# Patient Record
Sex: MALE | Race: BLACK OR AFRICAN AMERICAN | Employment: UNEMPLOYED | URBAN - METROPOLITAN AREA
[De-identification: names, ages, dates, MRNs, and addresses within clinical notes are randomized per-mention and may not be internally consistent; named-entity substitution may affect disease eponyms.]

---

## 2017-12-27 ENCOUNTER — APPOINTMENT (OUTPATIENT)
Dept: GENERAL RADIOLOGY | Age: 42
DRG: 720 | End: 2017-12-27
Payer: MEDICAID

## 2017-12-27 ENCOUNTER — APPOINTMENT (OUTPATIENT)
Dept: CT IMAGING | Age: 42
DRG: 720 | End: 2017-12-27
Payer: MEDICAID

## 2017-12-27 ENCOUNTER — HOSPITAL ENCOUNTER (INPATIENT)
Age: 42
LOS: 2 days | Discharge: HOME HEALTH | DRG: 720 | End: 2017-12-29
Attending: EMERGENCY MEDICINE | Admitting: INTERNAL MEDICINE
Payer: MEDICAID

## 2017-12-27 DIAGNOSIS — T83.511S URINARY TRACT INFECTION ASSOCIATED WITH CATHETERIZATION OF URINARY TRACT, UNSPECIFIED INDWELLING URINARY CATHETER TYPE, SEQUELA: ICD-10-CM

## 2017-12-27 DIAGNOSIS — A41.9 SEPSIS, DUE TO UNSPECIFIED ORGANISM: Primary | ICD-10-CM

## 2017-12-27 DIAGNOSIS — N39.0 URINARY TRACT INFECTION ASSOCIATED WITH CATHETERIZATION OF URINARY TRACT, UNSPECIFIED INDWELLING URINARY CATHETER TYPE, SEQUELA: ICD-10-CM

## 2017-12-27 PROBLEM — G93.40 ENCEPHALOPATHY ACUTE: Status: ACTIVE | Noted: 2017-12-27

## 2017-12-27 LAB
-: NORMAL
ABSOLUTE EOS #: <0.03 K/UL (ref 0–0.44)
ABSOLUTE IMMATURE GRANULOCYTE: 0.08 K/UL (ref 0–0.3)
ABSOLUTE LYMPH #: 1.33 K/UL (ref 1.1–3.7)
ABSOLUTE MONO #: 1.04 K/UL (ref 0.1–1.2)
ALBUMIN SERPL-MCNC: 3.3 G/DL (ref 3.5–5.2)
ALBUMIN SERPL-MCNC: 4 G/DL (ref 3.5–5.2)
ALBUMIN/GLOBULIN RATIO: 1.1 (ref 1–2.5)
ALBUMIN/GLOBULIN RATIO: 1.3 (ref 1–2.5)
ALLEN TEST: ABNORMAL
ALP BLD-CCNC: 100 U/L (ref 40–129)
ALP BLD-CCNC: 101 U/L (ref 40–129)
ALT SERPL-CCNC: 115 U/L (ref 5–41)
ALT SERPL-CCNC: 133 U/L (ref 5–41)
AMMONIA: 35 UMOL/L (ref 16–60)
AMMONIA: 48 UMOL/L (ref 16–60)
AMORPHOUS: NORMAL
AMPHETAMINE SCREEN URINE: NEGATIVE
AMYLASE: 27 U/L (ref 28–100)
ANION GAP SERPL CALCULATED.3IONS-SCNC: 15 MMOL/L (ref 9–17)
ANION GAP SERPL CALCULATED.3IONS-SCNC: 15 MMOL/L (ref 9–17)
ANION GAP: 7 MMOL/L (ref 7–16)
AST SERPL-CCNC: 24 U/L
AST SERPL-CCNC: 53 U/L
BACTERIA: NORMAL
BARBITURATE SCREEN URINE: POSITIVE
BASOPHILS # BLD: 0 % (ref 0–2)
BASOPHILS ABSOLUTE: 0.03 K/UL (ref 0–0.2)
BENZODIAZEPINE SCREEN, URINE: POSITIVE
BILIRUB SERPL-MCNC: 0.65 MG/DL (ref 0.3–1.2)
BILIRUB SERPL-MCNC: 0.84 MG/DL (ref 0.3–1.2)
BILIRUBIN DIRECT: 0.23 MG/DL
BILIRUBIN URINE: NEGATIVE
BILIRUBIN, INDIRECT: 0.42 MG/DL (ref 0–1)
BUN BLDV-MCNC: 9 MG/DL (ref 6–20)
BUN BLDV-MCNC: 9 MG/DL (ref 6–20)
BUN/CREAT BLD: ABNORMAL (ref 9–20)
BUN/CREAT BLD: ABNORMAL (ref 9–20)
BUPRENORPHINE URINE: ABNORMAL
CALCIUM SERPL-MCNC: 8.6 MG/DL (ref 8.6–10.4)
CALCIUM SERPL-MCNC: 9 MG/DL (ref 8.6–10.4)
CANNABINOID SCREEN URINE: POSITIVE
CASTS UA: NORMAL /LPF (ref 0–8)
CHLORIDE BLD-SCNC: 104 MMOL/L (ref 98–107)
CHLORIDE BLD-SCNC: 99 MMOL/L (ref 98–107)
CO2: 21 MMOL/L (ref 20–31)
CO2: 26 MMOL/L (ref 20–31)
COCAINE METABOLITE, URINE: NEGATIVE
COLOR: ABNORMAL
COMMENT UA: ABNORMAL
CORTISOL COLLECTION INFO: NORMAL
CORTISOL: 16.6 UG/DL (ref 2.7–18.4)
CREAT SERPL-MCNC: 0.6 MG/DL (ref 0.7–1.2)
CREAT SERPL-MCNC: 0.73 MG/DL (ref 0.7–1.2)
CRYSTALS, UA: NORMAL /HPF
DIFFERENTIAL TYPE: ABNORMAL
DIRECT EXAM: NORMAL
EOSINOPHILS RELATIVE PERCENT: 0 % (ref 1–4)
EPITHELIAL CELLS UA: NORMAL /HPF (ref 0–5)
FIO2: ABNORMAL
GFR AFRICAN AMERICAN: >60 ML/MIN
GFR AFRICAN AMERICAN: >60 ML/MIN
GFR NON-AFRICAN AMERICAN: >60 ML/MIN
GFR SERPL CREATININE-BSD FRML MDRD: >60 ML/MIN
GFR SERPL CREATININE-BSD FRML MDRD: ABNORMAL ML/MIN/{1.73_M2}
GFR SERPL CREATININE-BSD FRML MDRD: NORMAL ML/MIN/{1.73_M2}
GLOBULIN: ABNORMAL G/DL (ref 1.5–3.8)
GLUCOSE BLD-MCNC: 108 MG/DL (ref 70–99)
GLUCOSE BLD-MCNC: 119 MG/DL (ref 70–99)
GLUCOSE BLD-MCNC: 129 MG/DL (ref 74–100)
GLUCOSE URINE: NEGATIVE
HCO3 VENOUS: 24.2 MMOL/L (ref 22–29)
HCT VFR BLD CALC: 47.5 % (ref 40.7–50.3)
HEMOGLOBIN: 15.6 G/DL (ref 13–17)
IMMATURE GRANULOCYTES: 1 %
KETONES, URINE: NEGATIVE
LACTIC ACID, WHOLE BLOOD: 1.7 MMOL/L (ref 0.7–2.1)
LACTIC ACID, WHOLE BLOOD: 2 MMOL/L (ref 0.7–2.1)
LACTIC ACID, WHOLE BLOOD: 2.8 MMOL/L (ref 0.7–2.1)
LACTIC ACID, WHOLE BLOOD: 2.9 MMOL/L (ref 0.7–2.1)
LACTIC ACID: ABNORMAL MMOL/L
LACTIC ACID: ABNORMAL MMOL/L
LACTIC ACID: NORMAL MMOL/L
LEUKOCYTE ESTERASE, URINE: ABNORMAL
LIPASE: 12 U/L (ref 13–60)
LYMPHOCYTES # BLD: 9 % (ref 24–43)
Lab: NORMAL
Lab: NORMAL
MCH RBC QN AUTO: 31.1 PG (ref 25.2–33.5)
MCHC RBC AUTO-ENTMCNC: 32.8 G/DL (ref 28.4–34.8)
MCV RBC AUTO: 94.6 FL (ref 82.6–102.9)
MDMA URINE: ABNORMAL
METHADONE SCREEN, URINE: NEGATIVE
METHAMPHETAMINE, URINE: ABNORMAL
MODE: ABNORMAL
MONOCYTES # BLD: 7 % (ref 3–12)
MUCUS: NORMAL
NEGATIVE BASE EXCESS, VEN: ABNORMAL (ref 0–2)
NITRITE, URINE: NEGATIVE
O2 DEVICE/FLOW/%: ABNORMAL
O2 SAT, VEN: 67 % (ref 60–85)
OPIATES, URINE: POSITIVE
OTHER OBSERVATIONS UA: NORMAL
OXYCODONE SCREEN URINE: NEGATIVE
PATIENT TEMP: ABNORMAL
PCO2, VEN: 39 MM HG (ref 41–51)
PDW BLD-RTO: 13.3 % (ref 11.8–14.4)
PH UA: 7.5 (ref 5–8)
PH VENOUS: 7.4 (ref 7.32–7.43)
PHENCYCLIDINE, URINE: NEGATIVE
PLATELET # BLD: 214 K/UL (ref 138–453)
PLATELET ESTIMATE: ABNORMAL
PMV BLD AUTO: 11.5 FL (ref 8.1–13.5)
PO2, VEN: 34.7 MM HG (ref 30–50)
POC CHLORIDE: 109 MMOL/L (ref 98–107)
POC CREATININE: 0.92 MG/DL (ref 0.51–1.19)
POC HEMATOCRIT: 52 % (ref 41–53)
POC HEMOGLOBIN: 17.8 G/DL (ref 13.5–17.5)
POC IONIZED CALCIUM: 1.06 MMOL/L (ref 1.15–1.33)
POC LACTIC ACID: 2.52 MMOL/L (ref 0.56–1.39)
POC PCO2 TEMP: ABNORMAL MM HG
POC PH TEMP: ABNORMAL
POC PO2 TEMP: ABNORMAL MM HG
POC POTASSIUM: 4.4 MMOL/L (ref 3.5–4.5)
POC SODIUM: 140 MMOL/L (ref 138–146)
POC TROPONIN I: 0 NG/ML (ref 0–0.1)
POC TROPONIN I: 0 NG/ML (ref 0–0.1)
POC TROPONIN INTERP: NORMAL
POC TROPONIN INTERP: NORMAL
POSITIVE BASE EXCESS, VEN: 0 (ref 0–3)
POTASSIUM SERPL-SCNC: 3.3 MMOL/L (ref 3.7–5.3)
POTASSIUM SERPL-SCNC: 4.5 MMOL/L (ref 3.7–5.3)
PROPOXYPHENE, URINE: ABNORMAL
PROTEIN UA: ABNORMAL
RBC # BLD: 5.02 M/UL (ref 4.21–5.77)
RBC # BLD: ABNORMAL 10*6/UL
RBC UA: NORMAL /HPF (ref 0–4)
RENAL EPITHELIAL, UA: NORMAL /HPF
SAMPLE SITE: ABNORMAL
SEG NEUTROPHILS: 83 % (ref 36–65)
SEGMENTED NEUTROPHILS ABSOLUTE COUNT: 12.15 K/UL (ref 1.5–8.1)
SODIUM BLD-SCNC: 140 MMOL/L (ref 135–144)
SODIUM BLD-SCNC: 140 MMOL/L (ref 135–144)
SPECIFIC GRAVITY UA: 1.03 (ref 1–1.03)
SPECIMEN DESCRIPTION: NORMAL
SPECIMEN DESCRIPTION: NORMAL
STATUS: NORMAL
STATUS: NORMAL
TEST INFORMATION: ABNORMAL
TOTAL CK: 243 U/L (ref 39–308)
TOTAL CO2, VENOUS: 25 MMOL/L (ref 23–30)
TOTAL PROTEIN: 6.2 G/DL (ref 6.4–8.3)
TOTAL PROTEIN: 7 G/DL (ref 6.4–8.3)
TRICHOMONAS: NORMAL
TRICYCLIC ANTIDEPRESSANTS, UR: ABNORMAL
TROPONIN INTERP: NORMAL
TROPONIN T: <0.03 NG/ML
TURBIDITY: CLEAR
URINE HGB: ABNORMAL
UROBILINOGEN, URINE: NORMAL
WBC # BLD: 14.6 K/UL (ref 3.5–11.3)
WBC # BLD: ABNORMAL 10*3/UL
WBC UA: NORMAL /HPF (ref 0–5)
YEAST: NORMAL

## 2017-12-27 PROCEDURE — 99285 EMERGENCY DEPT VISIT HI MDM: CPT

## 2017-12-27 PROCEDURE — 80076 HEPATIC FUNCTION PANEL: CPT

## 2017-12-27 PROCEDURE — 71010 XR CHEST PORTABLE: CPT

## 2017-12-27 PROCEDURE — G0480 DRUG TEST DEF 1-7 CLASSES: HCPCS

## 2017-12-27 PROCEDURE — 93005 ELECTROCARDIOGRAM TRACING: CPT

## 2017-12-27 PROCEDURE — 80307 DRUG TEST PRSMV CHEM ANLYZR: CPT

## 2017-12-27 PROCEDURE — 83605 ASSAY OF LACTIC ACID: CPT

## 2017-12-27 PROCEDURE — 80053 COMPREHEN METABOLIC PANEL: CPT

## 2017-12-27 PROCEDURE — 84132 ASSAY OF SERUM POTASSIUM: CPT

## 2017-12-27 PROCEDURE — 6360000002 HC RX W HCPCS: Performed by: EMERGENCY MEDICINE

## 2017-12-27 PROCEDURE — 87186 SC STD MICRODIL/AGAR DIL: CPT

## 2017-12-27 PROCEDURE — 82435 ASSAY OF BLOOD CHLORIDE: CPT

## 2017-12-27 PROCEDURE — 82947 ASSAY GLUCOSE BLOOD QUANT: CPT

## 2017-12-27 PROCEDURE — 84484 ASSAY OF TROPONIN QUANT: CPT

## 2017-12-27 PROCEDURE — 87077 CULTURE AEROBIC IDENTIFY: CPT

## 2017-12-27 PROCEDURE — 86403 PARTICLE AGGLUT ANTBDY SCRN: CPT

## 2017-12-27 PROCEDURE — 87149 DNA/RNA DIRECT PROBE: CPT

## 2017-12-27 PROCEDURE — 84295 ASSAY OF SERUM SODIUM: CPT

## 2017-12-27 PROCEDURE — 87449 NOS EACH ORGANISM AG IA: CPT

## 2017-12-27 PROCEDURE — 82803 BLOOD GASES ANY COMBINATION: CPT

## 2017-12-27 PROCEDURE — 87205 SMEAR GRAM STAIN: CPT

## 2017-12-27 PROCEDURE — 82140 ASSAY OF AMMONIA: CPT

## 2017-12-27 PROCEDURE — 2060000000 HC ICU INTERMEDIATE R&B

## 2017-12-27 PROCEDURE — 82565 ASSAY OF CREATININE: CPT

## 2017-12-27 PROCEDURE — 6370000000 HC RX 637 (ALT 250 FOR IP): Performed by: STUDENT IN AN ORGANIZED HEALTH CARE EDUCATION/TRAINING PROGRAM

## 2017-12-27 PROCEDURE — 80048 BASIC METABOLIC PNL TOTAL CA: CPT

## 2017-12-27 PROCEDURE — 82533 TOTAL CORTISOL: CPT

## 2017-12-27 PROCEDURE — 2500000003 HC RX 250 WO HCPCS: Performed by: EMERGENCY MEDICINE

## 2017-12-27 PROCEDURE — 36415 COLL VENOUS BLD VENIPUNCTURE: CPT

## 2017-12-27 PROCEDURE — 83690 ASSAY OF LIPASE: CPT

## 2017-12-27 PROCEDURE — 87086 URINE CULTURE/COLONY COUNT: CPT

## 2017-12-27 PROCEDURE — G8987 SELF CARE CURRENT STATUS: HCPCS

## 2017-12-27 PROCEDURE — 82150 ASSAY OF AMYLASE: CPT

## 2017-12-27 PROCEDURE — 6360000002 HC RX W HCPCS: Performed by: STUDENT IN AN ORGANIZED HEALTH CARE EDUCATION/TRAINING PROGRAM

## 2017-12-27 PROCEDURE — 82550 ASSAY OF CK (CPK): CPT

## 2017-12-27 PROCEDURE — 81001 URINALYSIS AUTO W/SCOPE: CPT

## 2017-12-27 PROCEDURE — 82330 ASSAY OF CALCIUM: CPT

## 2017-12-27 PROCEDURE — 85014 HEMATOCRIT: CPT

## 2017-12-27 PROCEDURE — 70450 CT HEAD/BRAIN W/O DYE: CPT

## 2017-12-27 PROCEDURE — 87899 AGENT NOS ASSAY W/OPTIC: CPT

## 2017-12-27 PROCEDURE — 2580000003 HC RX 258: Performed by: STUDENT IN AN ORGANIZED HEALTH CARE EDUCATION/TRAINING PROGRAM

## 2017-12-27 PROCEDURE — 97167 OT EVAL HIGH COMPLEX 60 MIN: CPT

## 2017-12-27 PROCEDURE — 6370000000 HC RX 637 (ALT 250 FOR IP): Performed by: EMERGENCY MEDICINE

## 2017-12-27 PROCEDURE — 2580000003 HC RX 258: Performed by: EMERGENCY MEDICINE

## 2017-12-27 PROCEDURE — G8988 SELF CARE GOAL STATUS: HCPCS

## 2017-12-27 PROCEDURE — 87040 BLOOD CULTURE FOR BACTERIA: CPT

## 2017-12-27 PROCEDURE — 85025 COMPLETE CBC W/AUTO DIFF WBC: CPT

## 2017-12-27 PROCEDURE — 97530 THERAPEUTIC ACTIVITIES: CPT

## 2017-12-27 RX ORDER — ALBUTEROL SULFATE 2.5 MG/3ML
2.5 SOLUTION RESPIRATORY (INHALATION) EVERY 4 HOURS PRN
Status: DISCONTINUED | OUTPATIENT
Start: 2017-12-27 | End: 2017-12-29 | Stop reason: HOSPADM

## 2017-12-27 RX ORDER — 0.9 % SODIUM CHLORIDE 0.9 %
1000 INTRAVENOUS SOLUTION INTRAVENOUS ONCE
Status: COMPLETED | OUTPATIENT
Start: 2017-12-27 | End: 2017-12-27

## 2017-12-27 RX ORDER — ACETAMINOPHEN 650 MG/1
650 SUPPOSITORY RECTAL ONCE
Status: COMPLETED | OUTPATIENT
Start: 2017-12-27 | End: 2017-12-27

## 2017-12-27 RX ORDER — PRIMIDONE 50 MG/1
50 TABLET ORAL 2 TIMES DAILY
Status: DISCONTINUED | OUTPATIENT
Start: 2017-12-27 | End: 2017-12-29 | Stop reason: HOSPADM

## 2017-12-27 RX ORDER — POTASSIUM CHLORIDE 20 MEQ/1
40 TABLET, EXTENDED RELEASE ORAL 2 TIMES DAILY WITH MEALS
Status: COMPLETED | OUTPATIENT
Start: 2017-12-27 | End: 2017-12-28

## 2017-12-27 RX ORDER — AZITHROMYCIN 250 MG/1
1000 TABLET, FILM COATED ORAL ONCE
Status: DISCONTINUED | OUTPATIENT
Start: 2017-12-27 | End: 2017-12-27

## 2017-12-27 RX ORDER — ACETAMINOPHEN 325 MG/1
650 TABLET ORAL EVERY 4 HOURS PRN
Status: DISCONTINUED | OUTPATIENT
Start: 2017-12-27 | End: 2017-12-29 | Stop reason: HOSPADM

## 2017-12-27 RX ORDER — SODIUM CHLORIDE 0.9 % (FLUSH) 0.9 %
10 SYRINGE (ML) INJECTION PRN
Status: DISCONTINUED | OUTPATIENT
Start: 2017-12-27 | End: 2017-12-29 | Stop reason: HOSPADM

## 2017-12-27 RX ORDER — SODIUM CHLORIDE 9 MG/ML
INJECTION, SOLUTION INTRAVENOUS CONTINUOUS
Status: DISCONTINUED | OUTPATIENT
Start: 2017-12-27 | End: 2017-12-29 | Stop reason: HOSPADM

## 2017-12-27 RX ORDER — SODIUM CHLORIDE 0.9 % (FLUSH) 0.9 %
10 SYRINGE (ML) INJECTION EVERY 12 HOURS SCHEDULED
Status: DISCONTINUED | OUTPATIENT
Start: 2017-12-27 | End: 2017-12-29 | Stop reason: HOSPADM

## 2017-12-27 RX ADMIN — SODIUM CHLORIDE 1000 ML: 9 INJECTION, SOLUTION INTRAVENOUS at 10:12

## 2017-12-27 RX ADMIN — PRIMIDONE 50 MG: 50 TABLET ORAL at 20:35

## 2017-12-27 RX ADMIN — CEFTRIAXONE SODIUM 1 G: 1 INJECTION, POWDER, FOR SOLUTION INTRAMUSCULAR; INTRAVENOUS at 16:58

## 2017-12-27 RX ADMIN — CEFTRIAXONE SODIUM 1 G: 1 INJECTION, POWDER, FOR SOLUTION INTRAMUSCULAR; INTRAVENOUS at 11:07

## 2017-12-27 RX ADMIN — SODIUM CHLORIDE 1000 ML: 9 INJECTION, SOLUTION INTRAVENOUS at 11:07

## 2017-12-27 RX ADMIN — ENOXAPARIN SODIUM 40 MG: 40 INJECTION SUBCUTANEOUS at 16:57

## 2017-12-27 RX ADMIN — SODIUM CHLORIDE: 9 INJECTION, SOLUTION INTRAVENOUS at 16:57

## 2017-12-27 RX ADMIN — ACETAMINOPHEN 650 MG: 650 SUPPOSITORY RECTAL at 10:17

## 2017-12-27 RX ADMIN — POTASSIUM CHLORIDE 40 MEQ: 20 TABLET, EXTENDED RELEASE ORAL at 18:48

## 2017-12-27 RX ADMIN — SODIUM CHLORIDE 1000 ML: 9 INJECTION, SOLUTION INTRAVENOUS at 15:59

## 2017-12-27 RX ADMIN — AZITHROMYCIN MONOHYDRATE 500 MG: 500 INJECTION, POWDER, LYOPHILIZED, FOR SOLUTION INTRAVENOUS at 12:47

## 2017-12-27 ASSESSMENT — PAIN DESCRIPTION - PAIN TYPE: TYPE: ACUTE PAIN

## 2017-12-27 ASSESSMENT — PAIN SCALES - GENERAL: PAINLEVEL_OUTOF10: 0

## 2017-12-27 ASSESSMENT — PAIN DESCRIPTION - FREQUENCY: FREQUENCY: CONTINUOUS

## 2017-12-27 ASSESSMENT — PAIN DESCRIPTION - LOCATION: LOCATION: PENIS

## 2017-12-27 ASSESSMENT — PAIN DESCRIPTION - DESCRIPTORS: DESCRIPTORS: BURNING;TENDER

## 2017-12-27 ASSESSMENT — PAIN SCALES - WONG BAKER: WONGBAKER_NUMERICALRESPONSE: 2

## 2017-12-27 NOTE — H&P
congestion. Visualized osseous structures appear intact, and grossly unremarkable, given the non dedicated imaging. No radiographic evidence for acute cardiopulmonary disease process. ASSESSMENT/PLAN:  1. Sepsis. Source is urine. May have a developing pneumonia as well which is not evident on the chest x-ray. .  #2 acute metabolic encephalopathy  #3 multiple sclerosis. At baseline he is wheelchair bound and incontinent  #4 severe dehydration  #5 hyperlactatemia which has resolved after getting IV fluids      · - Patient got a dose of Rocephin and azithromycin in the emergency room. Continue Rocephin. Decide about next dose of azithromycin tomorrow. · Follow urine culture and blood culture  · Influenza A and B antigens, Legionella urine antigen, streptococcal antigen  · Patient has got 2 L of fluid boluses in the emergency room. We will give another fluid bolus on the floor and then continue fluids at 150 mL per hour  · Monitor intake and output daily weights, BMP, lactic acid  · DVT prophylaxis  · Neurology consultation for multiple sclerosis  · Tylenol to keep patient normothermic  · GI prophylaxis  · Assess mentation periodically. Keep nothing by mouth for now. · Oxygen inhalation to keep saturation above 92%.   When necessary albuterol nebulization for wheezing  · Follow-up toxic screen          Ava Burks MD  Internal Medicine, PGY2 Resident  [facility]

## 2017-12-27 NOTE — ED NOTES
Family arrives at bedside, Resident at bedside to speak with family, will continue to monitor      Jaron Riddle RN  12/27/17 [id number]

## 2017-12-27 NOTE — ED NOTES
Patient resting on stretcher, pt responds to verbal, will continue to monitor      Jake Bajwa RN  12/27/17 [id number]

## 2017-12-27 NOTE — ED PROVIDER NOTES
nasal spray 1 spray by Nasal route daily 3/10/16   Ying Raza, DO   albuterol (PROVENTIL) (2.5 MG/3ML) 0.083% nebulizer solution Take 3 mLs by nebulization every 4 hours as needed for Wheezing 3/10/16   Ying Raza, DO   Catheters (GIZMO CONDOM CATHETER) MISC 1 Units by Does not apply route daily 3/10/16   Ying Raza, DO   Incontinence Supplies (URINARY DRAINAGE BAG) MISC 1 Units by Other route once a week Change fry bag at least once weekly. Empty every 4-6 hours. 3/10/16   Ying Raza, DO   pantoprazole (PROTONIX) 40 MG tablet Take 1 tablet by mouth daily Secondary to possible NG trauma from intubation 3/10/16   Ying Raza, DO   clonazePAM (KLONOPIN) 1 MG tablet Take 1 mg by mouth 2 times daily as needed    Historical Provider, MD   primidone (MYSOLINE) 50 MG tablet Take 50 mg by mouth 2 times daily    Historical Provider, MD   DULoxetine (CYMBALTA) 60 MG capsule Take 60 mg by mouth daily    Historical Provider, MD   baclofen (LIORESAL) 20 MG tablet Take 20 mg by mouth 3 times daily    Historical Provider, MD   nortriptyline (PAMELOR) 25 MG capsule Take 50 mg by mouth nightly     Historical Provider, MD   QUEtiapine (SEROQUEL) 25 MG tablet Take 25 mg by mouth daily     Historical Provider, MD       REVIEW OF SYSTEMS    (2-9 systems for level 4, 10 or more for level 5)      Review of Systems   Unable to perform ROS: Mental status change       PHYSICAL EXAM   (up to 7 for level 4, 8 or more for level 5)      INITIAL VITALS:   /75   Pulse 103   Temp 98.5 °F (36.9 °C) (Oral)   Resp 25   Ht 6' 1\" (1.854 m)   Wt 141 lb 15.6 oz (64.4 kg)   SpO2 95%   BMI 18.73 kg/m²     Physical Exam   Constitutional: He appears lethargic. He appears cachectic. He has a sickly appearance. No distress. HENT:   Head: Normocephalic and atraumatic. Eyes: Right eye exhibits no discharge. Left eye exhibits no discharge. Neck: Normal range of motion. No JVD present.  No tracheal deviation present. Cardiovascular: Normal rate, normal heart sounds and intact distal pulses. Pulmonary/Chest: Effort normal. No stridor. No respiratory distress. He has no wheezes. He has rales in the left middle field and the left lower field. Abdominal: Soft. He exhibits no distension. There is no tenderness. There is no guarding. Musculoskeletal: Normal range of motion. Lymphadenopathy:     He has no cervical adenopathy. Neurological: He appears lethargic. He is disoriented. GCS eye subscore is 3. GCS verbal subscore is 2. GCS motor subscore is 6. Skin: Skin is warm. No rash noted.        DIFFERENTIAL  DIAGNOSIS     PLAN (LABS / IMAGING / EKG):  Orders Placed This Encounter   Procedures    Culture Blood #1    Culture Blood #1    RAPID INFLUENZA A/B ANTIGENS    LEGIONELLA ANTIGEN, URINE    Strep Pneumoniae Antigen    Urine Culture    CT Head WO Contrast    XR Chest Portable    Hemoglobin and hematocrit, blood    CBC WITH AUTO DIFFERENTIAL    BASIC METABOLIC PANEL    HEPATIC FUNCTION PANEL    UA W/REFLEX CULTURE    Lactic Acid, Plasma    SODIUM (POC)    POTASSIUM (POC)    CHLORIDE (POC)    CALCIUM, IONIC (POC)    Ammonia    Lactic Acid, Plasma    Microscopic Urinalysis    Protein, Urine    Lactic acid, plasma    Comprehensive metabolic panel    Troponin    CK    Cortisol    Amylase    Lipase    CBC auto differential    CK    Magnesium    Phosphorus    AMMONIA    DRUG SCREEN MULTI URINE    TOX SCR, COMPLETE BL    Lactic Acid, Whole Blood    Diet NPO Effective Now    Vital signs per unit routine    Daily weights    Intake and output    Telemetry monitoring    Notify physician    Up as tolerated    Full Code    Inpatient consult to Social Work    Inpatient Consult to Neurology    OT eval and treat    PT evaluation and treat    Initiate Oxygen Therapy Protocol    POCT troponin    Venous Blood Gas, POC    Creatinine W/GFR Point of Care    POCT American >60 >60 mL/min    GFR Comment         Lactic Acid, POC   Result Value Ref Range    POC Lactic Acid 2.52 (H) 0.56 - 1.39 mmol/L   POCT Glucose   Result Value Ref Range    POC Glucose 129 (H) 74 - 100 mg/dL   Anion Gap (Calc) POC   Result Value Ref Range    Anion Gap 7 7 - 16 mmol/L   POCT troponin   Result Value Ref Range    POC Troponin I 0.00 0.00 - 0.10 ng/mL    POC Troponin Interp       The Troponin-I (POC) results cannot be compared to the Troponin-T results. IMPRESSION: MENTAL status like this secondary to sepsis. Patient has a history of multiple sclerosis, was noted this morning to be altered by his wife. Patient is AO ×1 ( to person), GCS of 11 and maintaining his airway  without difficulty. Vitals are remarkable for a fever 102.5, heart rate of 106. Normal heart sounds and auscultation, mild crackles at the left lung base on auscultation of the lungs. Abdomen is soft, nontender, nondistended with no guarding or rigidity. We will initiate an altered mental status workup including a CT scan of the head, chest x-ray, EKG and start patient on IV fluids. We will also obtain cultures, rectal Tylenol for fever. RADIOLOGY:  Ct Head Wo Contrast    Result Date: 12/27/2017  EXAMINATION: CT OF THE HEAD WITHOUT CONTRAST  12/27/2017 10:00 am TECHNIQUE: CT of the head was performed without the administration of intravenous contrast. Dose modulation, iterative reconstruction, and/or weight based adjustment of the mA/kV was utilized to reduce the radiation dose to as low as reasonably achievable. COMPARISON: 03/05/2016, MR brain from 03/07/2016 HISTORY: ORDERING SYSTEM PROVIDED HISTORY: altered mentral status 51-year-old male with altered mental status FINDINGS: BRAIN/VENTRICLES: Foramen magnum and 4th ventricle appear patent. Stable mild enlargement of the ventricles which are unchanged in size and remain midline in position.   Stable prominence of the left temporal extra-axial space anteriorly on 1. Sepsis, due to unspecified organism ([facility] [address]    2. Urinary tract infection associated with catheterization of urinary tract, unspecified indwelling urinary catheter type, sequela          DISPOSITION / PLAN     DISPOSITION Admitted 12/27/2017 12:19:17 PM      PATIENT REFERRED TO:  No follow-up provider specified.     DISCHARGE MEDICATIONS:  Current Discharge Medication List          Zaria Rosen MD  Emergency Medicine Resident    (Please note that portions of this note were completed with a voice recognition program.  Efforts were made to edit the dictations but occasionally words are mis-transcribed.)       Zaria Rosen MD  Resident  12/27/17 [phone]

## 2017-12-27 NOTE — PROGRESS NOTES
to [facility]  L Shoulder Flex: 4+/5  L Elbow Flex: 4+/5  L Elbow Ext: 4/5  L Hand Grasp: 5/5  L Hand Release: 5/5  RUE Strength  Gross RUE Strength: Exceptions to [facility]  R Shoulder Flex: 3-/5  R Elbow Flex: 4-/5  R Elbow Ext: 3+/5  R Hand Grasp: 4+/5  R Hand Release: 4+/5      Assessment   Performance deficits / Impairments: Decreased functional mobility ; Decreased ADL status; Decreased balance;Decreased safe awareness;Decreased endurance;Decreased high-level IADLs;Decreased strength;Decreased coordination  Prognosis: Fair  Decision Making: High Complexity  Patient Education: Safety awareness, OTPOC, discharge rec  REQUIRES OT FOLLOW UP: Yes  Activity Tolerance  Activity Tolerance: Patient limited by fatigue;Treatment limited secondary to medical complications (free text)  Activity Tolerance: Hx of MS, tremors in RUE, limited movement noted in BLE's  Safety Devices  Safety Devices in place: Yes  Type of devices: Left in bed;Nurse notified; All fall risk precautions in place;Call light within reach (RN in room)  Restraints  Initially in place: No         Plan   Plan  Times per week: 4-5x  Current Treatment Recommendations: Strengthening, Functional Mobility Training, Endurance Training, Neuromuscular Re-education, Safety Education & Training, Pain Management, Self-Care / ADL, Home Management Training    G-Code  OT G-codes  Functional Assessment Tool Used: [facility]  Score: 9/24  Functional Limitation: Self care  Self Care Current Status (): At least 80 percent but less than 100 percent impaired, limited or restricted (Pt has low baseline d/t multiple sclerosis)  Self Care Goal Status ():  At least 60 percent but less than 80 percent impaired, limited or restricted    Goals  Short term goals  Time Frame for Short term goals: Pt will by discharge   Short term goal 1: demo supine<>sit transfer with mod A and increased time  Short term goal 2: demo ADL feeding/grooming task seated with mod A and setup  Short term goal

## 2017-12-28 ENCOUNTER — APPOINTMENT (OUTPATIENT)
Dept: MRI IMAGING | Age: 42
DRG: 720 | End: 2017-12-28
Payer: MEDICAID

## 2017-12-28 LAB
ABSOLUTE EOS #: 0.04 K/UL (ref 0–0.44)
ABSOLUTE IMMATURE GRANULOCYTE: 0.07 K/UL (ref 0–0.3)
ABSOLUTE LYMPH #: 1.86 K/UL (ref 1.1–3.7)
ABSOLUTE MONO #: 1.24 K/UL (ref 0.1–1.2)
BASOPHILS # BLD: 0 % (ref 0–2)
BASOPHILS ABSOLUTE: 0.04 K/UL (ref 0–0.2)
CULTURE: ABNORMAL
CULTURE: ABNORMAL
DIFFERENTIAL TYPE: ABNORMAL
EKG ATRIAL RATE: 113 BPM
EKG P AXIS: 57 DEGREES
EKG P-R INTERVAL: 166 MS
EKG Q-T INTERVAL: 310 MS
EKG QRS DURATION: 84 MS
EKG QTC CALCULATION (BAZETT): 425 MS
EKG R AXIS: 62 DEGREES
EKG T AXIS: 58 DEGREES
EKG VENTRICULAR RATE: 113 BPM
EOSINOPHILS RELATIVE PERCENT: 0 % (ref 1–4)
FOLATE: 10.9 NG/ML
GLUCOSE BLD-MCNC: 106 MG/DL (ref 75–110)
GLUCOSE BLD-MCNC: 106 MG/DL (ref 75–110)
HCT VFR BLD CALC: 35.2 % (ref 40.7–50.3)
HEMOGLOBIN: 12 G/DL (ref 13–17)
IMMATURE GRANULOCYTES: 1 %
LACTIC ACID, WHOLE BLOOD: 1 MMOL/L (ref 0.7–2.1)
LACTIC ACID: NORMAL MMOL/L
LYMPHOCYTES # BLD: 15 % (ref 24–43)
Lab: ABNORMAL
MAGNESIUM: 1.6 MG/DL (ref 1.6–2.6)
MCH RBC QN AUTO: 31.7 PG (ref 25.2–33.5)
MCHC RBC AUTO-ENTMCNC: 34.1 G/DL (ref 28.4–34.8)
MCV RBC AUTO: 93.1 FL (ref 82.6–102.9)
MONOCYTES # BLD: 10 % (ref 3–12)
ORGANISM: ABNORMAL
PDW BLD-RTO: 13.5 % (ref 11.8–14.4)
PHOSPHORUS: 1.7 MG/DL (ref 2.5–4.5)
PLATELET # BLD: 155 K/UL (ref 138–453)
PLATELET ESTIMATE: ABNORMAL
PMV BLD AUTO: 11.7 FL (ref 8.1–13.5)
RBC # BLD: 3.78 M/UL (ref 4.21–5.77)
RBC # BLD: ABNORMAL 10*6/UL
SEG NEUTROPHILS: 74 % (ref 36–65)
SEGMENTED NEUTROPHILS ABSOLUTE COUNT: 9.36 K/UL (ref 1.5–8.1)
SPECIMEN DESCRIPTION: ABNORMAL
STATUS: ABNORMAL
TOTAL CK: 442 U/L (ref 39–308)
TOTAL CK: 472 U/L (ref 39–308)
VITAMIN B-12: 439 PG/ML (ref 232–1245)
WBC # BLD: 12.6 K/UL (ref 3.5–11.3)
WBC # BLD: ABNORMAL 10*3/UL

## 2017-12-28 PROCEDURE — 2060000000 HC ICU INTERMEDIATE R&B

## 2017-12-28 PROCEDURE — 82947 ASSAY GLUCOSE BLOOD QUANT: CPT

## 2017-12-28 PROCEDURE — 36415 COLL VENOUS BLD VENIPUNCTURE: CPT

## 2017-12-28 PROCEDURE — 83735 ASSAY OF MAGNESIUM: CPT

## 2017-12-28 PROCEDURE — 70553 MRI BRAIN STEM W/O & W/DYE: CPT

## 2017-12-28 PROCEDURE — 82550 ASSAY OF CK (CPK): CPT

## 2017-12-28 PROCEDURE — 84100 ASSAY OF PHOSPHORUS: CPT

## 2017-12-28 PROCEDURE — 82746 ASSAY OF FOLIC ACID SERUM: CPT

## 2017-12-28 PROCEDURE — 2500000003 HC RX 250 WO HCPCS: Performed by: STUDENT IN AN ORGANIZED HEALTH CARE EDUCATION/TRAINING PROGRAM

## 2017-12-28 PROCEDURE — 2580000003 HC RX 258: Performed by: STUDENT IN AN ORGANIZED HEALTH CARE EDUCATION/TRAINING PROGRAM

## 2017-12-28 PROCEDURE — 6360000004 HC RX CONTRAST MEDICATION: Performed by: INTERNAL MEDICINE

## 2017-12-28 PROCEDURE — 6360000002 HC RX W HCPCS: Performed by: STUDENT IN AN ORGANIZED HEALTH CARE EDUCATION/TRAINING PROGRAM

## 2017-12-28 PROCEDURE — 6370000000 HC RX 637 (ALT 250 FOR IP): Performed by: STUDENT IN AN ORGANIZED HEALTH CARE EDUCATION/TRAINING PROGRAM

## 2017-12-28 PROCEDURE — 85025 COMPLETE CBC W/AUTO DIFF WBC: CPT

## 2017-12-28 PROCEDURE — 99254 IP/OBS CNSLTJ NEW/EST MOD 60: CPT | Performed by: PSYCHIATRY & NEUROLOGY

## 2017-12-28 PROCEDURE — 72156 MRI NECK SPINE W/O & W/DYE: CPT

## 2017-12-28 PROCEDURE — 83605 ASSAY OF LACTIC ACID: CPT

## 2017-12-28 PROCEDURE — 82607 VITAMIN B-12: CPT

## 2017-12-28 PROCEDURE — 99223 1ST HOSP IP/OBS HIGH 75: CPT | Performed by: INTERNAL MEDICINE

## 2017-12-28 PROCEDURE — A9579 GAD-BASE MR CONTRAST NOS,1ML: HCPCS | Performed by: INTERNAL MEDICINE

## 2017-12-28 RX ORDER — SODIUM CHLORIDE 0.9 % (FLUSH) 0.9 %
10 SYRINGE (ML) INJECTION 2 TIMES DAILY
Status: DISCONTINUED | OUTPATIENT
Start: 2017-12-28 | End: 2017-12-29 | Stop reason: HOSPADM

## 2017-12-28 RX ORDER — VANCOMYCIN HYDROCHLORIDE 1 G/200ML
1000 INJECTION, SOLUTION INTRAVENOUS EVERY 12 HOURS
Status: DISCONTINUED | OUTPATIENT
Start: 2017-12-28 | End: 2017-12-28

## 2017-12-28 RX ADMIN — PRIMIDONE 50 MG: 50 TABLET ORAL at 08:46

## 2017-12-28 RX ADMIN — POTASSIUM PHOSPHATE, MONOBASIC AND POTASSIUM PHOSPHATE, DIBASIC 10 MMOL: 224; 236 INJECTION, SOLUTION, CONCENTRATE INTRAVENOUS at 12:00

## 2017-12-28 RX ADMIN — GADOPENTETATE DIMEGLUMINE 13 ML: 469.01 INJECTION INTRAVENOUS at 15:33

## 2017-12-28 RX ADMIN — PRIMIDONE 50 MG: 50 TABLET ORAL at 21:25

## 2017-12-28 RX ADMIN — CEFTRIAXONE SODIUM 1 G: 1 INJECTION, POWDER, FOR SOLUTION INTRAMUSCULAR; INTRAVENOUS at 15:30

## 2017-12-28 RX ADMIN — ENOXAPARIN SODIUM 40 MG: 40 INJECTION SUBCUTANEOUS at 08:46

## 2017-12-28 RX ADMIN — POTASSIUM CHLORIDE 40 MEQ: 20 TABLET, EXTENDED RELEASE ORAL at 08:46

## 2017-12-28 RX ADMIN — SODIUM CHLORIDE: 9 INJECTION, SOLUTION INTRAVENOUS at 12:19

## 2017-12-28 RX ADMIN — SODIUM CHLORIDE, PRESERVATIVE FREE 10 ML: 5 INJECTION INTRAVENOUS at 08:46

## 2017-12-28 RX ADMIN — ACETAMINOPHEN 650 MG: 325 TABLET ORAL at 07:37

## 2017-12-28 RX ADMIN — ACETAMINOPHEN 650 MG: 325 TABLET ORAL at 21:42

## 2017-12-28 ASSESSMENT — PAIN SCALES - GENERAL: PAINLEVEL_OUTOF10: 3

## 2017-12-28 NOTE — PLAN OF CARE
Problem: Pain:  Goal: Pain level will decrease  Pain level will decrease   Outcome: Ongoing  Pain assessment completed. Patient educated on pain scale and to call out with any new onset of pain or unrelieved pain. PRN pain medication given per patient request. Will continue to monitor. Piper Gold RN      Problem: Risk for Impaired Skin Integrity  Goal: Tissue integrity - skin and mucous membranes  Structural intactness and normal physiological function of skin and  mucous membranes. Outcome: Ongoing  Skin assessment performed during this shift. No new skin breakdown noted. Patient is reminded to turn every 2 hours. Will continue to monitor. Piper Gold RN      Problem: Falls - Risk of  Goal: Absence of falls  Outcome: Ongoing  Fall precautions in place. Bed in lowest position, wheels locked, bed alarm in place and activated. Non-skid socks on patient. Fall risk ID on patient, call light within reach. Environment free of clutter and adequate lighting provided. Patient is encouraged to call out before getting out of bed and for any other needs. Will continue to monitor. Piper Gold RN

## 2017-12-28 NOTE — PROGRESS NOTES
Physician Statement  I have discussed the care of Neil Blunt, including pertinent history and exam findings,  with the resident. I have seen and examined the patient and the key elements of all parts of the encounter have been performed by me. I agree with the assessment, plan and orders as documented by the resident.      Phil Driscoll MD  12/28/2017  2:24 PM

## 2017-12-28 NOTE — CONSULTS
1,250 mg Intravenous Q12H Edil Berry MD        vancomycin (VANCOCIN) intermittent dosing (placeholder)   Other RX Placeholder Edil Berry MD        albuterol (PROVENTIL) nebulizer solution 2.5 mg  2.5 mg Nebulization Q4H PRN Veverly MD Judith        primidone (MYSOLINE) tablet 50 mg  50 mg Oral BID Veverly MD Judith   50 mg at 12/28/17 0846    sodium chloride flush 0.9 % injection 10 mL  10 mL Intravenous 2 times per day Veverly MD Judith   10 mL at 12/28/17 0846    sodium chloride flush 0.9 % injection 10 mL  10 mL Intravenous PRN Veverly MD Judith        acetaminophen (TYLENOL) tablet 650 mg  650 mg Oral Q4H PRN Veverly MD Judith   650 mg at 12/28/17 0737    enoxaparin (LOVENOX) injection 40 mg  40 mg Subcutaneous Daily Elysialy MD Judith   40 mg at 12/28/17 0846    0.9 % sodium chloride infusion   Intravenous Continuous Veverly MD Jduith 150 mL/hr at 12/27/17 1657      cefTRIAXone (ROCEPHIN) 1 g in sterile water 10 mL IV syringe  1 g Intravenous Q24H Veverly MD Judith   1 g at 12/27/17 1658       No Known Allergies    ROS:   Constitutional                  Postive for fever and chills   HEENT                            Negative for ear discharge, ear pain, nosebleed  Eyes                                Negative for photophobia, pain and discharge  Respiratory                      Positive for cough  Cardiovascular                Negative for orthopnea, claudication and PND  Gastrointestinal               Negative for abdominal pain, diarrhea, blood in stool  Musculoskeletal               Negative for joint pain, negative for myalgia  Skin                                 Negative for rash or itching  Endo/heme/allergies       Negative for polydipsia, environmental allergy  Psychiatric                       Negative for suicidal ideation.   Patient is not anxious    Vitals:    12/28/17 0723   BP: (!) 141/79   Pulse: 98   Resp: 25   Temp: 101.3 °F (38.5 °C)   SpO2: 96%

## 2017-12-28 NOTE — PROGRESS NOTES
Pharmacy Note  Vancomycin Consult    Hunter Hernandez is a 38 y.o. male started on Vancomycin for sepsis; consult received from Dr. Eloisa Nickerson to manage therapy. Also receiving the following antibiotics: ceftriaxone. Patient Active Problem List   Diagnosis    Influenza A    Altered mental status    Lactic acidosis    Multiple sclerosis ([facility] [address]    Metabolic encephalopathy    Acute respiratory failure (HCC)    Relapsing remitting multiple sclerosis (HCC)    Acute respiratory failure with hypoxia (HCC)    Encephalopathy acute       Allergies:  Review of patient's allergies indicates no known allergies. Temp max: 38.6 C    Recent Labs      12/27/17   0911  12/27/17   1536   BUN  9  9       Recent Labs      12/27/17   0911  12/27/17   1536   CREATININE  0.73  0.60*       Recent Labs      12/27/17   0911  12/28/17   0608   WBC  14.6*  12.6*         Intake/Output Summary (Last 24 hours) at 12/28/17 0809  Last data filed at 12/28/17 0550   Gross per 24 hour   Intake 2852 ml   Output 1300 ml   Net 1552 ml       Culture Date      Source                       Results  12.27.17              Legionella                  negative  12.27.17              Strep Pneumoniae    negative  12.27.17              Urine                          pending  12.27.17              Blood x2                    pending    Ht Readings from Last 1 Encounters:   12/27/17 6' 1\" (1.854 m)        Wt Readings from Last 1 Encounters:   12/27/17 141 lb 15.6 oz (64.4 kg)         Body mass index is 18.73 kg/m². Estimated Creatinine Clearance: 149 mL/min (based on SCr of 0.6 mg/dL). Assessment/Plan:  Will initiate vancomycin 1250 mg IV every 12 hours. Timing of trough level will be determined based on culture results, renal function, and clinical response. Thank you for the consult. Will continue to follow.   Licha Alberts, JesseniaD BCPS  12/28/2017 8:10 AM

## 2017-12-29 VITALS
HEART RATE: 66 BPM | WEIGHT: 141.98 LBS | HEIGHT: 73 IN | BODY MASS INDEX: 18.82 KG/M2 | RESPIRATION RATE: 26 BRPM | TEMPERATURE: 99.2 F | DIASTOLIC BLOOD PRESSURE: 86 MMHG | OXYGEN SATURATION: 95 % | SYSTOLIC BLOOD PRESSURE: 138 MMHG

## 2017-12-29 LAB
ACETAMINOPHEN LEVEL: <10 UG/ML (ref 10–30)
AMPHETAMINE: NEGATIVE NG/ML
ANION GAP SERPL CALCULATED.3IONS-SCNC: 14 MMOL/L (ref 9–17)
BARBITURATES: POSITIVE NG/ML
BENZODIAZEPINES: NEGATIVE NG/ML
BUN BLDV-MCNC: 3 MG/DL (ref 6–20)
BUN/CREAT BLD: ABNORMAL (ref 9–20)
BUPRENORPHINE: NEGATIVE NG/ML
CALCIUM SERPL-MCNC: 8.1 MG/DL (ref 8.6–10.4)
CHLORIDE BLD-SCNC: 105 MMOL/L (ref 98–107)
CO2: 20 MMOL/L (ref 20–31)
COCAINE: NEGATIVE NG/ML
CREAT SERPL-MCNC: 0.49 MG/DL (ref 0.7–1.2)
CULTURE: ABNORMAL
DRUGS OF ABUSE COMMENT: ABNORMAL
ETHANOL PERCENT: <0.01 %
ETHANOL: <10 MG/DL
GFR AFRICAN AMERICAN: >60 ML/MIN
GFR NON-AFRICAN AMERICAN: >60 ML/MIN
GFR SERPL CREATININE-BSD FRML MDRD: ABNORMAL ML/MIN/{1.73_M2}
GFR SERPL CREATININE-BSD FRML MDRD: ABNORMAL ML/MIN/{1.73_M2}
GLUCOSE BLD-MCNC: 106 MG/DL (ref 75–110)
GLUCOSE BLD-MCNC: 109 MG/DL (ref 75–110)
GLUCOSE BLD-MCNC: 129 MG/DL (ref 75–110)
GLUCOSE BLD-MCNC: 98 MG/DL (ref 70–99)
Lab: ABNORMAL
MAGNESIUM: 1.8 MG/DL (ref 1.6–2.6)
METHADONE: NEGATIVE NG/ML
METHAMPHETAMINE: NEGATIVE NG/ML
OPIATES: NEGATIVE NG/ML
ORGANISM: ABNORMAL
OXYCODONE: NEGATIVE NG/ML
PHENCYCLIDINE: NEGATIVE NG/ML
PHOSPHORUS: 2.7 MG/DL (ref 2.5–4.5)
POTASSIUM SERPL-SCNC: 3.7 MMOL/L (ref 3.7–5.3)
SALICYLATE LEVEL: <1 MG/DL (ref 3–10)
SODIUM BLD-SCNC: 139 MMOL/L (ref 135–144)
SPECIMEN DESCRIPTION: ABNORMAL
STATUS: ABNORMAL
THC: POSITIVE NG/ML
TOTAL CK: 307 U/L (ref 39–308)
TOXIC TRICYCLIC SC,BLOOD: NEGATIVE

## 2017-12-29 PROCEDURE — 95819 EEG AWAKE AND ASLEEP: CPT

## 2017-12-29 PROCEDURE — 2580000003 HC RX 258: Performed by: STUDENT IN AN ORGANIZED HEALTH CARE EDUCATION/TRAINING PROGRAM

## 2017-12-29 PROCEDURE — 92610 EVALUATE SWALLOWING FUNCTION: CPT

## 2017-12-29 PROCEDURE — G8997 SWALLOW GOAL STATUS: HCPCS

## 2017-12-29 PROCEDURE — 36415 COLL VENOUS BLD VENIPUNCTURE: CPT

## 2017-12-29 PROCEDURE — G8978 MOBILITY CURRENT STATUS: HCPCS

## 2017-12-29 PROCEDURE — 99238 HOSP IP/OBS DSCHRG MGMT 30/<: CPT | Performed by: INTERNAL MEDICINE

## 2017-12-29 PROCEDURE — G8979 MOBILITY GOAL STATUS: HCPCS

## 2017-12-29 PROCEDURE — 82550 ASSAY OF CK (CPK): CPT

## 2017-12-29 PROCEDURE — 80048 BASIC METABOLIC PNL TOTAL CA: CPT

## 2017-12-29 PROCEDURE — 99232 SBSQ HOSP IP/OBS MODERATE 35: CPT | Performed by: PSYCHIATRY & NEUROLOGY

## 2017-12-29 PROCEDURE — G8996 SWALLOW CURRENT STATUS: HCPCS

## 2017-12-29 PROCEDURE — 95816 EEG AWAKE AND DROWSY: CPT | Performed by: PSYCHIATRY & NEUROLOGY

## 2017-12-29 PROCEDURE — 97162 PT EVAL MOD COMPLEX 30 MIN: CPT

## 2017-12-29 PROCEDURE — 84100 ASSAY OF PHOSPHORUS: CPT

## 2017-12-29 PROCEDURE — 2580000003 HC RX 258: Performed by: INTERNAL MEDICINE

## 2017-12-29 PROCEDURE — 83735 ASSAY OF MAGNESIUM: CPT

## 2017-12-29 PROCEDURE — 6370000000 HC RX 637 (ALT 250 FOR IP): Performed by: STUDENT IN AN ORGANIZED HEALTH CARE EDUCATION/TRAINING PROGRAM

## 2017-12-29 PROCEDURE — 97530 THERAPEUTIC ACTIVITIES: CPT

## 2017-12-29 PROCEDURE — 82947 ASSAY GLUCOSE BLOOD QUANT: CPT

## 2017-12-29 RX ORDER — CIPROFLOXACIN 500 MG/1
500 TABLET, FILM COATED ORAL EVERY 12 HOURS SCHEDULED
Status: DISCONTINUED | OUTPATIENT
Start: 2017-12-29 | End: 2017-12-29 | Stop reason: HOSPADM

## 2017-12-29 RX ORDER — CIPROFLOXACIN 500 MG/1
500 TABLET, FILM COATED ORAL EVERY 12 HOURS SCHEDULED
Qty: 20 TABLET | Refills: 0 | Status: SHIPPED | OUTPATIENT
Start: 2017-12-29 | End: 2018-01-08

## 2017-12-29 RX ADMIN — SODIUM CHLORIDE 1000 ML: 9 INJECTION, SOLUTION INTRAVENOUS at 01:48

## 2017-12-29 RX ADMIN — SODIUM CHLORIDE: 9 INJECTION, SOLUTION INTRAVENOUS at 07:27

## 2017-12-29 RX ADMIN — SODIUM CHLORIDE, PRESERVATIVE FREE 10 ML: 5 INJECTION INTRAVENOUS at 09:00

## 2017-12-29 RX ADMIN — CIPROFLOXACIN 500 MG: 500 TABLET, FILM COATED ORAL at 10:36

## 2017-12-29 RX ADMIN — PRIMIDONE 50 MG: 50 TABLET ORAL at 10:36

## 2017-12-29 NOTE — PROCEDURES
[facility]                            ELECTROENCEPHALOGRAM REPORT    PATIENT NAME: Virgie Alexandre                 :        1975  MED REC NO:   [id number]                             ROOM:       1031  ACCOUNT NO:   [de-identified]                           ADMIT DATE: 2017  PROVIDER:     Yudelka Richmond    DATE OF EE2017    HISTORY:  This is a 57-year-old male with secondary progressive multiple  sclerosis, presented with altered mentation. He is being admitted for possible seizures. DESCRIPTION OF PROCEDURE:  Electrodes were applied using paste in positions  dictated by the International 10-20 System of placement. Reviewing  montages included both referential and bipolar derivations. In addition to  EEG data, EKG and eye movements were recorded. This is a routine  recording. This test was performed on 2017. DESCRIPTION OF ACTIVITIES:  At the onset of the study, the patient is  awake, and during wakefulness, there are occasional runs of 8 Hz, 20-30  microvolt symmetric posterior alpha rhythm, which attenuated symmetrically  with eye opening. As the study progresses, the patient is drowsy for a  brief duration and artifacts attenuated. Sleep was not recorded. This  study did not demonstrate any ongoing electrographic seizure activity. No  epileptiform discharges noted. Hyperventilation was not performed. Photic  stimulation did not induce posterior driving responses. ELECTRODIAGNOSTIC INTERPRETATION:  This EEG performed during wakefulness  and a brief period of drowsiness did not demonstrate any evidence of  ongoing electrographic seizure activity. No epileptiform discharges noted. No electrographic seizures noted.         Yesica Rosario    D: 2017 15:33:55       T: 2017 15:35:39     [email or web address]  Job#: [id number]     Doc#: [id number]    CC:

## 2017-12-29 NOTE — PLAN OF CARE
Problem: Pain:  Goal: Pain level will decrease  Pain level will decrease   Outcome: Ongoing    Goal: Control of acute pain  Control of acute pain   Outcome: Ongoing    Goal: Control of chronic pain  Control of chronic pain   Outcome: Ongoing      Problem: Risk for Impaired Skin Integrity  Goal: Tissue integrity - skin and mucous membranes  Structural intactness and normal physiological function of skin and  mucous membranes.    Outcome: Ongoing      Problem: Falls - Risk of  Goal: Absence of falls  Outcome: Ongoing

## 2017-12-29 NOTE — PROGRESS NOTES
Physical Therapy    Facility/Department: [facility] NEURO  Initial Assessment    NAME: Colleen Kramer  : 1975  MRN: [id number]    Date of Service: 2017  Chief Complaint   Patient presents with    Fever     EMS report 104 fever.  Altered Mental Status     history of MS. usually more responsive. Patient Diagnosis(es): The primary encounter diagnosis was Sepsis, due to unspecified organism [facility]). A diagnosis of Urinary tract infection associated with catheterization of urinary tract, unspecified indwelling urinary catheter type, sequela was also pertinent to this visit. has a past medical history of Depression and Multiple sclerosis ([facility] [address] has no past surgical history on file. Restrictions  Restrictions/Precautions  Restrictions/Precautions: General Precautions, Fall Risk  Required Braces or Orthoses?: No  Position Activity Restriction  Other position/activity restrictions: up as tolerated, hx of MS  Vision/Hearing  Vision: Within Functional Limits  Hearing: Within functional limits     Subjective  General  Patient assessed for rehabilitation services?: Yes  Response To Previous Treatment: Not applicable  Family / Caregiver Present: Yes (wife)  Follows Commands: Within Functional Limits  Subjective  Subjective: Pt supine in bed and agreeable to therapy. Wife agreeable to therapy as well. Pt denies pain at this time.    Pain Screening  Patient Currently in Pain: Denies  Vital Signs  Patient Currently in Pain: Denies         Social/Functional History  Social/Functional History  Lives With: Spouse (Pt's spouse and children live with him (multiple children agaes 10-22 years))  Type of Home: House  Home Layout: Two level, Able to Live on Main level with bedroom/bathroom  Home Access: Ramped entrance  Bathroom Shower/Tub:  (Pt does bed bathes only)  Bathroom Toilet:  (Pt utilizes briefs only)  Bathroom Accessibility: Accessible  Home Equipment: [address] Help From: Family, Personal care attendant (Pt has home health aides and nurses but wife indicates they aren't very reliable. Pt's children and wife do most of the assisting.)  ADL Assistance: Needs assistance  Homemaking Assistance: Needs assistance  Homemaking Responsibilities: No (wife does all household chores)  Ambulation Assistance: Needs assistance (dependent push in w/c)  Transfer Assistance: Needs assistance (Wife states pt is picked up \"like a baby\" and placed into his wheelchair)  Active : No  Patient's  Info: Wife drives  Mode of Transportation: Family  Occupation: On disability  Leisure & Hobbies: Likes to be with his family  Additional Comments: Wife reports someone is with him at all times but indicates that sometimes the only person home is the 8year old and that he just does what he can. Pt reports it is difficult to obtain all of the help that she needs. Objective          PROM RLE (degrees)  RLE PROM: WFL  RLE General PROM: ankle to neutral DF  PROM LLE (degrees)  LLE PROM: WFL  LLE General PROM: ankle to neutral DF  AROM RUE (degrees)  RUE AROM : WFL  AROM LUE (degrees)  LUE AROM : WFL  Strength RLE  Comment: No active movements noted  Strength LLE  Comment: No active movements noted  Strength RUE  Comment: grossly 4-/5  Strength LUE  Comment: grossly 4/5        Bed mobility  Rolling to Left: Maximum assistance  Rolling to Right: Maximum assistance  Supine to Sit: Maximum assistance  Sit to Supine: Dependent/Total;Maximum assistance  Scooting: Dependent/Total  Transfers  Comment: Not attempted this date  Ambulation  Ambulation?: No  Stairs/Curb  Stairs?: No     Balance  Posture: Poor  Sitting - Static: Poor  Sitting - Dynamic: Poor;-  Other exercises  Other exercises?: Yes  Other exercises 1: PROM x10 reps bilateral LEs  Other exercises 2: A-AAROM elbow flexion x10 reps   Pt sat EOB x4 minutes with varying mod to max A with improved balance when using LUE for support.      Assessment   Body

## 2017-12-29 NOTE — PROGRESS NOTES
assist feeder to place hand on head to help stabilize. Treatment/Goals  Short-term Goals  Goal 1: Pt will tolerate recommended diet without observed clinical signs/symptoms of aspiration. General  Chart Reviewed: Yes  Behavior/Cognition: Alert; Cooperative  Breath Sounds: Clear  Follows Directions: Complex  Dentition: Adequate  Patient Positioning: Upright in bed  Consistencies Administered: soft solid, puree, thin liquid      Oral Motor Deficits  Oral/Motor  Oral Motor: Exceptions to [facility]  Labial Coordination: Reduced  Lingual Coordination: Reduced    Oral Phase Dysfunction  Oral Phase  Oral Phase: Exceptions     Indicators of Pharyngeal Phase Dysfunction   Pharyngeal Phase  Pharyngeal Phase: Exceptions    Prognosis  Prognosis  Prognosis for safe diet advancement: fair  Individuals consulted  Consulted and agree with results and recommendations: Patient;RN    Education  Patient Education: yes  Patient Education Response: Verbalizes understanding      G-Code  SLP G-Codes  Functional Limitations: Swallowing  Swallow Current Status (): At least 20 percent but less than 40 percent impaired, limited or restricted  Swallow Goal Status ():  At least 20 percent but less than 40 percent impaired, limited or restricted         Therapy Time  SLP Individual Minutes  Time In: 6843  Time Out: [facility]  Minutes: [address] [facility] [place] [address] [place]  12/29/2017 10:08 AM

## 2017-12-29 NOTE — PROGRESS NOTES
NEUROLOGY INPATIENT PROGRESS NOTE    12/29/2017         Subjective: Clinton Harp is a  38 y.o. male admitted on 12/27/2017 with Encephalopathy acute [G93.40]  Chart reviewed and discussed with caregivers. They stated that the patient has been having improving mentation. He has not had any witnessed seizure activity. Briefly, this is a  38 y.o. male with history of secondary progressive multiple sclerosis was admitted on 12/27/2017 with mental status changes. He has baseline paraparesis along with cerebellar syndrome. He has been wheelchair bound for the past few years. He is brought to Hospital for stupor/disorientation. He was found to be febrile with 104°F.  CT head demonstrated bilateral extensive white matter changes. Admits workup is significant for urinalysis positive with gram negative rods. He is started on ciprofloxacin. Patient is not on any immunomodulators. He takes Mysoline 50 mg twice daily, baclofen 20 mg 3 times a day, Seroquel 25 mg nightly and Cymbalta 60 mg daily. No current facility-administered medications on file prior to encounter. Current Outpatient Prescriptions on File Prior to Encounter   Medication Sig Dispense Refill    fluticasone (FLONASE) 50 MCG/ACT nasal spray 1 spray by Nasal route daily 1 Bottle 3    albuterol (PROVENTIL) (2.5 MG/3ML) 0.083% nebulizer solution Take 3 mLs by nebulization every 4 hours as needed for Wheezing 120 each 3    Catheters (GIZMO CONDOM CATHETER) MISC 1 Units by Does not apply route daily 30 each 5    Incontinence Supplies (URINARY DRAINAGE BAG) MISC 1 Units by Other route once a week Change fry bag at least once weekly. Empty every 4-6 hours.  30 Bottle 5    pantoprazole (PROTONIX) 40 MG tablet Take 1 tablet by mouth daily Secondary to possible NG trauma from intubation 30 tablet 0    clonazePAM (KLONOPIN) 1 MG tablet Take 1 mg by mouth 2 times daily as needed      primidone (MYSOLINE) 50 MG tablet Take 50 mg grade 3/5 in bilateral lower extremities and 4+/5 in bilateral upper extremities with spasticity in lower extremities    SENSORY FUNCTION:  Normal touch, normal pin, normal vibration, normal proprioception   CEREBELLAR FUNCTION:  Significant dysmetria on finger-nose-finger testing left greater than right   REFLEX FUNCTION:  Hyperreflexia with bilateral extensor plantar response   STATION and GAIT  Not tested         Data:    Lab Results:   CBC:   Recent Labs      12/27/17   0911  12/28/17   0608   WBC  14.6*  12.6*   HGB  15.6  12.0*   PLT  214  155     BMP:    Recent Labs      12/27/17   0911  12/27/17   1536  12/29/17   0640   NA  140  140  139   K  4.5  3.3*  3.7   CL  99  104  105   CO2  26  21  20   BUN  9  9  3*   CREATININE  0.73  0.60*  0.49*   GLUCOSE  119*  108*  98         Lab Results   Component Value Date     (H) 12/27/2017    AST 53 (H) 12/27/2017    [id number] 439 12/28/2017     MRI brain (W/WO) (12/28/17): Motion limited evaluation. 2. No acute intracranial abnormality. 3. No appreciable change in extensive T2/FLAIR hyperintense lesions in thesupratentorial white matter and brainstem in keeping with chronicdemyelinating disease. No definite new lesions since 03/07/2016 or evidenceof active demyelination. 4. Stable bifrontal encephalomalacia in keeping with sequela of prior insult. MRI cervical spine (wo/wo) (12/28/17): Motion limited evaluation. 2. No appreciable change in multiple T2 hyperintense lesions in thevisualized spinal cord in keeping with chronic demyelinating disease. Noabnormal cord enhancement to suggest active demyelination or pre small newlesions since 03/07/2016.3. Mild C4-5 and C5-6 spinal canal stenosis. 4. Moderate right C5 and mild bilateral C6 neural foraminal narrowing    EEG (12/29/17):  Performed during drowsy state is unremarkable.       No results found for: [facility]  Lab Results   Component Value Date    [id number] 439 12/28/2017      Lab Results   Component Value

## 2017-12-29 NOTE — PROGRESS NOTES
[place]  Occupational Therapy Not Seen Note    Patient not available for Occupational Therapy due to:    [] Testing:    [] Hemodialysis    [] Blood Transfusion in Progress    []Refusal by Patient:    [] Surgery/Procedure:    [] Strict Bedrest    [] Sedation    [] Spine Precautions     [] Pt being transferred to palliative care at this time. Spoke with pt/family and OT services to be defered. [] Pt independent with functional mobility and functional tasks. Pt with no OT acute care needs at this time, will defer OT eval.    [x] Other : attempt this afternoon but pt speaking w/ another discipline, writer moved on.     Next Scheduled Treatment: 12/30/17    Signature: Marcelo MCKEON

## 2017-12-29 NOTE — PROGRESS NOTES
Physical Therapy  DATE: 2017    NAME: Sagar Huerta  MRN: [id number]   : 1975    Patient not seen this date for Physical Therapy due to:  [] Blood transfusion in progress  [] Hemodialysis  []  Patient Declined  [] Spine Precautions   [] Strict Bedrest  [] Surgery/ Procedure  [x] Testing- EEG. PT will check back as time allows. [] Other        [] PT being discontinued at this time. Patient independent. No further needs. [] PT being discontinued at this time as the patient has been transferred to palliative care. No further needs.     Damon Sarah, PT

## 2017-12-31 LAB
AMOBARBITAL: <50 NG/ML
BUTALBITAL: <50 NG/ML
PENTOBARBITAL: <50 NG/ML
PHENOBARBITAL: NORMAL NG/ML
SECOBARBITAL: <50 NG/ML

## 2018-01-02 LAB
CULTURE: NORMAL
CULTURE: NORMAL
Lab: NORMAL
SPECIMEN DESCRIPTION: NORMAL
STATUS: NORMAL

## 2018-01-04 LAB — CANNABINOID, BLOOD: 147 NG/ML

## 2018-01-11 NOTE — DISCHARGE SUMMARY
[address] [place] [address]     Department of Internal Medicine - Staff Internal Medicine Service    INPATIENT DISCHARGE SUMMARY      PATIENT IDENTIFICATION:  NAME:  El Brown   :   1975  MRN:    [id number]     Acct:    [de-identified]   [facility] Date:  2017  Discharge date:  2017  6:26 PM   Attending Provider: No att. providers found                                     REASON FOR HOSPITALIZATION:   El Brown is a 38 y.o. male who presented with AMS    DIAGNOSES:  Primary:   Encephalopathy acute [G93.40]    Secondary: Active Hospital Problems    Diagnosis Date Noted    Sepsis ([facility] [address] [A41.9]     Encephalopathy acute [G93.40]     Metabolic encephalopathy [K86.98] 2016    Multiple sclerosis ([facility] [place] 2016       TREATMENT:  Brief Inpatient Course: This 38year old patient was admitted for AMS. On admission patient was septic and IV antibotics and fluids were given. Blood and urine cultures were positive for gram negative rods. Neurology was consulted for hx of MS; ordered EEG, MRI head and cervical spine. Patient improved with antibotics. Patient was discharged on ciprofloxacin for urosepsis.      Consults:   neurology    Procedures:  EEG    Any Hospital Acquired Infections: none    PATIENT'S DISCHARGE CONDITION:      PATIENT/FAMILY INSTRUCTIONS:   Discharge Medication List as of 2017  3:54 PM      START taking these medications    Details   ciprofloxacin (CIPRO) 500 MG tablet Take 1 tablet by mouth every 12 hours for 10 days, Disp-20 tablet, R-0Normal         CONTINUE these medications which have NOT CHANGED    Details   fluticasone (FLONASE) 50 MCG/ACT nasal spray 1 spray by Nasal route daily, Disp-1 Bottle, R-3      albuterol (PROVENTIL) (2.5 MG/3ML) 0.083% nebulizer solution Take 3 mLs by nebulization every 4 hours as needed for Wheezing, Disp-120 each, R-3      Catheters (GIZMO CONDOM CATHETER) MISC DAILY Starting 3/10/2016, Until Discontinued, Disp-30 each, R-5, Normal      Incontinence Supplies (URINARY DRAINAGE BAG) MISC WEEKLY Starting 3/10/2016, Until Discontinued, Disp-30 Bottle, R-5, YEIMY, NormalChange fry bag at least once weekly. Empty every 4-6 hours. pantoprazole (PROTONIX) 40 MG tablet Take 1 tablet by mouth daily Secondary to possible NG trauma from intubation, Disp-30 tablet, R-0      clonazePAM (KLONOPIN) 1 MG tablet Take 1 mg by mouth 2 times daily as needed      primidone (MYSOLINE) 50 MG tablet Take 50 mg by mouth 2 times daily      DULoxetine (CYMBALTA) 60 MG capsule Take 60 mg by mouth daily      baclofen (LIORESAL) 20 MG tablet Take 20 mg by mouth 3 times daily      nortriptyline (PAMELOR) 25 MG capsule Take 50 mg by mouth nightly       QUEtiapine (SEROQUEL) 25 MG tablet Take 25 mg by mouth daily            Activity: activity as tolerated    Condition: Stable    Diet: regular diet    Disposition: home    Follow-up:  in the next few weeks with No primary care provider on file. No follow-up provider specified.     Claudette Sargent, PGY-1  Internal Medicine Department  [facility]

## 2018-09-16 ENCOUNTER — HOSPITAL ENCOUNTER (INPATIENT)
Age: 43
LOS: 5 days | Discharge: HOME HEALTH | DRG: 720 | End: 2018-09-21
Attending: EMERGENCY MEDICINE | Admitting: INTERNAL MEDICINE
Payer: MEDICAID

## 2018-09-16 ENCOUNTER — APPOINTMENT (OUTPATIENT)
Dept: GENERAL RADIOLOGY | Age: 43
DRG: 720 | End: 2018-09-16
Payer: MEDICAID

## 2018-09-16 DIAGNOSIS — N30.00 ACUTE CYSTITIS WITHOUT HEMATURIA: ICD-10-CM

## 2018-09-16 DIAGNOSIS — A41.9 SEPSIS, DUE TO UNSPECIFIED ORGANISM: Primary | ICD-10-CM

## 2018-09-16 PROBLEM — N39.0 UTI (URINARY TRACT INFECTION): Status: ACTIVE | Noted: 2018-09-16

## 2018-09-16 PROBLEM — N39.0 URINARY TRACT INFECTION: Status: ACTIVE | Noted: 2018-09-16

## 2018-09-16 LAB
-: ABNORMAL
ABSOLUTE EOS #: 0.03 K/UL (ref 0–0.44)
ABSOLUTE IMMATURE GRANULOCYTE: 0.04 K/UL (ref 0–0.3)
ABSOLUTE LYMPH #: 1.31 K/UL (ref 1.1–3.7)
ABSOLUTE MONO #: 0.59 K/UL (ref 0.1–1.2)
ALBUMIN SERPL-MCNC: 3.9 G/DL (ref 3.5–5.2)
ALBUMIN/GLOBULIN RATIO: 1.3 (ref 1–2.5)
ALLEN TEST: ABNORMAL
ALLEN TEST: POSITIVE
ALP BLD-CCNC: 95 U/L (ref 40–129)
ALT SERPL-CCNC: 64 U/L (ref 5–41)
AMMONIA: 44 UMOL/L (ref 16–60)
AMORPHOUS: ABNORMAL
ANION GAP SERPL CALCULATED.3IONS-SCNC: 8 MMOL/L (ref 9–17)
ANION GAP: 6 MMOL/L (ref 7–16)
AST SERPL-CCNC: 24 U/L
BACTERIA: ABNORMAL
BASOPHILS # BLD: 0 % (ref 0–2)
BASOPHILS ABSOLUTE: 0.04 K/UL (ref 0–0.2)
BILIRUB SERPL-MCNC: 0.53 MG/DL (ref 0.3–1.2)
BILIRUBIN URINE: NEGATIVE
BUN BLDV-MCNC: 8 MG/DL (ref 6–20)
BUN/CREAT BLD: ABNORMAL (ref 9–20)
CALCIUM SERPL-MCNC: 8.8 MG/DL (ref 8.6–10.4)
CASTS UA: ABNORMAL /LPF (ref 0–8)
CHLORIDE BLD-SCNC: 106 MMOL/L (ref 98–107)
CO2: 24 MMOL/L (ref 20–31)
COLOR: YELLOW
CREAT SERPL-MCNC: 0.78 MG/DL (ref 0.7–1.2)
CRYSTALS, UA: ABNORMAL /HPF
DIFFERENTIAL TYPE: ABNORMAL
EKG ATRIAL RATE: 104 BPM
EKG P AXIS: 75 DEGREES
EKG P-R INTERVAL: 166 MS
EKG Q-T INTERVAL: 322 MS
EKG QRS DURATION: 82 MS
EKG QTC CALCULATION (BAZETT): 423 MS
EKG R AXIS: 67 DEGREES
EKG T AXIS: 70 DEGREES
EKG VENTRICULAR RATE: 104 BPM
EOSINOPHILS RELATIVE PERCENT: 0 % (ref 1–4)
EPITHELIAL CELLS UA: ABNORMAL /HPF (ref 0–5)
FIO2: ABNORMAL
FIO2: ABNORMAL
GFR AFRICAN AMERICAN: >60 ML/MIN
GFR NON-AFRICAN AMERICAN: >60 ML/MIN
GFR NON-AFRICAN AMERICAN: >60 ML/MIN
GFR SERPL CREATININE-BSD FRML MDRD: >60 ML/MIN
GFR SERPL CREATININE-BSD FRML MDRD: ABNORMAL ML/MIN/{1.73_M2}
GFR SERPL CREATININE-BSD FRML MDRD: ABNORMAL ML/MIN/{1.73_M2}
GFR SERPL CREATININE-BSD FRML MDRD: NORMAL ML/MIN/{1.73_M2}
GLUCOSE BLD-MCNC: 106 MG/DL (ref 75–110)
GLUCOSE BLD-MCNC: 92 MG/DL (ref 74–100)
GLUCOSE BLD-MCNC: 97 MG/DL (ref 70–99)
GLUCOSE URINE: NEGATIVE
HCO3 VENOUS: 28.2 MMOL/L (ref 22–29)
HCT VFR BLD CALC: 44.5 % (ref 40.7–50.3)
HEMOGLOBIN: 14.5 G/DL (ref 13–17)
IMMATURE GRANULOCYTES: 0 %
INR BLD: 1.1
KETONES, URINE: NEGATIVE
LACTIC ACID, WHOLE BLOOD: 1.8 MMOL/L (ref 0.7–2.1)
LEUKOCYTE ESTERASE, URINE: ABNORMAL
LYMPHOCYTES # BLD: 11 % (ref 24–43)
MCH RBC QN AUTO: 31.7 PG (ref 25.2–33.5)
MCHC RBC AUTO-ENTMCNC: 32.6 G/DL (ref 28.4–34.8)
MCV RBC AUTO: 97.2 FL (ref 82.6–102.9)
MODE: ABNORMAL
MODE: ABNORMAL
MONOCYTES # BLD: 5 % (ref 3–12)
MUCUS: ABNORMAL
NEGATIVE BASE EXCESS, ART: ABNORMAL (ref 0–2)
NEGATIVE BASE EXCESS, VEN: ABNORMAL (ref 0–2)
NITRITE, URINE: POSITIVE
NRBC AUTOMATED: 0 PER 100 WBC
O2 DEVICE/FLOW/%: ABNORMAL
O2 DEVICE/FLOW/%: ABNORMAL
O2 SAT, VEN: 27 % (ref 60–85)
OTHER OBSERVATIONS UA: ABNORMAL
PARTIAL THROMBOPLASTIN TIME: 26.4 SEC (ref 20.5–30.5)
PATIENT TEMP: ABNORMAL
PATIENT TEMP: ABNORMAL
PCO2, VEN: 47.7 MM HG (ref 41–51)
PDW BLD-RTO: 13.8 % (ref 11.8–14.4)
PH UA: >9 (ref 5–8)
PH VENOUS: 7.38 (ref 7.32–7.43)
PLATELET # BLD: 273 K/UL (ref 138–453)
PLATELET ESTIMATE: ABNORMAL
PMV BLD AUTO: 11.2 FL (ref 8.1–13.5)
PO2, VEN: 18.8 MM HG (ref 30–50)
POC CHLORIDE: 106 MMOL/L (ref 98–107)
POC CREATININE: 0.89 MG/DL (ref 0.51–1.19)
POC HCO3: 22.8 MMOL/L (ref 21–28)
POC HEMATOCRIT: 43 % (ref 41–53)
POC HEMOGLOBIN: 14.7 G/DL (ref 13.5–17.5)
POC IONIZED CALCIUM: 1.18 MMOL/L (ref 1.15–1.33)
POC LACTIC ACID: 1.6 MMOL/L (ref 0.56–1.39)
POC O2 SATURATION: 88 % (ref 94–98)
POC PCO2 TEMP: ABNORMAL MM HG
POC PCO2 TEMP: ABNORMAL MM HG
POC PCO2: 31 MM HG (ref 35–48)
POC PH TEMP: ABNORMAL
POC PH TEMP: ABNORMAL
POC PH: 7.47 (ref 7.35–7.45)
POC PO2 TEMP: ABNORMAL MM HG
POC PO2 TEMP: ABNORMAL MM HG
POC PO2: 50.4 MM HG (ref 83–108)
POC POTASSIUM: 5.6 MMOL/L (ref 3.5–4.5)
POC SODIUM: 140 MMOL/L (ref 138–146)
POSITIVE BASE EXCESS, ART: 0 (ref 0–3)
POSITIVE BASE EXCESS, VEN: 2 (ref 0–3)
POTASSIUM SERPL-SCNC: 5 MMOL/L (ref 3.7–5.3)
PROTEIN UA: ABNORMAL
PROTHROMBIN TIME: 11.6 SEC (ref 9–12)
RBC # BLD: 4.58 M/UL (ref 4.21–5.77)
RBC # BLD: ABNORMAL 10*6/UL
RBC UA: ABNORMAL /HPF (ref 0–4)
RENAL EPITHELIAL, UA: ABNORMAL /HPF
SAMPLE SITE: ABNORMAL
SAMPLE SITE: ABNORMAL
SEG NEUTROPHILS: 84 % (ref 36–65)
SEGMENTED NEUTROPHILS ABSOLUTE COUNT: 9.63 K/UL (ref 1.5–8.1)
SODIUM BLD-SCNC: 138 MMOL/L (ref 135–144)
SPECIFIC GRAVITY UA: 1.03 (ref 1–1.03)
TCO2 (CALC), ART: 24 MMOL/L (ref 22–29)
TOTAL CO2, VENOUS: 30 MMOL/L (ref 23–30)
TOTAL PROTEIN: 6.9 G/DL (ref 6.4–8.3)
TRICHOMONAS: ABNORMAL
TROPONIN INTERP: NORMAL
TROPONIN INTERP: NORMAL
TROPONIN T: <0.03 NG/ML
TROPONIN T: <0.03 NG/ML
TURBIDITY: CLEAR
URINE HGB: NEGATIVE
UROBILINOGEN, URINE: NORMAL
WBC # BLD: 11.6 K/UL (ref 3.5–11.3)
WBC # BLD: ABNORMAL 10*3/UL
WBC UA: ABNORMAL /HPF (ref 0–5)
YEAST: ABNORMAL

## 2018-09-16 PROCEDURE — 84484 ASSAY OF TROPONIN QUANT: CPT

## 2018-09-16 PROCEDURE — 85610 PROTHROMBIN TIME: CPT

## 2018-09-16 PROCEDURE — 85025 COMPLETE CBC W/AUTO DIFF WBC: CPT

## 2018-09-16 PROCEDURE — 1200000000 HC SEMI PRIVATE

## 2018-09-16 PROCEDURE — 84132 ASSAY OF SERUM POTASSIUM: CPT

## 2018-09-16 PROCEDURE — 87040 BLOOD CULTURE FOR BACTERIA: CPT

## 2018-09-16 PROCEDURE — 80048 BASIC METABOLIC PNL TOTAL CA: CPT

## 2018-09-16 PROCEDURE — 2580000003 HC RX 258: Performed by: STUDENT IN AN ORGANIZED HEALTH CARE EDUCATION/TRAINING PROGRAM

## 2018-09-16 PROCEDURE — 99285 EMERGENCY DEPT VISIT HI MDM: CPT

## 2018-09-16 PROCEDURE — 82565 ASSAY OF CREATININE: CPT

## 2018-09-16 PROCEDURE — 93005 ELECTROCARDIOGRAM TRACING: CPT

## 2018-09-16 PROCEDURE — 82803 BLOOD GASES ANY COMBINATION: CPT

## 2018-09-16 PROCEDURE — 87205 SMEAR GRAM STAIN: CPT

## 2018-09-16 PROCEDURE — 84146 ASSAY OF PROLACTIN: CPT

## 2018-09-16 PROCEDURE — 80053 COMPREHEN METABOLIC PANEL: CPT

## 2018-09-16 PROCEDURE — 81001 URINALYSIS AUTO W/SCOPE: CPT

## 2018-09-16 PROCEDURE — 6360000002 HC RX W HCPCS: Performed by: STUDENT IN AN ORGANIZED HEALTH CARE EDUCATION/TRAINING PROGRAM

## 2018-09-16 PROCEDURE — 96367 TX/PROPH/DG ADDL SEQ IV INF: CPT

## 2018-09-16 PROCEDURE — 71045 X-RAY EXAM CHEST 1 VIEW: CPT

## 2018-09-16 PROCEDURE — 87086 URINE CULTURE/COLONY COUNT: CPT

## 2018-09-16 PROCEDURE — 82140 ASSAY OF AMMONIA: CPT

## 2018-09-16 PROCEDURE — 85730 THROMBOPLASTIN TIME PARTIAL: CPT

## 2018-09-16 PROCEDURE — 6370000000 HC RX 637 (ALT 250 FOR IP): Performed by: EMERGENCY MEDICINE

## 2018-09-16 PROCEDURE — 82330 ASSAY OF CALCIUM: CPT

## 2018-09-16 PROCEDURE — 82435 ASSAY OF BLOOD CHLORIDE: CPT

## 2018-09-16 PROCEDURE — 96365 THER/PROPH/DIAG IV INF INIT: CPT

## 2018-09-16 PROCEDURE — 87088 URINE BACTERIA CULTURE: CPT

## 2018-09-16 PROCEDURE — 82947 ASSAY GLUCOSE BLOOD QUANT: CPT

## 2018-09-16 PROCEDURE — 85014 HEMATOCRIT: CPT

## 2018-09-16 PROCEDURE — 87186 SC STD MICRODIL/AGAR DIL: CPT

## 2018-09-16 PROCEDURE — 36415 COLL VENOUS BLD VENIPUNCTURE: CPT

## 2018-09-16 PROCEDURE — 87641 MR-STAPH DNA AMP PROBE: CPT

## 2018-09-16 PROCEDURE — 87149 DNA/RNA DIRECT PROBE: CPT

## 2018-09-16 PROCEDURE — 36600 WITHDRAWAL OF ARTERIAL BLOOD: CPT

## 2018-09-16 PROCEDURE — 83605 ASSAY OF LACTIC ACID: CPT

## 2018-09-16 PROCEDURE — 84295 ASSAY OF SERUM SODIUM: CPT

## 2018-09-16 RX ORDER — SODIUM CHLORIDE 0.9 % (FLUSH) 0.9 %
10 SYRINGE (ML) INJECTION PRN
Status: DISCONTINUED | OUTPATIENT
Start: 2018-09-16 | End: 2018-09-21 | Stop reason: HOSPADM

## 2018-09-16 RX ORDER — ACETAMINOPHEN 650 MG/1
650 SUPPOSITORY RECTAL ONCE
Status: COMPLETED | OUTPATIENT
Start: 2018-09-16 | End: 2018-09-16

## 2018-09-16 RX ORDER — PRIMIDONE 50 MG/1
50 TABLET ORAL 2 TIMES DAILY
Status: DISCONTINUED | OUTPATIENT
Start: 2018-09-16 | End: 2018-09-21 | Stop reason: HOSPADM

## 2018-09-16 RX ORDER — VANCOMYCIN HYDROCHLORIDE 1 G/200ML
1000 INJECTION, SOLUTION INTRAVENOUS ONCE
Status: COMPLETED | OUTPATIENT
Start: 2018-09-16 | End: 2018-09-16

## 2018-09-16 RX ORDER — CLONAZEPAM 1 MG/1
1 TABLET ORAL 2 TIMES DAILY PRN
Status: DISCONTINUED | OUTPATIENT
Start: 2018-09-16 | End: 2018-09-21 | Stop reason: HOSPADM

## 2018-09-16 RX ORDER — NORTRIPTYLINE HYDROCHLORIDE 25 MG/1
50 CAPSULE ORAL NIGHTLY
Status: DISCONTINUED | OUTPATIENT
Start: 2018-09-17 | End: 2018-09-19

## 2018-09-16 RX ORDER — QUETIAPINE FUMARATE 25 MG/1
25 TABLET, FILM COATED ORAL NIGHTLY
Status: DISCONTINUED | OUTPATIENT
Start: 2018-09-17 | End: 2018-09-21 | Stop reason: HOSPADM

## 2018-09-16 RX ORDER — ONDANSETRON 2 MG/ML
4 INJECTION INTRAMUSCULAR; INTRAVENOUS EVERY 6 HOURS PRN
Status: DISCONTINUED | OUTPATIENT
Start: 2018-09-16 | End: 2018-09-21 | Stop reason: HOSPADM

## 2018-09-16 RX ORDER — SODIUM CHLORIDE 9 MG/ML
INJECTION, SOLUTION INTRAVENOUS CONTINUOUS
Status: DISCONTINUED | OUTPATIENT
Start: 2018-09-16 | End: 2018-09-21 | Stop reason: HOSPADM

## 2018-09-16 RX ORDER — ALBUTEROL SULFATE 2.5 MG/3ML
2.5 SOLUTION RESPIRATORY (INHALATION) EVERY 4 HOURS PRN
Status: DISCONTINUED | OUTPATIENT
Start: 2018-09-16 | End: 2018-09-21 | Stop reason: HOSPADM

## 2018-09-16 RX ORDER — SODIUM CHLORIDE 0.9 % (FLUSH) 0.9 %
10 SYRINGE (ML) INJECTION EVERY 12 HOURS SCHEDULED
Status: DISCONTINUED | OUTPATIENT
Start: 2018-09-16 | End: 2018-09-21 | Stop reason: HOSPADM

## 2018-09-16 RX ADMIN — ACETAMINOPHEN 650 MG: 650 SUPPOSITORY RECTAL at 18:36

## 2018-09-16 RX ADMIN — PIPERACILLIN AND TAZOBACTAM 3.38 G: 3; .375 INJECTION, POWDER, LYOPHILIZED, FOR SOLUTION INTRAVENOUS; PARENTERAL at 17:05

## 2018-09-16 RX ADMIN — VANCOMYCIN HYDROCHLORIDE 1000 MG: 1 INJECTION, SOLUTION INTRAVENOUS at 18:36

## 2018-09-16 ASSESSMENT — PAIN SCALES - GENERAL: PAINLEVEL_OUTOF10: 10

## 2018-09-16 NOTE — ED NOTES
Bed: 24  Expected date:   Expected time:   Means of arrival:   Comments:  Joel Murray RN  09/16/18 2392

## 2018-09-16 NOTE — ED PROVIDER NOTES
pulses. Pulmonary/Chest: Breath sounds normal. No respiratory distress. He has no wheezes. He has no rales. Course breath sounds bilateral lung fields. Abdominal: Soft. Bowel sounds are normal. He exhibits no distension. There is no tenderness. Musculoskeletal: Normal range of motion. He exhibits no edema or deformity. Neurological: He has normal reflexes. Patient following simple commands. GCS 13.  5 muscle strength in bilateral lower extremities. Diffuse bilateral lower extremity muscle atrophy. Plus peripheral pulses in all extremities. Skin: Skin is warm and dry. No rash noted. No erythema. Psychiatric: He has a normal mood and affect.  His behavior is normal.       DIFFERENTIAL  DIAGNOSIS     PLAN (LABS / IMAGING / EKG):  Orders Placed This Encounter   Procedures    Culture Blood #1    Culture Blood #1    Urine Culture    URINE CULTURE CLEAN CATCH    CULTURE BLOOD #1    CULTURE BLOOD #1    MRSA DNA Probe, Nasal    XR CHEST PORTABLE    XR CHEST PORTABLE    CBC Auto Differential    Comprehensive Metabolic Panel    Protime-INR    APTT    Urinalysis with Microscopic    AMMONIA    Hemoglobin and hematocrit, blood    SODIUM (POC)    POTASSIUM (POC)    CHLORIDE (POC)    CALCIUM, IONIC (POC)    Troponin    Troponin    Protein, Urine    Basic Metabolic Panel w/ Reflex to MG    CBC auto differential    Basic Metabolic Panel w/ Reflex to MG    PROLACTIN    Hemoglobin and hematocrit, blood    SODIUM (POC)    POTASSIUM (POC)    CHLORIDE (POC)    CALCIUM, IONIC (POC)    Diet NPO Effective Now    Vital signs per unit routine    Tobacco cessation education    Notify physician    Bedrest with bedside commode    Daily weights    Intake and output    Up as tolerated    Nursing swallow assessment    Full Code    Inpatient consult to Internal Medicine    Inpatient consult to Case Management    OT eval and treat    PT evaluation and treat    Initiate Oxygen Therapy Protocol    Venous Blood Gas, POC    Creatinine W/GFR Point of Care    Lactic Acid, POC    POCT Glucose    Anion Gap (Calc) POC    POC Glucose Fingerstick    Arterial Blood Gas, POC    Arterial Blood Gas, POC    Creatinine W/GFR Point of Care    Lactic Acid, POC    POCT Glucose    Anion Gap (Calc) POC    EKG 12 Lead    PATIENT STATUS (FROM ED OR OR/PROCEDURAL) Inpatient    PATIENT STATUS (FROM ED OR OR/PROCEDURAL) Inpatient    Transfer patient       MEDICATIONS ORDERED:  Orders Placed This Encounter   Medications    piperacillin-tazobactam (ZOSYN) 3.375 g in dextrose 5 % 50 mL IVPB (mini-bag)    vancomycin (VANCOCIN) 1000 mg in dextrose 5% 200 mL IVPB    DISCONTD: vancomycin (VANCOCIN) intermittent dosing (placeholder)    DISCONTD: vancomycin (VANCOCIN) 750 mg in dextrose 5 % 250 mL IVPB    acetaminophen (TYLENOL) suppository 650 mg    albuterol (PROVENTIL) nebulizer solution 2.5 mg    clonazePAM (KLONOPIN) tablet 1 mg    primidone (MYSOLINE) tablet 50 mg    QUEtiapine (SEROQUEL) tablet 25 mg    sodium chloride flush 0.9 % injection 10 mL    sodium chloride flush 0.9 % injection 10 mL    magnesium hydroxide (MILK OF MAGNESIA) 400 MG/5ML suspension 30 mL    ondansetron (ZOFRAN) injection 4 mg    enoxaparin (LOVENOX) injection 40 mg    DISCONTD: cefTRIAXone (ROCEPHIN) 1 g IVPB in 50 mL D5W minibag    nortriptyline (PAMELOR) capsule 50 mg    0.9 % sodium chloride infusion    piperacillin-tazobactam (ZOSYN) 3.375 g in dextrose 5 % 50 mL IVPB (mini-bag)       DDX: Sepsis with urinary/pulmonary/soft tissue source    DIAGNOSTIC RESULTS / EMERGENCY DEPARTMENT COURSE / MDM     LABS:  Results for orders placed or performed during the hospital encounter of 09/16/18   Culture Blood #1   Result Value Ref Range    Specimen Description . BLOOD     Special Requests LT FA     Culture NO GROWTH 4 HOURS     Status Pending    Culture Blood #1   Result Value Ref Range    Specimen Description . BLOOD Special Requests RT AC     Culture NO GROWTH 4 HOURS     Status Pending    CBC Auto Differential   Result Value Ref Range    WBC 11.6 (H) 3.5 - 11.3 k/uL    RBC 4.58 4.21 - 5.77 m/uL    Hemoglobin 14.5 13.0 - 17.0 g/dL    Hematocrit 44.5 40.7 - 50.3 %    MCV 97.2 82.6 - 102.9 fL    MCH 31.7 25.2 - 33.5 pg    MCHC 32.6 28.4 - 34.8 g/dL    RDW 13.8 11.8 - 14.4 %    Platelets 273 489 - 345 k/uL    MPV 11.2 8.1 - 13.5 fL    NRBC Automated 0.0 0.0 per 100 WBC    Differential Type NOT REPORTED     Seg Neutrophils 84 (H) 36 - 65 %    Lymphocytes 11 (L) 24 - 43 %    Monocytes 5 3 - 12 %    Eosinophils % 0 (L) 1 - 4 %    Basophils 0 0 - 2 %    Immature Granulocytes 0 0 %    Segs Absolute 9.63 (H) 1.50 - 8.10 k/uL    Absolute Lymph # 1.31 1.10 - 3.70 k/uL    Absolute Mono # 0.59 0.10 - 1.20 k/uL    Absolute Eos # 0.03 0.00 - 0.44 k/uL    Basophils # 0.04 0.00 - 0.20 k/uL    Absolute Immature Granulocyte 0.04 0.00 - 0.30 k/uL    WBC Morphology NOT REPORTED     RBC Morphology NOT REPORTED     Platelet Estimate NOT REPORTED    Comprehensive Metabolic Panel   Result Value Ref Range    Glucose 97 70 - 99 mg/dL    BUN 8 6 - 20 mg/dL    CREATININE 0.78 0.70 - 1.20 mg/dL    Bun/Cre Ratio NOT REPORTED 9 - 20    Calcium 8.8 8.6 - 10.4 mg/dL    Sodium 138 135 - 144 mmol/L    Potassium 5.0 3.7 - 5.3 mmol/L    Chloride 106 98 - 107 mmol/L    CO2 24 20 - 31 mmol/L    Anion Gap 8 (L) 9 - 17 mmol/L    Alkaline Phosphatase 95 40 - 129 U/L    ALT 64 (H) 5 - 41 U/L    AST 24 <40 U/L    Total Bilirubin 0.53 0.3 - 1.2 mg/dL    Total Protein 6.9 6.4 - 8.3 g/dL    Alb 3.9 3.5 - 5.2 g/dL    Albumin/Globulin Ratio 1.3 1.0 - 2.5    GFR Non-African American >60 >60 mL/min    GFR African American >60 >60 mL/min    GFR Comment          GFR Staging NOT REPORTED    Lactic Acid, Whole Blood   Result Value Ref Range    Lactic Acid, Whole Blood 1.8 0.7 - 2.1 mmol/L   Protime-INR   Result Value Ref Range    Protime 11.6 9.0 - 12.0 sec    INR 1.1    APTT Result Value Ref Range    PTT 26.4 20.5 - 30.5 sec   Urinalysis with Microscopic   Result Value Ref Range    Color, UA YELLOW YEL    Turbidity UA CLEAR CLEAR    Glucose, Ur NEGATIVE NEG    Bilirubin Urine NEGATIVE NEG    Ketones, Urine NEGATIVE NEG    Specific Gravity, UA 1.027 1.005 - 1.030    Urine Hgb NEGATIVE NEG    pH, UA >9.0 (H) 5.0 - 8.0    Protein, UA TRACE (A) NEG    Urobilinogen, Urine Normal NORM    Nitrite, Urine POSITIVE (A) NEG    Leukocyte Esterase, Urine TRACE (A) NEG    -          WBC, UA 5 TO 10 0 - 5 /HPF    RBC, UA 0 TO 2 0 - 4 /HPF    Casts UA 5 TO 10 HYALINE 0 - 8 /LPF    Crystals UA NOT REPORTED NONE /HPF    Epithelial Cells UA 2 TO 5 0 - 5 /HPF    Renal Epithelial, Urine NOT REPORTED 0 /HPF    Bacteria, UA FEW (A) NONE    Mucus, UA NOT REPORTED NONE    Trichomonas, UA NOT REPORTED NONE    Amorphous, UA NOT REPORTED NONE    Other Observations UA NOT REPORTED NREQ    Yeast, UA NOT REPORTED NONE   AMMONIA   Result Value Ref Range    Ammonia 44 16 - 60 umol/L   Hemoglobin and hematocrit, blood   Result Value Ref Range    POC Hemoglobin 14.7 13.5 - 17.5 g/dL    POC Hematocrit 43 41 - 53 %   SODIUM (POC)   Result Value Ref Range    POC Sodium 140 138 - 146 mmol/L   POTASSIUM (POC)   Result Value Ref Range    POC Potassium 5.6 (H) 3.5 - 4.5 mmol/L   CHLORIDE (POC)   Result Value Ref Range    POC Chloride 106 98 - 107 mmol/L   CALCIUM, IONIC (POC)   Result Value Ref Range    POC Ionized Calcium 1.18 1.15 - 1.33 mmol/L   Troponin   Result Value Ref Range    Troponin T <0.03 <0.03 ng/mL    Troponin Interp         Troponin   Result Value Ref Range    Troponin T <0.03 <0.03 ng/mL    Troponin Interp         Basic Metabolic Panel w/ Reflex to MG   Result Value Ref Range    Glucose 99 70 - 99 mg/dL    BUN 10 6 - 20 mg/dL    CREATININE 0.81 0.70 - 1.20 mg/dL    Bun/Cre Ratio NOT REPORTED 9 - 20    Calcium 8.6 8.6 - 10.4 mg/dL    Sodium 141 135 - 144 mmol/L    Potassium 3.7 3.7 - 5.3 mmol/L    Chloride 106 98 - 107 mmol/L    CO2 22 20 - 31 mmol/L    Anion Gap 13 9 - 17 mmol/L    GFR Non-African American >60 >60 mL/min    GFR African American >60 >60 mL/min    GFR Comment          GFR Staging NOT REPORTED    PROLACTIN   Result Value Ref Range    Prolactin 16.57 (H) 4.04 - 15.20 ug/L   Hemoglobin and hematocrit, blood   Result Value Ref Range    POC Hemoglobin 13.5 13.5 - 17.5 g/dL    POC Hematocrit 40 (L) 41 - 53 %   SODIUM (POC)   Result Value Ref Range    POC Sodium 142 138 - 146 mmol/L   POTASSIUM (POC)   Result Value Ref Range    POC Potassium 3.4 (L) 3.5 - 4.5 mmol/L   CHLORIDE (POC)   Result Value Ref Range    POC Chloride 106 98 - 107 mmol/L   CALCIUM, IONIC (POC)   Result Value Ref Range    POC Ionized Calcium 1.22 1.15 - 1.33 mmol/L   Venous Blood Gas, POC   Result Value Ref Range    pH, Selvin 7.379 7.320 - 7.430    pCO2, Selvin 47.7 41.0 - 51.0 mm Hg    pO2, Selvin 18.8 (L) 30.0 - 50.0 mm Hg    HCO3, Venous 28.2 22.0 - 29.0 mmol/L    Total CO2, Venous 30 23.0 - 30.0 mmol/L    Negative Base Excess, Selvin NOT REPORTED 0.0 - 2.0    Positive Base Excess, Selvin 2 0.0 - 3.0    O2 Sat, Selvin 27 (L) 60.0 - 85.0 %    O2 Device/Flow/% NOT REPORTED     Charles Test NOT REPORTED     Sample Site NOT REPORTED     Mode NOT REPORTED     FIO2 NOT REPORTED     Pt Temp NOT REPORTED     POC pH Temp NOT REPORTED     POC pCO2 Temp NOT REPORTED mm Hg    POC pO2 Temp NOT REPORTED mm Hg   Creatinine W/GFR Point of Care   Result Value Ref Range    POC Creatinine 0.89 0.51 - 1.19 mg/dL    GFR Comment >60 >60 mL/min    GFR Non-African American >60 >60 mL/min    GFR Comment         Lactic Acid, POC   Result Value Ref Range    POC Lactic Acid 1.60 (H) 0.56 - 1.39 mmol/L   POCT Glucose   Result Value Ref Range    POC Glucose 92 74 - 100 mg/dL   Anion Gap (Calc) POC   Result Value Ref Range    Anion Gap 6 (L) 7 - 16 mmol/L   POC Glucose Fingerstick   Result Value Ref Range    POC Glucose 106 75 - 110 mg/dL   Arterial Blood Gas, POC   Result Value Ref Range patient is incontinent and has frequent UTIs. Patient also with bilateral coarse breath sounds on presentation and not handling secretions well. Patient was suctioned with improvement of respiratory status. Patient did remain with O2 sats above 95%. Blood pressure was stable, pulse stable. Patient did present febrile with a temperature of 101.3. Blood cultures, lactate obtained. Lactate not elevated. Patient did have elevated white blood cell count, fever, urinary source coughing patient for sepsis. Patient was placed on Zosyn and flank after initial emanation. Required 30 mL/kg bolus. Patient was admitted to the ICU under Dr. Juarez Hargrove:  None    CONSULTS:  IP CONSULT TO INTERNAL MEDICINE  IP CONSULT TO CASE MANAGEMENT    CRITICAL CARE:  None    FINAL IMPRESSION      1. Sepsis, due to unspecified organism ([facility] [address]    2. Acute cystitis without hematuria          DISPOSITION / PLAN     DISPOSITION Admitted 09/16/2018 09:18:35 PM      PATIENT REFERRED TO:  No follow-up provider specified.     DISCHARGE MEDICATIONS:  Current Discharge Medication List          Shayne Aguilar MD  Emergency Medicine Resident    (Please note that portions of this note were completed with a voice recognition program.  Efforts were made to edit the dictations but occasionally words are mis-transcribed.)       Shayne Aguilar MD  09/17/18 [id number]

## 2018-09-17 ENCOUNTER — APPOINTMENT (OUTPATIENT)
Dept: GENERAL RADIOLOGY | Age: 43
DRG: 720 | End: 2018-09-17
Payer: MEDICAID

## 2018-09-17 PROBLEM — J69.0 ASPIRATION PNEUMONIA (HCC): Status: ACTIVE | Noted: 2018-09-17

## 2018-09-17 LAB
ABSOLUTE EOS #: <0.03 K/UL (ref 0–0.44)
ABSOLUTE IMMATURE GRANULOCYTE: 0.08 K/UL (ref 0–0.3)
ABSOLUTE LYMPH #: 2.19 K/UL (ref 1.1–3.7)
ABSOLUTE MONO #: 1.32 K/UL (ref 0.1–1.2)
ALLEN TEST: POSITIVE
ANION GAP SERPL CALCULATED.3IONS-SCNC: 12 MMOL/L (ref 9–17)
ANION GAP SERPL CALCULATED.3IONS-SCNC: 13 MMOL/L (ref 9–17)
ANION GAP: 13 MMOL/L (ref 7–16)
BASOPHILS # BLD: 0 % (ref 0–2)
BASOPHILS ABSOLUTE: 0.04 K/UL (ref 0–0.2)
BUN BLDV-MCNC: 10 MG/DL (ref 6–20)
BUN BLDV-MCNC: 11 MG/DL (ref 6–20)
BUN/CREAT BLD: ABNORMAL (ref 9–20)
BUN/CREAT BLD: NORMAL (ref 9–20)
CALCIUM SERPL-MCNC: 8.1 MG/DL (ref 8.6–10.4)
CALCIUM SERPL-MCNC: 8.6 MG/DL (ref 8.6–10.4)
CHLORIDE BLD-SCNC: 106 MMOL/L (ref 98–107)
CHLORIDE BLD-SCNC: 106 MMOL/L (ref 98–107)
CO2: 22 MMOL/L (ref 20–31)
CO2: 24 MMOL/L (ref 20–31)
CREAT SERPL-MCNC: 0.81 MG/DL (ref 0.7–1.2)
CREAT SERPL-MCNC: 1.14 MG/DL (ref 0.7–1.2)
DIFFERENTIAL TYPE: ABNORMAL
EOSINOPHILS RELATIVE PERCENT: 0 % (ref 1–4)
FIO2: ABNORMAL
GFR AFRICAN AMERICAN: >60 ML/MIN
GFR AFRICAN AMERICAN: >60 ML/MIN
GFR NON-AFRICAN AMERICAN: >60 ML/MIN
GFR SERPL CREATININE-BSD FRML MDRD: >60 ML/MIN
GFR SERPL CREATININE-BSD FRML MDRD: ABNORMAL ML/MIN/{1.73_M2}
GFR SERPL CREATININE-BSD FRML MDRD: ABNORMAL ML/MIN/{1.73_M2}
GFR SERPL CREATININE-BSD FRML MDRD: NORMAL ML/MIN/{1.73_M2}
GLUCOSE BLD-MCNC: 105 MG/DL (ref 74–100)
GLUCOSE BLD-MCNC: 97 MG/DL (ref 70–99)
GLUCOSE BLD-MCNC: 99 MG/DL (ref 70–99)
HCT VFR BLD CALC: 40.2 % (ref 40.7–50.3)
HEMOGLOBIN: 13.4 G/DL (ref 13–17)
IMMATURE GRANULOCYTES: 1 %
LYMPHOCYTES # BLD: 18 % (ref 24–43)
MCH RBC QN AUTO: 31.5 PG (ref 25.2–33.5)
MCHC RBC AUTO-ENTMCNC: 33.3 G/DL (ref 28.4–34.8)
MCV RBC AUTO: 94.6 FL (ref 82.6–102.9)
MODE: ABNORMAL
MONOCYTES # BLD: 11 % (ref 3–12)
MRSA, DNA, NASAL: NORMAL
NEGATIVE BASE EXCESS, ART: ABNORMAL (ref 0–2)
NRBC AUTOMATED: 0 PER 100 WBC
O2 DEVICE/FLOW/%: ABNORMAL
PATIENT TEMP: ABNORMAL
PDW BLD-RTO: 13.6 % (ref 11.8–14.4)
PLATELET # BLD: 218 K/UL (ref 138–453)
PLATELET ESTIMATE: ABNORMAL
PMV BLD AUTO: 10.6 FL (ref 8.1–13.5)
POC CHLORIDE: 106 MMOL/L (ref 98–107)
POC CREATININE: 0.86 MG/DL (ref 0.51–1.19)
POC HCO3: 23.2 MMOL/L (ref 21–28)
POC HEMATOCRIT: 40 % (ref 41–53)
POC HEMOGLOBIN: 13.5 G/DL (ref 13.5–17.5)
POC IONIZED CALCIUM: 1.22 MMOL/L (ref 1.15–1.33)
POC LACTIC ACID: 0.45 MMOL/L (ref 0.56–1.39)
POC O2 SATURATION: 94 % (ref 94–98)
POC PCO2 TEMP: ABNORMAL MM HG
POC PCO2: 32.5 MM HG (ref 35–48)
POC PH TEMP: ABNORMAL
POC PH: 7.46 (ref 7.35–7.45)
POC PO2 TEMP: ABNORMAL MM HG
POC PO2: 67.4 MM HG (ref 83–108)
POC POTASSIUM: 3.4 MMOL/L (ref 3.5–4.5)
POC SODIUM: 142 MMOL/L (ref 138–146)
POSITIVE BASE EXCESS, ART: 0 (ref 0–3)
POTASSIUM SERPL-SCNC: 3.7 MMOL/L (ref 3.7–5.3)
POTASSIUM SERPL-SCNC: 3.8 MMOL/L (ref 3.7–5.3)
PROLACTIN: 16.57 UG/L (ref 4.04–15.2)
RBC # BLD: 4.25 M/UL (ref 4.21–5.77)
RBC # BLD: ABNORMAL 10*6/UL
SAMPLE SITE: ABNORMAL
SEG NEUTROPHILS: 70 % (ref 36–65)
SEGMENTED NEUTROPHILS ABSOLUTE COUNT: 8.32 K/UL (ref 1.5–8.1)
SODIUM BLD-SCNC: 141 MMOL/L (ref 135–144)
SODIUM BLD-SCNC: 142 MMOL/L (ref 135–144)
SPECIMEN DESCRIPTION: NORMAL
TCO2 (CALC), ART: 24 MMOL/L (ref 22–29)
WBC # BLD: 12 K/UL (ref 3.5–11.3)
WBC # BLD: ABNORMAL 10*3/UL

## 2018-09-17 PROCEDURE — 6360000002 HC RX W HCPCS: Performed by: STUDENT IN AN ORGANIZED HEALTH CARE EDUCATION/TRAINING PROGRAM

## 2018-09-17 PROCEDURE — 82803 BLOOD GASES ANY COMBINATION: CPT

## 2018-09-17 PROCEDURE — 85014 HEMATOCRIT: CPT

## 2018-09-17 PROCEDURE — 36415 COLL VENOUS BLD VENIPUNCTURE: CPT

## 2018-09-17 PROCEDURE — 6370000000 HC RX 637 (ALT 250 FOR IP): Performed by: STUDENT IN AN ORGANIZED HEALTH CARE EDUCATION/TRAINING PROGRAM

## 2018-09-17 PROCEDURE — G8979 MOBILITY GOAL STATUS: HCPCS

## 2018-09-17 PROCEDURE — 2000000000 HC ICU R&B

## 2018-09-17 PROCEDURE — 82435 ASSAY OF BLOOD CHLORIDE: CPT

## 2018-09-17 PROCEDURE — 82947 ASSAY GLUCOSE BLOOD QUANT: CPT

## 2018-09-17 PROCEDURE — 82330 ASSAY OF CALCIUM: CPT

## 2018-09-17 PROCEDURE — 80048 BASIC METABOLIC PNL TOTAL CA: CPT

## 2018-09-17 PROCEDURE — 94762 N-INVAS EAR/PLS OXIMTRY CONT: CPT

## 2018-09-17 PROCEDURE — 97162 PT EVAL MOD COMPLEX 30 MIN: CPT

## 2018-09-17 PROCEDURE — 2580000003 HC RX 258: Performed by: STUDENT IN AN ORGANIZED HEALTH CARE EDUCATION/TRAINING PROGRAM

## 2018-09-17 PROCEDURE — 82565 ASSAY OF CREATININE: CPT

## 2018-09-17 PROCEDURE — 36600 WITHDRAWAL OF ARTERIAL BLOOD: CPT

## 2018-09-17 PROCEDURE — G8978 MOBILITY CURRENT STATUS: HCPCS

## 2018-09-17 PROCEDURE — 2580000003 HC RX 258: Performed by: HOSPITALIST

## 2018-09-17 PROCEDURE — 71045 X-RAY EXAM CHEST 1 VIEW: CPT

## 2018-09-17 PROCEDURE — 84295 ASSAY OF SERUM SODIUM: CPT

## 2018-09-17 PROCEDURE — 85025 COMPLETE CBC W/AUTO DIFF WBC: CPT

## 2018-09-17 PROCEDURE — 51701 INSERT BLADDER CATHETER: CPT

## 2018-09-17 PROCEDURE — 99291 CRITICAL CARE FIRST HOUR: CPT | Performed by: INTERNAL MEDICINE

## 2018-09-17 PROCEDURE — 84132 ASSAY OF SERUM POTASSIUM: CPT

## 2018-09-17 PROCEDURE — 83605 ASSAY OF LACTIC ACID: CPT

## 2018-09-17 PROCEDURE — 97530 THERAPEUTIC ACTIVITIES: CPT

## 2018-09-17 PROCEDURE — 51798 US URINE CAPACITY MEASURE: CPT

## 2018-09-17 RX ORDER — DULOXETIN HYDROCHLORIDE 30 MG/1
60 CAPSULE, DELAYED RELEASE ORAL DAILY
Status: DISCONTINUED | OUTPATIENT
Start: 2018-09-18 | End: 2018-09-19

## 2018-09-17 RX ORDER — BACLOFEN 10 MG/1
20 TABLET ORAL 3 TIMES DAILY
Status: DISCONTINUED | OUTPATIENT
Start: 2018-09-17 | End: 2018-09-21 | Stop reason: HOSPADM

## 2018-09-17 RX ORDER — 0.9 % SODIUM CHLORIDE 0.9 %
500 INTRAVENOUS SOLUTION INTRAVENOUS ONCE
Status: COMPLETED | OUTPATIENT
Start: 2018-09-17 | End: 2018-09-17

## 2018-09-17 RX ADMIN — PRIMIDONE 50 MG: 50 TABLET ORAL at 08:45

## 2018-09-17 RX ADMIN — ENOXAPARIN SODIUM 40 MG: 40 INJECTION SUBCUTANEOUS at 08:46

## 2018-09-17 RX ADMIN — MEROPENEM 1 G: 1 INJECTION, POWDER, FOR SOLUTION INTRAVENOUS at 19:17

## 2018-09-17 RX ADMIN — PIPERACILLIN AND TAZOBACTAM 3.38 G: 3; .375 INJECTION, POWDER, LYOPHILIZED, FOR SOLUTION INTRAVENOUS; PARENTERAL at 00:21

## 2018-09-17 RX ADMIN — QUETIAPINE FUMARATE 25 MG: 25 TABLET ORAL at 20:13

## 2018-09-17 RX ADMIN — PRIMIDONE 50 MG: 50 TABLET ORAL at 20:13

## 2018-09-17 RX ADMIN — PIPERACILLIN AND TAZOBACTAM 3.38 G: 3; .375 INJECTION, POWDER, LYOPHILIZED, FOR SOLUTION INTRAVENOUS; PARENTERAL at 07:43

## 2018-09-17 RX ADMIN — SODIUM CHLORIDE 500 ML: 9 INJECTION, SOLUTION INTRAVENOUS at 15:52

## 2018-09-17 RX ADMIN — BACLOFEN 20 MG: 10 TABLET ORAL at 10:54

## 2018-09-17 RX ADMIN — BACLOFEN 20 MG: 10 TABLET ORAL at 15:52

## 2018-09-17 RX ADMIN — SODIUM CHLORIDE: 9 INJECTION, SOLUTION INTRAVENOUS at 06:16

## 2018-09-17 RX ADMIN — BACLOFEN 20 MG: 10 TABLET ORAL at 20:13

## 2018-09-17 RX ADMIN — MEROPENEM 1 G: 1 INJECTION, POWDER, FOR SOLUTION INTRAVENOUS at 10:54

## 2018-09-17 RX ADMIN — SODIUM CHLORIDE: 9 INJECTION, SOLUTION INTRAVENOUS at 00:00

## 2018-09-17 RX ADMIN — NORTRIPTYLINE HYDROCHLORIDE 50 MG: 25 CAPSULE ORAL at 20:13

## 2018-09-17 RX ADMIN — Medication 10 ML: at 00:22

## 2018-09-17 ASSESSMENT — PAIN SCALES - GENERAL
PAINLEVEL_OUTOF10: 0

## 2018-09-17 NOTE — H&P
LABALBU  3.9      Assessment and Plan     Principal Problem:    Urinary tract infection  Active Problems:    Multiple sclerosis (HCC)    Metabolic encephalopathy    Sepsis ([address]  Resolved Problems:    * No resolved hospital problems. *      1. Metabolic Encephalopathy secondary to UTI   - UA consistent with UTI   - Patient has multiple episodes of UTI   - Urine culture pending   - Started him on Rocephin 1g IV    2. Diet   - NPO for now    3. DVT Prophylaxis   - On Lovenox 40 mg SQ      The primary Internal medicine team assigned to the patient will be following up the patient on the floor. The above mentioned assessment and plan will be reviewed by the Internal medicine attending, and can be changed or modified accordingly by the attending physician.       Luan Rodriguez MD  PGY-1, Department of Internal Medicine  [address] [place], [place]  9/16/2018 11:20 PM

## 2018-09-17 NOTE — PROGRESS NOTES
2230: Pt arrived to unit from ED via stretcher, accompanied by ED Paramedic and family member. Writer noted immediately that pts eyes were rolling towards the back of his head. Writer attempted to stimulate pt several times, without any responses. /77 HR 92 Temp 98.9 Resp 24 SpO2 92% on RA. Writer asked family member if this was pts baseline. Family stated it was not his baseline and that pt was able to talk and respond in the ED. Family member also stated that pt vomited prior to arriving to unit. 2235: Rapid Response called at this time.      2250:Writer gave report to ICU, Elsi Wooten RN.     2300: Pt transported to ICU at this time, accompanied by Rapid Response Team.

## 2018-09-17 NOTE — PROGRESS NOTES
Physical Therapy  Facility/Department: [address] MICU  Daily Treatment Note  NAME: Karolina Armijo  : 1975  MRN: [id number]    Date of Service: 2018    Discharge Recommendations:  Continue to assess pending progress, Subacute/Skilled Nursing Facility       Chief Complaint   Patient presents with    Altered Mental Status         Patient Diagnosis(es): The primary encounter diagnosis was Sepsis, due to unspecified organism ([facility] [address] A diagnosis of Acute cystitis without hematuria was also pertinent to this visit. has a past medical history of Depression and Multiple sclerosis ([facility] [address] has no past surgical history on file. Restrictions  Restrictions/Precautions  Restrictions/Precautions: Fall Risk  Subjective   General  Family / Caregiver Present: No  Subjective  Subjective: Pt resting in bed when therapy entered. Pt very difficult to understand, however able to answer yes/no questions appropriatly . Pain Screening  Patient Currently in Pain: Denies  Vital Signs  Patient Currently in Pain: Denies       Orientation  Orientation  Overall Orientation Status: Within Normal Limits  Objective   Bed mobility  Rolling to Left: Maximum assistance  Rolling to Right: Maximum assistance               PROM RLE (degrees)  RLE PROM: WFL  PROM LLE (degrees)  LLE PROM: WFL  AROM RUE (degrees)  RUE AROM : WFL  AROM LUE (degrees)  LUE AROM : WFL  Strength RLE  Comment: no active movement observed  Strength LLE  Comment: no active movement observed  Strength RUE  Comment: At least 3/5  Strength LUE  Comment: At least 3/5  Strength Other  Other: Severe intention spams with movement, RUE worse than LUE                 Assessment   Body structures, Functions, Activity limitations: Decreased functional mobility ; Decreased strength;Decreased endurance;Decreased balance  Assessment: Pt performed rolling wiht MaxA, severe spasms noted with any BUE movemetn therefore further bed mobility deferred at this time.  Will continue to

## 2018-09-17 NOTE — H&P
noted to have increasing difficulty breathing using accessory muscles and becoming tachypneic. He was deep suctioned with improvement in respiratory status and he was transferred to the ICU to closer observation. Upon arrival to the ICU, pt became more responsive and was following commends appropriately and speaking at this baseline speech which was hard to understand but comprehensible. Decision was made to hold intubation, order CXR and observe patient. Past Medical History:        Diagnosis Date    Depression     Multiple sclerosis ([facility] [address]        Past Surgical History:  History reviewed. No pertinent surgical history. Allergies:    No Known Allergies      Home Meds:   Prior to Admission medications    Medication Sig Start Date End Date Taking? Authorizing Provider   fluticasone (FLONASE) 50 MCG/ACT nasal spray 1 spray by Nasal route daily 3/10/16   Ying Raza,    albuterol (PROVENTIL) (2.5 MG/3ML) 0.083% nebulizer solution Take 3 mLs by nebulization every 4 hours as needed for Wheezing 3/10/16   Ying Raza, DO   Catheters (GIZMO CONDOM CATHETER) MISC 1 Units by Does not apply route daily 3/10/16   Ying Raza, DO   Incontinence Supplies (URINARY DRAINAGE BAG) MISC 1 Units by Other route once a week Change fry bag at least once weekly. Empty every 4-6 hours.  3/10/16   Ying Raza, DO   pantoprazole (PROTONIX) 40 MG tablet Take 1 tablet by mouth daily Secondary to possible NG trauma from intubation 3/10/16   Ying Raza DO   clonazePAM (KLONOPIN) 1 MG tablet Take 1 mg by mouth 2 times daily as needed    Historical Provider, MD   primidone (MYSOLINE) 50 MG tablet Take 50 mg by mouth 2 times daily    Historical Provider, MD   DULoxetine (CYMBALTA) 60 MG capsule Take 60 mg by mouth daily    Historical Provider, MD   baclofen (LIORESAL) 20 MG tablet Take 20 mg by mouth 3 times daily    Historical Provider, MD   nortriptyline (PAMELOR) 25 MG capsule Take coli)  Sputum Culture:               [x] None drawn       [] Negative             []  Positive (Details:  )   Endotracheal aspirate:     [x] None drawn       [] Negative             []  Positive (Details:  )         -----------------------------------------------------------------  Radiological reports:    (See actual reports for details)      Recent Cardiac Catheterization summary:   (See actual reports for details)    Electrocardiogram:     Last Echocardiogram findings:   (See actual reports for details)       Assessment and Plan       Patient Active Problem List   Diagnosis    Influenza A    Altered mental status    Lactic acidosis    Multiple sclerosis ([address]    Metabolic encephalopathy    Acute respiratory failure ([address]    Relapsing remitting multiple sclerosis ([address]    Acute respiratory failure with hypoxia ([address]    Encephalopathy acute    Sepsis ([facility] [address]    Urinary tract infection    UTI (urinary tract infection)    Aspiration pneumonia ([address]       42 yo M w/ Hx of MS who presented w/ complaints of AMS admitted for UTI, became unresponsive but improved mentation after deep suctioning with concern for aspiration, now back to baseline. No prior hx of seizure.     Plan:    NEURO: AMS improved,   - AO x 3  - Neuro checks  - Analgesia   - if repeated episode of unresponsiveness, will consider neurology consult to r/o seizure and CTH  - prolactin of 16.5  - resume home meds of klonopin, cymbalta, nortriptyline, seroquel, and primidone    CV:  - BP and HR stable  - Continue to monitor    PULM: Impending respiratory failure - resolved  - stable  - CXR shows hazy density in L perihilar region and LLL infiltrates concerning for possible aspiration   - repeat CXR in AM  - Encourage frequent pulmonary toilet with IS, deep breathing/ coughing exercises    GI/DVT PROPHYLAXIS: hyperammonemia  - H2 blocker/SCDs/heparin  - NPO  - LFT wnl except for very mildly elevated ammonia  - repeat ammonia in AM    RENAL:  - Adequate urine output  - continue briefs and intermittent straight caths  - Continue to monitor  - IVF: NS @ 75 cc/hr  - Cr/BUN stable  - daily bmp    ID/ HEME: UTI, possible aspiration pneumonia  - switch zosyn to meropenem  - received vancomycin x1  - urine culture and blood cx 1/4 positive for E coli  - WBC 11.6  - Afebrile / Tmax 100.2  - Continue meropenem until final cultures  - daily cbc    MSK/ ORTHO:  - wheelchair bound and BUE weakness at baseline 2/2 hx of MS     PT/OT/SLP:  - Evaluation and treatment     DISPO:  - Continue ICU admission      The critical care team assigned to the patient will be following up the patient in the intensive care unit. I have discussed the current plan with the critical care attending. The above mentioned assessment and plan will be reviewed again in detail by the critical care attending at bedside, and can be further changed or modified accordingly by the attending physician. --  Kirill Gurrola MD  Critical Care Resident, PGY-3  Department of Internal Medicine/ Critical care  [facility])             9/17/2018, 5:30 PM    Attending Physician Statement  I have discussed the care of Elle Clifford, including pertinent history and exam findings with the resident. I have reviewed the key elements of all parts of the encounter with the resident. I have seen and examined the patient with the resident. I agree with the assessment and plan and status of the problem list as documented. Seen the patient during my round this morning, I reviewed the chest x-rays, lab seen urine and blood cultures are seen and I have also seen the results of the right gases and reviewed the previous history and previous culture results seen.   History of multiple sclerosis progressive neuromuscular disease with previous history of Klebsiella bacteremia and Klebsiella UTI, history of frequent urinary infection and previous history of intubation for airway protection. Admitted with altered mental status and after he was admitted to the floor he became unresponsive and it was considered that he will need intubation for airway protection but after arrival to ICU he became more responsive, there was also concern about aspiration as he had vomitus also and had rhonchi on exam after he had vomiting, initial chest x-ray shows possible hazy infiltrate in left lower lobe and infrahilar area the chest x-ray repeated shows more prominent infiltrate in that area which is likely infiltrate rather than atelectasis. This morning his blood culture is growing E. coli, his urine culture was positive also for E. coli although his urine has 5-10 WBC only and positive for nitrites and trace leukocyte esterase. He had noted any hypotension and ABG did not show CO2 retention does have hypoxia, when I examined he was only on 2 L nasal cannula he was not in distress he does have rhonchi present on the left side. According to nursing staff he had no signs of aspiration and on giving him his medication there was no coughing or choking. His lactic acid which was done on admission did not show lactic acidosis. He is on Klonopin at home and also on Seroquel. Sepsis likely secondary to urinary tract infection. Altered mental status secondary to sepsis and UTI. Progressive multiple sclerosis and immobility and bed bound status. Discontinue vancomycin. Start meropenem and discontinue Zosyn until culture data available. Aspiration precaution. He is on Klonopin when necessary, he is also on Seroquel which we will resume, he is also on nortriptyline and also on baclofen for muscle spasticity. Repeat chest x-ray. Continue with IV fluid. We will do a straight cath. We will resume primidone, baclofen Seroquel and nortriptyline. If his mentation remained okay we can resume home dose of Cymbalta tomorrow. Follow-up blood and urine culture results.   Follow-up urine output and renal function.     Ioana Vazquez MD  9/17/2018 9:51 AM

## 2018-09-17 NOTE — FLOWSHEET NOTE
SPIRITUAL CARE DEPARTMENT - [address]  PROGRESS NOTE    Shift date: 9.16.2018  Shift day: Sunday   Shift # 2    Room # 0130/0130-01   Name: Karan Martines            Age: 41 y.o. Gender: male          Church: [employer]   Place of [employer]: unknown    Referral: Rapid Response    Admit Date & Time: 9/16/2018  4:19 PM    PATIENT/EVENT DESCRIPTION:  Karan Martines is a 41 y.o. male who was called in for a rapid response. Patient having trouble breathing. SPIRITUAL ASSESSMENT/INTERVENTION:  Mother was in the room with the patient.  sat with the mother and made sure she was ok with staying in the room. Mother insisted on staying and stated that the patient's wife was on the way. Patient's wife came and  escorted them to the ICU.  prayed with the family and checked in on the patient's situation upon transfer. Wife is struggling with cancer and just recently had a transplant done. She is concerned about enter into the ICU because of possible infection. SPIRITUAL CARE FOLLOW-UP PLAN:  Chaplains will remain available to offer spiritual and emotional support as needed. Electronically signed by Catrachita Grove, on 9/16/2018 at 11:48 PM.  [facility]  [phone]       09/16/18 5186   Encounter Summary   Services provided to: Patient and family together   Referral/Consult From: Multi-disciplinary team   Support System Parent;Spouse   Place of [address] [place] [address] ([phone])   Complexity of Encounter High   Length of Encounter 30 minutes   Spiritual Assessment Completed Yes   Crisis   Type Rapid response   Assessment Tearful; Anxious; Fearful   Intervention Active listening;Explored feelings, thoughts, concerns;Prayer   Outcome Expressed gratitude     Electronically signed by Aureliano Valderrama on 9/16/2018 at 11:48 PM

## 2018-09-17 NOTE — PROGRESS NOTES
Smoking Cessation - topics covered   []  Health Risks  []  Benefits of Quitting   []  Smoking Cessation  [x]  Patient has no history of tobacco use  []  Patient is former smoker. [x]  No need for tobacco cessation education. []  Booklet given  []  Patient verbalizes understanding. []  Patient denies need for tobacco cessation education.   Mayur August  8:10 AM

## 2018-09-18 ENCOUNTER — APPOINTMENT (OUTPATIENT)
Dept: GENERAL RADIOLOGY | Age: 43
DRG: 720 | End: 2018-09-18
Payer: MEDICAID

## 2018-09-18 LAB
ABSOLUTE EOS #: <0.03 K/UL (ref 0–0.44)
ABSOLUTE IMMATURE GRANULOCYTE: 0.09 K/UL (ref 0–0.3)
ABSOLUTE LYMPH #: 0.71 K/UL (ref 1.1–3.7)
ABSOLUTE MONO #: 1.21 K/UL (ref 0.1–1.2)
ANION GAP SERPL CALCULATED.3IONS-SCNC: 19 MMOL/L (ref 9–17)
BASOPHILS # BLD: 0 % (ref 0–2)
BASOPHILS ABSOLUTE: 0.03 K/UL (ref 0–0.2)
BUN BLDV-MCNC: 9 MG/DL (ref 6–20)
BUN/CREAT BLD: ABNORMAL (ref 9–20)
CALCIUM SERPL-MCNC: 9 MG/DL (ref 8.6–10.4)
CHLORIDE BLD-SCNC: 106 MMOL/L (ref 98–107)
CO2: 19 MMOL/L (ref 20–31)
CREAT SERPL-MCNC: 1.41 MG/DL (ref 0.7–1.2)
CULTURE: ABNORMAL
DIFFERENTIAL TYPE: ABNORMAL
EOSINOPHILS RELATIVE PERCENT: 0 % (ref 1–4)
GFR AFRICAN AMERICAN: >60 ML/MIN
GFR NON-AFRICAN AMERICAN: 55 ML/MIN
GFR SERPL CREATININE-BSD FRML MDRD: ABNORMAL ML/MIN/{1.73_M2}
GFR SERPL CREATININE-BSD FRML MDRD: ABNORMAL ML/MIN/{1.73_M2}
GLUCOSE BLD-MCNC: 83 MG/DL (ref 70–99)
HCT VFR BLD CALC: 42 % (ref 40.7–50.3)
HEMOGLOBIN: 13.9 G/DL (ref 13–17)
IMMATURE GRANULOCYTES: 1 %
LYMPHOCYTES # BLD: 5 % (ref 24–43)
Lab: ABNORMAL
Lab: ABNORMAL
MCH RBC QN AUTO: 31.4 PG (ref 25.2–33.5)
MCHC RBC AUTO-ENTMCNC: 33.1 G/DL (ref 28.4–34.8)
MCV RBC AUTO: 94.8 FL (ref 82.6–102.9)
MONOCYTES # BLD: 9 % (ref 3–12)
NRBC AUTOMATED: 0 PER 100 WBC
ORGANISM: ABNORMAL
ORGANISM: ABNORMAL
PDW BLD-RTO: 13.7 % (ref 11.8–14.4)
PLATELET # BLD: 210 K/UL (ref 138–453)
PLATELET ESTIMATE: ABNORMAL
PMV BLD AUTO: 11.1 FL (ref 8.1–13.5)
POTASSIUM SERPL-SCNC: 3.7 MMOL/L (ref 3.7–5.3)
RBC # BLD: 4.43 M/UL (ref 4.21–5.77)
RBC # BLD: ABNORMAL 10*6/UL
SEG NEUTROPHILS: 86 % (ref 36–65)
SEGMENTED NEUTROPHILS ABSOLUTE COUNT: 12.2 K/UL (ref 1.5–8.1)
SODIUM BLD-SCNC: 144 MMOL/L (ref 135–144)
SPECIMEN DESCRIPTION: ABNORMAL
SPECIMEN DESCRIPTION: ABNORMAL
STATUS: ABNORMAL
STATUS: ABNORMAL
WBC # BLD: 14.3 K/UL (ref 3.5–11.3)
WBC # BLD: ABNORMAL 10*3/UL

## 2018-09-18 PROCEDURE — 6360000002 HC RX W HCPCS: Performed by: EMERGENCY MEDICINE

## 2018-09-18 PROCEDURE — 6360000002 HC RX W HCPCS: Performed by: STUDENT IN AN ORGANIZED HEALTH CARE EDUCATION/TRAINING PROGRAM

## 2018-09-18 PROCEDURE — 97110 THERAPEUTIC EXERCISES: CPT

## 2018-09-18 PROCEDURE — 36415 COLL VENOUS BLD VENIPUNCTURE: CPT

## 2018-09-18 PROCEDURE — 71045 X-RAY EXAM CHEST 1 VIEW: CPT

## 2018-09-18 PROCEDURE — 6370000000 HC RX 637 (ALT 250 FOR IP): Performed by: STUDENT IN AN ORGANIZED HEALTH CARE EDUCATION/TRAINING PROGRAM

## 2018-09-18 PROCEDURE — 80048 BASIC METABOLIC PNL TOTAL CA: CPT

## 2018-09-18 PROCEDURE — 85025 COMPLETE CBC W/AUTO DIFF WBC: CPT

## 2018-09-18 PROCEDURE — 2060000000 HC ICU INTERMEDIATE R&B

## 2018-09-18 PROCEDURE — 2000000000 HC ICU R&B

## 2018-09-18 PROCEDURE — 99233 SBSQ HOSP IP/OBS HIGH 50: CPT | Performed by: INTERNAL MEDICINE

## 2018-09-18 PROCEDURE — 2580000003 HC RX 258: Performed by: EMERGENCY MEDICINE

## 2018-09-18 PROCEDURE — 2580000003 HC RX 258: Performed by: STUDENT IN AN ORGANIZED HEALTH CARE EDUCATION/TRAINING PROGRAM

## 2018-09-18 RX ORDER — ACETAMINOPHEN 325 MG/1
650 TABLET ORAL EVERY 6 HOURS PRN
Status: DISCONTINUED | OUTPATIENT
Start: 2018-09-18 | End: 2018-09-21 | Stop reason: HOSPADM

## 2018-09-18 RX ADMIN — PRIMIDONE 50 MG: 50 TABLET ORAL at 10:43

## 2018-09-18 RX ADMIN — ACETAMINOPHEN 650 MG: 325 TABLET ORAL at 06:35

## 2018-09-18 RX ADMIN — QUETIAPINE FUMARATE 25 MG: 25 TABLET ORAL at 21:05

## 2018-09-18 RX ADMIN — SODIUM CHLORIDE: 9 INJECTION, SOLUTION INTRAVENOUS at 02:49

## 2018-09-18 RX ADMIN — PRIMIDONE 50 MG: 50 TABLET ORAL at 21:06

## 2018-09-18 RX ADMIN — BACLOFEN 20 MG: 10 TABLET ORAL at 15:23

## 2018-09-18 RX ADMIN — ENOXAPARIN SODIUM 40 MG: 40 INJECTION SUBCUTANEOUS at 10:44

## 2018-09-18 RX ADMIN — CEFTRIAXONE SODIUM 2 G: 2 INJECTION, POWDER, FOR SOLUTION INTRAMUSCULAR; INTRAVENOUS at 18:40

## 2018-09-18 RX ADMIN — MEROPENEM 1 G: 1 INJECTION, POWDER, FOR SOLUTION INTRAVENOUS at 10:44

## 2018-09-18 RX ADMIN — BACLOFEN 20 MG: 10 TABLET ORAL at 21:05

## 2018-09-18 RX ADMIN — NORTRIPTYLINE HYDROCHLORIDE 50 MG: 25 CAPSULE ORAL at 21:06

## 2018-09-18 RX ADMIN — DULOXETINE HYDROCHLORIDE 60 MG: 30 CAPSULE, DELAYED RELEASE ORAL at 10:43

## 2018-09-18 RX ADMIN — Medication 10 ML: at 10:42

## 2018-09-18 RX ADMIN — MEROPENEM 1 G: 1 INJECTION, POWDER, FOR SOLUTION INTRAVENOUS at 02:47

## 2018-09-18 RX ADMIN — SODIUM CHLORIDE: 9 INJECTION, SOLUTION INTRAVENOUS at 12:53

## 2018-09-18 RX ADMIN — BACLOFEN 20 MG: 10 TABLET ORAL at 10:44

## 2018-09-18 ASSESSMENT — PAIN SCALES - GENERAL
PAINLEVEL_OUTOF10: 0

## 2018-09-18 NOTE — PROGRESS NOTES
Physical Therapy    DATE: 2018  NAME: Elle Cliffrod  MRN: [id number]   : 1975      Discharge Recommendations   Continue to assess pending progress, Subacute/Skilled Nursing Facility         Subjective: Per RN, patient okay for PT - stated patient continues to have significant spasms. Patient opened eyes upon introduction. Able shake/nod head, limited communication. Pain: KOFI  Patient follows: All commands - attempts motions when asked  Is patient on ventilator: No  Is patient on sedation: No  Precautions: Fall risk    Therapeutic exercises:  PROM all planes x 10 reps BUE and BLE. Bilateral gastrocnemius stretching 3x15\"  Very little tightness noted in B LE's except B gastrocs.   -Issued FDS, notified RN, donned to L LE at 1100    Goals  Short Term Goals  Short term goal 1: Pt to perform bed mobility with Sydni  Short term goal 2: Pt to tolerate at least 30 mins of UE/LE exercises in order ot increase strength and prevent contractures  Short term goal 3: pt to tolerate sitting on EOB for at least 5' with ModA          Plan: Progress functional mobility as medically appropriate.    Time In: 1687  Time Out: 0915  Time Coded Minutes (treatment minutes): 32  Rehab Potential: Good  Treatments/week: 3x/wk    Navdeep Koo, PTA

## 2018-09-18 NOTE — PROGRESS NOTES
If yes -     [] Right IJ     [] Left IJ [] Right Femoral [] Left Femoral                   [] Right Subclavian [] Left Subclavian       OWENS'S CATHETER:   [x] No   [] Yes  (Date of Insertion:   )     URINE OUTPUT:            [x] Good   [] Low              [] Anuric      OBJECTIVE:     VITAL SIGNS:  BP (!) 124/105   Pulse 61   Temp 98.6 °F (37 °C) (Axillary)   Resp 23   Ht 6' 0.83\" (1.85 m)   Wt 137 lb 2 oz (62.2 kg)   SpO2 100%   BMI 18.17 kg/m²   Tmax over 24 hours:  Temp (24hrs), Av.4 °F (37.4 °C), Min:98.6 °F (37 °C), Max:101.1 °F (38.4 °C)      Patient Vitals for the past 6 hrs:   BP Temp Temp src Pulse Resp SpO2   18 1200 (!) 124/105 - - 61 23 -   18 1100 (!) 126/102 98.6 °F (37 °C) Axillary 61 18 100 %   18 1000 126/75 - - 62 22 100 %   18 0900 125/78 - - 59 17 100 %   18 0800 108/63 100.2 °F (37.9 °C) Axillary 67 20 100 %   18 0700 (!) 157/89 - - 81 21 100 %         Intake/Output Summary (Last 24 hours) at 18 1243  Last data filed at 18 0800   Gross per 24 hour   Intake             2180 ml   Output                0 ml   Net             2180 ml     Wt Readings from Last 2 Encounters:   18 137 lb 2 oz (62.2 kg)   17 141 lb 15.6 oz (64.4 kg)     Body mass index is 18.17 kg/m². PHYSICAL EXAMINATION:  Constitutional: Appears well, no distress  EENT: PERRLA, EOMI, sclera clear, anicteric, oropharynx clear, no lesions, neck supple with midline trachea. Neck: Supple, symmetrical, trachea midline, no adenopathy,  no jvd, skin normal  Respiratory: clear to auscultation, no wheezes or rales and unlabored breathing.  No intercostal tenderness  Cardiovascular: regular rate and rhythm, normal S1, S2, no murmur noted  Abdomen: soft, nontender, nondistended, no masses or organomegaly, in diaper for incontinence  Extremities:   no pedal edema, no clubbing or cyanosis  Neuro: following commends appropriately, 0/5 in BLE at baseline, 3+/5, weak cough at baseline, weakness in BUE at baseline      MEDICATIONS:    Scheduled Meds:   meropenem  1 g Intravenous Q8H    baclofen  20 mg Oral TID    DULoxetine  60 mg Oral Daily    primidone  50 mg Oral BID    QUEtiapine  25 mg Oral Nightly    sodium chloride flush  10 mL Intravenous 2 times per day    enoxaparin  40 mg Subcutaneous Daily    nortriptyline  50 mg Oral Nightly     Continuous Infusions:   sodium chloride 100 mL/hr at 09/18/18 0249     PRN Meds:     acetaminophen 650 mg Q6H PRN   albuterol 2.5 mg Q4H PRN   clonazePAM 1 mg BID PRN   sodium chloride flush 10 mL PRN   magnesium hydroxide 30 mL Daily PRN   ondansetron 4 mg Q6H PRN         VENT SETTINGS (Comprehensive) (if applicable):  Vent Information  FiO2 : 21 %  Additional Respiratory  Assessments  Pulse: 61  Resp: 23  SpO2: 100 %  Oral Care: Teeth brushed, Lip moisturizer applied, Mouth moisturizer    ABGs:     Laboratory findings:    Complete Blood Count: Recent Labs      09/16/18   1649  09/17/18   0531  09/18/18   0606   WBC  11.6*  12.0*  14.3*   HGB  14.5  13.4  13.9   HCT  44.5  40.2*  42.0   PLT  273  218  210        Last 3 Blood Glucose:   Recent Labs      09/16/18   2334  09/17/18   0531  09/18/18   0606   GLUCOSE  99  97  83        PT/INR:    Lab Results   Component Value Date    PROTIME 11.6 09/16/2018    INR 1.1 09/16/2018     PTT:    Lab Results   Component Value Date    APTT 26.4 09/16/2018       Comprehensive Metabolic Profile:   Recent Labs      09/16/18   1649  09/16/18   2334  09/17/18   0016  09/17/18   0531  09/18/18   0606   NA  138  141   --   142  144   K  5.0  3.7   --   3.8  3.7   CL  106  106   --   106  106   CO2  24  22   --   24  19*   BUN  8  10   --   11  9   CREATININE  0.78  0.81  0.86  1.14  1.41*   GLUCOSE  97  99   --   97  83   CALCIUM  8.8  8.6   --   8.1*  9.0   PROT  6.9   --    --    --    --    LABALBU  3.9   --    --    --    --    BILITOT  0.53   --    --    --    --    ALKPHOS  95   --    --    --    -- AST  24   --    --    --    --    ALT  64*   --    --    --    --       Magnesium:   Lab Results   Component Value Date    MG 1.8 12/29/2017     Phosphorus:   Lab Results   Component Value Date    PHOS 2.7 12/29/2017     Ionized Calcium: No results found for: CAION     Urinalysis:     Troponin: No results for input(s): TROPONINI in the last 72 hours. Microbiology:    Cultures during this admission:     Blood cultures:                 [] None drawn      [] Negative             [x]  Positive (Details: E coli )  Urine Culture:                   [] None drawn      [] Negative             [x]  Positive (Details: E coli )  Sputum Culture:               [x] None drawn       [] Negative             []  Positive (Details:  )   Endotracheal aspirate:     [x] None drawn       [] Negative             []  Positive (Details:  )     Other pertinent Labs:       Radiology/Imaging:     Chest Xray (9/18/2018):    ASSESSMENT:     Patient Active Problem List    Diagnosis Date Noted    Aspiration pneumonia ([address] 09/17/2018    Urinary tract infection 09/16/2018    UTI (urinary tract infection) 09/16/2018    Sepsis ([address]     Encephalopathy acute 12/27/2017    Acute respiratory failure with hypoxia ([address]     Acute respiratory failure ([address] 03/09/2016    Relapsing remitting multiple sclerosis ([address] 44/27/1196    Metabolic encephalopathy 27/13/6438    Influenza A 03/05/2016    Altered mental status 03/05/2016    Lactic acidosis 03/05/2016    Multiple sclerosis ([address] 03/05/2016       PLAN:     WEAN PER PROTOCOL:  [] No   [] Yes  [x] N/A    DISCONTINUE ANY LABS:   [x] No   [] Yes    ICU PROPHYLAXIS:  Stress ulcer:  [x] PPI Agent  [] R2Iilbj [] Sucralfate  [] Other:  VTE:   [] Enoxaparin  [] Unfract.  Heparin Subcut  [] EPC Cuffs    NUTRITION:  [] NPO [] Tube Feeding (Specify: ) [] TPN  [x] PO (Diet: Diet NPO Effective Now)    HOME MEDICATIONS RECONCILED: [] No  [x] Yes    INSULIN DRIP:   [x] No   [] Yes    CONSULTATION NEEDED: [x] No   [] Yes    FAMILY UPDATED:    [] No   [x] Yes    TRANSFER OUT OF ICU:   [] No   [x] Yes    ADDITIONAL PLAN:    42 yo M w/ Hx of MS who presented w/ complaints of AMS admitted for UTI, became unresponsive but improved mentation after deep suctioning with concern for aspiration, now back to baseline. No prior hx of seizure.     Plan:     NEURO: AMS improved,   - AO x 3  - Neuro checks  - Analgesia   - if repeated episode of unresponsiveness, will consider neurology consult to r/o seizure and CTH  - prolactin of 16.5  - resume home meds of klonopin, cymbalta, nortriptyline, seroquel, baclofen, and primidone - use klonopin as needed     CV:  - BP and HR stable  - Continue to monitor     PULM: Impending respiratory failure - resolved  - stable  - CXR 9/17 shows hazy density in L perihilar region and LLL infiltrates concerning for possible aspiration   - repeat CXR 9/18 shows improving L perihilar density  - Encourage frequent pulmonary toilet with IS, deep breathing/ coughing exercises     GI/DVT PROPHYLAXIS: hyperammonemia  - H2 blocker/SCDs/heparin  - Start diet   - LFT wnl except, ammonia 44     RENAL: AARTI  - Adequate urine output  - place external urethral catheter  - Continue to monitor  - Cr/BUN 1.14/11-->1.41/9  - IVF: NS @ 75 cc/hr, increase to 100 cc/hr  - daily bmp     ID/ HEME: Urosepsis 2/2 E coli, possible aspiration pneumonia  - switch zosyn to meropenem, - Urine cx sensitivity shows sensitivity to rocephin - will d/c meropenem and start rocephin   - received vancomycin x1  - urine culture and blood cx 1/4 positive for E coli  - WBC 11.6 -->14.3  - Afebrile / Tmax 101.2  - daily cbc     MSK/ ORTHO:  - wheelchair bound and BUE weakness at baseline 2/2 hx of MS     PT/OT/SLP:  - Evaluation and treatment      DISPO:  - Transfer to step down        The critical care team assigned to the patient will be following up the patient in the intensive care unit.  I have discussed the current plan with the

## 2018-09-19 ENCOUNTER — APPOINTMENT (OUTPATIENT)
Dept: GENERAL RADIOLOGY | Age: 43
DRG: 720 | End: 2018-09-19
Payer: MEDICAID

## 2018-09-19 LAB
ABSOLUTE EOS #: 0.08 K/UL (ref 0–0.44)
ABSOLUTE IMMATURE GRANULOCYTE: 0.05 K/UL (ref 0–0.3)
ABSOLUTE LYMPH #: 1.27 K/UL (ref 1.1–3.7)
ABSOLUTE MONO #: 1.01 K/UL (ref 0.1–1.2)
ANION GAP SERPL CALCULATED.3IONS-SCNC: 16 MMOL/L (ref 9–17)
BASOPHILS # BLD: 0 % (ref 0–2)
BASOPHILS ABSOLUTE: <0.03 K/UL (ref 0–0.2)
BUN BLDV-MCNC: 7 MG/DL (ref 6–20)
BUN/CREAT BLD: ABNORMAL (ref 9–20)
CALCIUM SERPL-MCNC: 8.7 MG/DL (ref 8.6–10.4)
CHLORIDE BLD-SCNC: 101 MMOL/L (ref 98–107)
CO2: 23 MMOL/L (ref 20–31)
CREAT SERPL-MCNC: 0.93 MG/DL (ref 0.7–1.2)
DIFFERENTIAL TYPE: ABNORMAL
EOSINOPHILS RELATIVE PERCENT: 1 % (ref 1–4)
GFR AFRICAN AMERICAN: >60 ML/MIN
GFR NON-AFRICAN AMERICAN: >60 ML/MIN
GFR SERPL CREATININE-BSD FRML MDRD: ABNORMAL ML/MIN/{1.73_M2}
GFR SERPL CREATININE-BSD FRML MDRD: ABNORMAL ML/MIN/{1.73_M2}
GLUCOSE BLD-MCNC: 112 MG/DL (ref 70–99)
HCT VFR BLD CALC: 39.3 % (ref 40.7–50.3)
HEMOGLOBIN: 13.1 G/DL (ref 13–17)
IMMATURE GRANULOCYTES: 1 %
LYMPHOCYTES # BLD: 15 % (ref 24–43)
MCH RBC QN AUTO: 31.3 PG (ref 25.2–33.5)
MCHC RBC AUTO-ENTMCNC: 33.3 G/DL (ref 28.4–34.8)
MCV RBC AUTO: 93.8 FL (ref 82.6–102.9)
MONOCYTES # BLD: 12 % (ref 3–12)
NRBC AUTOMATED: 0 PER 100 WBC
PDW BLD-RTO: 13.3 % (ref 11.8–14.4)
PLATELET # BLD: 217 K/UL (ref 138–453)
PLATELET ESTIMATE: ABNORMAL
PMV BLD AUTO: 11.2 FL (ref 8.1–13.5)
POTASSIUM SERPL-SCNC: 3.3 MMOL/L (ref 3.7–5.3)
RBC # BLD: 4.19 M/UL (ref 4.21–5.77)
RBC # BLD: ABNORMAL 10*6/UL
SEG NEUTROPHILS: 72 % (ref 36–65)
SEGMENTED NEUTROPHILS ABSOLUTE COUNT: 6.27 K/UL (ref 1.5–8.1)
SODIUM BLD-SCNC: 140 MMOL/L (ref 135–144)
WBC # BLD: 8.7 K/UL (ref 3.5–11.3)
WBC # BLD: ABNORMAL 10*3/UL

## 2018-09-19 PROCEDURE — 85025 COMPLETE CBC W/AUTO DIFF WBC: CPT

## 2018-09-19 PROCEDURE — 2580000003 HC RX 258: Performed by: EMERGENCY MEDICINE

## 2018-09-19 PROCEDURE — 6360000002 HC RX W HCPCS: Performed by: STUDENT IN AN ORGANIZED HEALTH CARE EDUCATION/TRAINING PROGRAM

## 2018-09-19 PROCEDURE — 99233 SBSQ HOSP IP/OBS HIGH 50: CPT | Performed by: INTERNAL MEDICINE

## 2018-09-19 PROCEDURE — 92611 MOTION FLUOROSCOPY/SWALLOW: CPT

## 2018-09-19 PROCEDURE — G8988 SELF CARE GOAL STATUS: HCPCS

## 2018-09-19 PROCEDURE — 6370000000 HC RX 637 (ALT 250 FOR IP): Performed by: STUDENT IN AN ORGANIZED HEALTH CARE EDUCATION/TRAINING PROGRAM

## 2018-09-19 PROCEDURE — G8997 SWALLOW GOAL STATUS: HCPCS

## 2018-09-19 PROCEDURE — 2500000003 HC RX 250 WO HCPCS: Performed by: HOSPITALIST

## 2018-09-19 PROCEDURE — 74230 X-RAY XM SWLNG FUNCJ C+: CPT

## 2018-09-19 PROCEDURE — 1200000000 HC SEMI PRIVATE

## 2018-09-19 PROCEDURE — 80048 BASIC METABOLIC PNL TOTAL CA: CPT

## 2018-09-19 PROCEDURE — 97166 OT EVAL MOD COMPLEX 45 MIN: CPT

## 2018-09-19 PROCEDURE — 97530 THERAPEUTIC ACTIVITIES: CPT

## 2018-09-19 PROCEDURE — G8987 SELF CARE CURRENT STATUS: HCPCS

## 2018-09-19 PROCEDURE — 2580000003 HC RX 258: Performed by: STUDENT IN AN ORGANIZED HEALTH CARE EDUCATION/TRAINING PROGRAM

## 2018-09-19 PROCEDURE — 36415 COLL VENOUS BLD VENIPUNCTURE: CPT

## 2018-09-19 PROCEDURE — G8996 SWALLOW CURRENT STATUS: HCPCS

## 2018-09-19 PROCEDURE — 6370000000 HC RX 637 (ALT 250 FOR IP): Performed by: HOSPITALIST

## 2018-09-19 PROCEDURE — 6360000002 HC RX W HCPCS: Performed by: EMERGENCY MEDICINE

## 2018-09-19 RX ORDER — PROMETHAZINE HYDROCHLORIDE 6.25 MG/5ML
20 SYRUP ORAL EVERY 6 HOURS PRN
Status: ON HOLD | COMMUNITY
End: 2018-09-21 | Stop reason: HOSPADM

## 2018-09-19 RX ORDER — PROPRANOLOL HCL 60 MG
120 CAPSULE, EXTENDED RELEASE 24HR ORAL DAILY
Status: DISCONTINUED | OUTPATIENT
Start: 2018-09-19 | End: 2018-09-21 | Stop reason: HOSPADM

## 2018-09-19 RX ORDER — OXYCODONE HYDROCHLORIDE AND ACETAMINOPHEN 5; 325 MG/1; MG/1
1 TABLET ORAL EVERY 6 HOURS PRN
Status: ON HOLD | COMMUNITY
End: 2018-09-21 | Stop reason: HOSPADM

## 2018-09-19 RX ORDER — LABETALOL HYDROCHLORIDE 5 MG/ML
20 INJECTION, SOLUTION INTRAVENOUS EVERY 4 HOURS PRN
Status: DISCONTINUED | OUTPATIENT
Start: 2018-09-19 | End: 2018-09-20

## 2018-09-19 RX ORDER — FLUOXETINE HYDROCHLORIDE 20 MG/1
20 CAPSULE ORAL DAILY
Status: DISCONTINUED | OUTPATIENT
Start: 2018-09-19 | End: 2018-09-21 | Stop reason: HOSPADM

## 2018-09-19 RX ORDER — POTASSIUM CHLORIDE 20MEQ/15ML
40 LIQUID (ML) ORAL 2 TIMES DAILY
Status: COMPLETED | OUTPATIENT
Start: 2018-09-19 | End: 2018-09-19

## 2018-09-19 RX ORDER — CLONAZEPAM 1 MG/1
0.5 TABLET ORAL
Status: ON HOLD | COMMUNITY
End: 2018-09-21 | Stop reason: HOSPADM

## 2018-09-19 RX ORDER — FLUOXETINE HYDROCHLORIDE 20 MG/1
20 CAPSULE ORAL DAILY
Status: ON HOLD | COMMUNITY
End: 2018-09-21

## 2018-09-19 RX ORDER — PROPRANOLOL HYDROCHLORIDE 120 MG/1
120 CAPSULE, EXTENDED RELEASE ORAL DAILY
Status: ON HOLD | COMMUNITY
End: 2018-09-21 | Stop reason: HOSPADM

## 2018-09-19 RX ADMIN — SODIUM CHLORIDE: 9 INJECTION, SOLUTION INTRAVENOUS at 00:27

## 2018-09-19 RX ADMIN — CEFTRIAXONE SODIUM 2 G: 2 INJECTION, POWDER, FOR SOLUTION INTRAMUSCULAR; INTRAVENOUS at 17:38

## 2018-09-19 RX ADMIN — VITAMIN D, TAB 1000IU (100/BT) 1000 UNITS: 25 TAB at 17:38

## 2018-09-19 RX ADMIN — LABETALOL HYDROCHLORIDE 20 MG: 5 INJECTION INTRAVENOUS at 22:04

## 2018-09-19 RX ADMIN — PROPRANOLOL HYDROCHLORIDE 120 MG: 60 CAPSULE, EXTENDED RELEASE ORAL at 17:38

## 2018-09-19 RX ADMIN — BACLOFEN 20 MG: 10 TABLET ORAL at 08:41

## 2018-09-19 RX ADMIN — PRIMIDONE 50 MG: 50 TABLET ORAL at 08:41

## 2018-09-19 RX ADMIN — QUETIAPINE FUMARATE 25 MG: 25 TABLET ORAL at 20:44

## 2018-09-19 RX ADMIN — VITAMIN D, TAB 1000IU (100/BT) 1000 UNITS: 25 TAB at 20:44

## 2018-09-19 RX ADMIN — POTASSIUM CHLORIDE 40 MEQ: 40 SOLUTION ORAL at 08:41

## 2018-09-19 RX ADMIN — PRIMIDONE 50 MG: 50 TABLET ORAL at 20:44

## 2018-09-19 RX ADMIN — ENOXAPARIN SODIUM 40 MG: 40 INJECTION SUBCUTANEOUS at 08:41

## 2018-09-19 RX ADMIN — POTASSIUM CHLORIDE 40 MEQ: 40 SOLUTION ORAL at 20:43

## 2018-09-19 RX ADMIN — SODIUM CHLORIDE: 9 INJECTION, SOLUTION INTRAVENOUS at 19:38

## 2018-09-19 RX ADMIN — LABETALOL HYDROCHLORIDE 20 MG: 5 INJECTION INTRAVENOUS at 06:13

## 2018-09-19 RX ADMIN — DULOXETINE HYDROCHLORIDE 60 MG: 30 CAPSULE, DELAYED RELEASE ORAL at 08:41

## 2018-09-19 RX ADMIN — FLUOXETINE HYDROCHLORIDE 20 MG: 20 CAPSULE ORAL at 17:37

## 2018-09-19 RX ADMIN — BACLOFEN 20 MG: 10 TABLET ORAL at 20:44

## 2018-09-19 ASSESSMENT — PAIN SCALES - GENERAL
PAINLEVEL_OUTOF10: 0

## 2018-09-19 NOTE — PROGRESS NOTES
[x] No   [] Yes  (Date of Insertion:   )     URINE OUTPUT:            [x] Good   [] Low              [] Anuric      OBJECTIVE:     VITAL SIGNS:  /76   Pulse 76   Temp 98.2 °F (36.8 °C) (Oral)   Resp 19   Ht 6' 0.83\" (1.85 m)   Wt 139 lb 5.3 oz (63.2 kg)   SpO2 99%   BMI 18.47 kg/m²   Tmax over 24 hours:  Temp (24hrs), Av.5 °F (36.9 °C), Min:98.2 °F (36.8 °C), Max:99.1 °F (37.3 °C)      Patient Vitals for the past 6 hrs:   BP Temp Temp src Pulse Resp SpO2 Weight   18 0800 119/76 98.2 °F (36.8 °C) Oral 76 19 99 % -   18 0700 132/87 - - 65 16 100 % -   18 0645 125/80 - - 69 15 100 % -   18 0630 124/79 - - 71 16 100 % -   18 0615 (!) 184/111 - - 86 16 100 % -   18 0600 (!) 176/110 - - 86 15 100 % -   18 0545 (!) 179/105 - - 83 16 100 % -   18 0530 (!) 163/103 - - 83 16 99 % -   18 0515 (!) 183/106 - - 84 19 100 % -   18 0501 - - - - - - 139 lb 5.3 oz (63.2 kg)   18 0500 (!) 177/107 - - 73 23 100 % -   18 0400 (!) 160/96 98.6 °F (37 °C) Oral 73 15 100 % -   18 0300 136/87 - - 74 17 100 % -         Intake/Output Summary (Last 24 hours) at 18 0836  Last data filed at 18 0831   Gross per 24 hour   Intake             2074 ml   Output             1460 ml   Net              614 ml     Wt Readings from Last 2 Encounters:   18 139 lb 5.3 oz (63.2 kg)   17 141 lb 15.6 oz (64.4 kg)     Body mass index is 18.47 kg/m². PHYSICAL EXAMINATION:  Constitutional: Appears well, no distress  EENT: PERRLA, EOMI, sclera clear, anicteric, oropharynx clear, no lesions, neck supple with midline trachea. Neck: Supple, symmetrical, trachea midline, no adenopathy,  no jvd, skin normal  Respiratory: clear to auscultation, no wheezes or rales and unlabored breathing.  No intercostal tenderness  Cardiovascular: regular rate and rhythm, normal S1, S2, no murmur noted  Abdomen: soft, nontender, nondistended, no masses or Martha Hamilton, including pertinent history and exam findings with the resident. I have reviewed the key elements of all parts of the encounter with the resident. I have seen and examined the patient with the resident. I agree with the assessment and plan and status of the problem list as documented. T max 99.1 last 24 hours and no fever spike and remained responsive and no episodes of unresponsiveness  One dose of labetalol this am as he is hypertensive he is not on any medicine for hypertension at home  No dysphagia and no aspiration but according to nursing staff he does have episode of coughing sometimes with eating  Swallow evaluation by speech and may need dysphagia diet  Creatinine is better and normal and urine output is good  Will continue with Rocephin for E. coli bacteremia and E. coli UTI  Transfer to floor with medicine team and once on floor critical care team will sign off      Marcus Hooper MD  9/19/2018 12:24 PM    Please note that this chart was generated using voice recognition Dragon dictation software. Although every effort was made to ensure the accuracy of this automated transcription, some errors in transcription may have occurred.

## 2018-09-19 NOTE — PROGRESS NOTES
AROM : WFL  RUE General AROM: Full ROM however extremely shaky/discoordination   Right Hand PROM (degrees)  Right Hand PROM: Exceptions  Right Hand General PROM: Last 3 digits demo increased tone, unable to fully extend passively  LUE Strength  Gross LUE Strength: Exceptions to [facility]  L Shoulder Flex: 4/5  L Elbow Flex: 4/5  L Elbow Ext: 4/5  L Hand Grasp: 4/5  L Hand Release: 4/5  RUE Strength  Gross RUE Strength: Exceptions to [facility]  R Shoulder Flex: 4/5  R Elbow Flex: 4/5  R Elbow Ext: 4/5  R Hand Grasp: 4/5  R Hand Release: 3-/5     Assessment   Performance deficits / Impairments: Decreased functional mobility ; Decreased ADL status; Decreased strength;Decreased safe awareness;Decreased high-level IADLs;Decreased balance;Decreased endurance;Decreased coordination;Decreased fine motor control  Prognosis: Fair;Guarded  Decision Making: Medium Complexity  Patient Education: Safety awareness, OTPOC, discharge rec-good return  REQUIRES OT FOLLOW UP: Yes  Activity Tolerance  Activity Tolerance: Patient Tolerated treatment well  Activity Tolerance: Limited by MS and extreme spasms, especially RUE  Safety Devices  Safety Devices in place: Yes  Type of devices: Left in bed; All fall risk precautions in place;Call light within reach;Nurse notified;Gait belt  Restraints  Initially in place: No         Plan   Plan  Times per week: 2-3x  Current Treatment Recommendations: Functional Mobility Training, Balance Training, Endurance Training, Safety Education & Training, Patient/Caregiver Education & Training, Equipment Evaluation, Education, & procurement, Strengthening, Neuromuscular Re-education, Wheelchair Mobility Training    G-Code  OT G-codes  Functional Assessment Tool Used: [place] AMPAC  Score: 7/24  Functional Limitation: Self care  Self Care Current Status (): At least 80 percent but less than 100 percent impaired, limited or restricted  Self Care Goal Status ():  At least 60 percent but less than 80 percent impaired, limited or restricted  How much help from another person does the pt currently need? Unable A Lot A Little None   1. Putting on and taking off regular lower body clothing? 1      2      3       4   2. Bathing (including washing, rinsing, drying)? 1      2      3      4   3. Toileting, which includes using toilet, bedpan, or urinal?      1      2        3      4   4. Putting on and taking off regular upper body clothing? 1      2      3       4   5. Taking care of personal grooming such as brushing teeth? 1      2      3      4   6. Eating meals? 1      2       3       4     1. Unable = Total/Dependent Assist  2. A Lot = Maximum/Moderate Assist  3. A Little = Minimum/Contact Guard Assist/Supervision  4. None= Modified [place]/Independent    Raw Score Scale Score Scale Score Standard Error Approximate Degree of Functional Impairment     6 17.07 3.74 100%   7 20.13 3.68 92%   8 22.86 3.43 86%   9 25.33 3.17 80%   10 27.31 2.96 75%   11 29.04 2.79 70%   12 30.60 2.68 67%   13 32.03 2.62 63%   14 33.39 2.61 60%   15 34.69 2.65 56%   16 35.96 2.71 53%   17 37.26 2.82 50%   18 38.66 2.97 47%   19 40.22 3.20 43%   20 42.03 3.55 38%   21 44.27 4.08 33%   22 47.10 4.81 26%   23 51.12 5.88 16%   24 57.54 7.36 0%       Goals  Short term goals  Time Frame for Short term goals: Pt will by discharge   Short term goal 1: demo supine<>sit transfer to EOB with mod A  Short term goal 2: demo static/dynamic sitting on EOB for 10 min with min A  Short term goal 3: demo simple func activity using BUE's with max A and setup while seated  Short term goal 4: demo activity tolerance for 40 min  Short term goal 5: notify OTR to update goals as mobility progresses if appropriate.        Therapy Time   Individual Concurrent Group Co-treatment   Time In 8670         Time Out 56         Minutes 30            Pt would benefit from additional acute care and post-acute care therapy services prior to a safe discharge home to address all current deficits and to return pt to previous baseline function. Discharge recommendations discussed with patient during initial evaluation.       Dave Shepard, OTR/L

## 2018-09-19 NOTE — PROCEDURES
INSTRUMENTAL SWALLOW REPORT  MODIFIED BARIUM SWALLOW    NAME: Johnny Mendoza   : 1975  MRN: [id number]       Date of Eval: 2018              Referring Diagnosis(es):      Past Medical History:  has a past medical history of Depression and Multiple sclerosis ([facility] [address] Past Surgical History:  has no past surgical history on file. Current Diet Solid Consistency: Regular  Current Diet Liquid Consistency: Thin       Type of Study: Initial MBS       Patient Complaints/Reason for Referral:  Johnny Mendoza was referred for a MBS to assess the efficiency of his/her swallow function, assess for aspiration, and to make recommendations regarding safe dietary consistencies, effective compensatory strategies, and safe eating environment. Onset of problem:     History was obtained from chart review and family. Johnny Mendoza is a 41 y.o. who was a rapid respond due to AMS while he was admitted to the floor for UTI and AMS. Pt has hx of MS, is wheelchair bound, is incontinent of urine at baseline. He also has history of intubation for airway protection in 2016. The patient has been admitted to the hospital multiple times in the past for sepsis and urinary tract infections. Per his mother, he was confused at home for the past day. His mom denies pt having complaints of f/c, n/v, headache, lightheadedness dizziness, chest pain, shortness of breath, productive cough, diarrhea or constipation. Pt was A&O in ED and was found to have UTI and was started on abx. Past hx of klebsiella UTI and bacterimia. The rest of his labs are unremarkable including cxr and lactic acid. Behavior/Cognition/Vision/Hearing:  Behavior/Cognition: Alert; Cooperative  Vision: Impaired    Impressions:  Patient presents with severe oral phase dysphagia. Pt. with decreased ability to close mouth consistently independently with decreased oral control of bolus.   Able to suck from straw with assist.  Decreased bolus formation with spillover and swallow delay due to decreased oral bolus control/coordination due to MS. Per RN pt. May not be on all his home meds. Proper medication may decreased uncontrolled movements which are impeding his swallow ability, recommend repeat MBSS at that time. Pt. Presents with safe swallow for Level I Puree diet with nectar thick liquids as evidenced by no  aspiration noted with consistencies tested. Recommend small sips and bites, only feed when alert and awake and upright at 90 degrees for all PO intake. Recommend close monitoring for overt/clinical s/s of aspiration and D/C PO intake and complete Modified Barium Swallow Study should they occur. Results and recommendations reported to RN. Patient Position: Lateral and Patient Degrees: 90      Consistencies Administered: Soft solid;Puree;Nectar straw; Thin straw        Recommended Diet:  Solid consistency: Dysphagia I Pureed  Liquid consistency: Nectar  Liquid administration via: Straw    Medication administration: Meds in puree    Safe Swallow Protocol:     Compensatory Swallowing Strategies: Alternate solids and liquids;Eat/Feed slowly;Upright as possible for all oral intake;Small bites/sips      Recommendations/Treatment  Requires SLP Intervention: Yes        D/C Recommendations: 24 hour supervision/assistance  Postural Changes and/or Swallow Maneuvers: Upright 90 degrees      Recommended Exercises:    Therapeutic Interventions: Diet tolerance monitoring         Education: Images and recommendations were reviewed with pt and RN following this exam.   Patient Education: yes       Prognosis  Prognosis for safe diet advancement: fair      Goals:    Long Term: To Maximize safety with intake, optimize nutrition/hydration and minimize risk for aspiration.   Short Term:     Goal 1: ST to follow and re-assess as appropriate for diet upgrade  Goal 2: The patient will tolerate recommended diet without observed clinical signs of

## 2018-09-19 NOTE — PROGRESS NOTES
Potassium 3.3. Ordered 40 mEq Oral solution KCL replacement BID x 2 doses. Sophie Iglesias M.D.  PGY-3 Internal Medicine  [address]

## 2018-09-20 LAB
ABSOLUTE EOS #: 0.1 K/UL (ref 0–0.44)
ABSOLUTE IMMATURE GRANULOCYTE: 0.03 K/UL (ref 0–0.3)
ABSOLUTE LYMPH #: 1.29 K/UL (ref 1.1–3.7)
ABSOLUTE MONO #: 0.97 K/UL (ref 0.1–1.2)
ANION GAP SERPL CALCULATED.3IONS-SCNC: 12 MMOL/L (ref 9–17)
BASOPHILS # BLD: 0 % (ref 0–2)
BASOPHILS ABSOLUTE: <0.03 K/UL (ref 0–0.2)
BUN BLDV-MCNC: 4 MG/DL (ref 6–20)
BUN/CREAT BLD: ABNORMAL (ref 9–20)
CALCIUM SERPL-MCNC: 8.6 MG/DL (ref 8.6–10.4)
CHLORIDE BLD-SCNC: 105 MMOL/L (ref 98–107)
CO2: 23 MMOL/L (ref 20–31)
CREAT SERPL-MCNC: 0.64 MG/DL (ref 0.7–1.2)
CULTURE: NORMAL
DIFFERENTIAL TYPE: ABNORMAL
EOSINOPHILS RELATIVE PERCENT: 2 % (ref 1–4)
GFR AFRICAN AMERICAN: >60 ML/MIN
GFR NON-AFRICAN AMERICAN: >60 ML/MIN
GFR SERPL CREATININE-BSD FRML MDRD: ABNORMAL ML/MIN/{1.73_M2}
GFR SERPL CREATININE-BSD FRML MDRD: ABNORMAL ML/MIN/{1.73_M2}
GLUCOSE BLD-MCNC: 104 MG/DL (ref 70–99)
HCT VFR BLD CALC: 35.3 % (ref 40.7–50.3)
HEMOGLOBIN: 12 G/DL (ref 13–17)
IMMATURE GRANULOCYTES: 1 %
LYMPHOCYTES # BLD: 22 % (ref 24–43)
Lab: NORMAL
MCH RBC QN AUTO: 31.2 PG (ref 25.2–33.5)
MCHC RBC AUTO-ENTMCNC: 34 G/DL (ref 28.4–34.8)
MCV RBC AUTO: 91.7 FL (ref 82.6–102.9)
MONOCYTES # BLD: 17 % (ref 3–12)
NRBC AUTOMATED: 0 PER 100 WBC
PDW BLD-RTO: 13.4 % (ref 11.8–14.4)
PLATELET # BLD: 194 K/UL (ref 138–453)
PLATELET ESTIMATE: ABNORMAL
PMV BLD AUTO: 11.1 FL (ref 8.1–13.5)
POTASSIUM SERPL-SCNC: 3.5 MMOL/L (ref 3.7–5.3)
RBC # BLD: 3.85 M/UL (ref 4.21–5.77)
RBC # BLD: ABNORMAL 10*6/UL
SEG NEUTROPHILS: 58 % (ref 36–65)
SEGMENTED NEUTROPHILS ABSOLUTE COUNT: 3.44 K/UL (ref 1.5–8.1)
SODIUM BLD-SCNC: 140 MMOL/L (ref 135–144)
SPECIMEN DESCRIPTION: NORMAL
STATUS: NORMAL
WBC # BLD: 5.8 K/UL (ref 3.5–11.3)
WBC # BLD: ABNORMAL 10*3/UL

## 2018-09-20 PROCEDURE — 6360000002 HC RX W HCPCS: Performed by: STUDENT IN AN ORGANIZED HEALTH CARE EDUCATION/TRAINING PROGRAM

## 2018-09-20 PROCEDURE — 2580000003 HC RX 258: Performed by: STUDENT IN AN ORGANIZED HEALTH CARE EDUCATION/TRAINING PROGRAM

## 2018-09-20 PROCEDURE — 97530 THERAPEUTIC ACTIVITIES: CPT

## 2018-09-20 PROCEDURE — 6370000000 HC RX 637 (ALT 250 FOR IP): Performed by: STUDENT IN AN ORGANIZED HEALTH CARE EDUCATION/TRAINING PROGRAM

## 2018-09-20 PROCEDURE — 51798 US URINE CAPACITY MEASURE: CPT

## 2018-09-20 PROCEDURE — 6360000002 HC RX W HCPCS: Performed by: EMERGENCY MEDICINE

## 2018-09-20 PROCEDURE — 94762 N-INVAS EAR/PLS OXIMTRY CONT: CPT

## 2018-09-20 PROCEDURE — 1200000000 HC SEMI PRIVATE

## 2018-09-20 PROCEDURE — 80048 BASIC METABOLIC PNL TOTAL CA: CPT

## 2018-09-20 PROCEDURE — 99233 SBSQ HOSP IP/OBS HIGH 50: CPT | Performed by: INTERNAL MEDICINE

## 2018-09-20 PROCEDURE — 2580000003 HC RX 258: Performed by: EMERGENCY MEDICINE

## 2018-09-20 PROCEDURE — 85025 COMPLETE CBC W/AUTO DIFF WBC: CPT

## 2018-09-20 PROCEDURE — 36415 COLL VENOUS BLD VENIPUNCTURE: CPT

## 2018-09-20 PROCEDURE — 97110 THERAPEUTIC EXERCISES: CPT

## 2018-09-20 PROCEDURE — 6370000000 HC RX 637 (ALT 250 FOR IP): Performed by: INTERNAL MEDICINE

## 2018-09-20 RX ORDER — POLYETHYLENE GLYCOL 3350 17 G/17G
17 POWDER, FOR SOLUTION ORAL 2 TIMES DAILY
Status: DISCONTINUED | OUTPATIENT
Start: 2018-09-20 | End: 2018-09-21 | Stop reason: HOSPADM

## 2018-09-20 RX ORDER — POTASSIUM CHLORIDE 7.45 MG/ML
40 INJECTION INTRAVENOUS ONCE
Status: COMPLETED | OUTPATIENT
Start: 2018-09-20 | End: 2018-09-20

## 2018-09-20 RX ADMIN — LABETALOL HYDROCHLORIDE 20 MG: 5 INJECTION INTRAVENOUS at 18:31

## 2018-09-20 RX ADMIN — BACLOFEN 20 MG: 10 TABLET ORAL at 22:19

## 2018-09-20 RX ADMIN — PROPRANOLOL HYDROCHLORIDE 120 MG: 60 CAPSULE, EXTENDED RELEASE ORAL at 08:01

## 2018-09-20 RX ADMIN — VITAMIN D, TAB 1000IU (100/BT) 1000 UNITS: 25 TAB at 08:00

## 2018-09-20 RX ADMIN — VITAMIN D, TAB 1000IU (100/BT) 1000 UNITS: 25 TAB at 22:19

## 2018-09-20 RX ADMIN — PRIMIDONE 50 MG: 50 TABLET ORAL at 22:19

## 2018-09-20 RX ADMIN — POLYETHYLENE GLYCOL 3350 17 G: 17 POWDER, FOR SOLUTION ORAL at 22:19

## 2018-09-20 RX ADMIN — BACLOFEN 20 MG: 10 TABLET ORAL at 08:01

## 2018-09-20 RX ADMIN — Medication 10 ML: at 08:00

## 2018-09-20 RX ADMIN — SODIUM CHLORIDE: 9 INJECTION, SOLUTION INTRAVENOUS at 05:00

## 2018-09-20 RX ADMIN — QUETIAPINE FUMARATE 25 MG: 25 TABLET ORAL at 22:19

## 2018-09-20 RX ADMIN — CEFTRIAXONE SODIUM 2 G: 2 INJECTION, POWDER, FOR SOLUTION INTRAMUSCULAR; INTRAVENOUS at 18:27

## 2018-09-20 RX ADMIN — BACLOFEN 20 MG: 10 TABLET ORAL at 15:23

## 2018-09-20 RX ADMIN — FLUOXETINE HYDROCHLORIDE 20 MG: 20 CAPSULE ORAL at 08:01

## 2018-09-20 RX ADMIN — POTASSIUM CHLORIDE 40 MEQ: 7.46 INJECTION, SOLUTION INTRAVENOUS at 06:39

## 2018-09-20 RX ADMIN — PRIMIDONE 50 MG: 50 TABLET ORAL at 08:00

## 2018-09-20 RX ADMIN — ENOXAPARIN SODIUM 40 MG: 40 INJECTION SUBCUTANEOUS at 08:01

## 2018-09-20 ASSESSMENT — PAIN SCALES - GENERAL
PAINLEVEL_OUTOF10: 0

## 2018-09-20 NOTE — PROGRESS NOTES
Critical Care Team - Daily Progress Note      Date and time: 9/20/2018 2:31 PM  Patient's name:  Caleb Menchaca [employer] Record Number: [id number]  Patient's account/billing number: [de-identified]  Patient's YOB: 1975  Age: 41 y.o. Date of Admission: 9/16/2018  4:19 PM  Length of stay during current admission: 4      Primary Care Physician: No primary care provider on file. ICU Attending Physician:   []Dr. Monisha Bui  [x]Dr. Lexx Hemphill  []Dr. Jonathan Elizondo  []Dr. Edgardo Blood    Code Status: Full Code    Reason for ICU admission: UTI, AMS      SUBJECTIVE:     OVERNIGHT EVENTS:         He is hemodynamically stable,afebrile. No acute issues overnight  This morning patient is awake alert and responding to commands  Creatinine back to baseline  On I.v  fluids at 75 mL per hour  Pt is on dyspahgia 1 diet. His U/O is 1.5 l over 24 hrs. Continued on rocephin .       AWAKE & FOLLOWING COMMANDS:  [] No   [x] Yes    CURRENT VENTILATION STATUS:     [] Ventilator  [] BIPAP  [] Nasal Cannula [x] Room Air      IF INTUBATED, ET TUBE MARKING AT LOWER LIP:       cms    SECRETIONS Amount:  [] Small [] Moderate  [] Large  [x] None  Color:     [] White [] Colored  [] Bloody    SEDATION:  RAAS Score:  [] Propofol gtt  [] Versed gtt  [] Ativan gtt   [x] No Sedation    PARALYZED:  [x] No    [] Yes    DIARRHEA:                [x] No                [] Yes  (C. Difficile status: [] positive                                                                                                                       [] negative                                                                                                                     [] pending)    VASOPRESSORS:  [x] No    [] Yes    If yes -   [] Levophed       [] Dopamine     [] Vasopressin       [] Dobutamine  [] Phenylephrine         [] Epinephrine    CENTRAL LINES:     [x] No   [] Yes   (Date of Insertion:   )           If yes -     [] Right IJ     [] Left IJ [] Right Femoral [] Left Femoral                   [] Right Subclavian [] Left Subclavian       OWENS'S CATHETER:   [] No   [x] Yes  (Date of Insertion:   )     URINE OUTPUT:            [x] Good   [] Low              [] Anuric      OBJECTIVE:     VITAL SIGNS:  BP (!) 154/96   Pulse 74   Temp 98.2 °F (36.8 °C) (Axillary)   Resp 18   Ht 6' 0.83\" (1.85 m)   Wt 139 lb 5.3 oz (63.2 kg)   SpO2 99%   BMI 18.47 kg/m²   Tmax over 24 hours:  Temp (24hrs), Av.3 °F (36.8 °C), Min:97.7 °F (36.5 °C), Max:98.8 °F (37.1 °C)      Patient Vitals for the past 6 hrs:   BP Temp Temp src Pulse Resp SpO2   18 1300 - - - 74 18 99 %   18 1200 (!) 154/96 98.2 °F (36.8 °C) Axillary 63 19 100 %   18 1100 - - - 68 16 100 %   18 1000 - - - 69 18 -   18 0900 - - - 68 18 -         Intake/Output Summary (Last 24 hours) at 18 1431  Last data filed at 18 1243   Gross per 24 hour   Intake             2719 ml   Output             1725 ml   Net              994 ml     Wt Readings from Last 2 Encounters:   18 139 lb 5.3 oz (63.2 kg)   17 141 lb 15.6 oz (64.4 kg)     Body mass index is 18.47 kg/m². PHYSICAL EXAMINATION:  Constitutional: Appears well, no distress  EENT: PERRLA, EOMI, sclera clear, anicteric, oropharynx clear, no lesions, neck supple with midline trachea. Neck: Supple, symmetrical, trachea midline, no adenopathy,  no jvd, skin normal  Respiratory: clear to auscultation, no wheezes or rales and unlabored breathing.  No intercostal tenderness  Cardiovascular: regular rate and rhythm, normal S1, S2, no murmur noted  Abdomen: soft, nontender, nondistended, no masses or organomegaly, in diaper for incontinence  Extremities:   no pedal edema, no clubbing or cyanosis  Neuro: following commends appropriately, 0/5 in BLE at baseline, 3+/5, weak cough at baseline, weakness in BUE at baseline      MEDICATIONS:    Scheduled Meds:   dextromethorphan-quiNIDine  1 capsule Oral Q12H    FLUoxetine  20 mg Oral Daily    propranolol  120 mg Oral Daily    vitamin D  1,000 Units Oral BID    cefTRIAXone (ROCEPHIN) IV  2 g Intravenous Q24H    baclofen  20 mg Oral TID    primidone  50 mg Oral BID    QUEtiapine  25 mg Oral Nightly    sodium chloride flush  10 mL Intravenous 2 times per day    enoxaparin  40 mg Subcutaneous Daily     Continuous Infusions:   sodium chloride 75 mL/hr at 09/20/18 0500     PRN Meds:     labetalol 20 mg Q4H PRN   acetaminophen 650 mg Q6H PRN   albuterol 2.5 mg Q4H PRN   clonazePAM 1 mg BID PRN   sodium chloride flush 10 mL PRN   magnesium hydroxide 30 mL Daily PRN   ondansetron 4 mg Q6H PRN           Laboratory findings:    Complete Blood Count:   Recent Labs      09/18/18   0606  09/19/18   0443  09/20/18   0451   WBC  14.3*  8.7  5.8   HGB  13.9  13.1  12.0*   HCT  42.0  39.3*  35.3*   PLT  210  217  194        Last 3 Blood Glucose:   Recent Labs      09/18/18   0606  09/19/18   0443  09/20/18   0451   GLUCOSE  83  112*  104*        PT/INR:    Lab Results   Component Value Date    PROTIME 11.6 09/16/2018    INR 1.1 09/16/2018     PTT:    Lab Results   Component Value Date    APTT 26.4 09/16/2018       Comprehensive Metabolic Profile:   Recent Labs      09/18/18   0606  09/19/18   0443  09/20/18   0451   NA  144  140  140   K  3.7  3.3*  3.5*   CL  106  101  105   CO2  19*  23  23   BUN  9  7  4*   CREATININE  1.41*  0.93  0.64*   GLUCOSE  83  112*  104*   CALCIUM  9.0  8.7  8.6      Magnesium:   Lab Results   Component Value Date    MG 1.8 12/29/2017     Phosphorus:   Lab Results   Component Value Date    PHOS 2.7 12/29/2017     Ionized Calcium: No results found for: CAION     Urinalysis:     Troponin: No results for input(s): TROPONINI in the last 72 hours.     Microbiology:    Cultures during this admission:     Blood cultures:                 [] None drawn      [] Negative             [x]  Positive (Details: E coli )  Urine Culture:                   [] None drawn      [] Negative [x]  Positive (Details: E coli )  Sputum Culture:               [x] None drawn       [] Negative             []  Positive (Details:  )   Endotracheal aspirate:     [x] None drawn       [] Negative             []  Positive (Details:  )         ASSESSMENT:     Principal Problem:    Urinary tract infection  Active Problems:    Multiple sclerosis ([facility] [address]    Metabolic encephalopathy    Sepsis ([facility] [address]    Aspiration pneumonia ([facility] [address]  Resolved Problems:    * No resolved hospital problems. *        PLAN:     WEAN PER PROTOCOL:  [] No   [] Yes  [x] N/A    DISCONTINUE ANY LABS:   [x] No   [] Yes    ICU PROPHYLAXIS:  Stress ulcer:  [x] PPI Agent  [] [id number] [] Sucralfate  [] Other:  VTE:   [] Enoxaparin  [] Unfract. Heparin Subcut  [] EPC Cuffs    NUTRITION:  [] NPO [] Tube Feeding (Specify: ) [] TPN  [x] PO (Diet: DIET GENERAL; Dysphagia I Pureed; Nectar Thick)    HOME MEDICATIONS RECONCILED: [] No  [x] Yes    INSULIN DRIP:   [x] No   [] Yes    CONSULTATION NEEDED:  [x] No   [] Yes    FAMILY UPDATED:    [] No   [x] Yes    TRANSFER OUT OF ICU:   [] No   [x] Yes    ADDITIONAL PLAN:    42 yo M w/ Hx of MS who presented w/ complaints of AMS admitted for UTI, became unresponsive but improved mentation after deep suctioning with concern for aspiration, now back to baseline. No prior hx of seizure.     Plan:     1. UTI bacteremia and AARTI  Urine and blood cultures positive for E. coli sensitive to Rocephin  improving  received vancomycin x1  BUN/cr- 4/0.64  Wbc-5.8  U/O-1.5 l over 24hrs  Continued on iv fluids at 75  Continue rocephin for 2weeks as per ID  Tolerating dysphagia 1 diet well  Plan to take fry out today and check post void urine.     2. AMS improved,   - AO x 3 , follows commands  - resumed home meds of klonopin, cymbalta, nortriptyline, seroquel, baclofen, and primidone - use klonopin as needed ( did not needed in the last 24 hours)     3.: Impending respiratory failure - resolved  - stable  - CXR 9/17 shows hazy density in L perihilar region and LLL infiltrates concerning for possible aspiration   - repeat CXR 9/18 shows improving L perihilar density  - Encourage frequent pulmonary toilet with IS, deep breathing/ coughing exercises        MSK/ ORTHO:  - wheelchair bound and BUE weakness at baseline 2/2 hx of MS     PT/OT/SLP:  - Evaluation and treatment      DISPO:  - Transfer to step [facility]        Teodoro Cotter MD      Department of Internal Medicine  [facility]         9/20/2018, 2:31 PM  Attending Physician Statement  I have discussed the care of Hussein Bang, including pertinent history and exam findings,  with the resident. I have seen and examined the patient and the key elements of all parts of the encounter have been performed by me. I agree with the assessment, plan and orders as documented by the resident with additions . Treatment plan Discussed with nursing staff in detail , all questions answered . Electronically signed by Sonu Henderson MD on   9/20/18 at 7:18 PM    Please note that this chart was generated using voice recognition Dragon dictation software. Although every effort was made to ensure the accuracy of this automated transcription, some errors in transcription may have occurred.

## 2018-09-20 NOTE — PLAN OF CARE
Problem: Risk for Impaired Skin Integrity  Goal: Tissue integrity - skin and mucous membranes  Structural intactness and normal physiological function of skin and  mucous membranes.    Outcome: Ongoing      Problem: Falls - Risk of:  Goal: Will remain free from falls  Will remain free from falls   Outcome: Met This Shift    Goal: Absence of physical injury  Absence of physical injury   Outcome: Met This Shift

## 2018-09-21 VITALS
HEART RATE: 70 BPM | SYSTOLIC BLOOD PRESSURE: 122 MMHG | WEIGHT: 149.2 LBS | DIASTOLIC BLOOD PRESSURE: 77 MMHG | BODY MASS INDEX: 19.78 KG/M2 | HEIGHT: 73 IN | TEMPERATURE: 98.2 F | OXYGEN SATURATION: 100 % | RESPIRATION RATE: 16 BRPM

## 2018-09-21 LAB
ABSOLUTE EOS #: 0.12 K/UL (ref 0–0.44)
ABSOLUTE IMMATURE GRANULOCYTE: 0.04 K/UL (ref 0–0.3)
ABSOLUTE LYMPH #: 1.48 K/UL (ref 1.1–3.7)
ABSOLUTE MONO #: 0.94 K/UL (ref 0.1–1.2)
ANION GAP SERPL CALCULATED.3IONS-SCNC: 12 MMOL/L (ref 9–17)
BASOPHILS # BLD: 0 % (ref 0–2)
BASOPHILS ABSOLUTE: <0.03 K/UL (ref 0–0.2)
BUN BLDV-MCNC: 3 MG/DL (ref 6–20)
BUN/CREAT BLD: ABNORMAL (ref 9–20)
CALCIUM SERPL-MCNC: 9.3 MG/DL (ref 8.6–10.4)
CHLORIDE BLD-SCNC: 105 MMOL/L (ref 98–107)
CO2: 24 MMOL/L (ref 20–31)
CREAT SERPL-MCNC: 0.65 MG/DL (ref 0.7–1.2)
DIFFERENTIAL TYPE: ABNORMAL
EOSINOPHILS RELATIVE PERCENT: 2 % (ref 1–4)
GFR AFRICAN AMERICAN: >60 ML/MIN
GFR NON-AFRICAN AMERICAN: >60 ML/MIN
GFR SERPL CREATININE-BSD FRML MDRD: ABNORMAL ML/MIN/{1.73_M2}
GFR SERPL CREATININE-BSD FRML MDRD: ABNORMAL ML/MIN/{1.73_M2}
GLUCOSE BLD-MCNC: 97 MG/DL (ref 70–99)
HCT VFR BLD CALC: 38.7 % (ref 40.7–50.3)
HEMOGLOBIN: 13 G/DL (ref 13–17)
IMMATURE GRANULOCYTES: 1 %
LYMPHOCYTES # BLD: 20 % (ref 24–43)
MCH RBC QN AUTO: 31.1 PG (ref 25.2–33.5)
MCHC RBC AUTO-ENTMCNC: 33.6 G/DL (ref 28.4–34.8)
MCV RBC AUTO: 92.6 FL (ref 82.6–102.9)
MONOCYTES # BLD: 13 % (ref 3–12)
NRBC AUTOMATED: 0 PER 100 WBC
PDW BLD-RTO: 13.6 % (ref 11.8–14.4)
PLATELET # BLD: 228 K/UL (ref 138–453)
PLATELET ESTIMATE: ABNORMAL
PMV BLD AUTO: 11 FL (ref 8.1–13.5)
POTASSIUM SERPL-SCNC: 3.5 MMOL/L (ref 3.7–5.3)
RBC # BLD: 4.18 M/UL (ref 4.21–5.77)
RBC # BLD: ABNORMAL 10*6/UL
SEG NEUTROPHILS: 64 % (ref 36–65)
SEGMENTED NEUTROPHILS ABSOLUTE COUNT: 4.7 K/UL (ref 1.5–8.1)
SODIUM BLD-SCNC: 141 MMOL/L (ref 135–144)
WBC # BLD: 7.3 K/UL (ref 3.5–11.3)
WBC # BLD: ABNORMAL 10*3/UL

## 2018-09-21 PROCEDURE — 6370000000 HC RX 637 (ALT 250 FOR IP): Performed by: STUDENT IN AN ORGANIZED HEALTH CARE EDUCATION/TRAINING PROGRAM

## 2018-09-21 PROCEDURE — 6360000002 HC RX W HCPCS: Performed by: STUDENT IN AN ORGANIZED HEALTH CARE EDUCATION/TRAINING PROGRAM

## 2018-09-21 PROCEDURE — 2580000003 HC RX 258: Performed by: STUDENT IN AN ORGANIZED HEALTH CARE EDUCATION/TRAINING PROGRAM

## 2018-09-21 PROCEDURE — 99233 SBSQ HOSP IP/OBS HIGH 50: CPT | Performed by: INTERNAL MEDICINE

## 2018-09-21 PROCEDURE — 85025 COMPLETE CBC W/AUTO DIFF WBC: CPT

## 2018-09-21 PROCEDURE — 6370000000 HC RX 637 (ALT 250 FOR IP): Performed by: INTERNAL MEDICINE

## 2018-09-21 PROCEDURE — 80048 BASIC METABOLIC PNL TOTAL CA: CPT

## 2018-09-21 PROCEDURE — 94762 N-INVAS EAR/PLS OXIMTRY CONT: CPT

## 2018-09-21 RX ORDER — CIPROFLOXACIN 500 MG/1
500 TABLET, FILM COATED ORAL EVERY 12 HOURS SCHEDULED
Qty: 14 TABLET | Refills: 0 | Status: SHIPPED | OUTPATIENT
Start: 2018-09-21 | End: 2018-09-21

## 2018-09-21 RX ORDER — QUETIAPINE FUMARATE 25 MG/1
25 TABLET, FILM COATED ORAL NIGHTLY
Qty: 60 TABLET | Refills: 3 | Status: SHIPPED | OUTPATIENT
Start: 2018-09-21 | End: 2018-09-21 | Stop reason: HOSPADM

## 2018-09-21 RX ORDER — CIPROFLOXACIN 500 MG/1
500 TABLET, FILM COATED ORAL EVERY 12 HOURS SCHEDULED
Status: DISCONTINUED | OUTPATIENT
Start: 2018-09-21 | End: 2018-09-21 | Stop reason: HOSPADM

## 2018-09-21 RX ORDER — HYDRALAZINE HYDROCHLORIDE 20 MG/ML
5 INJECTION INTRAMUSCULAR; INTRAVENOUS ONCE
Status: COMPLETED | OUTPATIENT
Start: 2018-09-21 | End: 2018-09-21

## 2018-09-21 RX ORDER — PRIMIDONE 250 MG/1
250 TABLET ORAL 2 TIMES DAILY
Qty: 120 TABLET | Refills: 3 | Status: SHIPPED | OUTPATIENT
Start: 2018-09-21

## 2018-09-21 RX ORDER — AMLODIPINE BESYLATE 5 MG/1
5 TABLET ORAL DAILY
Status: DISCONTINUED | OUTPATIENT
Start: 2018-09-21 | End: 2018-09-21

## 2018-09-21 RX ORDER — POTASSIUM CHLORIDE 20 MEQ/1
20 TABLET, EXTENDED RELEASE ORAL ONCE
Status: COMPLETED | OUTPATIENT
Start: 2018-09-21 | End: 2018-09-21

## 2018-09-21 RX ORDER — QUETIAPINE FUMARATE 25 MG/1
25 TABLET, FILM COATED ORAL NIGHTLY
Qty: 60 TABLET | Refills: 3 | Status: SHIPPED | OUTPATIENT
Start: 2018-09-21 | End: 2020-12-05

## 2018-09-21 RX ORDER — CIPROFLOXACIN 500 MG/1
500 TABLET, FILM COATED ORAL EVERY 12 HOURS SCHEDULED
Qty: 14 TABLET | Refills: 0 | Status: SHIPPED | OUTPATIENT
Start: 2018-09-21 | End: 2018-09-28

## 2018-09-21 RX ORDER — PROPRANOLOL HYDROCHLORIDE 120 MG/1
120 CAPSULE, EXTENDED RELEASE ORAL DAILY
Qty: 30 CAPSULE | Refills: 3 | Status: ON HOLD | OUTPATIENT
Start: 2018-09-22 | End: 2020-12-06 | Stop reason: HOSPADM

## 2018-09-21 RX ORDER — FLUOXETINE HYDROCHLORIDE 20 MG/1
20 CAPSULE ORAL DAILY
Qty: 30 CAPSULE | Refills: 3 | Status: ON HOLD | OUTPATIENT
Start: 2018-09-21 | End: 2020-12-06 | Stop reason: HOSPADM

## 2018-09-21 RX ADMIN — FLUOXETINE HYDROCHLORIDE 20 MG: 20 CAPSULE ORAL at 09:07

## 2018-09-21 RX ADMIN — POLYETHYLENE GLYCOL 3350 17 G: 17 POWDER, FOR SOLUTION ORAL at 09:07

## 2018-09-21 RX ADMIN — BACLOFEN 20 MG: 10 TABLET ORAL at 13:31

## 2018-09-21 RX ADMIN — PRIMIDONE 50 MG: 50 TABLET ORAL at 09:08

## 2018-09-21 RX ADMIN — BACLOFEN 20 MG: 10 TABLET ORAL at 09:07

## 2018-09-21 RX ADMIN — PROPRANOLOL HYDROCHLORIDE 120 MG: 60 CAPSULE, EXTENDED RELEASE ORAL at 09:07

## 2018-09-21 RX ADMIN — POTASSIUM CHLORIDE 20 MEQ: 20 TABLET, EXTENDED RELEASE ORAL at 09:07

## 2018-09-21 RX ADMIN — VITAMIN D, TAB 1000IU (100/BT) 1000 UNITS: 25 TAB at 09:09

## 2018-09-21 RX ADMIN — SODIUM CHLORIDE: 9 INJECTION, SOLUTION INTRAVENOUS at 00:43

## 2018-09-21 RX ADMIN — HYDRALAZINE HYDROCHLORIDE 5 MG: 20 INJECTION INTRAMUSCULAR; INTRAVENOUS at 01:02

## 2018-09-21 RX ADMIN — ENOXAPARIN SODIUM 40 MG: 40 INJECTION SUBCUTANEOUS at 09:09

## 2018-09-21 RX ADMIN — CIPROFLOXACIN 500 MG: 500 TABLET ORAL at 09:07

## 2018-09-21 ASSESSMENT — ENCOUNTER SYMPTOMS
HEMOPTYSIS: 0
ORTHOPNEA: 0
HEARTBURN: 0
COUGH: 0
SPUTUM PRODUCTION: 0
NAUSEA: 0
VOMITING: 0

## 2018-09-21 ASSESSMENT — PAIN SCALES - GENERAL: PAINLEVEL_OUTOF10: 0

## 2018-09-21 NOTE — PROGRESS NOTES
Value Ref Range Status   09/16/2018 FEW (A) NONE Final     Nitrite, Urine   Date Value Ref Range Status   09/16/2018 POSITIVE (A) NEG Final     WBC, UA   Date Value Ref Range Status   09/16/2018 5 TO 10 0 - 5 /HPF Final     Leukocyte Esterase, Urine   Date Value Ref Range Status   09/16/2018 TRACE (A) NEG Final     Yeast, UA   Date Value Ref Range Status   09/16/2018 NOT REPORTED NONE Final     Glucose, Ur   Date Value Ref Range Status   09/16/2018 NEGATIVE NEG Final     Bilirubin Urine   Date Value Ref Range Status   09/16/2018 NEGATIVE NEG Final         Assessment:  Principal Problem:    Urinary tract infection  Active Problems:    Multiple sclerosis (HCC)    Sepsis (HCC)    Aspiration pneumonia (HCC)  Resolved Problems:    Metabolic encephalopathy      Plan:  1.  UTI  Urine cultures positive for E. Coli 9/16/18  On  ciprofloxacin 500mg BID for 7 days       2. Altered mental status  Likely due to UTI  Improved,AO x 3 , follows commands     3. Impending respiratory failure - resolved  - stable  - CXR 9/17 shows hazy density in L perihilar region and LLL infiltrates concerning for possible aspiration   - repeat CXR 9/18 shows improving L perihilar density  - Encourage frequent pulmonary toilet with IS, deep breathing/ coughing exercises        4. Multiple sclerosis  Wheelchair bound and BUE weakness at baseline     5.  Continue home meds -Prozac, Seroquel, baclofen, Klonopin, primidone    hSailesh Prado MD        PGY-1 Internal Medicine Resident  [facility]       9/21/2018, 11:24 AM     Attending Physician Statement    Active Hospital Problems    Diagnosis Date Noted    Aspiration pneumonia ([facility] [address] [J69.0] 09/17/2018    Urinary tract infection [N39.0] 09/16/2018    Sepsis ([address] [A41.9]     Multiple sclerosis ([address] Ebony Markie 03/05/2016       I have discussed the case, including pertinent history and exam findings with the resident and the team.  I have seen and examined the patient and the key

## 2018-09-21 NOTE — PROGRESS NOTES
Talked with pts wife Aparna Palacio she stated if pt dc ok to use [facility] and pt has no PCP but wants to schedule appointment with [facility] internal med Dr Johanna Peña  stated she would call to make appointment Aparna Palacio notified of above.

## 2018-09-21 NOTE — PROGRESS NOTES
Internal Medicine Senior note     NAME:  Reyna Nino  MRN:  [id number]  [place]: [de-identified]   : 1975  PCP:No primary care provider on file. Admit date:2018  History Obtained From:  Patient     This is a 41years old gentleman with previous history of MS was admitted 4 days ago for UTI. Transferred to ICU secondary to inability to handle oral secretions. He did not require intubation while on ICU. One out of 2 Blood  grew E. Coli- 18  urine culture grew E. Coli-  X-ray showed minimal left hilar infiltration    Significant co morbidities: MS    On physical exam patient is alert and oriented, neurological finding at baseline    Assessment & plan :  Principal Problem:    Urinary tract infection  Active Problems:    Multiple sclerosis (HCC)    Metabolic encephalopathy    Sepsis ([address]    Aspiration pneumonia ([address]  Resolved Problems:    * No resolved hospital problems. *    1. Currently on IV Rocephin, will change to ciprofloxacin from tomorrow  2. Speech evaluation recommended levels 1 puree  Diet with nectar thick  3. Patient has Dow catheter, will remove tomorrow morning  4. MiraLAX  5. Continue home medication- Prozac, Seroquel, baclofen, Klonopin, primidone  6.   Stop IV fluids in the morning      Jadyn Wheeler MD  PGY-3,  Department of Internal Medicine,  [address]

## 2018-09-21 NOTE — H&P
INTERNAL MEDICINE                                                                             ICU PATIENT TRANSFER NOTE        Patient:  Felisa Schofield  YOB: 1975  MRN: [id number]     Acct: [de-identified]     Admit date: 9/16/2018    Code Status:-  Full code     Reason for ICU Admission:-       SUPPORT DEVICES: [] Ventilator [] BIPAP  [] Nasal Cannula [x] Room Air    Consultations:- [] Cardiology [] Nephrology  [] Hemo onco  [] GI                               [] ID [] ENT  [] Rheum [] Endo   []Physiotherapy                                 Others:-     NUTRITION:  [] NPO [] Tube Feeding (Specify: ) [] TPN  [x] PO    Central Lines:- [] No   [] Yes           If yes - Days/Date of Insertion. Pt seen,examined and Chart reviewed. ICU COURSE :-     Felisa Schofield is a 41 y.o male who was admitted on 9/16/2018 to the floor for UTI and AMS. According to his mother that the patient was confused at home the past day, he did not have any chest pain, shortness of breath, productive cough, nausea, vomiting. While in the ED he was found to have UTI and was started on antibiotics. When the patient arrived to his stepdown bed, he suddenly became unresponsive and a rapid response was called. The patient had an episode of emesis during his transit to his bed car, and there was a concern for aspiration. His ABGs showed pH 7.47, 0250, carbon dioxide 31, sat 88%. He was deep suctioned, with improvement in respiratory status and he was transferred to ICU for closer observation. While in the ICU he became more responsive and was following commands appropriately and speaking at his baseline speech. His AMS improved. The patient was not intubated. His urine and blood cultures were positive for E. coli, sensitive to Rocephin.   Continue Rocephin for 2 weeks as 20 mg Oral TID    primidone  50 mg Oral BID    QUEtiapine  25 mg Oral Nightly    sodium chloride flush  10 mL Intravenous 2 times per day    enoxaparin  40 mg Subcutaneous Daily     Continuous Infusions:   sodium chloride 75 mL/hr at 09/20/18 0500     PRN Meds:acetaminophen, albuterol, clonazePAM, sodium chloride flush, magnesium hydroxide, ondansetron    Objective:    CBC:   Recent Labs      09/18/18   0606  09/19/18   0443  09/20/18   0451   WBC  14.3*  8.7  5.8   HGB  13.9  13.1  12.0*   PLT  210  217  194     BMP:  Recent Labs      09/18/18   0606  09/19/18   0443  09/20/18   0451   NA  144  140  140   K  3.7  3.3*  3.5*   CL  106  101  105   CO2  19*  23  23   BUN  9  7  4*   CREATININE  1.41*  0.93  0.64*   GLUCOSE  83  112*  104*     Calcium:  Recent Labs      09/20/18   0451   CALCIUM  8.6   CULTURES:         Assessment:  Patient Active Problem List   Diagnosis    Influenza A    Altered mental status    Lactic acidosis    Multiple sclerosis (HCC)    Acute respiratory failure (HCC)    Relapsing remitting multiple sclerosis (HCC)    Acute respiratory failure with hypoxia (HCC)    Encephalopathy acute    Sepsis ([facility] [address]    Urinary tract infection    UTI (urinary tract infection)    Aspiration pneumonia (HCC)           Plan:  1.  UTI  Urine cultures positive for E. Coli 9/16/18  The patient is currently on  IV Rocephin, will change to ciprofloxacin from tomorrow  On IV  N saline at 75ml/hr      2. Altered mental status  Likely due to UTI  Improved,AO x 3 , follows commands    3. Impending respiratory failure - resolved  - stable  - CXR 9/17 shows hazy density in L perihilar region and LLL infiltrates concerning for possible aspiration   - repeat CXR 9/18 shows improving L perihilar density  - Encourage frequent pulmonary toilet with IS, deep breathing/ coughing exercises      4. Multiple sclerosis  Wheelchair bound and BUE weakness at baseline    5.  Continue home meds -Prozac, Seroquel, baclofen, jayson Kenney MD,   PGY-1 Internal Medicine Resident  [facility] [facility]  9/20/2018  10:19 PM

## 2018-09-21 NOTE — CARE COORDINATION
Called [facility] to set up PCP. Spoke with Berenice. Berenice states patient has missed appointments in the past.   Appointment made with Dr. Natan Rojas on 2018 at 0830. Address [address] [place] [address]; [place], [facility]. Phone [phone]  Leslie Palacio, [facility] notified. Shaheen Hill, wife notified of address, phone number, appointment time/date, photo id/insurance cards and to bring medications or med list.  Also informed to cancel 24 hours in advance if needed. If time is not convenient, patient is to call and reschedule. [employer] rep in with necessity form to be signed by MD, NP or PA. Leslie Palacio RN notified and will contact team (for thick it)    [phone]: Alejandrina Campbell states necessity form has been signed and faxed to [place].    1440: Faxed [facility] for ground transportation to patients home,  time 1600. Await verification from [address] [place] [address]: Spoke with Elijah Sorenson, will have thick it delivered to patients home. Aware patient is being discharged today. [phone]: [facility] order, Leslie Palacio RN notified and will call team.    [phone]: Faxed CLINT to Uche Garcia, [address] [facility] for [facility] [phone]  Phone [phone]: Nelli, spoke with Faye RABAGO  time at 1600 ground ambulance  Notified Shaheen Hill, wife at [phone] notified patient will be picked up at [place]. Brendan's around [facility] 13. verbalized understanding. [address] [facility] [address] Orders to Ang Diaz, [address] [facility] [phone]  Phone [phone]    Discharge Report    [facility] Case Management Department  Written by: Tereso Clements RN    Patient Name: Alejandroamy Boyces  Attending Provider: Chano Klein MD  Admit Date: 2018  4:19 PM  MRN: [id number]  Account: [de-identified]                     : 1975  Discharge Date:       Disposition: home per [facility] on stretcher.   Wife to meet patient at home    Tereso Clements RN
Swallow study completed-see note for diet recommendations. Also recommended a repeat swallow study once home meds resumed. Current with [facility].
PM

## 2018-09-21 NOTE — PROGRESS NOTES
CLINICAL PHARMACY NOTE: MEDS TO [address] Select Patient?: No  Total # of Prescriptions Filled: 2   The following medications were delivered to the patient:  · Quetiapine  · ciprofloxacin  Total # of Interventions Completed: 0  Time Spent (min): 0    Additional Documentation:

## 2018-09-21 NOTE — PROGRESS NOTES
\"Pt's /115, HR 77. Interventions? Thanks\"    PerfectServed Dr. Marilu Teague (internal medicine resident) about above. Orders received, see eMAR.

## 2018-09-22 LAB
CULTURE: NORMAL
CULTURE: NORMAL
Lab: NORMAL
Lab: NORMAL
SPECIMEN DESCRIPTION: NORMAL
SPECIMEN DESCRIPTION: NORMAL
STATUS: NORMAL
STATUS: NORMAL

## 2018-09-23 LAB
CULTURE: NORMAL
Lab: NORMAL
SPECIMEN DESCRIPTION: NORMAL
STATUS: NORMAL

## 2018-10-04 NOTE — DISCHARGE SUMMARY
concerning for possible aspiration, repeat chest x-ray on 9/18 showed improving left perihilar density. The patient was encouraged frequent pulmonary toilet with INS, deep breathing/coughing exercises. Procedures:  none    Any Hospital Acquired Infections: none    Discharge Functional Status:  stable    Disposition: home    Patient Instructions:   Discharge Medication List as of 9/21/2018  2:47 PM      CONTINUE these medications which have CHANGED    Details   primidone (MYSOLINE) 250 MG tablet Take 1 tablet by mouth 2 times daily, Disp-120 tablet, R-3Normal      FLUoxetine (PROZAC) 20 MG capsule Take 1 capsule by mouth daily, Disp-30 capsule, R-3Normal      QUEtiapine (SEROQUEL) 25 MG tablet Take 1 tablet by mouth nightly, Disp-60 tablet, R-3Normal      propranolol (INDERAL LA) 120 MG extended release capsule Take 1 capsule by mouth daily, Disp-30 capsule, R-3Normal      ciprofloxacin (CIPRO) 500 MG tablet Take 1 tablet by mouth every 12 hours for 7 days, Disp-14 tablet, R-0Normal      magnesium hydroxide (MILK OF MAGNESIA) 400 MG/5ML suspension Take 30 mLs by mouth daily as needed for ConstipationOTC      dextromethorphan-quiNIDine (NUEDEXTA) 20-10 MG CAPS per capsule Take 1 capsule by mouth every 12 hours, Disp-60 capsule, R-0Normal      vitamin D (CHOLECALCIFEROL) 1000 UNIT TABS tablet Take 1 tablet by mouth 2 times daily, Disp-60 tablet, R-0Normal         CONTINUE these medications which have NOT CHANGED    Details   Catheters (GIZMO CONDOM CATHETER) MISC DAILY Starting 3/10/2016, Until Discontinued, Disp-30 each, R-5, Normal      Incontinence Supplies (URINARY DRAINAGE BAG) MISC WEEKLY Starting 3/10/2016, Until Discontinued, Disp-30 Bottle, R-5, YEIMY, NormalChange fry bag at least once weekly. Empty every 4-6 hours. clonazePAM (KLONOPIN) 1 MG tablet Take 1 mg by mouth 2 times daily. (Take 1 tab in the morning, 1/2 tab at  3-4pm, and 1 tab in the evening. ). Historical Med         STOP taking these

## 2018-10-16 PROBLEM — N39.0 URINARY TRACT INFECTION: Status: RESOLVED | Noted: 2018-09-16 | Resolved: 2018-10-16

## 2018-10-16 PROBLEM — N39.0 UTI (URINARY TRACT INFECTION): Status: RESOLVED | Noted: 2018-09-16 | Resolved: 2018-10-16

## 2019-11-13 ENCOUNTER — APPOINTMENT (OUTPATIENT)
Dept: GENERAL RADIOLOGY | Age: 44
End: 2019-11-13
Payer: MEDICAID

## 2019-11-13 ENCOUNTER — HOSPITAL ENCOUNTER (OUTPATIENT)
Age: 44
Setting detail: OBSERVATION
Discharge: NURSING, REHAB OR LONG-TERM CARE | End: 2019-11-14
Attending: EMERGENCY MEDICINE | Admitting: INTERNAL MEDICINE
Payer: MEDICAID

## 2019-11-13 DIAGNOSIS — R31.9 URINARY TRACT INFECTION WITH HEMATURIA, SITE UNSPECIFIED: ICD-10-CM

## 2019-11-13 DIAGNOSIS — N39.0 URINARY TRACT INFECTION WITH HEMATURIA, SITE UNSPECIFIED: ICD-10-CM

## 2019-11-13 DIAGNOSIS — G35 MULTIPLE SCLEROSIS (HCC): Primary | ICD-10-CM

## 2019-11-13 PROBLEM — R65.10 SIRS (SYSTEMIC INFLAMMATORY RESPONSE SYNDROME) (HCC): Status: ACTIVE | Noted: 2019-11-13

## 2019-11-13 LAB
-: ABNORMAL
ABSOLUTE EOS #: 0.1 K/UL (ref 0–0.44)
ABSOLUTE IMMATURE GRANULOCYTE: 0.15 K/UL (ref 0–0.3)
ABSOLUTE LYMPH #: 2.53 K/UL (ref 1.1–3.7)
ABSOLUTE MONO #: 0.97 K/UL (ref 0.1–1.2)
AMORPHOUS: ABNORMAL
ANION GAP SERPL CALCULATED.3IONS-SCNC: 10 MMOL/L (ref 9–17)
BACTERIA: ABNORMAL
BASOPHILS # BLD: 0 % (ref 0–2)
BASOPHILS ABSOLUTE: 0.04 K/UL (ref 0–0.2)
BILIRUBIN URINE: ABNORMAL
BUN BLDV-MCNC: 11 MG/DL (ref 6–20)
BUN/CREAT BLD: ABNORMAL (ref 9–20)
CALCIUM SERPL-MCNC: 9.1 MG/DL (ref 8.6–10.4)
CASTS UA: ABNORMAL /LPF (ref 0–8)
CHLORIDE BLD-SCNC: 100 MMOL/L (ref 98–107)
CO2: 28 MMOL/L (ref 20–31)
COLOR: ABNORMAL
CREAT SERPL-MCNC: 0.59 MG/DL (ref 0.7–1.2)
CRYSTALS, UA: ABNORMAL /HPF
DIFFERENTIAL TYPE: ABNORMAL
EOSINOPHILS RELATIVE PERCENT: 1 % (ref 1–4)
EPITHELIAL CELLS UA: ABNORMAL /HPF (ref 0–5)
GFR AFRICAN AMERICAN: >60 ML/MIN
GFR NON-AFRICAN AMERICAN: >60 ML/MIN
GFR SERPL CREATININE-BSD FRML MDRD: ABNORMAL ML/MIN/{1.73_M2}
GFR SERPL CREATININE-BSD FRML MDRD: ABNORMAL ML/MIN/{1.73_M2}
GLUCOSE BLD-MCNC: 115 MG/DL (ref 70–99)
GLUCOSE URINE: NEGATIVE
HCT VFR BLD CALC: 39.9 % (ref 40.7–50.3)
HEMOGLOBIN: 12.9 G/DL (ref 13–17)
IMMATURE GRANULOCYTES: 1 %
INR BLD: 1
KETONES, URINE: ABNORMAL
LACTIC ACID, SEPSIS WHOLE BLOOD: 1 MMOL/L (ref 0.5–1.9)
LACTIC ACID, SEPSIS WHOLE BLOOD: 1.2 MMOL/L (ref 0.5–1.9)
LACTIC ACID, SEPSIS: NORMAL MMOL/L (ref 0.5–1.9)
LACTIC ACID, SEPSIS: NORMAL MMOL/L (ref 0.5–1.9)
LACTIC ACID, WHOLE BLOOD: 1.3 MMOL/L (ref 0.7–2.1)
LEUKOCYTE ESTERASE, URINE: ABNORMAL
LYMPHOCYTES # BLD: 19 % (ref 24–43)
MCH RBC QN AUTO: 30.9 PG (ref 25.2–33.5)
MCHC RBC AUTO-ENTMCNC: 32.3 G/DL (ref 28.4–34.8)
MCV RBC AUTO: 95.7 FL (ref 82.6–102.9)
MONOCYTES # BLD: 7 % (ref 3–12)
MUCUS: ABNORMAL
NITRITE, URINE: POSITIVE
NRBC AUTOMATED: 0 PER 100 WBC
OTHER OBSERVATIONS UA: ABNORMAL
PARTIAL THROMBOPLASTIN TIME: 27.6 SEC (ref 20.5–30.5)
PDW BLD-RTO: 13.8 % (ref 11.8–14.4)
PH UA: 5.5 (ref 5–8)
PLATELET # BLD: 430 K/UL (ref 138–453)
PLATELET ESTIMATE: ABNORMAL
PMV BLD AUTO: 10 FL (ref 8.1–13.5)
POTASSIUM SERPL-SCNC: 4 MMOL/L (ref 3.7–5.3)
PROTEIN UA: ABNORMAL
PROTHROMBIN TIME: 10.7 SEC (ref 9–12)
RBC # BLD: 4.17 M/UL (ref 4.21–5.77)
RBC # BLD: ABNORMAL 10*6/UL
RBC UA: ABNORMAL /HPF (ref 0–4)
RENAL EPITHELIAL, UA: ABNORMAL /HPF
SEG NEUTROPHILS: 72 % (ref 36–65)
SEGMENTED NEUTROPHILS ABSOLUTE COUNT: 9.67 K/UL (ref 1.5–8.1)
SODIUM BLD-SCNC: 138 MMOL/L (ref 135–144)
SPECIFIC GRAVITY UA: 1.03 (ref 1–1.03)
TRICHOMONAS: ABNORMAL
TURBIDITY: CLEAR
URINE HGB: NEGATIVE
UROBILINOGEN, URINE: NORMAL
WBC # BLD: 13.5 K/UL (ref 3.5–11.3)
WBC # BLD: ABNORMAL 10*3/UL
WBC UA: ABNORMAL /HPF (ref 0–5)
YEAST: ABNORMAL

## 2019-11-13 PROCEDURE — APPSS60 APP SPLIT SHARED TIME 46-60 MINUTES: Performed by: PHYSICIAN ASSISTANT

## 2019-11-13 PROCEDURE — 6360000002 HC RX W HCPCS: Performed by: PHYSICIAN ASSISTANT

## 2019-11-13 PROCEDURE — G0378 HOSPITAL OBSERVATION PER HR: HCPCS

## 2019-11-13 PROCEDURE — 2580000003 HC RX 258: Performed by: PHYSICIAN ASSISTANT

## 2019-11-13 PROCEDURE — 87040 BLOOD CULTURE FOR BACTERIA: CPT

## 2019-11-13 PROCEDURE — 96365 THER/PROPH/DIAG IV INF INIT: CPT

## 2019-11-13 PROCEDURE — 85025 COMPLETE CBC W/AUTO DIFF WBC: CPT

## 2019-11-13 PROCEDURE — 6360000002 HC RX W HCPCS: Performed by: NURSE PRACTITIONER

## 2019-11-13 PROCEDURE — 36415 COLL VENOUS BLD VENIPUNCTURE: CPT

## 2019-11-13 PROCEDURE — 2580000003 HC RX 258: Performed by: STUDENT IN AN ORGANIZED HEALTH CARE EDUCATION/TRAINING PROGRAM

## 2019-11-13 PROCEDURE — 71045 X-RAY EXAM CHEST 1 VIEW: CPT

## 2019-11-13 PROCEDURE — 96372 THER/PROPH/DIAG INJ SC/IM: CPT

## 2019-11-13 PROCEDURE — 99284 EMERGENCY DEPT VISIT MOD MDM: CPT

## 2019-11-13 PROCEDURE — 85610 PROTHROMBIN TIME: CPT

## 2019-11-13 PROCEDURE — 85730 THROMBOPLASTIN TIME PARTIAL: CPT

## 2019-11-13 PROCEDURE — 81001 URINALYSIS AUTO W/SCOPE: CPT

## 2019-11-13 PROCEDURE — 6370000000 HC RX 637 (ALT 250 FOR IP): Performed by: NURSE PRACTITIONER

## 2019-11-13 PROCEDURE — 96366 THER/PROPH/DIAG IV INF ADDON: CPT

## 2019-11-13 PROCEDURE — 99219 PR INITIAL OBSERVATION CARE/DAY 50 MINUTES: CPT | Performed by: INTERNAL MEDICINE

## 2019-11-13 PROCEDURE — 80048 BASIC METABOLIC PNL TOTAL CA: CPT

## 2019-11-13 PROCEDURE — 83605 ASSAY OF LACTIC ACID: CPT

## 2019-11-13 PROCEDURE — 6360000002 HC RX W HCPCS: Performed by: STUDENT IN AN ORGANIZED HEALTH CARE EDUCATION/TRAINING PROGRAM

## 2019-11-13 RX ORDER — OXYCODONE HYDROCHLORIDE AND ACETAMINOPHEN 5; 325 MG/1; MG/1
1 TABLET ORAL EVERY 6 HOURS PRN
Status: ON HOLD | COMMUNITY
End: 2019-11-14 | Stop reason: SDUPTHER

## 2019-11-13 RX ORDER — SODIUM CHLORIDE 9 MG/ML
INJECTION, SOLUTION INTRAVENOUS CONTINUOUS
Status: DISCONTINUED | OUTPATIENT
Start: 2019-11-13 | End: 2019-11-14 | Stop reason: HOSPADM

## 2019-11-13 RX ORDER — PRIMIDONE 250 MG/1
250 TABLET ORAL 2 TIMES DAILY
Status: DISCONTINUED | OUTPATIENT
Start: 2019-11-13 | End: 2019-11-14 | Stop reason: HOSPADM

## 2019-11-13 RX ORDER — 0.9 % SODIUM CHLORIDE 0.9 %
1000 INTRAVENOUS SOLUTION INTRAVENOUS ONCE
Status: COMPLETED | OUTPATIENT
Start: 2019-11-13 | End: 2019-11-13

## 2019-11-13 RX ORDER — SODIUM CHLORIDE 0.9 % (FLUSH) 0.9 %
10 SYRINGE (ML) INJECTION PRN
Status: DISCONTINUED | OUTPATIENT
Start: 2019-11-13 | End: 2019-11-14 | Stop reason: HOSPADM

## 2019-11-13 RX ORDER — CLONAZEPAM 0.5 MG/1
1 TABLET ORAL 2 TIMES DAILY
Status: DISCONTINUED | OUTPATIENT
Start: 2019-11-13 | End: 2019-11-14 | Stop reason: HOSPADM

## 2019-11-13 RX ORDER — FLUOXETINE HYDROCHLORIDE 20 MG/1
20 CAPSULE ORAL DAILY
Status: DISCONTINUED | OUTPATIENT
Start: 2019-11-13 | End: 2019-11-14 | Stop reason: HOSPADM

## 2019-11-13 RX ORDER — SODIUM CHLORIDE 0.9 % (FLUSH) 0.9 %
10 SYRINGE (ML) INJECTION EVERY 12 HOURS SCHEDULED
Status: DISCONTINUED | OUTPATIENT
Start: 2019-11-13 | End: 2019-11-14 | Stop reason: HOSPADM

## 2019-11-13 RX ORDER — PROPRANOLOL HCL 60 MG
120 CAPSULE, EXTENDED RELEASE 24HR ORAL DAILY
Status: DISCONTINUED | OUTPATIENT
Start: 2019-11-13 | End: 2019-11-14 | Stop reason: HOSPADM

## 2019-11-13 RX ORDER — QUETIAPINE FUMARATE 25 MG/1
25 TABLET, FILM COATED ORAL NIGHTLY
Status: DISCONTINUED | OUTPATIENT
Start: 2019-11-13 | End: 2019-11-14 | Stop reason: HOSPADM

## 2019-11-13 RX ORDER — VITAMIN B COMPLEX
1000 TABLET ORAL 2 TIMES DAILY
Status: DISCONTINUED | OUTPATIENT
Start: 2019-11-13 | End: 2019-11-14 | Stop reason: HOSPADM

## 2019-11-13 RX ORDER — ACETAMINOPHEN 325 MG/1
650 TABLET ORAL EVERY 4 HOURS PRN
Status: DISCONTINUED | OUTPATIENT
Start: 2019-11-13 | End: 2019-11-14 | Stop reason: HOSPADM

## 2019-11-13 RX ADMIN — PROPRANOLOL HYDROCHLORIDE 120 MG: 60 CAPSULE, EXTENDED RELEASE ORAL at 12:26

## 2019-11-13 RX ADMIN — CEFTRIAXONE SODIUM 1 G: 1 INJECTION, POWDER, FOR SOLUTION INTRAMUSCULAR; INTRAVENOUS at 12:27

## 2019-11-13 RX ADMIN — SODIUM CHLORIDE: 9 INJECTION, SOLUTION INTRAVENOUS at 10:00

## 2019-11-13 RX ADMIN — DEXTROMETHORPHAN HYDROBROMIDE AND QUINIDINE SULFATE 1 CAPSULE: 20; 10 CAPSULE, GELATIN COATED ORAL at 12:26

## 2019-11-13 RX ADMIN — VITAMIN D, TAB 1000IU (100/BT) 1000 UNITS: 25 TAB at 21:41

## 2019-11-13 RX ADMIN — DEXTROMETHORPHAN HYDROBROMIDE AND QUINIDINE SULFATE 1 CAPSULE: 20; 10 CAPSULE, GELATIN COATED ORAL at 21:41

## 2019-11-13 RX ADMIN — SODIUM CHLORIDE 1000 ML: 9 INJECTION, SOLUTION INTRAVENOUS at 01:26

## 2019-11-13 RX ADMIN — PRIMIDONE 250 MG: 250 TABLET ORAL at 21:41

## 2019-11-13 RX ADMIN — SODIUM CHLORIDE: 9 INJECTION, SOLUTION INTRAVENOUS at 20:44

## 2019-11-13 RX ADMIN — CLONAZEPAM 1 MG: 0.5 TABLET ORAL at 12:25

## 2019-11-13 RX ADMIN — CLONAZEPAM 1 MG: 0.5 TABLET ORAL at 21:41

## 2019-11-13 RX ADMIN — QUETIAPINE FUMARATE 25 MG: 25 TABLET ORAL at 21:41

## 2019-11-13 RX ADMIN — ENOXAPARIN SODIUM 40 MG: 40 INJECTION SUBCUTANEOUS at 12:27

## 2019-11-13 RX ADMIN — PIPERACILLIN AND TAZOBACTAM 3.38 G: 3; .375 INJECTION, POWDER, FOR SOLUTION INTRAVENOUS at 04:56

## 2019-11-13 RX ADMIN — FLUOXETINE HYDROCHLORIDE 20 MG: 20 CAPSULE ORAL at 12:27

## 2019-11-13 RX ADMIN — VITAMIN D, TAB 1000IU (100/BT) 1000 UNITS: 25 TAB at 12:26

## 2019-11-13 RX ADMIN — PRIMIDONE 250 MG: 250 TABLET ORAL at 12:26

## 2019-11-14 VITALS
SYSTOLIC BLOOD PRESSURE: 111 MMHG | OXYGEN SATURATION: 98 % | HEART RATE: 77 BPM | HEIGHT: 73 IN | BODY MASS INDEX: 20 KG/M2 | RESPIRATION RATE: 19 BRPM | TEMPERATURE: 98.4 F | DIASTOLIC BLOOD PRESSURE: 68 MMHG | WEIGHT: 150.9 LBS

## 2019-11-14 PROBLEM — R65.10 SIRS (SYSTEMIC INFLAMMATORY RESPONSE SYNDROME) (HCC): Status: RESOLVED | Noted: 2019-11-13 | Resolved: 2019-11-14

## 2019-11-14 LAB
ALBUMIN SERPL-MCNC: 2.8 G/DL (ref 3.5–5.2)
ALBUMIN/GLOBULIN RATIO: 1.1 (ref 1–2.5)
ALP BLD-CCNC: 61 U/L (ref 40–129)
ALT SERPL-CCNC: 32 U/L (ref 5–41)
ANION GAP SERPL CALCULATED.3IONS-SCNC: 10 MMOL/L (ref 9–17)
AST SERPL-CCNC: 13 U/L
BILIRUB SERPL-MCNC: <0.1 MG/DL (ref 0.3–1.2)
BUN BLDV-MCNC: 6 MG/DL (ref 6–20)
BUN/CREAT BLD: ABNORMAL (ref 9–20)
CALCIUM IONIZED: 1.22 MMOL/L (ref 1.13–1.33)
CALCIUM SERPL-MCNC: 8.7 MG/DL (ref 8.6–10.4)
CHLORIDE BLD-SCNC: 108 MMOL/L (ref 98–107)
CO2: 23 MMOL/L (ref 20–31)
CREAT SERPL-MCNC: 0.41 MG/DL (ref 0.7–1.2)
GFR AFRICAN AMERICAN: >60 ML/MIN
GFR NON-AFRICAN AMERICAN: >60 ML/MIN
GFR SERPL CREATININE-BSD FRML MDRD: ABNORMAL ML/MIN/{1.73_M2}
GFR SERPL CREATININE-BSD FRML MDRD: ABNORMAL ML/MIN/{1.73_M2}
GLUCOSE BLD-MCNC: 98 MG/DL (ref 70–99)
HCT VFR BLD CALC: 35 % (ref 40.7–50.3)
HEMOGLOBIN: 10.9 G/DL (ref 13–17)
INR BLD: 1
MAGNESIUM: 1.8 MG/DL (ref 1.6–2.6)
MCH RBC QN AUTO: 31.2 PG (ref 25.2–33.5)
MCHC RBC AUTO-ENTMCNC: 31.1 G/DL (ref 28.4–34.8)
MCV RBC AUTO: 100.3 FL (ref 82.6–102.9)
NRBC AUTOMATED: 0 PER 100 WBC
PDW BLD-RTO: 13.9 % (ref 11.8–14.4)
PHOSPHORUS: 2.7 MG/DL (ref 2.5–4.5)
PLATELET # BLD: 355 K/UL (ref 138–453)
PMV BLD AUTO: 10.1 FL (ref 8.1–13.5)
POTASSIUM SERPL-SCNC: 4 MMOL/L (ref 3.7–5.3)
PROTHROMBIN TIME: 10.3 SEC (ref 9–12)
RBC # BLD: 3.49 M/UL (ref 4.21–5.77)
SODIUM BLD-SCNC: 141 MMOL/L (ref 135–144)
TOTAL PROTEIN: 5.3 G/DL (ref 6.4–8.3)
WBC # BLD: 8.1 K/UL (ref 3.5–11.3)

## 2019-11-14 PROCEDURE — 2580000003 HC RX 258: Performed by: PHYSICIAN ASSISTANT

## 2019-11-14 PROCEDURE — 85027 COMPLETE CBC AUTOMATED: CPT

## 2019-11-14 PROCEDURE — 96367 TX/PROPH/DG ADDL SEQ IV INF: CPT

## 2019-11-14 PROCEDURE — G0378 HOSPITAL OBSERVATION PER HR: HCPCS

## 2019-11-14 PROCEDURE — 83735 ASSAY OF MAGNESIUM: CPT

## 2019-11-14 PROCEDURE — 6360000002 HC RX W HCPCS: Performed by: PHYSICIAN ASSISTANT

## 2019-11-14 PROCEDURE — APPSS30 APP SPLIT SHARED TIME 16-30 MINUTES: Performed by: PHYSICIAN ASSISTANT

## 2019-11-14 PROCEDURE — 6370000000 HC RX 637 (ALT 250 FOR IP): Performed by: NURSE PRACTITIONER

## 2019-11-14 PROCEDURE — 96372 THER/PROPH/DIAG INJ SC/IM: CPT

## 2019-11-14 PROCEDURE — 80053 COMPREHEN METABOLIC PANEL: CPT

## 2019-11-14 PROCEDURE — 36415 COLL VENOUS BLD VENIPUNCTURE: CPT

## 2019-11-14 PROCEDURE — 84100 ASSAY OF PHOSPHORUS: CPT

## 2019-11-14 PROCEDURE — 82330 ASSAY OF CALCIUM: CPT

## 2019-11-14 PROCEDURE — 6360000002 HC RX W HCPCS: Performed by: NURSE PRACTITIONER

## 2019-11-14 PROCEDURE — 85610 PROTHROMBIN TIME: CPT

## 2019-11-14 RX ORDER — CLONAZEPAM 1 MG/1
1 TABLET ORAL 3 TIMES DAILY
Qty: 9 TABLET | Refills: 0 | Status: SHIPPED | OUTPATIENT
Start: 2019-11-14 | End: 2020-12-05

## 2019-11-14 RX ORDER — OXYCODONE HYDROCHLORIDE AND ACETAMINOPHEN 5; 325 MG/1; MG/1
1 TABLET ORAL EVERY 6 HOURS PRN
Qty: 12 TABLET | Refills: 0 | Status: SHIPPED | OUTPATIENT
Start: 2019-11-14 | End: 2019-11-17

## 2019-11-14 RX ORDER — CEPHALEXIN 500 MG/1
500 CAPSULE ORAL 2 TIMES DAILY
Qty: 10 CAPSULE | Refills: 0
Start: 2019-11-14 | End: 2019-11-19

## 2019-11-14 RX ADMIN — CLONAZEPAM 1 MG: 0.5 TABLET ORAL at 08:25

## 2019-11-14 RX ADMIN — CEFTRIAXONE SODIUM 1 G: 1 INJECTION, POWDER, FOR SOLUTION INTRAMUSCULAR; INTRAVENOUS at 10:35

## 2019-11-14 RX ADMIN — ENOXAPARIN SODIUM 40 MG: 40 INJECTION SUBCUTANEOUS at 08:22

## 2019-11-14 RX ADMIN — VITAMIN D, TAB 1000IU (100/BT) 1000 UNITS: 25 TAB at 08:22

## 2019-11-14 RX ADMIN — PROPRANOLOL HYDROCHLORIDE 120 MG: 60 CAPSULE, EXTENDED RELEASE ORAL at 08:21

## 2019-11-14 RX ADMIN — DEXTROMETHORPHAN HYDROBROMIDE AND QUINIDINE SULFATE 1 CAPSULE: 20; 10 CAPSULE, GELATIN COATED ORAL at 10:35

## 2019-11-14 RX ADMIN — PRIMIDONE 250 MG: 250 TABLET ORAL at 08:21

## 2019-11-14 RX ADMIN — FLUOXETINE HYDROCHLORIDE 20 MG: 20 CAPSULE ORAL at 08:21

## 2019-11-14 RX ADMIN — SODIUM CHLORIDE: 9 INJECTION, SOLUTION INTRAVENOUS at 06:45

## 2019-11-19 LAB
CULTURE: NORMAL
CULTURE: NORMAL
Lab: NORMAL
Lab: NORMAL
SPECIMEN DESCRIPTION: NORMAL
SPECIMEN DESCRIPTION: NORMAL

## 2019-12-14 PROBLEM — N39.0 URINARY TRACT INFECTION: Status: RESOLVED | Noted: 2018-09-16 | Resolved: 2019-12-14

## 2020-12-04 ENCOUNTER — APPOINTMENT (OUTPATIENT)
Dept: GENERAL RADIOLOGY | Age: 45
DRG: 720 | End: 2020-12-04
Payer: MEDICAID

## 2020-12-04 ENCOUNTER — APPOINTMENT (OUTPATIENT)
Dept: CT IMAGING | Age: 45
DRG: 720 | End: 2020-12-04
Payer: MEDICAID

## 2020-12-04 ENCOUNTER — HOSPITAL ENCOUNTER (INPATIENT)
Age: 45
LOS: 2 days | Discharge: NURSING, REHAB OR LONG-TERM CARE | DRG: 720 | End: 2020-12-07
Attending: EMERGENCY MEDICINE | Admitting: INTERNAL MEDICINE
Payer: MEDICAID

## 2020-12-04 LAB
ABSOLUTE EOS #: <0.03 K/UL (ref 0–0.44)
ABSOLUTE IMMATURE GRANULOCYTE: <0.03 K/UL (ref 0–0.3)
ABSOLUTE LYMPH #: 0.7 K/UL (ref 1.1–3.7)
ABSOLUTE MONO #: 0.62 K/UL (ref 0.1–1.2)
ALBUMIN SERPL-MCNC: 3.7 G/DL (ref 3.5–5.2)
ALBUMIN/GLOBULIN RATIO: 1.5 (ref 1–2.5)
ALP BLD-CCNC: 88 U/L (ref 40–129)
ALT SERPL-CCNC: 43 U/L (ref 5–41)
ANION GAP SERPL CALCULATED.3IONS-SCNC: 11 MMOL/L (ref 9–17)
AST SERPL-CCNC: 25 U/L
BASOPHILS # BLD: 0 % (ref 0–2)
BASOPHILS ABSOLUTE: <0.03 K/UL (ref 0–0.2)
BILIRUB SERPL-MCNC: 0.18 MG/DL (ref 0.3–1.2)
BUN BLDV-MCNC: 11 MG/DL (ref 6–20)
BUN/CREAT BLD: ABNORMAL (ref 9–20)
C-REACTIVE PROTEIN: 23 MG/L (ref 0–5)
CALCIUM SERPL-MCNC: 8.5 MG/DL (ref 8.6–10.4)
CHLORIDE BLD-SCNC: 104 MMOL/L (ref 98–107)
CO2: 24 MMOL/L (ref 20–31)
CREAT SERPL-MCNC: 0.86 MG/DL (ref 0.7–1.2)
D-DIMER QUANTITATIVE: 0.39 MG/L FEU
DIFFERENTIAL TYPE: ABNORMAL
EOSINOPHILS RELATIVE PERCENT: 0 % (ref 1–4)
FERRITIN: 168 UG/L (ref 30–400)
GFR AFRICAN AMERICAN: >60 ML/MIN
GFR NON-AFRICAN AMERICAN: >60 ML/MIN
GFR SERPL CREATININE-BSD FRML MDRD: ABNORMAL ML/MIN/{1.73_M2}
GFR SERPL CREATININE-BSD FRML MDRD: ABNORMAL ML/MIN/{1.73_M2}
GLUCOSE BLD-MCNC: 90 MG/DL (ref 70–99)
HCT VFR BLD CALC: 42.1 % (ref 40.7–50.3)
HEMOGLOBIN: 13.5 G/DL (ref 13–17)
IMMATURE GRANULOCYTES: 0 %
INR BLD: 1.1
LACTATE DEHYDROGENASE: 147 U/L (ref 135–225)
LACTIC ACID, SEPSIS WHOLE BLOOD: 2 MMOL/L (ref 0.5–1.9)
LACTIC ACID, SEPSIS: ABNORMAL MMOL/L (ref 0.5–1.9)
LYMPHOCYTES # BLD: 10 % (ref 24–43)
MCH RBC QN AUTO: 31.5 PG (ref 25.2–33.5)
MCHC RBC AUTO-ENTMCNC: 32.1 G/DL (ref 28.4–34.8)
MCV RBC AUTO: 98.4 FL (ref 82.6–102.9)
MONOCYTES # BLD: 8 % (ref 3–12)
NRBC AUTOMATED: 0 PER 100 WBC
PARTIAL THROMBOPLASTIN TIME: 28.7 SEC (ref 20.5–30.5)
PDW BLD-RTO: 13.8 % (ref 11.8–14.4)
PLATELET # BLD: 173 K/UL (ref 138–453)
PLATELET ESTIMATE: ABNORMAL
PMV BLD AUTO: 11.8 FL (ref 8.1–13.5)
POTASSIUM SERPL-SCNC: 4.1 MMOL/L (ref 3.7–5.3)
PROTHROMBIN TIME: 11.8 SEC (ref 9–12)
RBC # BLD: 4.28 M/UL (ref 4.21–5.77)
RBC # BLD: ABNORMAL 10*6/UL
SARS-COV-2, RAPID: NOT DETECTED
SARS-COV-2: NORMAL
SARS-COV-2: NORMAL
SEG NEUTROPHILS: 82 % (ref 36–65)
SEGMENTED NEUTROPHILS ABSOLUTE COUNT: 6.04 K/UL (ref 1.5–8.1)
SODIUM BLD-SCNC: 139 MMOL/L (ref 135–144)
SOURCE: NORMAL
TOTAL PROTEIN: 6.2 G/DL (ref 6.4–8.3)
TROPONIN INTERP: NORMAL
TROPONIN INTERP: NORMAL
TROPONIN T: NORMAL NG/ML
TROPONIN T: NORMAL NG/ML
TROPONIN, HIGH SENSITIVITY: 12 NG/L (ref 0–22)
TROPONIN, HIGH SENSITIVITY: 14 NG/L (ref 0–22)
WBC # BLD: 7.4 K/UL (ref 3.5–11.3)
WBC # BLD: ABNORMAL 10*3/UL

## 2020-12-04 PROCEDURE — 83615 LACTATE (LD) (LDH) ENZYME: CPT

## 2020-12-04 PROCEDURE — 87086 URINE CULTURE/COLONY COUNT: CPT

## 2020-12-04 PROCEDURE — 70450 CT HEAD/BRAIN W/O DYE: CPT

## 2020-12-04 PROCEDURE — 82728 ASSAY OF FERRITIN: CPT

## 2020-12-04 PROCEDURE — 85379 FIBRIN DEGRADATION QUANT: CPT

## 2020-12-04 PROCEDURE — 84484 ASSAY OF TROPONIN QUANT: CPT

## 2020-12-04 PROCEDURE — 96374 THER/PROPH/DIAG INJ IV PUSH: CPT

## 2020-12-04 PROCEDURE — 71045 X-RAY EXAM CHEST 1 VIEW: CPT

## 2020-12-04 PROCEDURE — 81001 URINALYSIS AUTO W/SCOPE: CPT

## 2020-12-04 PROCEDURE — 86140 C-REACTIVE PROTEIN: CPT

## 2020-12-04 PROCEDURE — 85610 PROTHROMBIN TIME: CPT

## 2020-12-04 PROCEDURE — 85730 THROMBOPLASTIN TIME PARTIAL: CPT

## 2020-12-04 PROCEDURE — 99285 EMERGENCY DEPT VISIT HI MDM: CPT

## 2020-12-04 PROCEDURE — 93005 ELECTROCARDIOGRAM TRACING: CPT | Performed by: EMERGENCY MEDICINE

## 2020-12-04 PROCEDURE — 2580000003 HC RX 258: Performed by: STUDENT IN AN ORGANIZED HEALTH CARE EDUCATION/TRAINING PROGRAM

## 2020-12-04 PROCEDURE — 83605 ASSAY OF LACTIC ACID: CPT

## 2020-12-04 PROCEDURE — 87040 BLOOD CULTURE FOR BACTERIA: CPT

## 2020-12-04 PROCEDURE — 80053 COMPREHEN METABOLIC PANEL: CPT

## 2020-12-04 PROCEDURE — 85025 COMPLETE CBC W/AUTO DIFF WBC: CPT

## 2020-12-04 PROCEDURE — U0002 COVID-19 LAB TEST NON-CDC: HCPCS

## 2020-12-04 RX ORDER — KETOROLAC TROMETHAMINE 15 MG/ML
15 INJECTION, SOLUTION INTRAMUSCULAR; INTRAVENOUS ONCE
Status: COMPLETED | OUTPATIENT
Start: 2020-12-05 | End: 2020-12-05

## 2020-12-04 RX ORDER — 0.9 % SODIUM CHLORIDE 0.9 %
1000 INTRAVENOUS SOLUTION INTRAVENOUS ONCE
Status: COMPLETED | OUTPATIENT
Start: 2020-12-04 | End: 2020-12-04

## 2020-12-04 RX ADMIN — SODIUM CHLORIDE 1000 ML: 9 INJECTION, SOLUTION INTRAVENOUS at 20:30

## 2020-12-05 ENCOUNTER — APPOINTMENT (OUTPATIENT)
Dept: GENERAL RADIOLOGY | Age: 45
DRG: 720 | End: 2020-12-05
Payer: MEDICAID

## 2020-12-05 ENCOUNTER — APPOINTMENT (OUTPATIENT)
Dept: CT IMAGING | Age: 45
DRG: 720 | End: 2020-12-05
Payer: MEDICAID

## 2020-12-05 ENCOUNTER — APPOINTMENT (OUTPATIENT)
Dept: MRI IMAGING | Age: 45
DRG: 720 | End: 2020-12-05
Payer: MEDICAID

## 2020-12-05 LAB
-: ABNORMAL
AMORPHOUS: ABNORMAL
BACTERIA: ABNORMAL
BILIRUBIN URINE: ABNORMAL
BNP INTERPRETATION: NORMAL
CASTS UA: ABNORMAL /LPF (ref 0–8)
COLOR: ABNORMAL
CRYSTALS, UA: ABNORMAL /HPF
EPITHELIAL CELLS UA: ABNORMAL /HPF (ref 0–5)
GLUCOSE URINE: NEGATIVE
KETONES, URINE: ABNORMAL
LACTIC ACID, SEPSIS WHOLE BLOOD: 1.1 MMOL/L (ref 0.5–1.9)
LACTIC ACID, SEPSIS WHOLE BLOOD: 1.6 MMOL/L (ref 0.5–1.9)
LACTIC ACID, SEPSIS: NORMAL MMOL/L (ref 0.5–1.9)
LACTIC ACID, SEPSIS: NORMAL MMOL/L (ref 0.5–1.9)
LEUKOCYTE ESTERASE, URINE: ABNORMAL
MUCUS: ABNORMAL
NITRITE, URINE: NEGATIVE
OTHER OBSERVATIONS UA: ABNORMAL
PH UA: 5.5 (ref 5–8)
PRO-BNP: 87 PG/ML
PROCALCITONIN: 0.14 NG/ML
PROTEIN UA: ABNORMAL
RBC UA: ABNORMAL /HPF (ref 0–4)
RENAL EPITHELIAL, UA: ABNORMAL /HPF
SPECIFIC GRAVITY UA: 1.03 (ref 1–1.03)
TRICHOMONAS: ABNORMAL
TURBIDITY: CLEAR
URINE HGB: NEGATIVE
UROBILINOGEN, URINE: NORMAL
WBC UA: ABNORMAL /HPF (ref 0–5)
YEAST: ABNORMAL

## 2020-12-05 PROCEDURE — 6360000002 HC RX W HCPCS: Performed by: INTERNAL MEDICINE

## 2020-12-05 PROCEDURE — 2580000003 HC RX 258: Performed by: INTERNAL MEDICINE

## 2020-12-05 PROCEDURE — 2580000003 HC RX 258: Performed by: STUDENT IN AN ORGANIZED HEALTH CARE EDUCATION/TRAINING PROGRAM

## 2020-12-05 PROCEDURE — 99222 1ST HOSP IP/OBS MODERATE 55: CPT | Performed by: INTERNAL MEDICINE

## 2020-12-05 PROCEDURE — 2060000000 HC ICU INTERMEDIATE R&B

## 2020-12-05 PROCEDURE — A9576 INJ PROHANCE MULTIPACK: HCPCS | Performed by: INTERNAL MEDICINE

## 2020-12-05 PROCEDURE — 6370000000 HC RX 637 (ALT 250 FOR IP): Performed by: NURSE PRACTITIONER

## 2020-12-05 PROCEDURE — 83605 ASSAY OF LACTIC ACID: CPT

## 2020-12-05 PROCEDURE — 70553 MRI BRAIN STEM W/O & W/DYE: CPT

## 2020-12-05 PROCEDURE — 84145 PROCALCITONIN (PCT): CPT

## 2020-12-05 PROCEDURE — 72156 MRI NECK SPINE W/O & W/DYE: CPT

## 2020-12-05 PROCEDURE — 6360000002 HC RX W HCPCS

## 2020-12-05 PROCEDURE — 6360000002 HC RX W HCPCS: Performed by: STUDENT IN AN ORGANIZED HEALTH CARE EDUCATION/TRAINING PROGRAM

## 2020-12-05 PROCEDURE — 71250 CT THORAX DX C-: CPT

## 2020-12-05 PROCEDURE — 6360000002 HC RX W HCPCS: Performed by: NURSE PRACTITIONER

## 2020-12-05 PROCEDURE — 87641 MR-STAPH DNA AMP PROBE: CPT

## 2020-12-05 PROCEDURE — 6360000004 HC RX CONTRAST MEDICATION: Performed by: INTERNAL MEDICINE

## 2020-12-05 PROCEDURE — APPSS30 APP SPLIT SHARED TIME 16-30 MINUTES: Performed by: NURSE PRACTITIONER

## 2020-12-05 PROCEDURE — 2580000003 HC RX 258: Performed by: NURSE PRACTITIONER

## 2020-12-05 PROCEDURE — 87040 BLOOD CULTURE FOR BACTERIA: CPT

## 2020-12-05 PROCEDURE — 99223 1ST HOSP IP/OBS HIGH 75: CPT | Performed by: PSYCHIATRY & NEUROLOGY

## 2020-12-05 PROCEDURE — 83880 ASSAY OF NATRIURETIC PEPTIDE: CPT

## 2020-12-05 PROCEDURE — 71046 X-RAY EXAM CHEST 2 VIEWS: CPT

## 2020-12-05 RX ORDER — SODIUM CHLORIDE 9 MG/ML
INJECTION, SOLUTION INTRAVENOUS CONTINUOUS
Status: DISCONTINUED | OUTPATIENT
Start: 2020-12-05 | End: 2020-12-05

## 2020-12-05 RX ORDER — SODIUM CHLORIDE 0.9 % (FLUSH) 0.9 %
10 SYRINGE (ML) INJECTION ONCE
Status: DISCONTINUED | OUTPATIENT
Start: 2020-12-05 | End: 2020-12-06

## 2020-12-05 RX ORDER — HALOPERIDOL 5 MG/ML
3 INJECTION INTRAMUSCULAR ONCE
Status: COMPLETED | OUTPATIENT
Start: 2020-12-05 | End: 2020-12-05

## 2020-12-05 RX ORDER — FLUOXETINE HYDROCHLORIDE 20 MG/1
20 CAPSULE ORAL DAILY
Status: DISCONTINUED | OUTPATIENT
Start: 2020-12-05 | End: 2020-12-07 | Stop reason: HOSPADM

## 2020-12-05 RX ORDER — FLUTICASONE PROPIONATE 50 MCG
1 SPRAY, SUSPENSION (ML) NASAL DAILY
COMMUNITY

## 2020-12-05 RX ORDER — OXYCODONE HYDROCHLORIDE AND ACETAMINOPHEN 5; 325 MG/1; MG/1
1 TABLET ORAL 2 TIMES DAILY PRN
Status: ON HOLD | COMMUNITY
End: 2020-12-06 | Stop reason: HOSPADM

## 2020-12-05 RX ORDER — SODIUM CHLORIDE 0.9 % (FLUSH) 0.9 %
10 SYRINGE (ML) INJECTION EVERY 12 HOURS SCHEDULED
Status: DISCONTINUED | OUTPATIENT
Start: 2020-12-06 | End: 2020-12-06

## 2020-12-05 RX ORDER — POLYETHYLENE GLYCOL 3350 17 G/17G
17 POWDER, FOR SOLUTION ORAL DAILY PRN
COMMUNITY

## 2020-12-05 RX ORDER — HALOPERIDOL 5 MG/ML
INJECTION INTRAMUSCULAR
Status: COMPLETED
Start: 2020-12-05 | End: 2020-12-05

## 2020-12-05 RX ORDER — PRIMIDONE 250 MG/1
250 TABLET ORAL 2 TIMES DAILY
Status: DISCONTINUED | OUTPATIENT
Start: 2020-12-05 | End: 2020-12-07 | Stop reason: HOSPADM

## 2020-12-05 RX ORDER — ACETAMINOPHEN 650 MG/1
650 SUPPOSITORY RECTAL EVERY 6 HOURS PRN
Status: DISCONTINUED | OUTPATIENT
Start: 2020-12-05 | End: 2020-12-07 | Stop reason: HOSPADM

## 2020-12-05 RX ORDER — CLONAZEPAM 1 MG/1
1 TABLET ORAL 2 TIMES DAILY PRN
COMMUNITY

## 2020-12-05 RX ORDER — ACETAMINOPHEN 325 MG/1
650 TABLET ORAL EVERY 6 HOURS PRN
Status: DISCONTINUED | OUTPATIENT
Start: 2020-12-05 | End: 2020-12-07 | Stop reason: HOSPADM

## 2020-12-05 RX ORDER — VITAMIN B COMPLEX
1000 TABLET ORAL 2 TIMES DAILY
Status: DISCONTINUED | OUTPATIENT
Start: 2020-12-05 | End: 2020-12-07 | Stop reason: HOSPADM

## 2020-12-05 RX ORDER — VANCOMYCIN HYDROCHLORIDE 1 G/200ML
15 INJECTION, SOLUTION INTRAVENOUS ONCE
Status: COMPLETED | OUTPATIENT
Start: 2020-12-05 | End: 2020-12-05

## 2020-12-05 RX ORDER — CLONAZEPAM 0.5 MG/1
0.5 TABLET ORAL DAILY
COMMUNITY

## 2020-12-05 RX ORDER — 0.9 % SODIUM CHLORIDE 0.9 %
1000 INTRAVENOUS SOLUTION INTRAVENOUS ONCE
Status: COMPLETED | OUTPATIENT
Start: 2020-12-05 | End: 2020-12-05

## 2020-12-05 RX ORDER — PROPRANOLOL HCL 60 MG
120 CAPSULE, EXTENDED RELEASE 24HR ORAL DAILY
Status: DISCONTINUED | OUTPATIENT
Start: 2020-12-05 | End: 2020-12-05

## 2020-12-05 RX ORDER — VANCOMYCIN HYDROCHLORIDE 1 G/200ML
1000 INJECTION, SOLUTION INTRAVENOUS EVERY 12 HOURS
Status: DISCONTINUED | OUTPATIENT
Start: 2020-12-05 | End: 2020-12-05

## 2020-12-05 RX ORDER — QUETIAPINE FUMARATE 25 MG/1
25 TABLET, FILM COATED ORAL NIGHTLY
Status: DISCONTINUED | OUTPATIENT
Start: 2020-12-05 | End: 2020-12-07 | Stop reason: HOSPADM

## 2020-12-05 RX ORDER — SODIUM CHLORIDE 0.9 % (FLUSH) 0.9 %
10 SYRINGE (ML) INJECTION PRN
Status: DISCONTINUED | OUTPATIENT
Start: 2020-12-05 | End: 2020-12-06

## 2020-12-05 RX ORDER — SENNA PLUS 8.6 MG/1
1 TABLET ORAL DAILY
COMMUNITY

## 2020-12-05 RX ORDER — DULOXETIN HYDROCHLORIDE 60 MG/1
60 CAPSULE, DELAYED RELEASE ORAL 2 TIMES DAILY
COMMUNITY

## 2020-12-05 RX ADMIN — PRIMIDONE 250 MG: 250 TABLET ORAL at 08:28

## 2020-12-05 RX ADMIN — ACETAMINOPHEN 650 MG: 325 TABLET ORAL at 17:08

## 2020-12-05 RX ADMIN — ACETAMINOPHEN 650 MG: 325 TABLET ORAL at 22:19

## 2020-12-05 RX ADMIN — FLUOXETINE HYDROCHLORIDE 20 MG: 20 CAPSULE ORAL at 08:27

## 2020-12-05 RX ADMIN — HALOPERIDOL LACTATE 3 MG: 5 INJECTION, SOLUTION INTRAMUSCULAR at 18:11

## 2020-12-05 RX ADMIN — SODIUM CHLORIDE: 9 INJECTION, SOLUTION INTRAVENOUS at 04:58

## 2020-12-05 RX ADMIN — VANCOMYCIN HYDROCHLORIDE 1000 MG: 1 INJECTION, SOLUTION INTRAVENOUS at 02:06

## 2020-12-05 RX ADMIN — ENOXAPARIN SODIUM 40 MG: 40 INJECTION SUBCUTANEOUS at 08:35

## 2020-12-05 RX ADMIN — PROPRANOLOL HYDROCHLORIDE 120 MG: 60 CAPSULE, EXTENDED RELEASE ORAL at 08:28

## 2020-12-05 RX ADMIN — Medication 1000 UNITS: at 21:36

## 2020-12-05 RX ADMIN — Medication 1250 MG: at 11:52

## 2020-12-05 RX ADMIN — HALOPERIDOL 3 MG: 5 INJECTION INTRAMUSCULAR at 18:11

## 2020-12-05 RX ADMIN — CEFTRIAXONE SODIUM 1 G: 1 INJECTION, POWDER, FOR SOLUTION INTRAMUSCULAR; INTRAVENOUS at 00:26

## 2020-12-05 RX ADMIN — PRIMIDONE 250 MG: 250 TABLET ORAL at 21:35

## 2020-12-05 RX ADMIN — Medication 1000 UNITS: at 08:27

## 2020-12-05 RX ADMIN — SODIUM CHLORIDE 1000 ML: 9 INJECTION, SOLUTION INTRAVENOUS at 00:07

## 2020-12-05 RX ADMIN — SODIUM CHLORIDE 1.5 G: 900 INJECTION INTRAVENOUS at 19:20

## 2020-12-05 RX ADMIN — GADOTERIDOL 14 ML: 279.3 INJECTION, SOLUTION INTRAVENOUS at 18:36

## 2020-12-05 RX ADMIN — KETOROLAC TROMETHAMINE 15 MG: 15 INJECTION, SOLUTION INTRAMUSCULAR; INTRAVENOUS at 00:02

## 2020-12-05 ASSESSMENT — PAIN SCALES - GENERAL: PAINLEVEL_OUTOF10: 1

## 2020-12-05 NOTE — ED PROVIDER NOTES
[address]  ED  Emergency Department Encounter  EmergencyMedicine Resident     Pt Xi Harris  MRN: [id number]  [place] 1975  Date of evaluation: 12/4/20  PCP:  No primary care provider on file. CHIEF COMPLAINT       Chief Complaint   Patient presents with    Fever    Fatigue    Altered Mental Status       HISTORY OF PRESENT ILLNESS  (Location/Symptom, Timing/Onset, Context/Setting, Quality, Duration, Modifying Factors, Severity.)      Latoya Balbuena is a 39 y.o. male who presents with altered mental status. Patient has a history of MS, lives at extended care facility, limited speaking ability at baseline. Patient was also recently admitted to the hospital for urinary tract infection, discharged on 11/14/2020. [facility] care facility reported patient more altered today, less responsive, febrile to 102 °F.  Patient is minimally responsive, unable to participate in HPI and ROS. It does appear that patient is denying any pain, but unable to be certain. Patient does not have a catheter, is in briefs. PAST MEDICAL / SURGICAL / SOCIAL / FAMILY HISTORY      has a past medical history of Depression and Multiple sclerosis ([facility] [address] has no past surgical history on file.   Unable to obtain past surgical history    Social History     Socioeconomic History    Marital status: Legally      Spouse name: Not on file    Number of children: Not on file    Years of education: Not on file    Highest education level: Not on file   Occupational History    Not on file   Social Needs    Financial resource strain: Not on file    Food insecurity     Worry: Not on file     Inability: Not on file    Transportation needs     Medical: Not on file     Non-medical: Not on file   Tobacco Use    Smoking status: Never Smoker    Smokeless tobacco: Never Used   Substance and Sexual Activity    Alcohol use: No     Alcohol/week: 0.0 standard drinks    Drug use: Yes     Types: Marijuana    Sexual activity: Not on file   Lifestyle    Physical activity     Days per week: Not on file     Minutes per session: Not on file    Stress: Not on file   Relationships    Social connections     Talks on phone: Not on file     Gets together: Not on file     Attends [employer] service: Not on file     Active member of club or organization: Not on file     Attends meetings of clubs or organizations: Not on file     Relationship status: Not on file    Intimate partner violence     Fear of current or ex partner: Not on file     Emotionally abused: Not on file     Physically abused: Not on file     Forced sexual activity: Not on file   Other Topics Concern    Not on file   Social History Narrative    Not on file       Family History   Problem Relation Age of Onset    Asthma Father        Allergies:  Nalbuphine; Tramadol; and Ibuprofen    Home Medications:  Prior to Admission medications    Medication Sig Start Date End Date Taking? Authorizing Provider   clonazePAM (KLONOPIN) 1 MG tablet Take 1 tablet by mouth 3 times daily for 3 days.  (Take 1 tab in the morning, 1/2 tab at  3-4pm, and 1 tab in the evening.) 11/14/19 11/17/19  Deepak Henry PA-C   primidone (MYSOLINE) 250 MG tablet Take 1 tablet by mouth 2 times daily 9/21/18   Nilsa Carson MD   FLUoxetine (PROZAC) 20 MG capsule Take 1 capsule by mouth daily 9/21/18   Nilsa Carson MD   QUEtiapine (SEROQUEL) 25 MG tablet Take 1 tablet by mouth nightly 9/21/18   Nilsa Carson MD   propranolol (INDERAL LA) 120 MG extended release capsule Take 1 capsule by mouth daily 9/22/18   Nilsa Carson MD   magnesium hydroxide (MILK OF MAGNESIA) 400 MG/5ML suspension Take 30 mLs by mouth daily as needed for Constipation 9/21/18   Nilsa Carson MD   dextromethorphan-quiNIDine (NUEDEXTA) 20-10 MG CAPS per capsule Take 1 capsule by mouth every 12 hours 9/21/18   Nilsa Carson MD   vitamin D (CHOLECALCIFEROL) 1000 UNIT TABS tablet Take 1 tablet by mouth 2 times daily 9/21/18   Luz Elena Duque MD   Catheters (GIZMO CONDOM CATHETER) MISC 1 Units by Does not apply route daily 3/10/16   Ying Raza,    Incontinence Supplies (URINARY DRAINAGE BAG) MISC 1 Units by Other route once a week Change fry bag at least once weekly. Empty every 4-6 hours. 3/10/16   Ying Raza, DO       REVIEW OF SYSTEMS    (2-9 systems for level 4, 10 or more for level 5)      Review of Systems   Unable to obtain ROS secondary to condition    PHYSICAL EXAM   (up to 7 for level 4, 8 or more for level 5)      INITIAL VITALS:   /84   Pulse 91   Temp 99.1 °F (37.3 °C) (Oral)   Resp 22   Ht 6' (1.829 m)   Wt 160 lb (72.6 kg)   SpO2 100%   BMI 21.70 kg/m²     Physical Exam   Patient is alert to voice, unable to answer questions, follows commands, PERRLA, EOMI, head is atraumatic, no raccoon eyes, no ching signs, no tracheal deviation, no JVD, CTAB, regular rate and rhythm, no abdominal tenderness, no groin abscesses, no foot ulcers, distal pulses intact and equal bilaterally.     DIFFERENTIAL  DIAGNOSIS     PLAN (LABS / IMAGING / EKG):  Orders Placed This Encounter   Procedures    Culture, Blood 1    Culture, Blood 1    Culture, Urine    XR CHEST PORTABLE    CT HEAD WO CONTRAST    CBC Auto Differential    Comprehensive Metabolic Panel    Lactate, Sepsis    Protime-INR    APTT    Urinalysis with Microscopic    D-Dimer, Quantitative    FERRITIN    LACTATE DEHYDROGENASE    C-Reactive Protein    Troponin    Troponin    COVID-19    Lactate, Sepsis    Inpatient consult to Hospitalist    EKG 12 Lead    PATIENT STATUS (FROM ED OR OR/PROCEDURAL) Inpatient       MEDICATIONS ORDERED:  Orders Placed This Encounter   Medications    0.9 % sodium chloride bolus    ketorolac (TORADOL) injection 15 mg    cefTRIAXone (ROCEPHIN) 1 g IVPB in 50 mL D5W minibag     Order Specific Question:   Antimicrobial Indications     Answer:   Urinary Tract Infection    0.9 % sodium chloride bolus    vancomycin (VANCOCIN) 1000 mg in dextrose 5% 200 mL IVPB     Order Specific Question:   Antimicrobial Indications     Answer:   Sepsis of Unknown Etiology       DDX: Sepsis, UTI, pneumonia, intracranial abnormality, Covid    DIAGNOSTIC RESULTS / EMERGENCY DEPARTMENT COURSE / MDM   LAB RESULTS:  Results for orders placed or performed during the hospital encounter of 12/04/20   Culture, Blood 1    Specimen: Blood   Result Value Ref Range    Specimen Description . BLOOD     Special Requests RT HAND 1ML     Culture NO GROWTH 2 HOURS    Culture, Blood 1    Specimen: Blood   Result Value Ref Range    Specimen Description . BLOOD     Special Requests RT ARM 2ML     Culture NO GROWTH 2 HOURS    CBC Auto Differential   Result Value Ref Range    WBC 7.4 3.5 - 11.3 k/uL    RBC 4.28 4.21 - 5.77 m/uL    Hemoglobin 13.5 13.0 - 17.0 g/dL    Hematocrit 42.1 40.7 - 50.3 %    MCV 98.4 82.6 - 102.9 fL    MCH 31.5 25.2 - 33.5 pg    MCHC 32.1 28.4 - 34.8 g/dL    RDW 13.8 11.8 - 14.4 %    Platelets 746 179 - 394 k/uL    MPV 11.8 8.1 - 13.5 fL    NRBC Automated 0.0 0.0 per 100 WBC    Differential Type NOT REPORTED     Seg Neutrophils 82 (H) 36 - 65 %    Lymphocytes 10 (L) 24 - 43 %    Monocytes 8 3 - 12 %    Eosinophils % 0 (L) 1 - 4 %    Basophils 0 0 - 2 %    Immature Granulocytes 0 0 %    Segs Absolute 6.04 1.50 - 8.10 k/uL    Absolute Lymph # 0.70 (L) 1.10 - 3.70 k/uL    Absolute Mono # 0.62 0.10 - 1.20 k/uL    Absolute Eos # <0.03 0.00 - 0.44 k/uL    Basophils Absolute <0.03 0.00 - 0.20 k/uL    Absolute Immature Granulocyte <0.03 0.00 - 0.30 k/uL    WBC Morphology NOT REPORTED     RBC Morphology NOT REPORTED     Platelet Estimate NOT REPORTED    Comprehensive Metabolic Panel   Result Value Ref Range    Glucose 90 70 - 99 mg/dL    BUN 11 6 - 20 mg/dL    CREATININE 0.86 0.70 - 1.20 mg/dL    Bun/Cre Ratio NOT REPORTED 9 - 20    Calcium 8.5 (L) 8.6 - 10.4 mg/dL    Sodium 139 135 - 144 mmol/L    Potassium 4.1 3.7 - 5.3 mmol/L    Chloride 104 98 - 107 mmol/L    CO2 24 20 - 31 mmol/L    Anion Gap 11 9 - 17 mmol/L    Alkaline Phosphatase 88 40 - 129 U/L    ALT 43 (H) 5 - 41 U/L    AST 25 <40 U/L    Total Bilirubin 0.18 (L) 0.3 - 1.2 mg/dL    Total Protein 6.2 (L) 6.4 - 8.3 g/dL    Alb 3.7 3.5 - 5.2 g/dL    Albumin/Globulin Ratio 1.5 1.0 - 2.5    GFR Non-African American >60 >60 mL/min    GFR African American >60 >60 mL/min    GFR Comment          GFR Staging NOT REPORTED    Lactate, Sepsis   Result Value Ref Range    Lactic Acid, Sepsis NOT REPORTED 0.5 - 1.9 mmol/L    Lactic Acid, Sepsis, Whole Blood 2.0 (H) 0.5 - 1.9 mmol/L   Protime-INR   Result Value Ref Range    Protime 11.8 9.0 - 12.0 sec    INR 1.1    APTT   Result Value Ref Range    PTT 28.7 20.5 - 30.5 sec   Urinalysis with Microscopic   Result Value Ref Range    Color, UA DARK YELLOW (A) YELLOW    Turbidity UA CLEAR CLEAR    Glucose, Ur NEGATIVE NEGATIVE    Bilirubin Urine NEGATIVE  Verified by ictotest. (A) NEGATIVE    Ketones, Urine MODERATE (A) NEGATIVE    Specific Gravity, UA 1.034 (H) 1.005 - 1.030    Urine Hgb NEGATIVE NEGATIVE    pH, UA 5.5 5.0 - 8.0    Protein, UA 1+ (A) NEGATIVE    Urobilinogen, Urine Normal Normal    Nitrite, Urine NEGATIVE NEGATIVE    Leukocyte Esterase, Urine TRACE (A) NEGATIVE    -          WBC, UA 0 TO 2 0 - 5 /HPF    RBC, UA 2 TO 5 0 - 4 /HPF    Casts UA  0 - 8 /LPF     0 TO 2 HYALINE Reference range defined for non-centrifuged specimen. Crystals, UA NOT REPORTED None /HPF    Epithelial Cells UA 0 TO 2 0 - 5 /HPF    Renal Epithelial, UA NOT REPORTED 0 /HPF    Bacteria, UA NOT REPORTED None    Mucus, UA NOT REPORTED None    Trichomonas, UA NOT REPORTED None    Amorphous, UA NOT REPORTED None    Other Observations UA NOT REPORTED NOT REQ.     Yeast, UA NOT REPORTED None   D-Dimer, Quantitative   Result Value Ref Range    D-Dimer, Quant 0.39 mg/L FEU   FERRITIN   Result Value Ref Range    Ferritin 168 30 - 400 ug/L   LACTATE DEHYDROGENASE Result Value Ref Range     135 - 225 U/L   C-Reactive Protein   Result Value Ref Range    CRP 23.0 (H) 0.0 - 5.0 mg/L   Troponin   Result Value Ref Range    Troponin, High Sensitivity 14 0 - 22 ng/L    Troponin T NOT REPORTED <0.03 ng/mL    Troponin Interp NOT REPORTED    Troponin   Result Value Ref Range    Troponin, High Sensitivity 12 0 - 22 ng/L    Troponin T NOT REPORTED <0.03 ng/mL    Troponin Interp NOT REPORTED    COVID-19    Specimen: Other   Result Value Ref Range    SARS-CoV-2          SARS-CoV-2, Rapid Not Detected Not Detected    Source . NASOPHARYNGEAL SWAB     SARS-CoV-2         Lactate, Sepsis   Result Value Ref Range    Lactic Acid, Sepsis NOT REPORTED 0.5 - 1.9 mmol/L    Lactic Acid, Sepsis, Whole Blood 1.1 0.5 - 1.9 mmol/L   EKG 12 Lead   Result Value Ref Range    Ventricular Rate 97 BPM    Atrial Rate 97 BPM    P-R Interval 188 ms    QRS Duration 86 ms    Q-T Interval 344 ms    QTc Calculation (Bazett) 436 ms    P Axis 63 degrees    R Axis 64 degrees    T Axis 71 degrees       IMPRESSION: 28-year-old male with history of MS living in an extended care facility, coming from [facility] with altered mental status, febrile, concern for infection. Will obtain sepsis labs, Covid labs, CT head, cardiac labs. No obvious source of infection at this time, likely secondary to UTI as patient was admitted last month for urinary tract infection. Will administer fluids, start patient on ceftriaxone for likely UTI. RADIOLOGY:  Ct Head Wo Contrast    Result Date: 12/4/2020  EXAMINATION: CT OF THE HEAD WITHOUT CONTRAST  12/4/2020 11:23 pm TECHNIQUE: CT of the head was performed without the administration of intravenous contrast. Dose modulation, iterative reconstruction, and/or weight based adjustment of the mA/kV was utilized to reduce the radiation dose to as low as reasonably achievable. COMPARISON: None.  HISTORY: ORDERING SYSTEM PROVIDED HISTORY: AMS TECHNOLOGIST PROVIDED HISTORY: AMS Reason for Exam: AMS - Fever/Fatigue Acuity: Acute Type of Exam: Initial Additional signs and symptoms: Hx - MS and Encephalopathy acute. FINDINGS: BRAIN/VENTRICLES: There is no acute intracranial hemorrhage, mass effect or midline shift. No abnormal extra-axial fluid collection. The gray-white differentiation is maintained without evidence of an acute infarct. There is no evidence of hydrocephalus. There are areas of hypoattenuation in the periventricular and subcortical white matter, which is nonspecific. There is prominence of the ventricles and sulci due to global parenchymal volume loss. Minimal areas of encephalomalacia involving the frontal lobes bilaterally. ORBITS: The visualized portion of the orbits demonstrate no acute abnormality. SINUSES: A small fluid level is seen within the right maxillary sinus. Scattered mucosal thickening of the paranasal sinuses. The mastoid air cells demonstrate no acute abnormality. SOFT TISSUES/SKULL:  No acute abnormality of the visualized skull or soft tissues. Postsurgical changes from a prior frontal craniotomy. 1. No acute intracranial abnormality. 2. Again seen are areas of decreased attenuation in the periventricular and subcortical white matter bilaterally. 3. Mild global parenchymal volume loss. 4. Scattered mucosal thickening of the paranasal sinuses with a small amount of fluid in the right maxillary sinus. Xr Chest Portable    Result Date: 12/4/2020  EXAMINATION: ONE XRAY VIEW OF THE CHEST 12/4/2020 5:15 pm COMPARISON: 11/13/2019 HISTORY: ORDERING SYSTEM PROVIDED HISTORY: sepsis TECHNOLOGIST PROVIDED HISTORY: sepsis Reason for Exam: supiine Acuity: Unknown Type of Exam: Unknown FINDINGS: There is suboptimal inspiration. The cardiac size is normal. No acute infiltrates or pleural effusions are seen. There pulmonary vascularity appears normal.  No acute bony abnormalities. The hilar structures are normal.     No acute cardiopulmonary disease IMPRESSION: No acute process.

## 2020-12-05 NOTE — ED NOTES
Pt resting on bed with eyes closed. NAD noted. RR even and non labored. Mother at bedside.      Alaina Velasquez RN  12/05/20 0078

## 2020-12-05 NOTE — ED NOTES
Pt resting in bed. NAD at this time. Sister at bedside. Will continue to monitor.         Braydon Urena RN  12/05/20 3840

## 2020-12-05 NOTE — H&P
[facility]  Office: [address], DO, Emili Tiffanie, DO, Chinmay Navarro, DO, Lindsey Bills, DO, Valiant Sicard, MD, Levy Browne MD, John Neumann MD, Judy Loza MD, Crissy Cintron MD, Amina Mills MD, Calixto Lr MD, Sanjay Serna MD, Mblaura Haynes MD, Na Leiva, DO, Shay Tobar MD, Angelika Wilkinson MD, Kacey Huitron DO, Jeanette Tran MD,  Chris Toney, DO, Samanta Galeas MD, Maryann Rojo MD, Joseph Giron, [facility], [facility], [facility], Jessy Bedoya, [facility], Gris Santos, CNS, Kimberly Vega, CNP, Katya Washington, CNP, Karoline Hernandez, CNP, Laron Hay, CNP, Randy Adler, CNP, Angel Bolton PA-C, Chris Montiel, [facility], Roberto Greenberg, CNP, Jesse Andrade, CNP, Shalini Talavera, CNP, Rylie Douglas, CNP, Jose Alfredo Alfaro, [facility]   [address] [facility]    HISTORY AND PHYSICAL EXAMINATION            Date:   12/5/2020  Patient name:  Rosaura Stephens  Date of admission:  12/4/2020  7:53 PM  MRN:   [id number]  Account:  [de-identified]  YOB: 1975  PCP:    No primary care provider on file. Room:   /  Code Status:    Prior    Chief Complaint:     Chief Complaint   Patient presents with    Fever    Fatigue    Altered Mental Status       History Obtained From:     patient, electronic medical record    History of Present Illness:     Rosaura Stephens is a 39 y.o. Unavailable/unknown male who presents with Fever; Fatigue; and Altered Mental Status   and is admitted to the hospital for the management of sepsis. Patient presented to the emergency room from [facility] facility for mental status changes. Mother reports that 2 days ago the patient developed decreased appetite, lethargy and then confusion with associated fevers. Facility sent him to the emergency room for concern of infection. Mother states this is his typical presentation when he has UTI.   Patient was just recently admitted for a UTI last month. Patient was started on vanco, and rocephin and bolus with IVF for sepsis     Work up in the emergency room      Vitals:    Temp.   99.1  HR. 91    RR. 22     BP.  119/84       SPO2.  100    Laboratories:   Metabolic panel. - WNL with exception of lactic acid 2.0 with repeat 1.1   GI/Liver Panel- wnl; except alt 43, bilirubin 0.18. Hematology. - wnl   Coagulation- wnl  Cardiac Markers- crp, 23 , hs trop 14. EKG- NSR without acute changes- per ER provider  Urine- negative nitrite , leukocyte trace, 0 -2 wbc,       Imaging and Diagnostics:     Ct Head Wo Contrast    Result Date: 12/4/2020  1. No acute intracranial abnormality. 2. Again seen are areas of decreased attenuation in the periventricular and subcortical white matter bilaterally. 3. Mild global parenchymal volume loss. 4. Scattered mucosal thickening of the paranasal sinuses with a small amount of fluid in the right maxillary sinus. Xr Chest Portable    Result Date: 12/4/2020  No acute cardiopulmonary disease IMPRESSION: No acute process. Past Medical History:     Past Medical History:   Diagnosis Date    Depression     Multiple sclerosis ([facility] [address]         Past Surgical History:     History reviewed. No pertinent surgical history. Medications Prior to Admission:     Prior to Admission medications    Medication Sig Start Date End Date Taking? Authorizing Provider   clonazePAM (KLONOPIN) 1 MG tablet Take 1 tablet by mouth 3 times daily for 3 days.  (Take 1 tab in the morning, 1/2 tab at  3-4pm, and 1 tab in the evening.) 11/14/19 11/17/19  Julito Henry PA-C   primidone (MYSOLINE) 250 MG tablet Take 1 tablet by mouth 2 times daily 9/21/18   Nathanael Tyson MD   FLUoxetine (PROZAC) 20 MG capsule Take 1 capsule by mouth daily 9/21/18   Nathanael Tyson MD   QUEtiapine (SEROQUEL) 25 MG tablet Take 1 tablet by mouth nightly 9/21/18   Nathanael Tyson MD   propranolol (INDERAL LA) 120 MG extended release capsule Take 1 capsule by mouth daily 18   Marquita Estevez MD   magnesium hydroxide (MILK OF MAGNESIA) 400 MG/5ML suspension Take 30 mLs by mouth daily as needed for Constipation 18   Marquita Estevez MD   dextromethorphan-quiNIDine (NUEDEXTA) 20-10 MG CAPS per capsule Take 1 capsule by mouth every 12 hours 18   Marquita Estevez MD   vitamin D (CHOLECALCIFEROL) 1000 UNIT TABS tablet Take 1 tablet by mouth 2 times daily 18   Marquita Estevez MD   Catheters (GIZMO CONDOM CATHETER) MISC 1 Units by Does not apply route daily 3/10/16   Ying Raza DO   Incontinence Supplies (URINARY DRAINAGE BAG) MISC 1 Units by Other route once a week Change fry bag at least once weekly. Empty every 4-6 hours. 3/10/16   Ying Raza DO        Allergies:     Nalbuphine; Tramadol; and Ibuprofen    Social History:     Tobacco:    reports that he has never smoked. He has never used smokeless tobacco.  Alcohol:      reports no history of alcohol use. Drug Use:  reports current drug use. Drug: Marijuana. Family History:     Family History   Problem Relation Age of Onset    Asthma Father        Review of Systems:     Positive and Negative as described in HPI. Review of Systems   Unable to perform ROS: Mental status change       Physical Exam:   /84   Pulse 91   Temp 99.1 °F (37.3 °C) (Oral)   Resp 22   Ht 6' (1.829 m)   Wt 160 lb (72.6 kg)   SpO2 100%   BMI 21.70 kg/m²   Temp (24hrs), Av.1 °F (37.8 °C), Min:98.5 °F (36.9 °C), Max:102.7 °F (39.3 °C)    No results for input(s): POCGLU in the last 72 hours. No intake or output data in the 24 hours ending 20 0259    Physical Exam  Vitals signs and nursing note reviewed. Constitutional:       General: He is sleeping. He is not in acute distress. Appearance: He is well-developed. He is ill-appearing. He is not diaphoretic. HENT:      Head: Normocephalic and atraumatic.       Right Ear: Hearing normal.      Left Ear: Hearing normal.      Nose: Nose normal. No rhinorrhea. Eyes:      General: Lids are normal.      Extraocular Movements:      Right eye: Normal extraocular motion. Left eye: Normal extraocular motion. Conjunctiva/sclera: Conjunctivae normal.      Right eye: Right conjunctiva is not injected. Left eye: Left conjunctiva is not injected. Pupils: Pupils are equal, round, and reactive to light. Pupils are equal.      Right eye: Pupil is reactive. Left eye: Pupil is reactive. Neck:      Musculoskeletal: Neck supple. Thyroid: No thyromegaly. Vascular: No carotid bruit. Trachea: Trachea and phonation normal. No tracheal deviation. Cardiovascular:      Rate and Rhythm: Normal rate and regular rhythm. Pulses: Normal pulses. Heart sounds: Normal heart sounds. No murmur. Pulmonary:      Effort: Pulmonary effort is normal. No respiratory distress. Breath sounds: Normal breath sounds. No stridor. No decreased breath sounds. Abdominal:      General: Bowel sounds are normal. There is no distension. Palpations: Abdomen is soft. There is no mass. Tenderness: There is no abdominal tenderness. There is no guarding. Musculoskeletal:         General: No tenderness. Skin:     General: Skin is warm and dry. Findings: No erythema, lesion or rash. Neurological:      Mental Status: He is oriented to person, place, and time and easily aroused. He is not disoriented. Cranial Nerves: No cranial nerve deficit. Psychiatric:         Speech: Speech normal.         Behavior: Behavior normal. Behavior is cooperative.          Investigations:      Laboratory Testing:  Recent Results (from the past 24 hour(s))   EKG 12 Lead    Collection Time: 12/04/20  8:48 PM   Result Value Ref Range    Ventricular Rate 97 BPM    Atrial Rate 97 BPM    P-R Interval 188 ms    QRS Duration 86 ms    Q-T Interval 344 ms    QTc Calculation (Bazett) 436 ms    P Axis 63 degrees    R Axis 64 degrees    T Axis 71 degrees   Culture, Blood 1    Collection Time: 12/04/20  8:55 PM    Specimen: Blood   Result Value Ref Range    Specimen Description . BLOOD     Special Requests RT ARM 2ML     Culture NO GROWTH 2 HOURS    Culture, Blood 1    Collection Time: 12/04/20  9:05 PM    Specimen: Blood   Result Value Ref Range    Specimen Description . BLOOD     Special Requests RT HAND 1ML     Culture NO GROWTH 2 HOURS    CBC Auto Differential    Collection Time: 12/04/20  9:23 PM   Result Value Ref Range    WBC 7.4 3.5 - 11.3 k/uL    RBC 4.28 4.21 - 5.77 m/uL    Hemoglobin 13.5 13.0 - 17.0 g/dL    Hematocrit 42.1 40.7 - 50.3 %    MCV 98.4 82.6 - 102.9 fL    MCH 31.5 25.2 - 33.5 pg    MCHC 32.1 28.4 - 34.8 g/dL    RDW 13.8 11.8 - 14.4 %    Platelets 000 714 - 688 k/uL    MPV 11.8 8.1 - 13.5 fL    NRBC Automated 0.0 0.0 per 100 WBC    Differential Type NOT REPORTED     Seg Neutrophils 82 (H) 36 - 65 %    Lymphocytes 10 (L) 24 - 43 %    Monocytes 8 3 - 12 %    Eosinophils % 0 (L) 1 - 4 %    Basophils 0 0 - 2 %    Immature Granulocytes 0 0 %    Segs Absolute 6.04 1.50 - 8.10 k/uL    Absolute Lymph # 0.70 (L) 1.10 - 3.70 k/uL    Absolute Mono # 0.62 0.10 - 1.20 k/uL    Absolute Eos # <0.03 0.00 - 0.44 k/uL    Basophils Absolute <0.03 0.00 - 0.20 k/uL    Absolute Immature Granulocyte <0.03 0.00 - 0.30 k/uL    WBC Morphology NOT REPORTED     RBC Morphology NOT REPORTED     Platelet Estimate NOT REPORTED    Comprehensive Metabolic Panel    Collection Time: 12/04/20  9:23 PM   Result Value Ref Range    Glucose 90 70 - 99 mg/dL    BUN 11 6 - 20 mg/dL    CREATININE 0.86 0.70 - 1.20 mg/dL    Bun/Cre Ratio NOT REPORTED 9 - 20    Calcium 8.5 (L) 8.6 - 10.4 mg/dL    Sodium 139 135 - 144 mmol/L    Potassium 4.1 3.7 - 5.3 mmol/L    Chloride 104 98 - 107 mmol/L    CO2 24 20 - 31 mmol/L    Anion Gap 11 9 - 17 mmol/L    Alkaline Phosphatase 88 40 - 129 U/L    ALT 43 (H) 5 - 41 U/L    AST 25 <40 U/L    Total Bilirubin 0.18 (L) 0.3 - 1.2 mg/dL    Total Protein 6.2 (L) 6.4 - 8.3 g/dL    Alb 3.7 3.5 - 5.2 g/dL    Albumin/Globulin Ratio 1.5 1.0 - 2.5    GFR Non-African American >60 >60 mL/min    GFR African American >60 >60 mL/min    GFR Comment          GFR Staging NOT REPORTED    Lactate, Sepsis    Collection Time: 12/04/20  9:23 PM   Result Value Ref Range    Lactic Acid, Sepsis NOT REPORTED 0.5 - 1.9 mmol/L    Lactic Acid, Sepsis, Whole Blood 2.0 (H) 0.5 - 1.9 mmol/L   Protime-INR    Collection Time: 12/04/20  9:23 PM   Result Value Ref Range    Protime 11.8 9.0 - 12.0 sec    INR 1.1    APTT    Collection Time: 12/04/20  9:23 PM   Result Value Ref Range    PTT 28.7 20.5 - 30.5 sec   D-Dimer, Quantitative    Collection Time: 12/04/20  9:23 PM   Result Value Ref Range    D-Dimer, Quant 0.39 mg/L FEU   FERRITIN    Collection Time: 12/04/20  9:23 PM   Result Value Ref Range    Ferritin 168 30 - 400 ug/L   LACTATE DEHYDROGENASE    Collection Time: 12/04/20  9:23 PM   Result Value Ref Range     135 - 225 U/L   C-Reactive Protein    Collection Time: 12/04/20  9:23 PM   Result Value Ref Range    CRP 23.0 (H) 0.0 - 5.0 mg/L   Troponin    Collection Time: 12/04/20  9:23 PM   Result Value Ref Range    Troponin, High Sensitivity 12 0 - 22 ng/L    Troponin T NOT REPORTED <0.03 ng/mL    Troponin Interp NOT REPORTED    Troponin    Collection Time: 12/04/20 10:59 PM   Result Value Ref Range    Troponin, High Sensitivity 14 0 - 22 ng/L    Troponin T NOT REPORTED <0.03 ng/mL    Troponin Interp NOT REPORTED    COVID-19    Collection Time: 12/04/20 11:49 PM    Specimen: Other   Result Value Ref Range    SARS-CoV-2          SARS-CoV-2, Rapid Not Detected Not Detected    Source . NASOPHARYNGEAL SWAB     SARS-CoV-2         Urinalysis with Microscopic    Collection Time: 12/05/20 12:05 AM   Result Value Ref Range    Color, UA DARK YELLOW (A) YELLOW    Turbidity UA CLEAR CLEAR    Glucose, Ur NEGATIVE NEGATIVE    Bilirubin Urine NEGATIVE  Verified by ictotest. (A) NEGATIVE    Ketones, Urine MODERATE (A) NEGATIVE    Specific Gravity, UA 1.034 (H) 1.005 - 1.030    Urine Hgb NEGATIVE NEGATIVE    pH, UA 5.5 5.0 - 8.0    Protein, UA 1+ (A) NEGATIVE    Urobilinogen, Urine Normal Normal    Nitrite, Urine NEGATIVE NEGATIVE    Leukocyte Esterase, Urine TRACE (A) NEGATIVE    -          WBC, UA 0 TO 2 0 - 5 /HPF    RBC, UA 2 TO 5 0 - 4 /HPF    Casts UA  0 - 8 /LPF     0 TO 2 HYALINE Reference range defined for non-centrifuged specimen. Crystals, UA NOT REPORTED None /HPF    Epithelial Cells UA 0 TO 2 0 - 5 /HPF    Renal Epithelial, UA NOT REPORTED 0 /HPF    Bacteria, UA NOT REPORTED None    Mucus, UA NOT REPORTED None    Trichomonas, UA NOT REPORTED None    Amorphous, UA NOT REPORTED None    Other Observations UA NOT REPORTED NOT REQ. Yeast, UA NOT REPORTED None   Lactate, Sepsis    Collection Time: 12/05/20 12:19 AM   Result Value Ref Range    Lactic Acid, Sepsis NOT REPORTED 0.5 - 1.9 mmol/L    Lactic Acid, Sepsis, Whole Blood 1.1 0.5 - 1.9 mmol/L       Imaging/Diagnostics:  Ct Head Wo Contrast    Result Date: 12/4/2020  1. No acute intracranial abnormality. 2. Again seen are areas of decreased attenuation in the periventricular and subcortical white matter bilaterally. 3. Mild global parenchymal volume loss. 4. Scattered mucosal thickening of the paranasal sinuses with a small amount of fluid in the right maxillary sinus. Xr Chest Portable    Result Date: 12/4/2020  No acute cardiopulmonary disease IMPRESSION: No acute process. Assessment :      Hospital Problems           Last Modified POA    * (Principal) Sepsis ([facility] [address] 12/5/2020 Yes    Altered mental status 12/5/2020 Yes    Multiple sclerosis ([facility] [address] 12/5/2020 Yes    Mild intermittent asthma without complication 11/3/7693 Yes          Plan:     Patient status inpatient in the Med/Surge    1. Iv antibiotics, await cultures continue to look for source  2. Pharmacy to dose vanco   3. Iv hydration and recheck cxr in am to assess for infiltrates.

## 2020-12-05 NOTE — ED NOTES
Pt resting on bed, RR even and non labored. NAD noted. Mother at bedside.      Torie England RN  12/05/20 5999

## 2020-12-05 NOTE — PROGRESS NOTES
Pharmacy Note  Vancomycin Consult    Ginny Hayward is a 39 y.o. male started on Vancomycin for sepsis; consult received from Dr. Kirsten Herrera Blood to manage therapy. Also receiving the following antibiotics: ceftriaxone. Patient Active Problem List   Diagnosis    Altered mental status    Lactic acidosis    Multiple sclerosis (HCC)    Acute respiratory failure (HCC)    Relapsing remitting multiple sclerosis (HCC)    Acute respiratory failure with hypoxia (HCC)    Encephalopathy acute    Sepsis (HCC)    Aspiration pneumonia (HCC)    Benign neoplasm of cerebral meninges (HCC)    Cerebellar ataxia (HCC)    Mild intermittent asthma without complication       Allergies:  Nalbuphine; Tramadol; and Ibuprofen     Temp max: 102.7 F  (39.3 C)    Recent Labs     12/04/20 2123   BUN 11       Recent Labs     12/04/20 2123   CREATININE 0.86       Recent Labs     12/04/20 2123   WBC 7.4       No intake or output data in the 24 hours ending 12/05/20 6160    Culture Date      Source                       Results       Ht Readings from Last 1 Encounters:   12/04/20 6' (1.829 m)        Wt Readings from Last 1 Encounters:   12/04/20 160 lb (72.6 kg)         Body mass index is 21.7 kg/m². Estimated Creatinine Clearance: 114 mL/min (based on SCr of 0.86 mg/dL). Goal Trough Level: 15-20 mcg/mL    Assessment/Plan:  Will initiate Vancomycin  1250 mg IV every 12 hours. Timing of trough level will be determined based on culture results, renal function, and clinical response. Thank you for the consult. Will continue to follow.

## 2020-12-05 NOTE — ED NOTES
Pt transported to MRI via hospital bed. Pt taken on monitor.  M[place] paramedic to stay with pt during test.      Ari Gil RN  12/05/20 2516

## 2020-12-05 NOTE — ED NOTES
Pt resting in bed, eyes closed. Even non labored RR. Will continue to monitor .        Brock Gupta RN  12/05/20 8728

## 2020-12-05 NOTE — ED NOTES
Bed: 20  Expected date: 12/4/20  Expected time: 7:39 PM  Means of arrival:   Comments:  Holland Balderrama [address], RN  12/04/20 3395

## 2020-12-05 NOTE — ED NOTES
Pt resting in bed. NAD at this time. Even non labored RR. Will continue to monitor. Sister at bedside.         Michelle Solo RN  12/05/20 0967

## 2020-12-06 LAB
ALBUMIN SERPL-MCNC: 3.2 G/DL (ref 3.5–5.2)
ALBUMIN/GLOBULIN RATIO: 1.4 (ref 1–2.5)
ALP BLD-CCNC: 71 U/L (ref 40–129)
ALT SERPL-CCNC: 34 U/L (ref 5–41)
ANION GAP SERPL CALCULATED.3IONS-SCNC: 8 MMOL/L (ref 9–17)
AST SERPL-CCNC: 22 U/L
BILIRUB SERPL-MCNC: 0.21 MG/DL (ref 0.3–1.2)
BUN BLDV-MCNC: 7 MG/DL (ref 6–20)
BUN/CREAT BLD: ABNORMAL (ref 9–20)
CALCIUM IONIZED: 1.13 MMOL/L (ref 1.13–1.33)
CALCIUM SERPL-MCNC: 8.5 MG/DL (ref 8.6–10.4)
CHLORIDE BLD-SCNC: 108 MMOL/L (ref 98–107)
CO2: 23 MMOL/L (ref 20–31)
CREAT SERPL-MCNC: 0.59 MG/DL (ref 0.7–1.2)
CULTURE: NO GROWTH
DIRECT EXAM: NORMAL
GFR AFRICAN AMERICAN: >60 ML/MIN
GFR NON-AFRICAN AMERICAN: >60 ML/MIN
GFR SERPL CREATININE-BSD FRML MDRD: ABNORMAL ML/MIN/{1.73_M2}
GFR SERPL CREATININE-BSD FRML MDRD: ABNORMAL ML/MIN/{1.73_M2}
GLUCOSE BLD-MCNC: 92 MG/DL (ref 70–99)
GLUCOSE BLD-MCNC: 92 MG/DL (ref 75–110)
HCT VFR BLD CALC: 39.1 % (ref 40.7–50.3)
HEMOGLOBIN: 12.9 G/DL (ref 13–17)
INR BLD: 1.1
LACTIC ACID, SEPSIS WHOLE BLOOD: 0.8 MMOL/L (ref 0.5–1.9)
LACTIC ACID, SEPSIS: NORMAL MMOL/L (ref 0.5–1.9)
LEGIONELLA PNEUMOPHILIA AG, URINE: NEGATIVE
Lab: NORMAL
Lab: NORMAL
MAGNESIUM: 1.8 MG/DL (ref 1.6–2.6)
MCH RBC QN AUTO: 31.5 PG (ref 25.2–33.5)
MCHC RBC AUTO-ENTMCNC: 33 G/DL (ref 28.4–34.8)
MCV RBC AUTO: 95.6 FL (ref 82.6–102.9)
MRSA, DNA, NASAL: NORMAL
NRBC AUTOMATED: 0 PER 100 WBC
PDW BLD-RTO: 13.7 % (ref 11.8–14.4)
PHOSPHORUS: 2.2 MG/DL (ref 2.5–4.5)
PLATELET # BLD: 143 K/UL (ref 138–453)
PMV BLD AUTO: 11.9 FL (ref 8.1–13.5)
POTASSIUM SERPL-SCNC: 3.4 MMOL/L (ref 3.7–5.3)
PROTHROMBIN TIME: 11.1 SEC (ref 9–12)
RBC # BLD: 4.09 M/UL (ref 4.21–5.77)
SODIUM BLD-SCNC: 139 MMOL/L (ref 135–144)
SOURCE: NORMAL
SPECIMEN DESCRIPTION: NORMAL
STREP PNEUMONIAE ANTIGEN: NEGATIVE
TOTAL PROTEIN: 5.5 G/DL (ref 6.4–8.3)
VANCOMYCIN TROUGH DATE LAST DOSE: NORMAL
VANCOMYCIN TROUGH DOSE AMOUNT: NORMAL
VANCOMYCIN TROUGH TIME LAST DOSE: NORMAL
VANCOMYCIN TROUGH: 12.6 UG/ML (ref 10–20)
WBC # BLD: 5.4 K/UL (ref 3.5–11.3)

## 2020-12-06 PROCEDURE — 82330 ASSAY OF CALCIUM: CPT

## 2020-12-06 PROCEDURE — 2580000003 HC RX 258: Performed by: INTERNAL MEDICINE

## 2020-12-06 PROCEDURE — 87449 NOS EACH ORGANISM AG IA: CPT

## 2020-12-06 PROCEDURE — 36415 COLL VENOUS BLD VENIPUNCTURE: CPT

## 2020-12-06 PROCEDURE — 6370000000 HC RX 637 (ALT 250 FOR IP): Performed by: NURSE PRACTITIONER

## 2020-12-06 PROCEDURE — 97166 OT EVAL MOD COMPLEX 45 MIN: CPT

## 2020-12-06 PROCEDURE — 82947 ASSAY GLUCOSE BLOOD QUANT: CPT

## 2020-12-06 PROCEDURE — 80053 COMPREHEN METABOLIC PANEL: CPT

## 2020-12-06 PROCEDURE — 97530 THERAPEUTIC ACTIVITIES: CPT

## 2020-12-06 PROCEDURE — 97163 PT EVAL HIGH COMPLEX 45 MIN: CPT

## 2020-12-06 PROCEDURE — 84100 ASSAY OF PHOSPHORUS: CPT

## 2020-12-06 PROCEDURE — 83735 ASSAY OF MAGNESIUM: CPT

## 2020-12-06 PROCEDURE — 80202 ASSAY OF VANCOMYCIN: CPT

## 2020-12-06 PROCEDURE — 85610 PROTHROMBIN TIME: CPT

## 2020-12-06 PROCEDURE — 94761 N-INVAS EAR/PLS OXIMETRY MLT: CPT

## 2020-12-06 PROCEDURE — 6370000000 HC RX 637 (ALT 250 FOR IP): Performed by: INTERNAL MEDICINE

## 2020-12-06 PROCEDURE — 6360000002 HC RX W HCPCS: Performed by: NURSE PRACTITIONER

## 2020-12-06 PROCEDURE — 6360000002 HC RX W HCPCS: Performed by: INTERNAL MEDICINE

## 2020-12-06 PROCEDURE — 97535 SELF CARE MNGMENT TRAINING: CPT

## 2020-12-06 PROCEDURE — 99232 SBSQ HOSP IP/OBS MODERATE 35: CPT | Performed by: PSYCHIATRY & NEUROLOGY

## 2020-12-06 PROCEDURE — 87804 INFLUENZA ASSAY W/OPTIC: CPT

## 2020-12-06 PROCEDURE — 2700000000 HC OXYGEN THERAPY PER DAY

## 2020-12-06 PROCEDURE — 87899 AGENT NOS ASSAY W/OPTIC: CPT

## 2020-12-06 PROCEDURE — 2060000000 HC ICU INTERMEDIATE R&B

## 2020-12-06 PROCEDURE — 92610 EVALUATE SWALLOWING FUNCTION: CPT

## 2020-12-06 PROCEDURE — 85027 COMPLETE CBC AUTOMATED: CPT

## 2020-12-06 PROCEDURE — 99232 SBSQ HOSP IP/OBS MODERATE 35: CPT | Performed by: INTERNAL MEDICINE

## 2020-12-06 RX ORDER — AMOXICILLIN AND CLAVULANATE POTASSIUM 875; 125 MG/1; MG/1
1 TABLET, FILM COATED ORAL 2 TIMES DAILY
Qty: 14 TABLET | Refills: 0 | Status: SHIPPED | OUTPATIENT
Start: 2020-12-06 | End: 2020-12-12

## 2020-12-06 RX ORDER — AMOXICILLIN AND CLAVULANATE POTASSIUM 875; 125 MG/1; MG/1
1 TABLET, FILM COATED ORAL EVERY 12 HOURS SCHEDULED
Status: DISCONTINUED | OUTPATIENT
Start: 2020-12-06 | End: 2020-12-07 | Stop reason: HOSPADM

## 2020-12-06 RX ORDER — GUAIFENESIN 600 MG/1
600 TABLET, EXTENDED RELEASE ORAL 2 TIMES DAILY
Status: DISCONTINUED | OUTPATIENT
Start: 2020-12-06 | End: 2020-12-07 | Stop reason: HOSPADM

## 2020-12-06 RX ADMIN — Medication 1000 UNITS: at 10:03

## 2020-12-06 RX ADMIN — ACETAMINOPHEN 650 MG: 650 SUPPOSITORY RECTAL at 19:20

## 2020-12-06 RX ADMIN — Medication 1250 MG: at 01:20

## 2020-12-06 RX ADMIN — SODIUM CHLORIDE 1.5 G: 900 INJECTION INTRAVENOUS at 02:51

## 2020-12-06 RX ADMIN — ENOXAPARIN SODIUM 40 MG: 40 INJECTION SUBCUTANEOUS at 10:02

## 2020-12-06 RX ADMIN — GUAIFENESIN 600 MG: 600 TABLET, EXTENDED RELEASE ORAL at 21:15

## 2020-12-06 RX ADMIN — AMOXICILLIN AND CLAVULANATE POTASSIUM 1 TABLET: 875; 125 TABLET, FILM COATED ORAL at 21:15

## 2020-12-06 RX ADMIN — FLUOXETINE HYDROCHLORIDE 20 MG: 20 CAPSULE ORAL at 10:03

## 2020-12-06 RX ADMIN — PRIMIDONE 250 MG: 250 TABLET ORAL at 10:03

## 2020-12-06 RX ADMIN — Medication 1250 MG: at 11:56

## 2020-12-06 RX ADMIN — SODIUM CHLORIDE 1.5 G: 900 INJECTION INTRAVENOUS at 10:03

## 2020-12-06 ASSESSMENT — PAIN SCALES - GENERAL
PAINLEVEL_OUTOF10: 0

## 2020-12-06 ASSESSMENT — PAIN - FUNCTIONAL ASSESSMENT: PAIN_FUNCTIONAL_ASSESSMENT: 0-10

## 2020-12-06 ASSESSMENT — ENCOUNTER SYMPTOMS: TACHYPNEA: 1

## 2020-12-06 NOTE — PLAN OF CARE
Problem: Pain:  Goal: Pain level will decrease  Description: Pain level will decrease  12/6/2020 0525 by Vianney Victor RN  Outcome: Ongoing     Problem: Pain:  Goal: Control of acute pain  Description: Control of acute pain  12/6/2020 1728 by Ester Bennett RN  Outcome: Met This Shift  Note: Pt able to communicate needs for pain medications and states satisfaction with outcome. Problem: Falls - Risk of:  Goal: Will remain free from falls  Description: Will remain free from falls  12/6/2020 1728 by Ester Bennett RN  Note: Pt remains free from falls at this time. Floor free from obstacles, and bed is locked and in lowest position. Adequate lighting provided. Call light within reach; pt encouraged to call before getting OOB for any need. Will continue to monitor needs during hourly rounding.       Problem: Musculor/Skeletal Functional Status  Goal: Highest potential functional level  12/6/2020 1728 by Ester Bennett RN  Outcome: Met This Shift  Note: Pt functioning back to baseline

## 2020-12-06 NOTE — PROGRESS NOTES
Pharmacy Vancomycin Consult     Vancomycin Day: 2  Current Dosinmg IV q12 hours      Recent Labs     20  0503   BUN 11 7       Recent Labs     20  0503   CREATININE 0.86 0.59*       Recent Labs     20  0503   WBC 7.4 5.4         Intake/Output Summary (Last 24 hours) at 2020 1224  Last data filed at 2020 1043  Gross per 24 hour   Intake 688 ml   Output 1200 ml   Net -512 ml       Ht Readings from Last 1 Encounters:   20 6' (1.829 m)        Wt Readings from Last 1 Encounters:   20 139 lb 8.8 oz (63.3 kg)         Body mass index is 18.93 kg/m². Estimated Creatinine Clearance: 145 mL/min (A) (based on SCr of 0.59 mg/dL (L)). Trough: 12.6 drawn appropriately     Assessment/Plan:  Trough slightly under therapeutic range for sepsis. Will increase dose to 1500mg IV q12 hours.  Will continue to monitor kidney function, microbiology (no growth x2 days at this time), and overall clinical response    Juan Grant, JesseniaD

## 2020-12-06 NOTE — PROGRESS NOTES
Occupational Therapy   Occupational Therapy Initial Assessment  Date: 2020   Patient Name: Lino Ortiz  MRN: [id number]     : 1975    Date of Service: 2020    Discharge Recommendations:  Patient would benefit from continued therapy after discharge       Assessment   Performance deficits / Impairments: Decreased functional mobility ; Decreased ADL status; Decreased ROM; Decreased balance;Decreased high-level IADLs;Decreased fine motor control;Decreased coordination  Assessment: Pt agreeable to OT eval this date. Pt demo ability to answer yes/no questions however not very accurately. Pt limited d/t spastic movements however able to complete bed mobility with Max A X2. Pt completed static/dynamic sitting with Min-Mod A ~10 minutes. Pt to benefit from continued OT services to increase safety and independence with ADLs/IADLs and bed mobility  Prognosis: Good  Decision Making: Medium Complexity  Patient Education: Pt educated on OT role, OT POC, safety awareness, and importance of continued OT. Pt verbalized good understanding  REQUIRES OT FOLLOW UP: Yes  Activity Tolerance  Activity Tolerance: Patient Tolerated treatment well;Treatment limited secondary to medical complications (free text)  Safety Devices  Safety Devices in place: Yes  Type of devices: Nurse notified; Left in bed;Call light within reach; Bed alarm in place  Restraints  Initially in place: No           Patient Diagnosis(es): The encounter diagnosis was Septicemia ([facility] has a past medical history of Depression and Multiple sclerosis ([facility] has no past surgical history on file.            Restrictions  Restrictions/Precautions  Restrictions/Precautions: Up as Tolerated, Fall Risk  Required Braces or Orthoses?: No  Position Activity Restriction  Other position/activity restrictions: hx MS    Subjective   General  Patient assessed for rehabilitation services?: Yes  Family / Caregiver Present: No  Patient Currently in Pain: Denies    Social/Functional History  Social/Functional History  Lives With: Alone  Type of Home: House  Home Layout: Two level, Bed/Bath upstairs  Home Equipment: Wheelchair-manual  ADL Assistance: Needs assistance(Pt stated \"mom\" when asked about assistance with ADL)  Homemaking Assistance: Needs assistance  Additional Comments: pt questionable historian. Per chart, pt [facility] to [facility] [address] at baseline       Objective   Vision: Impaired  Vision Exceptions: Wears glasses at all times  Hearing: Within functional limits      Balance  Sitting Balance: Minimal assistance(Pt demo Min A throughout static sitting and Mod A for dynamic sitting ~10 minutes. Pt completed L weight shifting requiring Min A and R weight shift requiring Mod A. Pt required anterior/posterior weight shift with Mod A. Pt completed stretching neck to)  Standing Balance: Unable to assess(comment)  Functional Mobility  Functional Mobility Comments: KOFI     ADL  Feeding: Maximum assistance;Setup;Verbal cueing; Increased time to complete;Bringing food to mouth assist;Adaptive utensils (comment); Scoop assist  Grooming: Maximum assistance;Setup;Verbal cueing; Increased time to complete  UE Bathing: Maximum assistance;Setup;Verbal cueing; Increased time to complete  LE Bathing: Dependent/Total  UE Dressing: Maximum assistance;Setup;Verbal cueing; Increased time to complete  LE Dressing: Dependent/Total  Toileting: Dependent/Total  Tone RUE  RUE Tone: Normotonic  Tone LUE  LUE Tone: Hypertonic  Coordination  Movements Are Fluid And Coordinated: No  Coordination and Movement description: Right UE; Ataxia    Bed mobility  Supine to Sit: 2 Person assistance;Maximum assistance(for BLE and trunk progression)  Sit to Supine: 2 Person assistance;Maximum assistance(for BLE and trunk progression)  Scootin Person assistance;Maximal assistance  Transfers  Sit to stand: Unable to assess  Stand to sit: Unable to assess    Cognition  Overall Cognitive Status: Exceptions  Following Commands: Follows one step commands with repetition; Follows one step commands with increased time  Problem Solving: Assistance required to correct errors made;Assistance required to identify errors made  Insights: Decreased awareness of deficits  Initiation: Requires cues for some  Sequencing: Requires cues for some    Sensation  Overall Sensation Status: WFL(pt denied numbness/tingling at this time)        LUE PROM (degrees)  LUE PROM: Exceptions  L Shoulder Flex  0-180: 120  L Elbow Flexion 0-145: WFL  L Elbow Extension 145-0: 120  L Wrist Flex 0-80: WFL  L Wrist Ext 0-70: 0; able to passively extend to neutral  Left Hand PROM (degrees)  Left Hand PROM: Exceptions  Left Hand General PROM: hand contracted into fist; able to passively extend PIPs to 90 DIP WFL MCP WFL  Left Hand AROM (degrees)  Left Hand AROM: WFL  RUE AROM (degrees)  RUE AROM : WFL  LUE Strength  L Hand General: NT  RUE Strength  R Hand General: 4/5  RUE Strength Comment: ataxia limiting                   Plan   Plan  Times per week: 2-3 x/wk  Current Treatment Recommendations: Strengthening, ROM, Balance Training, Self-Care / ADL, Patient/Caregiver Education & Training    AM-PAC Score        AM-[facility] Inpatient Daily Activity Raw Score: 10 (12/06/20 1331)  AM-PAC Inpatient ADL T-Scale Score : 27.31 (12/06/20 1331)  ADL Inpatient CMS 0-100% Score: 74.7 (12/06/20 1331)  ADL Inpatient CMS G-Code Modifier : CL (12/06/20 1331)    Goals  Short term goals  Time Frame for Short term goals: By discharge, pt will:  Short term goal 1: Demo Mod A X2 for bed mobility to increase safety and independence with functional tasks  Short term goal 2: Demo A/AAROM/PROM to BUE to increase functional performance for ADLs and decrease risk of contractures  Short term goal 3: Demo Mod A for UB ADLs and grooming tasks with setup and increased time as needed  Short term goal 4: Demo 15+ minutes dynamic sitting balance with CGA to increase safety and independence with ADLs/IADLs  Short term goal 5: Notify OTR to update goals as pt progresses       Therapy Time   Individual Concurrent Group Co-treatment   Time In 0934         Time Out 0956         Minutes 22         Timed Code Treatment Minutes: [address] [facility]       KANDIS Brown/NILSA

## 2020-12-06 NOTE — PROGRESS NOTES
[facility]  Office: [address], DO, Mary Alice Villarreal, DO, Michel Signs, DO, Roxanna Sethi Blood, DO, Angela Sandy MD, Mignon Meckel, MD, Kelley Luis MD, Layne Rios MD, Jana Kent MD, Althea Mclean MD, Willow Prado MD, Madie Valentin MD, Lanre Acosta MD, Luciana Petty, DO, Ricardo Rosales MD, Denise Alexander MD, Marlene Carpio, DO, Veronique Yuan MD,  Ashely Ray DO, Gerard Ravi MD, Sakina Maya MD, Leonard Marx, [facility], [facility] Alirezafern, CNP, Denise Collins, CNP, Tracee Palomo, CNS, Amanda Parra, CNP, Divina Buckley, CNP, Bijan Laird, CNP, Marleen Bello, CNP, Margarito Hernandez, CNP, Sandy Weber PA-C, Radhika Gerardo, [facility], Carmen Gastelum, CNP, Ewa Matias, CNP, Rosmery Goldberg, CNP, Ricky Jack, CNP, Caryle Poet, [address] [place] [address]    Progress Note    12/6/2020    9:30 AM    Name:   Catalino Javier  MRN:     [id number]     Acct:      [de-identified]   Room:   0308/9171-27   Day:  1  Admit Date:  12/4/2020  7:53 PM    PCP:   No primary care provider on file. Code Status:  DNR-CCA    Subjective:     C/C:   Chief Complaint   Patient presents with    Fever    Fatigue    Altered Mental Status     Interval History Status: improved. CT chest showing atelectasis of left lower lobe with some groundglass opacities. Covid test was negative. Neurology ordered MRI yesterday, MRI had movement artifact, will await further recommendations. Mentation has greatly improved today. Patient was evaluated by speech and swallow and is eating a normal diet. Brief History:     70-year-old male with a past medical history for multiple sclerosis. Patient family at bedside yesterday was able to provide much of the history. He has had a long history of multiple sclerosis and is paraplegic living in a nursing home normally.   For the past couple of weeks patient has been having worsening mentation and this morning patient was nonverbal and unable to eat. Patient was sent to the emergency department by the request of his family. In the emergency department patient was found to be lethargic, not answering questions and not moving extremities. CT head was performed which was negative, patient was evaluated by neurology and an MRI was ordered, patient was started on antibiotics for concern for aspiration pneumonia. Review of Systems:     Patient is nonverbal and unable to answer questions clearly, patient was able to shake head when asked whether he is in pain    Medications: Allergies: Allergies   Allergen Reactions    Nalbuphine Other (See Comments)     Other reaction(s): Unknown    Tramadol Other (See Comments)    Ibuprofen Itching and Rash       Current Meds:   Scheduled Meds:    FLUoxetine  20 mg Oral Daily    primidone  250 mg Oral BID    QUEtiapine  25 mg Oral Nightly    Vitamin D  1,000 Units Oral BID    sodium chloride flush  10 mL Intravenous 2 times per day    enoxaparin  40 mg Subcutaneous Daily    vancomycin (VANCOCIN) intermittent dosing (placeholder)   Other RX Placeholder    vancomycin  1,250 mg Intravenous Q12H    ampicillin-sulbactam  1.5 g Intravenous Q6H    sodium chloride flush  10 mL Intravenous Once     Continuous Infusions:   PRN Meds: sodium chloride flush, acetaminophen **OR** acetaminophen, perflutren lipid microspheres    Data:     Past Medical History:   has a past medical history of Depression and Multiple sclerosis ([address] Social History:   reports that he has never smoked. He has never used smokeless tobacco. He reports current drug use. Drug: Marijuana. He reports that he does not drink alcohol.      Family History:   Family History   Problem Relation Age of Onset    Asthma Father        Vitals:  /85   Pulse 65   Temp 99.3 °F (37.4 °C) (Oral)   Resp 16   Ht 6' (1.829 m)   Wt 139 lb 8.8 oz (63.3 kg)   SpO2 100%   BMI 18.93 kg/m²   Temp (24hrs), Av.8 °F (38.2 °C), Min:99.3 °F (37.4 °C), Max:103 °F (39.4 °C)    No results for input(s): POCGLU in the last 72 hours. I/O (24Hr): Intake/Output Summary (Last 24 hours) at 12/6/2020 0930  Last data filed at 12/6/2020 0612  Gross per 24 hour   Intake 328 ml   Output 1200 ml   Net -872 ml       Labs:  Hematology:  Recent Labs     12/04/20 2123 12/06/20  0503   WBC 7.4 5.4   RBC 4.28 4.09*   HGB 13.5 12.9*   HCT 42.1 39.1*   MCV 98.4 95.6   MCH 31.5 31.5   MCHC 32.1 33.0   RDW 13.8 13.7    143   MPV 11.8 11.9   CRP 23.0*  --    INR 1.1 1.1   DDIMER 0.39  --      Chemistry:  Recent Labs     12/04/20 2123 12/04/20  2259 12/05/20  1432 12/06/20  0503     --   --  139   K 4.1  --   --  3.4*     --   --  108*   CO2 24  --   --  23   GLUCOSE 90  --   --  92   BUN 11  --   --  7   CREATININE 0.86  --   --  0.59*   MG  --   --   --  1.8   ANIONGAP 11  --   --  8*   LABGLOM >60  --   --  >60   GFRAA >60  --   --  >60   CALCIUM 8.5*  --   --  8.5*   CAION  --   --   --  1.13   PHOS  --   --   --  2.2*   PROBNP  --   --  87  --    TROPHS 12 14  --   --      Recent Labs     12/04/20 2123 12/06/20  0503   PROT 6.2* 5.5*   LABALBU 3.7 3.2*   AST 25 22   ALT 43* 34     --    ALKPHOS 88 71   BILITOT 0.18* 0.21*     ABG:  Lab Results   Component Value Date    POCPH 7.462 09/17/2018    POCPCO2 32.5 09/17/2018    POCPO2 67.4 09/17/2018    POCHCO3 23.2 09/17/2018    NBEA NOT REPORTED 09/17/2018    PBEA 0 09/17/2018    TST1CXB 24 09/17/2018    URVH5CJM 94 09/17/2018    FIO2 NOT REPORTED 09/17/2018     Lab Results   Component Value Date/Time    SPECIAL NOT REPORTED 12/05/2020 12:05 AM     Lab Results   Component Value Date/Time    CULTURE NO GROWTH 12/05/2020 12:05 AM       Radiology:  Sunni Magdaleno Chest (2 Vw)    Result Date: 12/5/2020  No acute process is seen. Ct Head Wo Contrast    Result Date: 12/4/2020  1. No acute intracranial abnormality.  2. Again seen are areas of decreased attenuation in the periventricular and subcortical white matter bilaterally. 3. Mild global parenchymal volume loss. 4. Scattered mucosal thickening of the paranasal sinuses with a small amount of fluid in the right maxillary sinus. Ct Chest Wo Contrast    Result Date: 12/5/2020  1. Multifocal patchy ground-glass opacities of the left upper and lower lobe which are suspicious for an atypical or viral infectious process. 2. Dependent atelectasis within the bilateral lower lobes, left greater than right. Mri Cervical Spine W Wo Contrast    Result Date: 12/5/2020  Severe motion challenge evaluation. Findings suspicious for diffuse cord volume loss and increased signal involving the cord. Severe motion challenge evaluation for any active demyelination. No definite active myelination identified within the constraints of this exam.  No cord compression identified. There is persistent concern for underlying active myelination, repeat examination of the cervical spine could be beneficial     Xr Chest Portable    Result Date: 12/4/2020  No acute cardiopulmonary disease IMPRESSION: No acute process. Mri Brain W Wo Contrast    Result Date: 12/5/2020  No definite acute demyelination on motion degraded exam. Severe demyelinating lesion burden similar to 2017. Physical Examination:        General appearance: Patient is alert, cooperative, follows commands but is nonverbal  Lungs: Rhonchi left lower lung field  Heart:  regular rate and rhythm, no murmur  Abdomen:  soft, nontender, nondistended, normal bowel sounds, no masses, hepatomegaly, splenomegaly  Extremities: 0 out of 5 strength lower extremities, some purposeful movements of the upper extremities, 5 out of 5.   Skin:  no gross lesions, rashes, induration    Assessment:        Hospital Problems           Last Modified POA    * (Principal) Sepsis ([facility] [address] 12/5/2020 Yes    Altered mental status 12/5/2020 Yes    Multiple sclerosis ([address] 12/5/2020 Yes    Acute metabolic encephalopathy 27/8/2140 Yes    Mild intermittent asthma without complication 05/9/9700 Yes          Plan:        1. Blood culture still negative day, patient still on Unasyn and vancomycin, check MRSA probe if negative will discontinue vancomycin  2. Mentation greatly improved, will await discussion with family, if mentation is back to baseline patient possibly can be discharged later today. 3. Speech and swallow evaluation noted patient with normal swallow. Can switch Unasyn to Augmentin for discharge  4.  Await recommendations from neurology for MRI    Yousif Peña DO  12/6/2020  9:30 AM

## 2020-12-06 NOTE — PROGRESS NOTES
reviewed. No pertinent surgical history. Medications Prior to Admission:     Prior to Admission medications    Medication Sig Start Date End Date Taking? Authorizing Provider   clonazePAM (KLONOPIN) 0.5 MG tablet Take 0.5 mg by mouth daily. Yes Historical Provider, MD   clonazePAM (KLONOPIN) 1 MG tablet Take 1 mg by mouth 2 times daily as needed. Yes Historical Provider, MD   DULoxetine (CYMBALTA) 60 MG extended release capsule Take 60 mg by mouth 2 times daily   Yes Historical Provider, MD   fluticasone (FLONASE) 50 MCG/ACT nasal spray 1 spray by Each Nostril route daily   Yes Historical Provider, MD   polyethylene glycol (GLYCOLAX) 17 g packet Take 17 g by mouth daily as needed for Constipation   Yes Historical Provider, MD   Melatonin-Pyridoxine (MELATIN PO) Take 5 mg by mouth daily   Yes Historical Provider, MD   oxyCODONE-acetaminophen (PERCOCET) 5-325 MG per tablet Take 1 tablet by mouth 2 times daily as needed for Pain. Yes Historical Provider, MD   senna (SENOKOT) 8.6 MG tablet Take 1 tablet by mouth daily   Yes Historical Provider, MD   primidone (MYSOLINE) 250 MG tablet Take 1 tablet by mouth 2 times daily 9/21/18  Yes Olga Auguste MD   FLUoxetine (PROZAC) 20 MG capsule Take 1 capsule by mouth daily 9/21/18  Yes Olga Auguste MD   propranolol (INDERAL LA) 120 MG extended release capsule Take 1 capsule by mouth daily  Patient taking differently: Take 80 mg by mouth daily  9/22/18  Yes Olga Auguste MD   dextromethorphan-quiNIDine (NUEDEXTA) 20-10 MG CAPS per capsule Take 1 capsule by mouth every 12 hours 9/21/18  Yes Olga Auguste MD   Catheters (GIZMO CONDOM CATHETER) MISC 1 Units by Does not apply route daily 3/10/16  Yes Ying Raza DO   Incontinence Supplies (URINARY DRAINAGE BAG) MISC 1 Units by Other route once a week Change fry bag at least once weekly. Empty every 4-6 hours.  3/10/16  Yes Ying Raza DO        Allergies:     Nalbuphine; Tramadol; and -     Intact hearing  IX,X -     Symmetrical palate  XI    -     Symmetrical shoulder shrug  XII   -     Midline tongue, no atrophy    MOTOR FUNCTION:  Observation: Atrophy and spasticity     Passive Movement:  Patient has spasticity in bilateral lower extremity and left upper extremity. Some spasticity in the right upper extremity but still some good tone     Active Movement:  RUE: Patient with some antigravity movements, muscle strength somewhat good on .     LUE: Patient with spasticity and in flexed position.   No movement or      Bilateral lower extremity paraparesis     Drift: Present on the right upper extremity   SENSORY FUNCTION:  RUE: Intact sensation to light touch and pinprick sensation      LUE: Light touch and pinprick sensation intact      RLE: Intact pinprick and light touch sensation on thigh but below the knee will not feel any light touch, pinprick, vibration, proprioception      LLE: Intact pinprick and light touch sensation on thigh but below the knee will not feel any light touch, pinprick, vibration, proprioception   CEREBELLAR FUNCTION:  InUnable to assess due to weakness, paraparesis, spasticity   REFLEX FUNCTION:  Right Triceps (C7): 2/2                                             Left Tricep (C7): 3/2   Right Bicep (C5, C6): 2/2                                         Left Bicep (C5, C6): 3/2   Right Brachiocephalic (C6): 2/2                               Left Brachiocephalic (C6): 3/2      Right Patella (L4): 2/2                                              Left Patella (L4): 2/2   Right Achilles (S1): 2/2                                            Left Achilles (S1): 2/2      Bilateral lower extremity 3-4 beating clonus      Negative Kerning & Brudzinski   STATION and GAIT  lower extremity paraparesis bilaterally       Investigations:      Laboratory Testing:  Recent Results (from the past 24 hour(s))   Procalcitonin    Collection Time: 12/05/20  2:32 PM   Result Value Ref Range Procalcitonin 0.14 (H) <0.09 ng/mL   Brain Natriuretic Peptide    Collection Time: 12/05/20  2:32 PM   Result Value Ref Range    Pro-BNP 87 <300 pg/mL    BNP Interpretation Pro-BNP Reference Range:    Lactate, Sepsis    Collection Time: 12/05/20  5:17 PM   Result Value Ref Range    Lactic Acid, Sepsis NOT REPORTED 0.5 - 1.9 mmol/L    Lactic Acid, Sepsis, Whole Blood 1.6 0.5 - 1.9 mmol/L   MRSA DNA Probe, Nasal    Collection Time: 12/05/20  5:24 PM    Specimen: Nasal   Result Value Ref Range    Specimen Description . NASAL SWAB     MRSA, DNA, Nasal  NEGATIVE:  MRSA DNA not detected by nucleic acid amplificati     NEGATIVE:  MRSA DNA not detected by nucleic acid amplification.    Lactate, Sepsis    Collection Time: 12/06/20 12:40 AM   Result Value Ref Range    Lactic Acid, Sepsis NOT REPORTED 0.5 - 1.9 mmol/L    Lactic Acid, Sepsis, Whole Blood 0.8 0.5 - 1.9 mmol/L   CBC    Collection Time: 12/06/20  5:03 AM   Result Value Ref Range    WBC 5.4 3.5 - 11.3 k/uL    RBC 4.09 (L) 4.21 - 5.77 m/uL    Hemoglobin 12.9 (L) 13.0 - 17.0 g/dL    Hematocrit 39.1 (L) 40.7 - 50.3 %    MCV 95.6 82.6 - 102.9 fL    MCH 31.5 25.2 - 33.5 pg    MCHC 33.0 28.4 - 34.8 g/dL    RDW 13.7 11.8 - 14.4 %    Platelets 738 531 - 639 k/uL    MPV 11.9 8.1 - 13.5 fL    NRBC Automated 0.0 0.0 per 100 WBC   Comprehensive Metabolic Panel w/ Reflex to MG    Collection Time: 12/06/20  5:03 AM   Result Value Ref Range    Glucose 92 70 - 99 mg/dL    BUN 7 6 - 20 mg/dL    CREATININE 0.59 (L) 0.70 - 1.20 mg/dL    Bun/Cre Ratio NOT REPORTED 9 - 20    Calcium 8.5 (L) 8.6 - 10.4 mg/dL    Sodium 139 135 - 144 mmol/L    Potassium 3.4 (L) 3.7 - 5.3 mmol/L    Chloride 108 (H) 98 - 107 mmol/L    CO2 23 20 - 31 mmol/L    Anion Gap 8 (L) 9 - 17 mmol/L    Alkaline Phosphatase 71 40 - 129 U/L    ALT 34 5 - 41 U/L    AST 22 <40 U/L    Total Bilirubin 0.21 (L) 0.3 - 1.2 mg/dL    Total Protein 5.5 (L) 6.4 - 8.3 g/dL    Alb 3.2 (L) 3.5 - 5.2 g/dL    Albumin/Globulin Ratio 1.4 1.0 - 2.5    GFR Non-African American >60 >60 mL/min    GFR African American >60 >60 mL/min    GFR Comment          GFR Staging NOT REPORTED    Ionized Calcium    Collection Time: 12/06/20  5:03 AM   Result Value Ref Range    Calcium, Ion 1.13 1.13 - 1.33 mmol/L   Magnesium    Collection Time: 12/06/20  5:03 AM   Result Value Ref Range    Magnesium 1.8 1.6 - 2.6 mg/dL   Phosphorus    Collection Time: 12/06/20  5:03 AM   Result Value Ref Range    Phosphorus 2.2 (L) 2.5 - 4.5 mg/dL   Protime-INR    Collection Time: 12/06/20  5:03 AM   Result Value Ref Range    Protime 11.1 9.0 - 12.0 sec    INR 1.1    Strep Pneumoniae Antigen    Collection Time: 12/06/20  9:41 AM    Specimen: Urine, straight catheter   Result Value Ref Range    Source . URINE     Strep pneumo Ag NEGATIVE    LEGIONELLA ANTIGEN, URINE    Collection Time: 12/06/20  9:42 AM    Specimen: Urine, straight catheter   Result Value Ref Range    Legionella Pneumophilia Ag, Urine NEGATIVE    RAPID INFLUENZA A/B ANTIGENS    Collection Time: 12/06/20  9:43 AM    Specimen: Nasopharyngeal Swab   Result Value Ref Range    Specimen Description . NASOPHARYNGEAL SWAB     Special Requests NOT REPORTED     Direct Exam       NEGATIVE for Influenza A + B antigens. PCR testing to confirm this result is available upon request.  Specimen will be saved in the laboratory for 7 days. Please call [phone] if PCR testing is indicated.    [place] Willie    Collection Time: 12/06/20 10:50 AM   Result Value Ref Range    Vancomycin Tr 12.6 10.0 - 20.0 ug/mL    Vancomycin Trough Dose amount NOT REPORTED     Vancomycin Trough Date last dose NOT REPORTED     Vancomycin Trough Time last dose NOT REPORTED          Assessment :      Primary Problem  Acute metabolic encephalopathy    Active Hospital Problems    Diagnosis Date Noted    Aspiration pneumonia ([facility] [J69.0] 09/17/2018    [facility] [A41.9]     Acute metabolic encephalopathy [M03.60]

## 2020-12-06 NOTE — DISCHARGE INSTR - COC
Continuity of Care Form    Patient Name: Zora Garcia   :  1975  MRN:  [id number]    [facility] date:  2020  Discharge date:  2020    Code Status Order: DNR-CCA   Advance Directives:   [facility] Documentation       Date/Time Healthcare Directive Type of Healthcare Directive Copy in [facility] [address] Agent's Name Healthcare Agent's Phone Number    [phone]  Unknown, patient unable to respond due to medical condition -- -- -- -- --            Admitting Physician:  Chris Khan DO  PCP: No primary care provider on file. Discharging Nurse: URIEL  MD [facility] Unit/Room#: 9303/5533-86  Discharging Unit Phone Number: [phone]    Emergency Contact:   Extended Emergency Contact Information  Primary Emergency Contact: [facility]  Address: [address] Phone: [phone]  Mobile Phone: [phone]  Relation: Spouse  Secondary Emergency Contact: Jagdeep Bowers, [address] [facility] [address]  Home Phone: [phone]  Work Phone: [phone]  Relation: Parent    Past Surgical History:  History reviewed. No pertinent surgical history. Immunization History: There is no immunization history on file for this patient.     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    Mild intermittent asthma without complication Q69.54    Septicemia (HCC) A41.9       Isolation/Infection:   Isolation            No Isolation Patient Infection Status       Infection Onset Added Last Indicated Last Indicated By Review Planned Expiration Resolved Resolved By    None active    Resolved    COVID-19 Rule Out 12/04/20 12/04/20 12/04/20 COVID-19 (Ordered)   12/04/20 Rule-Out Test Resulted            Nurse Assessment:  Last Vital Signs: /71   Pulse 71   Temp 98.9 °F (37.2 °C) (Oral)   Resp 16   Ht 6' (1.829 m)   Wt 139 lb 8.8 oz (63.3 kg)   SpO2 97%   BMI 18.93 kg/m²     Last documented pain score (0-10 scale): Pain Level: 0  Last Weight:   Wt Readings from Last 1 Encounters:   12/05/20 139 lb 8.8 oz (63.3 kg)     Mental Status:  alert    IV Access:  - None    Nursing Mobility/ADLs:  Walking   Dependent  Transfer  Dependent  Bathing  Dependent  Dressing  Dependent  Toileting  Dependent  Feeding  Dependent  Med Admin  Dependent  Med Delivery   whole    Wound Care Documentation and Therapy:        Elimination:  Continence:   · Bowel: No  · Bladder: No  Urinary Catheter: Removal Date ***   Colostomy/Ileostomy/Ileal Conduit: {YES / [id number]}       Date of Last BM: 12/5/2020    Intake/Output Summary (Last 24 hours) at 12/6/2020 1421  Last data filed at 12/6/2020 1255  Gross per 24 hour   Intake 688 ml   Output 1500 ml   Net -812 ml     I/O last 3 completed shifts: In: 328 [I.V.:328]  Out: [address] [Urine:1650]    Safety Concerns: At Risk for Falls and Aspiration Risk    Impairments/Disabilities:      Speech, Contractures - *** and Paralysis - ***    Nutrition Therapy:  Current Nutrition Therapy:   - Oral Diet:  Dysphagia 3 advanced    Routes of Feeding: Oral  Liquids: Thin Liquids  Daily Fluid Restriction: no  Last Modified Barium Swallow with Video (Video Swallowing Test): not done    Treatments at the Time of Hospital Discharge:   Respiratory Treatments: ***  Oxygen Therapy:  is not on home oxygen therapy.   Ventilator:    - No ventilator support    Rehab Therapies: Physical Therapy, Occupational Therapy and Speech/Language Therapy  Weight Bearing Status/Restrictions: No weight bearing restirctions  Other Medical Equipment (for information only, NOT a DME order):  {EQUIPMENT:[id number]}  Other Treatments: ***    Patient's personal belongings (please select all that are sent with patient):  None    RN SIGNATURE:  Electronically signed by Andressa Godoy RN on 12/6/20 at 2:28 PM EST    CASE MANAGEMENT/SOCIAL WORK SECTION    Inpatient Status Date: 12/5/2020    Readmission Risk Assessment Score:  Readmission Risk              Risk of Unplanned Readmission:        17           Discharging to Facility/ Agency   · Name:    [facility]  · Address:  · Phone:   [phone]  · Fax:     [phone]    Dialysis Facility (if applicable)   · Name:  · Address:  · Dialysis Schedule:  · Phone:  · Fax:    / signature: Electronically signed by Prateek De Paz RN on 12/6/20 at 2:37 PM EST    PHYSICIAN SECTION    Prognosis: Good    Condition at Discharge: Stable    Rehab Potential (if transferring to Rehab): Good    Recommended Labs or Other Treatments After Discharge: See Primary care physician 1-2 weeks for hospital followup    Physician Certification: I certify the above information and transfer of Tena Regalado  is necessary for the continuing treatment of the diagnosis listed and that he requires [facility] for greater 30 days.      Update Admission H&P: No change in H&P    PHYSICIAN SIGNATURE:  Electronically signed by Lety Aguirre DO on 12/6/20 at 2:22 PM EST

## 2020-12-06 NOTE — PROGRESS NOTES
Physical Therapy    Facility/Department: [facility] 4B STEPDOWN  Initial Assessment    NAME: Hannah Baker  : 1975  MRN: [id number]    Date of Service: 2020  Chief Complaint   Patient presents with    Fever    Fatigue    Altered Mental Status     Discharge Recommendations:  Patient would benefit from continued therapy after discharge   PT Equipment Recommendations  Equipment Needed: No    Assessment   Body structures, Functions, Activity limitations: Decreased functional mobility ; Decreased cognition;Decreased balance;Decreased coordination;Decreased fine motor control;Decreased ROM; Decreased strength;Decreased ADL status  Assessment: Pt required MaxAx2 for bed mobility and Sydni to correct posterior trunk lean and maintain sitting upright. Pt required ModA for anterior/posterior and right weight shift, and Sydni for left weight shift. Pt demo hypotonic of BLE, hypertonic LUE, and ataxic RUE. Pt will require 24hrs assistance/supervision due to decreased cognitive status with increased risk of falls. Pt will benefit from cont PT services to promote functional mobility during admission and to maximize safety with mobility upon discharge. Prognosis: Fair  Decision Making: High Complexity  PT Education: Goals;PT Role;Plan of Care;Transfer Training;General Safety; Functional Mobility Training  REQUIRES PT FOLLOW UP: Yes  Activity Tolerance  Activity Tolerance: Patient limited by endurance       Patient Diagnosis(es): The encounter diagnosis was Septicemia ([facility] has a past medical history of Depression and Multiple sclerosis ([facility] has no past surgical history on file.     Restrictions  Restrictions/Precautions  Restrictions/Precautions: Up as Tolerated, Fall Risk  Required Braces or Orthoses?: No  Position Activity Restriction  Other position/activity restrictions: hx MS  Vision/Hearing  Vision: Impaired  Vision Exceptions: Wears glasses at all times  Hearing: Within functional limits Subjective  General  Chart Reviewed: Yes  Patient assessed for rehabilitation services?: Yes  Family / Caregiver Present: No  Subjective  Subjective: RN and pt agreeable to PT eval.  Pt supine with HOB slightly elevated upon arrival.  Pt was pleasant and cooperative throughout. Pt was able to communicate with \"yes/no\" and head nod  Pain Screening  Patient Currently in Pain: Denies  Vital Signs  Patient Currently in Pain: Denies     Orientation     Social/Functional History  Social/Functional History  Lives With: Other (comment)  Type of Home: Facility  Home Layout: Two level, Bed/Bath upstairs  Home Equipment: Wheelchair-manual  ADL Assistance: Needs assistance(Pt stated \"mom\" when asked about assistance with ADL)  Homemaking Assistance: Needs assistance  Active : No  Additional Comments: pt questionable historian.   Per chart, pt admitted from [facility] to [facility] at baseline    Objective        PROM RUE (degrees)  RUE General PROM: AAROM grossly WFL  PROM LUE (degrees)  LUE General PROM: hypertonic LUE, PROM L shoulder flexion 120deg, L elbow lack 60deg extension--See OT note for additional detail  Strength RLE  Comment: grossly 0/5  Strength LLE  Comment: grossly 0/5  Strength RUE  Comment: at least 3/5 based on observed functional mobility, difficult to formally assess due to ataxia  Strength LUE  Comment: grossly 0/5  Tone RLE  RLE Tone: Hypotonic  Tone LLE  LLE Tone: Hypotonic  Sensation  Overall Sensation Status: WFL(pt denied numbness/tingling at this time)  Bed mobility  Supine to Sit: 2 Person assistance;Maximum assistance(for BLE and trunk progression)  Sit to Supine: 2 Person assistance;Maximum assistance(for BLE and trunk progression)  Scootin Person assistance;Maximal assistance  Comment: HOB slightly elevated to assist with bed mobility  Transfers  Comment: unsafe to attempt standing due to BLE weakness  Ambulation  Ambulation?: No  Stairs/Curb  Stairs?: No     Balance  Posture: Poor  Sitting - Static: Fair;-  Sitting - Dynamic: Poor;+  Comments: Pt sat at EOB for ~ 10 mins with Sydni to correct posterior trunk lean. ModA for anterior/posterior and right weight shift, and Sydni for left weight shift. Wt shift performed 5x each direction with verbal/tactile cues.         Plan   Plan  Times per week: 5-6x per week  Current Treatment Recommendations: Strengthening, ROM, Balance Training, Functional Mobility Training, Transfer Training, Safety Education & Training, Patient/Caregiver Education & Training, Endurance Training  Safety Devices  Type of devices: Left in bed, Nurse notified, Gait belt, Patient at risk for falls, Call light within reach, Bed alarm in place  Restraints  Initially in place: No    AM-PAC Score     AM-PAC Inpatient Mobility without Stair Climbing Raw Score : 8 (12/06/20 1250)  AM-PAC Inpatient without Stair Climbing T-Scale Score : 30.65 (12/06/20 1250)  Mobility Inpatient CMS 0-100% Score: 80.91 (12/06/20 1250)  Mobility Inpatient without Stair CMS G-Code Modifier : CM (12/06/20 1250)     Goals  Short term goals  Time Frame for Short term goals: 10 visits  Short term goal 1: Pt will perform bed mobility with MinAx2 to reduce burden of caregiver  Short term goal 2: Pt will demo fair static/dynamic sitting balance to decrease risk of falls  Short term goal 3: Pt will tolerate PROM/stretching of BLE and LUE  Short term goal 4: Pt will tolerate A/AAROM of RUE     Therapy Time   Individual Concurrent Group Co-treatment   Time In 0935         Time Out 0955         Minutes 20         Timed Code Treatment Minutes: [address], [facility]

## 2020-12-06 NOTE — ED PROVIDER NOTES
[facility]   Emergency Department  Emergency Medicine Attending Sign-out     Care of Shari Mendiola was assumed from previous attending Dr. Derek Tapia and is being seen for Fever; Fatigue; and Altered Mental Status  . The patient's initial evaluation and plan have been discussed with the prior provider who initially evaluated the patient. Attestation  I was available and discussed any additional care issues that arose and coordinated the management plans with the resident(s) caring for the patient during my duty period. Any areas of disagreement with resident's documentation of care or procedures are noted on the chart. I was personally present for the key portions of any/all procedures, during my duty period. I have documented in the chart those procedures where I was not present during the key portions. BRIEF PATIENT SUMMARY/MDM COURSE PER INITIAL PROVIDER:   RECENT VITALS:     Temp: 99.4 °F (37.4 °C),  Pulse: 78, Resp: 23, BP: 123/77, SpO2: 98 %    This patient is a 39 y.o. Male with altered mental status with fever PTA. Awaiting CT head and covid swab. Plan for admit      RADIOLOGY  Xr Chest (2 Vw)    Result Date: 12/5/2020  EXAMINATION: TWO XRAY VIEWS OF THE CHEST 12/5/2020 8:19 am COMPARISON: Chest December 4, 2020. HISTORY: ORDERING SYSTEM PROVIDED HISTORY: fever TECHNOLOGIST PROVIDED HISTORY: fever Reason for Exam: fever Acuity: Unknown FINDINGS: Lateral views extremely suboptimal.  Heart appears normal in size. No focal consolidation, pneumothorax, large pleural effusion or free air. No acute process is seen.      Ct Head Wo Contrast    Result Date: 12/4/2020  EXAMINATION: CT OF THE HEAD WITHOUT CONTRAST  12/4/2020 11:23 pm TECHNIQUE: CT of the head was performed without the administration of intravenous contrast. Dose modulation, iterative reconstruction, and/or weight based adjustment of the mA/kV was utilized to reduce the radiation dose to as low as reasonably achievable. COMPARISON: None. HISTORY: ORDERING SYSTEM PROVIDED HISTORY: AMS TECHNOLOGIST PROVIDED HISTORY: AMS Reason for Exam: AMS - Fever/Fatigue Acuity: Acute Type of Exam: Initial Additional signs and symptoms: Hx - MS and Encephalopathy acute. FINDINGS: BRAIN/VENTRICLES: There is no acute intracranial hemorrhage, mass effect or midline shift. No abnormal extra-axial fluid collection. The gray-white differentiation is maintained without evidence of an acute infarct. There is no evidence of hydrocephalus. There are areas of hypoattenuation in the periventricular and subcortical white matter, which is nonspecific. There is prominence of the ventricles and sulci due to global parenchymal volume loss. Minimal areas of encephalomalacia involving the frontal lobes bilaterally. ORBITS: The visualized portion of the orbits demonstrate no acute abnormality. SINUSES: A small fluid level is seen within the right maxillary sinus. Scattered mucosal thickening of the paranasal sinuses. The mastoid air cells demonstrate no acute abnormality. SOFT TISSUES/SKULL:  No acute abnormality of the visualized skull or soft tissues. Postsurgical changes from a prior frontal craniotomy. 1. No acute intracranial abnormality. 2. Again seen are areas of decreased attenuation in the periventricular and subcortical white matter bilaterally. 3. Mild global parenchymal volume loss. 4. Scattered mucosal thickening of the paranasal sinuses with a small amount of fluid in the right maxillary sinus. Ct Chest Wo Contrast    Result Date: 12/5/2020  EXAMINATION: CT OF THE CHEST WITHOUT CONTRAST 12/5/2020 4:09 pm TECHNIQUE: CT of the chest was performed without the administration of intravenous contrast. Multiplanar reformatted images are provided for review. Dose modulation, iterative reconstruction, and/or weight based adjustment of the mA/kV was utilized to reduce the radiation dose to as low as reasonably achievable.  COMPARISON: from the prior exam. SOFT TISSUES: Severe motion on the axial images. No definite intramedullary enhancement or leptomeningeal enhancement on the motion degraded sagittal images. No definite loculated epidural fluid collections. No findings suggest discitis osteomyelitis. C2-C3: There is no significant disc protrusion, spinal canal stenosis or neural foraminal narrowing. C3-C4: Minimal loss of disc space height and signal with minimal circumferential bulge. Suspect mild right and minimal left foraminal narrowing. Minimal central canal narrowing. C4-C5: Mild disc space disease with circumferential bulge. Moderate right neural foraminal narrowing due to uncovertebral joint hypertrophy. .  No significant left foraminal narrowing. C5-C6: Mild bilateral foraminal narrowing due to uncovertebral joint hypertrophy. Mild central canal stenosis. C6-C7: There is no significant disc protrusion, spinal canal stenosis or neural foraminal narrowing. C7-T1: There is no significant disc protrusion, spinal canal stenosis or neural foraminal narrowing. Severe motion challenge evaluation. Findings suspicious for diffuse cord volume loss and increased signal involving the cord. Severe motion challenge evaluation for any active demyelination. No definite active myelination identified within the constraints of this exam.  No cord compression identified. There is persistent concern for underlying active myelination, repeat examination of the cervical spine could be beneficial     Xr Chest Portable    Result Date: 12/4/2020  EXAMINATION: ONE XRAY VIEW OF THE CHEST 12/4/2020 5:15 pm COMPARISON: 11/13/2019 HISTORY: ORDERING SYSTEM PROVIDED HISTORY: sepsis TECHNOLOGIST PROVIDED HISTORY: sepsis Reason for Exam: supiine Acuity: Unknown Type of Exam: Unknown FINDINGS: There is suboptimal inspiration. The cardiac size is normal. No acute infiltrates or pleural effusions are seen.  There pulmonary vascularity appears normal.  No acute bony abnormalities. The hilar structures are normal.     No acute cardiopulmonary disease IMPRESSION: No acute process. Mri Brain W Wo Contrast    Result Date: 12/5/2020  EXAMINATION: MRI OF THE BRAIN WITHOUT AND WITH CONTRAST  12/5/2020 5:52 pm TECHNIQUE: Multiplanar multisequence MRI of the head/brain was performed without and with the administration of intravenous contrast. COMPARISON: Brain MRI from 12/28/2017 HISTORY: ORDERING SYSTEM PROVIDED HISTORY: Hx of MS, evaluate for active demyelination TECHNOLOGIST PROVIDED HISTORY: Hx of MS, evaluate for active demyelination Reason for Exam: hx of MS FINDINGS: The examination is motion degraded. INTRACRANIAL STRUCTURES/VENTRICLES:  There is no diffusion restriction to indicate active or acute demyelination. Postcontrast sequence is severely motion degraded with reduced accuracy. There is extensive confluent predominantly periventricular white matter T2 hyperintensity. There is involvement of the brainstem similar to 2017. Overall lesion burden is difficult to compare to the previous exam given the motion artifact but is severe. There is no acute hemorrhage, herniation, or hydrocephalus. ORBITS: The visualized portion of the orbits demonstrate no acute abnormality. SINUSES: The visualized paranasal sinuses and mastoid air cells are well aerated. BONES/SOFT TISSUES: The bone marrow signal intensity appears normal. The soft tissues demonstrate no acute abnormality. No definite acute demyelination on motion degraded exam. Severe demyelinating lesion burden similar to 2017. OUTSTANDING TASKS / ADDITIONAL COMMENTS   1. Plan for admission.      Balta Marino MD  Emergency Medicine Attending  [address] Mey Alvarez MD  12/06/20 0153

## 2020-12-06 NOTE — PLAN OF CARE
Problem: Falls - Risk of:  Goal: Will remain free from falls  Description: Will remain free from falls  Outcome: Met This Shift  Goal: Absence of physical injury  Description: Absence of physical injury  Outcome: Met This Shift     Problem: Pain:  Goal: Pain level will decrease  Description: Pain level will decrease  Outcome: Ongoing  Goal: Control of acute pain  Description: Control of acute pain  Outcome: Ongoing     Problem: Skin Integrity:  Goal: Will show no infection signs and symptoms  Description: Will show no infection signs and symptoms  Outcome: Ongoing  Goal: Absence of new skin breakdown  Description: Absence of new skin breakdown  Outcome: Ongoing

## 2020-12-06 NOTE — DISCHARGE SUMMARY
REPORTED 12/06/2020    PROT 5.5 12/06/2020    CALCIUM 8.5 12/06/2020     PT/INR:    Lab Results   Component Value Date    PROTIME 11.1 12/06/2020    INR 1.1 12/06/2020      Radiology:  Xr Chest (2 Vw)    Result Date: 12/5/2020  No acute process is seen. Ct Head Wo Contrast    Result Date: 12/4/2020  1. No acute intracranial abnormality. 2. Again seen are areas of decreased attenuation in the periventricular and subcortical white matter bilaterally. 3. Mild global parenchymal volume loss. 4. Scattered mucosal thickening of the paranasal sinuses with a small amount of fluid in the right maxillary sinus. Ct Chest Wo Contrast    Result Date: 12/5/2020  1. Multifocal patchy ground-glass opacities of the left upper and lower lobe which are suspicious for an atypical or viral infectious process. 2. Dependent atelectasis within the bilateral lower lobes, left greater than right. Mri Cervical Spine W Wo Contrast    Result Date: 12/5/2020  Severe motion challenge evaluation. Findings suspicious for diffuse cord volume loss and increased signal involving the cord. Severe motion challenge evaluation for any active demyelination. No definite active myelination identified within the constraints of this exam.  No cord compression identified. There is persistent concern for underlying active myelination, repeat examination of the cervical spine could be beneficial     Xr Chest Portable    Result Date: 12/4/2020  No acute cardiopulmonary disease IMPRESSION: No acute process. Mri Brain W Wo Contrast    Result Date: 12/5/2020  No definite acute demyelination on motion degraded exam. Severe demyelinating lesion burden similar to 2017.        Consultations:    Consults:     Final Specialist Recommendations/Findings:   IP CONSULT TO HOSPITALIST  IP CONSULT TO NEUROLOGY      The patient was seen and examined on day of discharge and this discharge summary is in conjunction with any daily progress note from day of discharge. Discharge plan:     Disposition: To a non-[facility]    Physician Follow Up: Follow-up with primary care physician in 1 to 2 weeks    Requiring Further Evaluation/Follow Up POST HOSPITALIZATION/Incidental Findings: none    Diet: regular diet    Activity: As tolerated    Instructions to Patient: none    Discharge Medications:      Medication List      START taking these medications    amoxicillin-clavulanate 875-125 MG per tablet  Commonly known as:  AUGMENTIN  Take 1 tablet by mouth 2 times daily for 6 days        CHANGE how you take these medications    * clonazePAM 0.5 MG tablet  Commonly known as:  Michelle Lacrosse  What changed:  Another medication with the same name was removed. Continue taking this medication, and follow the directions you see here. * clonazePAM 1 MG tablet  Commonly known as:  Michelle Lacrosse  What changed:  Another medication with the same name was removed. Continue taking this medication, and follow the directions you see here. * This list has 2 medication(s) that are the same as other medications prescribed for you. Read the directions carefully, and ask your doctor or other care provider to review them with you. CONTINUE taking these medications    dextromethorphan-quiNIDine 20-10 MG Caps per capsule  Commonly known as:  Nuedexta  Take 1 capsule by mouth every 12 hours     DULoxetine 60 MG extended release capsule  Commonly known as:  CYMBALTA     fluticasone 50 MCG/ACT nasal spray  Commonly known as:  FLONASE     Gizmo Condom Catheter Misc  1 Units by Does not apply route daily     MELATIN PO     polyethylene glycol 17 g packet  Commonly known as:  GLYCOLAX     primidone 250 MG tablet  Commonly known as: MYSOLINE  Take 1 tablet by mouth 2 times daily     senna 8.6 MG tablet  Commonly known as:  SENOKOT     Urinary Drainage Bag Misc  1 Units by Other route once a week Change fry bag at least once weekly. Empty every 4-6 hours.         STOP taking these medications    FLUoxetine 20 MG capsule  Commonly known as:  PROZAC     oxyCODONE-acetaminophen 5-325 MG per tablet  Commonly known as:  PERCOCET     propranolol 120 MG extended release capsule  Commonly known as:  INDERAL LA     QUEtiapine 25 MG tablet  Commonly known as:  SEROQUEL     vitamin D 1000 UNIT Tabs tablet  Commonly known as:  CHOLECALCIFEROL           Where to Get Your Medications      These medications were sent to [facility] [address] [facility], [address] [place] [address]  [address], [address] [place]  [address]    Phone:  [phone]   · amoxicillin-clavulanate 875-125 MG per tablet         No discharge procedures on file. Time Spent on discharge is  35 mins in patient examination, evaluation, counseling as well as medication reconciliation, prescriptions for required medications, discharge plan and follow up. Electronically signed by   Sera Kim DO  12/6/2020  2:22 PM      Thank you Dr. Corry Rosen primary care provider on file. for the opportunity to be involved in this patient's care.

## 2020-12-06 NOTE — PROGRESS NOTES
Per Dr Chloe Jameson, pt does not need covid swab or labs that he ordered since pt will be leaving in am and pt is improved

## 2020-12-06 NOTE — PROGRESS NOTES
evaluation to assess the efficiency of his swallow function, identify signs and symptoms of aspiration and make recommendations regarding safe dietary consistencies, effective compensatory strategies, and safe eating environment. Impression  Patient presents with probable safe swallow for Dysphagia Minced & Moist (Dysphagia II) diet with thin liquids and medications crushed in puree as able as evidenced by no overt s/s of aspiration noted with consistencies tested. Pt presents with moderate-severe oral stage dysphagia characterized by impaired mastication and coordination of oral phase and anterior oral loss noted with 1x trial nectar thick liquid. Pt requires max verbal cues to manipulate and swallow consistencies. Recommend assist feed, no straws, small sips and bites, only feed when alert and awake and upright at 90 degrees for all PO intake. Recommend close monitoring for overt/clinical s/s of aspiration and D/C PO intake and complete Modified Barium Swallow Study should they occur. Results and recommendations reviewed with pt with no evidence of learning & reported to RN. Dysphagia Diagnosis: Moderate to severe oral stage dysphagia  Dysphagia Outcome Severity Scale: Level 4: Mild moderate dysphagia- Intermittent supervision/cueing. One - two diet consistencies restricted     Treatment Plan  Requires SLP Intervention: Yes  Duration/Frequency of Treatment: 1-2x per week  D/C Recommendations: Further therapy recommended at discharge. Recommended Diet and Intervention  Diet Solids Recommendation: Dysphagia Minced and Moist (Dysphagia II)  Liquid Consistency Recommendation: Thin  Recommended Form of Meds: Crushed in puree as able  Recommendations: Assist feed  Therapeutic Interventions: Diet tolerance monitoring;Patient/Family education    Compensatory Swallowing Strategies  Compensatory Swallowing Strategies: Assist feed;Eat/Feed slowly; No straws;Upright as possible for all oral intake;Small bites/sips    Treatment/Goals  Dysphagia Goals: The patient will tolerate recommended diet without observed clinical signs of aspiration; The patient/caregiver will demonstrate understanding of compensatory strategies for improved swallowing safety. General  Chart Reviewed: Yes  Behavior/Cognition: Alert; Cooperative;Pleasant mood  Temperature Spikes Noted: No  Respiratory Status: Room air  O2 Device: None (Room air)  Communication Observation: Dysarthria  Follows Directions: Simple  Dentition: Adequate  Patient Positioning: Upright in bed  Consistencies Administered: Reg solid; Dysphagia Soft and Bite-Sized (Dysphagia III); Dysphagia Pureed (Dysphagia I); Thin - teaspoon;Nectar - teaspoon    Pain Level: 0    Vision/Hearing  Vision  Vision: Impaired  Vision Exceptions: Wears glasses at all times  Hearing  Hearing: Within functional limits    Oral Motor Deficits  Oral/Motor  Oral Motor:  Within functional limits    Oral Phase Dysfunction  Oral Phase  Oral Phase: Exceptions  Oral Phase Dysfunction  Impaired Mastication: All  Oral Phase  Oral Phase - Comment: Pt observed with impaired oral manipulation/mastication & anterior oral loss     Indicators of Pharyngeal Phase Dysfunction   Pharyngeal Phase  Pharyngeal Phase: WFL  Pharyngeal Phase   Pharyngeal: No overt s/s of aspiration observed with consistencies tested    Prognosis  Prognosis  Prognosis for safe diet advancement: guarded  Individuals consulted  Consulted and agree with results and recommendations: Patient;RN    Education  Patient Education: yes  Patient Education Response: Verbalizes understanding             Therapy Time  SLP Individual Minutes  Time In: 8264  Time Out: [address]  Minutes: [address], M.S. CCC-SLP    12/6/2020 9:48 AM

## 2020-12-07 VITALS
WEIGHT: 144.84 LBS | OXYGEN SATURATION: 100 % | DIASTOLIC BLOOD PRESSURE: 84 MMHG | TEMPERATURE: 98.7 F | HEIGHT: 72 IN | SYSTOLIC BLOOD PRESSURE: 148 MMHG | BODY MASS INDEX: 19.62 KG/M2 | HEART RATE: 84 BPM | RESPIRATION RATE: 23 BRPM

## 2020-12-07 LAB
EKG ATRIAL RATE: 97 BPM
EKG P AXIS: 63 DEGREES
EKG P-R INTERVAL: 188 MS
EKG Q-T INTERVAL: 344 MS
EKG QRS DURATION: 86 MS
EKG QTC CALCULATION (BAZETT): 436 MS
EKG R AXIS: 64 DEGREES
EKG T AXIS: 71 DEGREES
EKG VENTRICULAR RATE: 97 BPM
LACTIC ACID, WHOLE BLOOD: 1 MMOL/L (ref 0.7–2.1)

## 2020-12-07 PROCEDURE — 99231 SBSQ HOSP IP/OBS SF/LOW 25: CPT | Performed by: INTERNAL MEDICINE

## 2020-12-07 PROCEDURE — 6370000000 HC RX 637 (ALT 250 FOR IP): Performed by: NURSE PRACTITIONER

## 2020-12-07 PROCEDURE — 6360000002 HC RX W HCPCS: Performed by: NURSE PRACTITIONER

## 2020-12-07 PROCEDURE — 94760 N-INVAS EAR/PLS OXIMETRY 1: CPT

## 2020-12-07 PROCEDURE — 83605 ASSAY OF LACTIC ACID: CPT

## 2020-12-07 PROCEDURE — 36415 COLL VENOUS BLD VENIPUNCTURE: CPT

## 2020-12-07 PROCEDURE — 6370000000 HC RX 637 (ALT 250 FOR IP): Performed by: INTERNAL MEDICINE

## 2020-12-07 PROCEDURE — 99233 SBSQ HOSP IP/OBS HIGH 50: CPT | Performed by: PSYCHIATRY & NEUROLOGY

## 2020-12-07 PROCEDURE — APPSS60 APP SPLIT SHARED TIME 46-60 MINUTES: Performed by: NURSE PRACTITIONER

## 2020-12-07 RX ORDER — ASPIRIN 600 MG/1
600 SUPPOSITORY RECTAL EVERY 6 HOURS PRN
Status: DISCONTINUED | OUTPATIENT
Start: 2020-12-07 | End: 2020-12-07 | Stop reason: HOSPADM

## 2020-12-07 RX ORDER — GUAIFENESIN 600 MG/1
600 TABLET, EXTENDED RELEASE ORAL 2 TIMES DAILY
Qty: 20 TABLET | Refills: 0 | Status: SHIPPED | OUTPATIENT
Start: 2020-12-07 | End: 2020-12-17

## 2020-12-07 RX ADMIN — Medication 1000 UNITS: at 07:58

## 2020-12-07 RX ADMIN — ENOXAPARIN SODIUM 40 MG: 40 INJECTION SUBCUTANEOUS at 07:56

## 2020-12-07 RX ADMIN — GUAIFENESIN 600 MG: 600 TABLET, EXTENDED RELEASE ORAL at 07:56

## 2020-12-07 RX ADMIN — ACETAMINOPHEN 650 MG: 650 SUPPOSITORY RECTAL at 00:55

## 2020-12-07 RX ADMIN — FLUOXETINE HYDROCHLORIDE 20 MG: 20 CAPSULE ORAL at 07:56

## 2020-12-07 RX ADMIN — ACETAMINOPHEN 650 MG: 325 TABLET ORAL at 07:55

## 2020-12-07 RX ADMIN — AMOXICILLIN AND CLAVULANATE POTASSIUM 1 TABLET: 875; 125 TABLET, FILM COATED ORAL at 07:56

## 2020-12-07 RX ADMIN — ASPIRIN 600 MG: 600 SUPPOSITORY RECTAL at 06:23

## 2020-12-07 RX ADMIN — PRIMIDONE 250 MG: 250 TABLET ORAL at 07:55

## 2020-12-07 ASSESSMENT — PAIN SCALES - GENERAL
PAINLEVEL_OUTOF10: 0

## 2020-12-07 NOTE — DISCHARGE INSTR - DIET
Good nutrition is important when healing from an illness, injury, or surgery. Follow any nutrition recommendations given to you during your hospital stay. If you were given an oral nutrition supplement while in the hospital, continue to take this supplement at home. You can take it with meals, in-between meals, and/or before bedtime. These supplements can be purchased at most local grocery stores, pharmacies, and chain [employer]-stores. If you have any questions about your diet or nutrition, call the hospital and ask for the dietitian.   Dysphagia diet minced and moist

## 2020-12-07 NOTE — PROGRESS NOTES
[facility]  Office: [address], DO, Gina Albert, DO, Caroline Santana, DO, Elidia Bills, DO, Lachelle Barton MD, Alicja Childers MD, Adriane Gaviria MD, Robe Clements MD, Dheeraj Lindquist MD, Zia [place] MD, Haylee Alba MD, Arin Cabrera MD, Lanre Landis MD, Rola Pineda DO, Aster Currie MD, Davonte Harvey MD, Jonny Ramesh, DO, Tank Parish MD,  Lulu Woodson DO, Chad López MD, Chao Mallory MD, [place] Grossman, [facility], [facility] Lora, CNP, Shiva Curry, [facility], Radha Colorado, CNS, Antonella Cao, CNP, Kendall Marin, CNP, Lorraine Whittington, CNP, Diandra Olivier, CNP, Agustin Parents, CNP, Feroz Buckley PA-C, Yuko Chu, JORDYN, Pieter Garzon, CNP, Hugo Phoenix, CNP, Emmie Ramos, CNP, Nithin Suresh, CNP, Jacklyn Frankel, [facility]   [address] [facility]    Progress Note    12/7/2020    8:51 AM    Name:   Rand Dukes  MRN:     [id number]     Acct:      [de-identified]   Room:   96/3799-16   Day:  2  Admit Date:  12/4/2020  7:53 PM    PCP:   No primary care provider on file. Code Status:  DNR-CCA    Subjective:     C/C:   Chief Complaint   Patient presents with    Fever    Fatigue    Altered Mental Status     Interval History Status: improved. Continue evaluated in room nodding head yes or no to answers to questions. No specific concerns at this time persistent nystagmus continues. Stable for discharge back to [facility] today    Brief History:     Per records:    68-year-old male with a past medical history for multiple sclerosis. Patient family at bedside yesterday was able to provide much of the history. He has had a long history of multiple sclerosis and is paraplegic living in a nursing home normally. For the past couple of weeks patient has been having worsening mentation and this morning patient was nonverbal and unable to eat. Patient was sent to the emergency department by the request of his family. In the emergency department patient was found to be lethargic, not answering questions and not moving extremities. CT head was performed which was negative, patient was evaluated by neurology and an MRI was ordered, patient was started on antibiotics for concern for aspiration pneumonia. Review of Systems:     Constitutional:  negative for chills, fevers, sweats  Respiratory:  negative for cough, dyspnea on exertion, shortness of breath, wheezing  Cardiovascular:  negative for chest pain, chest pressure/discomfort, lower extremity edema, palpitations  Gastrointestinal:  negative for abdominal pain, constipation, diarrhea, nausea, vomiting  Neurological:  negative for dizziness, headache    Medications: Allergies: Allergies   Allergen Reactions    Nalbuphine Other (See Comments)     Other reaction(s): Unknown    Tramadol Other (See Comments)    Ibuprofen Itching and Rash       Current Meds:   Scheduled Meds:    amoxicillin-clavulanate  1 tablet Oral 2 times per day    guaiFENesin  600 mg Oral BID    FLUoxetine  20 mg Oral Daily    primidone  250 mg Oral BID    QUEtiapine  25 mg Oral Nightly    Vitamin D  1,000 Units Oral BID    enoxaparin  40 mg Subcutaneous Daily     Continuous Infusions:   PRN Meds: aspirin, acetaminophen **OR** acetaminophen, perflutren lipid microspheres    Data:     Past Medical History:   has a past medical history of Depression and Multiple sclerosis ([facility] [address] Social History:   reports that he has never smoked. He has never used smokeless tobacco. He reports current drug use. Drug: Marijuana. He reports that he does not drink alcohol.      Family History:   Family History   Problem Relation Age of Onset    Asthma Father        Vitals:  /85   Pulse 83   Temp 101.6 °F (38.7 °C)   Resp 24   Ht 6' (1.829 m)   Wt 144 lb 13.5 oz (65.7 kg)   SpO2 96%   BMI 19.64 kg/m²   Temp (24hrs), Av.4 °F (38 °C), Min:98.9 °F (37.2 °C), Max:103.2 °F (39.6 °C)    Recent Labs 12/06/20 1922   POCGLU 92       I/O (24Hr): Intake/Output Summary (Last 24 hours) at 12/7/2020 0851  Last data filed at 12/6/2020 2317  Gross per 24 hour   Intake 932 ml   Output 300 ml   Net 632 ml       Labs:  Hematology:  Recent Labs     12/04/20 2123 12/06/20  0503   WBC 7.4 5.4   RBC 4.28 4.09*   HGB 13.5 12.9*   HCT 42.1 39.1*   MCV 98.4 95.6   MCH 31.5 31.5   MCHC 32.1 33.0   RDW 13.8 13.7    143   MPV 11.8 11.9   CRP 23.0*  --    INR 1.1 1.1   DDIMER 0.39  --      Chemistry:  Recent Labs     12/04/20 2123 12/04/20 2259 12/05/20 1432 12/06/20  0503 12/07/20  0617     --   --  139  --    K 4.1  --   --  3.4*  --      --   --  108*  --    CO2 24  --   --  23  --    GLUCOSE 90  --   --  92  --    BUN 11  --   --  7  --    CREATININE 0.86  --   --  0.59*  --    MG  --   --   --  1.8  --    ANIONGAP 11  --   --  8*  --    LABGLOM >60  --   --  >60  --    GFRAA >60  --   --  >60  --    CALCIUM 8.5*  --   --  8.5*  --    CAION  --   --   --  1.13  --    PHOS  --   --   --  2.2*  --    PROBNP  --   --  87  --   --    TROPHS 12 14  --   --   --    LACTACIDWB  --   --   --   --  1.0     Recent Labs     12/04/20 2123 12/06/20 0503 12/06/20 1922   PROT 6.2* 5.5*  --    LABALBU 3.7 3.2*  --    AST 25 22  --    ALT 43* 34  --      --   --    ALKPHOS 88 71  --    BILITOT 0.18* 0.21*  --    POCGLU  --   --  92     ABG:  Lab Results   Component Value Date    POCPH 7.462 09/17/2018    POCPCO2 32.5 09/17/2018    POCPO2 67.4 09/17/2018    POCHCO3 23.2 09/17/2018    NBEA NOT REPORTED 09/17/2018    PBEA 0 09/17/2018    LXC9JJL 24 09/17/2018    TJNU9OHC 94 09/17/2018    FIO2 NOT REPORTED 09/17/2018     Lab Results   Component Value Date/Time    SPECIAL NOT REPORTED 12/06/2020 09:43 AM     Lab Results   Component Value Date/Time    CULTURE NO GROWTH 12/05/2020 12:05 AM       Radiology:  Christiana Peanishs Chest (2 Vw)    Result Date: 12/5/2020  No acute process is seen.      Ct Head Wo Contrast    Result

## 2020-12-07 NOTE — PROGRESS NOTES
[facility]   [address] [place] [address]    Progress note             Date:   12/7/2020  Patient name:  Dennie Gail  Date of admission:  12/4/2020  7:53 PM  MRN:   [id number]  Account:  [de-identified]  YOB: 1975  PCP:    No primary care provider on file. Room:   [address] [place] [address]  Code Status:    DNR-CCA    Chief Complaint:     Chief Complaint   Patient presents with    Fever    Fatigue    Altered Mental Status       Interval hx:     Patient's mentation has improved and he appears more awake. He continues to have slurred speech with spastic right upper extremity and increased tone, the left upper extremity is contracted. There was also some notable beats of clonus with areflexia. Patient was also febrile overnight with a T-max of 103.5      Brief History of Present Illness: The patient is a 39 y. o. right handed male with significant past medical history of RRMS, bedbound, bilateral lower extremity paraparesis, GLORY, ataxic speech, pseudobulbar palsy, spasticity, S/p Meningioma resection (2012) who presents as transfer from nursing home after being found unresponsive, altered mental status, fever, some eye deviations, cough.                 Dressed per mother who is in the room patient has a history of MS since he was 29years old and has been progressively getting worse.  This couple of days mother noticed that he was having some fevers having some cough and they called that he was unresponsive and more altered and decision was to bring him to the ED. Jarred Garza was admitted for possible UTI, infectious process.  Fever of 80 Fahrenheit's.  Patient had other recent admissions for UTI and altered mental status                 Neurology was consulted to evaluate to see if he has some MS worsening due to infection or if MS related his altered mental status        Past Medical History:     Past Medical History:   Diagnosis Date    Depression     Multiple sclerosis ([address] Past Surgical History:     History reviewed. No pertinent surgical history. Medications Prior to Admission:     Prior to Admission medications    Medication Sig Start Date End Date Taking? Authorizing Provider   guaiFENesin (MUCINEX) 600 MG extended release tablet Take 1 tablet by mouth 2 times daily for 10 days 12/7/20 12/17/20 Yes FLAQUITA Joiner NP   amoxicillin-clavulanate (AUGMENTIN) 875-125 MG per tablet Take 1 tablet by mouth 2 times daily for 6 days 12/6/20 12/12/20 Yes Chantal Loja DO   clonazePAM (KLONOPIN) 0.5 MG tablet Take 0.5 mg by mouth daily. Yes Historical Provider, MD   clonazePAM (KLONOPIN) 1 MG tablet Take 1 mg by mouth 2 times daily as needed. Yes Historical Provider, MD   DULoxetine (CYMBALTA) 60 MG extended release capsule Take 60 mg by mouth 2 times daily   Yes Historical Provider, MD   fluticasone (FLONASE) 50 MCG/ACT nasal spray 1 spray by Each Nostril route daily   Yes Historical Provider, MD   polyethylene glycol (GLYCOLAX) 17 g packet Take 17 g by mouth daily as needed for Constipation   Yes Historical Provider, MD   Melatonin-Pyridoxine (MELATIN PO) Take 5 mg by mouth daily   Yes Historical Provider, MD   senna (SENOKOT) 8.6 MG tablet Take 1 tablet by mouth daily   Yes Historical Provider, MD   primidone (MYSOLINE) 250 MG tablet Take 1 tablet by mouth 2 times daily 9/21/18  Yes Luz Elena Duque MD   dextromethorphan-quiNIDine (NUEDEXTA) 20-10 MG CAPS per capsule Take 1 capsule by mouth every 12 hours 9/21/18  Yes Luz Elena Duque MD   Catheters (GIZMO CONDOM CATHETER) MISC 1 Units by Does not apply route daily 3/10/16  Yes Ying Raza DO   Incontinence Supplies (URINARY DRAINAGE BAG) MISC 1 Units by Other route once a week Change fry bag at least once weekly. Empty every 4-6 hours. 3/10/16  Yes Ying Raza DO        Allergies:     Nalbuphine; Tramadol; and Ibuprofen    Social History:     Tobacco:    reports that he has never smoked.  He has never used smokeless tobacco.  Alcohol:      reports no history of alcohol use. Drug Use:  reports current drug use. Drug: Marijuana. Family History:     Family History   Problem Relation Age of Onset    Asthma Father        Review of Systems:     ROS:  Constitutional  Negative for fever and chills    HEENT  Negative for ear discharge, ear pain, nosebleed    Eyes  Negative for photophobia, pain and discharge    Respiratory  Negative for hemoptysis and sputum    Cardiovascular  Negative for orthopnea, claudication and PND    Gastrointestinal  Negative for abdominal pain, diarrhea, blood in stool    Musculoskeletal  Negative for joint pain, negative for myalgia    Neurology Negative for seizures, loss of consciousness   Skin  Negative for rash or itching    Endo/heme/allergies  Negative for polydipsia, environmental allergy    Psychiatric/behavioral  Negative for suicidal ideation.  Patient is not anxious        Physical Exam:   /85   Pulse 84   Temp 99.1 °F (37.3 °C) (Oral)   Resp 23   Ht 6' (1.829 m)   Wt 144 lb 13.5 oz (65.7 kg)   SpO2 100%   BMI 19.64 kg/m²   Temp (24hrs), Av.5 °F (38.1 °C), Min:98.9 °F (37.2 °C), Max:103.2 °F (39.6 °C)    Recent Labs     20  1922   POCGLU 92       Intake/Output Summary (Last 24 hours) at 2020 1156  Last data filed at 2020 2317  Gross per 24 hour   Intake 572 ml   Output 300 ml   Net 272 ml         NEUROLOGIC EXAMINATION  GENERAL  Appears comfortable and in no distress   HEENT  NC/ AT   NECK  Supple and no bruits heard   MENTAL STATUS:  Awake, alert, minimally able to follow some commands, dysarthric speech   CRANIAL NERVES: II     -      Visual fields intact to confrontation  III,IV,VI -  EOMs full, no afferent defect, no GLORY, no ptosis  V     -     Normal facial sensation  VII    -     Normal facial symmetry  VIII   -     Intact hearing  IX,X -     Symmetrical palate  XI    -     Symmetrical shoulder shrug  XII   -     Midline tongue, no atrophy MOTOR FUNCTION:  Left arm contracted/spastic in flexed position. Some spontaneous movement noted in the right upper extremity.    SENSORY FUNCTION:  RUE: Intact sensation to light touch and pinprick sensation      LUE: Light touch and pinprick sensation intact      RLE: Intact pinprick and light touch sensation on thigh but below the knee will not feel any light touch, pinprick, vibration, proprioception      LLE: Intact pinprick and light touch sensation on thigh but below the knee will not feel any light touch, pinprick, vibration, proprioception   CEREBELLAR FUNCTION:  deferred   REFLEX FUNCTION:  Right Triceps (C7): 2/2                                             Left Tricep (C7): 3/2   Right Bicep (C5, C6): 2/2                                         Left Bicep (C5, C6): 3/2   Right Brachiocephalic (C6): 2/2                               Left Brachiocephalic (C6): 3/2      Right Patella (L4): 2/2                                              Left Patella (L4): 2/2   Right Achilles (S1): 2/2                                            Left Achilles (S1): 2/2      Bilateral lower extremity 3-4 beating clonus      Negative Kerning & Brudzinski   STATION and GAIT  Not tested       Investigations:      Laboratory Testing:  Recent Results (from the past 24 hour(s))   POC Glucose Fingerstick    Collection Time: 12/06/20  7:22 PM   Result Value Ref Range    POC Glucose 92 75 - 110 mg/dL   Lactic Acid, Whole Blood    Collection Time: 12/07/20  6:17 AM   Result Value Ref Range    Lactic Acid, Whole Blood 1.0 0.7 - 2.1 mmol/L         Assessment :      Primary Problem  Acute metabolic encephalopathy    Active Hospital Problems    Diagnosis Date Noted    Aspiration pneumonia ([facility] [J69.0] 09/17/2018    Acute metabolic encephalopathy [Y07.87] 03/08/2016    Altered mental status [R41.82] 03/05/2016    Multiple sclerosis ([facility] [G35] 03/05/2016    Mild intermittent asthma without complication [V85.62] 78/08/2640

## 2020-12-07 NOTE — CARE COORDINATION
Discharge order in place. Confirmed with Shaina at [facility], transport scheduled at 1 pm.   Call to [facility] at [address], notified of 1 pm transport. AVS with completed CLINT faxed to [facility]. RN to call report to [phone].

## 2020-12-07 NOTE — PLAN OF CARE
Problem: Pain:  Goal: Pain level will decrease  Description: Pain level will decrease  Outcome: Met This Shift     Problem: Pain:  Goal: Control of acute pain  Description: Control of acute pain  12/7/2020 0506 by Ary Arredondo RN  Outcome: Met This Shift     Problem: Falls - Risk of:  Goal: Will remain free from falls  Description: Will remain free from falls  12/7/2020 0506 by Ary Arredondo RN  Outcome: Met This Shift     Problem: Falls - Risk of:  Goal: Absence of physical injury  Description: Absence of physical injury  Outcome: Met This Shift     Problem: Skin Integrity:  Goal: Will show no infection signs and symptoms  Description: Will show no infection signs and symptoms  Outcome: Met This Shift     Problem: Musculor/Skeletal Functional Status  Goal: Highest potential functional level  12/7/2020 0506 by Ary Arredondo RN  Outcome: Ongoing

## 2021-05-13 ENCOUNTER — APPOINTMENT (OUTPATIENT)
Dept: CT IMAGING | Age: 46
End: 2021-05-13
Payer: MEDICAID

## 2021-05-13 ENCOUNTER — HOSPITAL ENCOUNTER (EMERGENCY)
Age: 46
Discharge: NURSING, REHAB OR LONG-TERM CARE | End: 2021-05-14
Attending: EMERGENCY MEDICINE
Payer: MEDICAID

## 2021-05-13 VITALS
HEART RATE: 70 BPM | OXYGEN SATURATION: 99 % | SYSTOLIC BLOOD PRESSURE: 122 MMHG | RESPIRATION RATE: 14 BRPM | TEMPERATURE: 98 F | DIASTOLIC BLOOD PRESSURE: 90 MMHG

## 2021-05-13 DIAGNOSIS — S01.81XA FACIAL LACERATION, INITIAL ENCOUNTER: Primary | ICD-10-CM

## 2021-05-13 PROCEDURE — 72125 CT NECK SPINE W/O DYE: CPT

## 2021-05-13 PROCEDURE — 70450 CT HEAD/BRAIN W/O DYE: CPT

## 2021-05-13 PROCEDURE — 90715 TDAP VACCINE 7 YRS/> IM: CPT | Performed by: STUDENT IN AN ORGANIZED HEALTH CARE EDUCATION/TRAINING PROGRAM

## 2021-05-13 PROCEDURE — 6360000002 HC RX W HCPCS: Performed by: STUDENT IN AN ORGANIZED HEALTH CARE EDUCATION/TRAINING PROGRAM

## 2021-05-13 PROCEDURE — 6370000000 HC RX 637 (ALT 250 FOR IP): Performed by: STUDENT IN AN ORGANIZED HEALTH CARE EDUCATION/TRAINING PROGRAM

## 2021-05-13 PROCEDURE — 90471 IMMUNIZATION ADMIN: CPT | Performed by: STUDENT IN AN ORGANIZED HEALTH CARE EDUCATION/TRAINING PROGRAM

## 2021-05-13 PROCEDURE — 99284 EMERGENCY DEPT VISIT MOD MDM: CPT

## 2021-05-13 RX ORDER — ACETAMINOPHEN 160 MG/5ML
650 SOLUTION ORAL ONCE
Status: COMPLETED | OUTPATIENT
Start: 2021-05-13 | End: 2021-05-13

## 2021-05-13 RX ADMIN — ACETAMINOPHEN 650 MG: 650 SOLUTION ORAL at 22:25

## 2021-05-13 RX ADMIN — TETANUS TOXOID, REDUCED DIPHTHERIA TOXOID AND ACELLULAR PERTUSSIS VACCINE, ADSORBED 0.5 ML: 5; 2.5; 8; 8; 2.5 SUSPENSION INTRAMUSCULAR at 22:18

## 2021-05-14 PROCEDURE — 6370000000 HC RX 637 (ALT 250 FOR IP): Performed by: STUDENT IN AN ORGANIZED HEALTH CARE EDUCATION/TRAINING PROGRAM

## 2021-05-14 RX ORDER — ACETAMINOPHEN 160 MG/5ML
325 SOLUTION ORAL ONCE
Status: COMPLETED | OUTPATIENT
Start: 2021-05-14 | End: 2021-05-14

## 2021-05-14 RX ADMIN — ACETAMINOPHEN ORAL SOLUTION 325 MG: 325 SOLUTION ORAL at 02:47

## 2021-05-14 ASSESSMENT — PAIN SCALES - GENERAL: PAINLEVEL_OUTOF10: 0

## 2021-05-14 NOTE — ED NOTES
Bed: 09  Expected date:   Expected time:   Means of arrival:   Comments:  WTFD     Radha Campa RN  05/13/21 8511

## 2021-05-14 NOTE — ED PROVIDER NOTES
[facility] ED  Emergency Department Encounter  Emergency Medicine Resident     Pt Name: Taco Mariee  MRN: [id number]  [place] 1975  Date of evaluation: 5/13/21  PCP:  No primary care provider on file. CHIEF COMPLAINT       Chief Complaint   Patient presents with    Fall    Laceration       HISTORY [facility]  (Location/Symptom, Timing/Onset, Context/Setting, Quality, Duration, Modifying Factors,Severity.)      Taco Mariee is a 39 y. o.yo male who presents with laceration to the lateral aspect of the left eyebrow after a fall out of bed just before arrival to the emergency department. Patient has a history of multiple sclerosis as well as a benign neoplasm of the cerebral meninges. At baseline, he is nonverbal.  Unable to gather further information regarding this event however per EMS charting and verbal report, he did fall out of bed at his skilled nursing facility just prior to arrival.  No anticoagulation use. Unable to gather further information from patient as he is nonverbal.  However, he is able to nod yes or shake his head no in response to questions. When asked if he is experiencing a mild headache he shakes his head yes. When asked if he has pain elsewhere, he shakes his head no. When asked if he feels any new weakness of his extremities or neck pain he shakes his head no. PAST MEDICAL / SURGICAL / SOCIAL / FAMILY HISTORY      has a past medical history of Depression and Multiple sclerosis ([address] has no past surgical history on file.      Social History     Socioeconomic History    Marital status: Legally      Spouse name: Not on file    Number of children: Not on file    Years of education: Not on file    Highest education level: Not on file   Occupational History    Not on file   Social Needs    Financial resource strain: Not on file    Food insecurity     Worry: Not on file     Inability: Not on file   [employer] needs Medical: Not on file     Non-medical: Not on file   Tobacco Use    Smoking status: Never Smoker    Smokeless tobacco: Never Used   Substance and Sexual Activity    Alcohol use: No     Alcohol/week: 0.0 standard drinks    Drug use: Yes     Types: Marijuana    Sexual activity: Not on file   Lifestyle    Physical activity     Days per week: Not on file     Minutes per session: Not on file    Stress: Not on file   Relationships    Social connections     Talks on phone: Not on file     Gets together: Not on file     Attends Mandaeism service: Not on file     Active member of club or organization: Not on file     Attends meetings of clubs or organizations: Not on file     Relationship status: Not on file    Intimate partner violence     Fear of current or ex partner: Not on file     Emotionally abused: Not on file     Physically abused: Not on file     Forced sexual activity: Not on file   Other Topics Concern    Not on file   Social History Narrative    Not on file       Family History   Problem Relation Age of Onset    Asthma Father         Allergies:  Nalbuphine, Tramadol, and Ibuprofen    Home Medications:  Prior to Admission medications    Medication Sig Start Date End Date Taking? Authorizing Provider   clonazePAM (KLONOPIN) 0.5 MG tablet Take 0.5 mg by mouth daily. Historical Provider, MD   clonazePAM (KLONOPIN) 1 MG tablet Take 1 mg by mouth 2 times daily as needed.     Historical Provider, MD   DULoxetine (CYMBALTA) 60 MG extended release capsule Take 60 mg by mouth 2 times daily    Historical Provider, MD   fluticasone (FLONASE) 50 MCG/ACT nasal spray 1 spray by Each Nostril route daily    Historical Provider, MD   polyethylene glycol (GLYCOLAX) 17 g packet Take 17 g by mouth daily as needed for Constipation    Historical Provider, MD   Melatonin-Pyridoxine (MELATIN PO) Take 5 mg by mouth daily    Historical Provider, MD   senna (SENOKOT) 8.6 MG tablet Take 1 tablet by mouth daily    Historical Provider, MD   primidone (MYSOLINE) 250 MG tablet Take 1 tablet by mouth 2 times daily 9/21/18   Jared Ramirez MD   dextromethorphan-quiNIDine (NUEDEXTA) 20-10 MG CAPS per capsule Take 1 capsule by mouth every 12 hours 9/21/18   Jared Ramirez MD   Catheters (GIZMO CONDOM CATHETER) MISC 1 Units by Does not apply route daily 3/10/16   Ying Raza, DO   Incontinence Supplies (URINARY DRAINAGE BAG) MISC 1 Units by Other route once a week Change fry bag at least once weekly. Empty every 4-6 hours. 3/10/16   Ying Raza, DO       REVIEW OFSYSTEMS    (2-9 systems for level 4, 10 or more for level 5)      Review of Systems  Unable to gather due to patient's mental condition    PHYSICAL EXAM   (up to 7 for level 4, 8 or more forlevel 5)      INITIAL VITALS:   ED Triage Vitals [05/13/21 2200]   BP Temp Temp Source Pulse Resp SpO2 Height Weight   117/84 98 °F (36.7 °C) Oral 70 14 98 % -- --       Physical Exam  Constitutional:       Appearance: He is well-developed. Comments: Chronically ill-appearing without acute distress, resting comfortably   HENT:      Head:      Comments: Centimeter linear laceration just lateral to left eyebrow through epidermis, dermis, and subcutaneous fat tissue without disruption of the galea. Eyes:      Conjunctiva/sclera: Conjunctivae normal.      Pupils: Pupils are equal, round, and reactive to light. Comments: Disconjugate gaze   Neck:      Musculoskeletal: Normal range of motion and neck supple. No muscular tenderness. Cardiovascular:      Rate and Rhythm: Normal rate. Heart sounds: Normal heart sounds. Pulmonary:      Effort: Pulmonary effort is normal.      Breath sounds: Normal breath sounds. Abdominal:      General: There is no distension. Palpations: Abdomen is soft. Tenderness: There is no abdominal tenderness. Musculoskeletal: Normal range of motion. General: No deformity or signs of injury.    Skin:     General: Skin is warm and dry. Neurological:      Mental Status: He is alert. Comments: Alert and able to appropriately shake his head no or nod yes in response to questioning. He is nonverbal at baseline. He has chronic contracture and atrophy. Slightly weaker on the left upper extremity compared to the right upper extremities with hand grasp and flexion extension. He has limited range of motion which is likely chronic due to his medical condition. Does not move lower extremities. DIFFERENTIAL  DIAGNOSIS     PLAN (LABS / IMAGING / EKG):  Orders Placed This Encounter   Procedures    CT HEAD WO CONTRAST    CT CERVICAL SPINE WO CONTRAST       MEDICATIONS ORDERED:  Orders Placed This Encounter   Medications    Tetanus-Diphth-Acell Pertussis (BOOSTRIX) injection 0.5 mL    acetaminophen (TYLENOL) 160 MG/5ML solution 650 mg         Initial MDM/Plan: 39 y.o. male who presents with 3 cm linear laceration just lateral to the left eyebrow after mechanical fall out of bed just prior to presentation. Unsure last tetanus immunization and will update tetanus. Analgesia. Patient has chronic neurological disorders including multiple sclerosis and cerebral ataxia with chronic contracture, weakness, deformity. When patient is questioned if this is his normal, he is able to nod his head yes. There are no other signs of trauma. Unfortunately, due to difficulty with neuro exam based on patient's chronic neurological disease processes, cannot apply the [place] head CT rule. Therefore, will obtain a CT of the head to evaluate for any intracranial pathology including hemorrhage. We will also obtain a cervical spine CT to evaluate for fracture dislocation. No other imaging of there are no other signs of trauma. Will perform primary closure to the laceration.     DIAGNOSTIC RESULTS / EMERGENCYDEPARTMENT COURSE / MDM     LABS:  Labs Reviewed - No data to display      RADIOLOGY:  Ct Head Wo Contrast    Result Date: 5/14/2021  EXAMINATION: CT OF THE HEAD WITHOUT CONTRAST; CT OF THE CERVICAL SPINE WITHOUT CONTRAST 5/13/2021 11:42 pm TECHNIQUE: CT of the head was performed without the administration of intravenous contrast. Dose modulation, iterative reconstruction, and/or weight based adjustment of the mA/kV was utilized to reduce the radiation dose to as low as reasonably achievable.; CT of the cervical spine was performed without the administration of intravenous contrast. Multiplanar reformatted images are provided for review. Dose modulation, iterative reconstruction, and/or weight based adjustment of the mA/kV was utilized to reduce the radiation dose to as low as reasonably achievable. COMPARISON: Brain MRI and cervical MRI of 12/05/2020 HISTORY: ORDERING SYSTEM PROVIDED HISTORY: fall out of bed, left sided facial lac TECHNOLOGIST PROVIDED HISTORY: fall out of bed, left sided facial lac Decision Support Exception - unselect if not a suspected or confirmed emergency medical condition->Emergency Medical Condition (MA) Reason for Exam: fall Acuity: Acute Type of Exam: Initial FINDINGS: Cervical spine: BONES/ALIGNMENT: There is no acute fracture or traumatic malalignment. DEGENERATIVE CHANGES: No significant degenerative changes. Posterior disc bulging C4-C5 and C5-C6. SOFT TISSUES: There is no prevertebral soft tissue swelling. Head CT: Changes related to prior bifrontal craniotomy. There is no acute infarction, intracranial hemorrhage or intracranial mass lesion. No mass effect, midline shift or extra-axial collection is noted. There are severe confluent  foci of periventricular and subcortical cerebral white matter hypodensity, most likely representing demyelinating disease. The brain parenchyma is otherwise normal. The cerebellar tonsils are in normal position. The ventricles, sulci, and cisterns are mildly prominent suggestive of moderate generalized volume loss. The globes and orbits are within normal limits.   Mild mucosal thickening of ethmoidal air cells and sphenoid sinuses and right maxillary sinus. The visualized extracranial structures including paranasal sinuses and mastoid air cells are otherwise unremarkable. No fracture is identified. Stable study. Cervical spine CT: No acute osseous abnormality of the cervical spine. Head CT: No evidence for acute intracranial hemorrhage, territorial infarction or intracranial mass lesion. Severe confluent white matter changes most likely sequela to demyelinating disease. Mild generalized volume loss. Mild mucosal thickening of ethmoidal air cells and sphenoid sinuses and right maxillary sinus. Ct Cervical Spine Wo Contrast    Result Date: 5/14/2021  EXAMINATION: CT OF THE HEAD WITHOUT CONTRAST; CT OF THE CERVICAL SPINE WITHOUT CONTRAST 5/13/2021 11:42 pm TECHNIQUE: CT of the head was performed without the administration of intravenous contrast. Dose modulation, iterative reconstruction, and/or weight based adjustment of the mA/kV was utilized to reduce the radiation dose to as low as reasonably achievable.; CT of the cervical spine was performed without the administration of intravenous contrast. Multiplanar reformatted images are provided for review. Dose modulation, iterative reconstruction, and/or weight based adjustment of the mA/kV was utilized to reduce the radiation dose to as low as reasonably achievable. COMPARISON: Brain MRI and cervical MRI of 12/05/2020 HISTORY: ORDERING SYSTEM PROVIDED HISTORY: fall out of bed, left sided facial lac TECHNOLOGIST PROVIDED HISTORY: fall out of bed, left sided facial lac Decision Support Exception - unselect if not a suspected or confirmed emergency medical condition->Emergency Medical Condition (MA) Reason for Exam: fall Acuity: Acute Type of Exam: Initial FINDINGS: Cervical spine: BONES/ALIGNMENT: There is no acute fracture or traumatic malalignment. DEGENERATIVE CHANGES: No significant degenerative changes. Posterior disc bulging C4-C5 and C5-C6.  SOFT TISSUES: There is no prevertebral soft tissue swelling. Head CT: Changes related to prior bifrontal craniotomy. There is no acute infarction, intracranial hemorrhage or intracranial mass lesion. No mass effect, midline shift or extra-axial collection is noted. There are severe confluent  foci of periventricular and subcortical cerebral white matter hypodensity, most likely representing demyelinating disease. The brain parenchyma is otherwise normal. The cerebellar tonsils are in normal position. The ventricles, sulci, and cisterns are mildly prominent suggestive of moderate generalized volume loss. The globes and orbits are within normal limits. Mild mucosal thickening of ethmoidal air cells and sphenoid sinuses and right maxillary sinus. The visualized extracranial structures including paranasal sinuses and mastoid air cells are otherwise unremarkable. No fracture is identified. Stable study. Cervical spine CT: No acute osseous abnormality of the cervical spine. Head CT: No evidence for acute intracranial hemorrhage, territorial infarction or intracranial mass lesion. Severe confluent white matter changes most likely sequela to demyelinating disease. Mild generalized volume loss. Mild mucosal thickening of ethmoidal air cells and sphenoid sinuses and right maxillary sinus. EKG  none    All EKG's are interpreted by the Emergency Department Physicianwho either signs or Co-signs this chart in the absence of a cardiologist.    EMERGENCY DEPARTMENT COURSE:    43-year-old male presents with facial laceration repaired with primary closure. Sutures will need to be removed in 5 days. Nonabsorbable sutures placed. See procedure note below. CT head and CT cervical spine negative for acute pathology including fracture, dislocation, intracranial hemorrhage. Tetanus updated. Patient is hemodynamically stable. No other signs of injury.   Appropriate for discharge back to skilled nursing facility. PROCEDURES:  PROCEDURE NOTE - LACERATION CLOSURE    PATIENT NAME: Sofia Blandon Henagar RECORD NO. [id number]  DATE: 5/14/2021  ATTENDING PHYSICIAN: Blake    PREOPERATIVE DIAGNOSIS: Laceration(s) as follows:  -Location: lateral to left eyebrow  -Length: 3 cm  -Layered closure: No    POSTOPERATIVE DIAGNOSIS:  Same  PROCEDURE PERFORMED:  Suture closure of laceration  PERFORMING PHYSICIAN: Chapis Avelar DO  ANESTHESIA:  Local utilizing  Lidocaine 1% with epinephrine  ESTIMATED BLOOD LOSS:  Less than 25 ml. DISCUSSION:  Taco Mariee is a 39y.o.-year-old male. Patient requires laceration repair. The history and physical examination were reviewed and confirmed. CONSENT: The patient provided verbal consent for this procedure. PROCEDURE:  Prior to starting, the procedure and patient were confirmed by those present. The wound area was irrigated with sterile saline and draped in a sterile fashion. The wound area was anesthetized with Lidocaine 1% with epinephrine. The wound was explored with the following results No foreign bodies found. The wound was repaired with 5-0 Ethilon using running-locking sutures. The wound was dressed with bacitracin and gauze. All sponge, instrument and needle counts were correct at the completion of the procedure. The patient tolerated the procedure well. SUTURE COUNT:  5    COMPLICATIONS:  None     Chapis Avelar DO  12:51 AM, 5/14/21      CONSULTS:  None    CRITICAL CARE:  none    FINAL IMPRESSION      1. Facial laceration, initial encounter          DISPOSITION / PLAN     DISPOSITION Decision To Discharge 05/14/2021 12:46:41 AM      PATIENT REFERRED TO:  No follow-up provider specified.     DISCHARGE MEDICATIONS:  New Prescriptions    No medications on file       Chapis Avelar DO  Emergency Medicine Resident    (Please note that portions of this note were completed with a voice recognition program.Efforts were made to edit the dictations but occasionally words are mis-transcribed.)        Oralee DO Maine Tejeda  05/14/21 0835

## 2021-05-14 NOTE — ED PROVIDER NOTES
and plan for laceration closure after CT imaging    CT scans reviewed  Laceration repaired  Stable for outpatient follow-up        Judge Ambar DO  Attending Emergency Physician        Wilver Colón DO  05/14/21 1248

## 2021-05-14 NOTE — ED NOTES
EMILY notified of pt being DC and needing a way back to the facility he dunia from. EMILY stated it will be awhile. Pt resting in bed. MARK.      Tiffanie Metzger RN  05/14/21 8419

## 2021-05-14 NOTE — ED TRIAGE NOTES
Pt presents to ED via EMS from [facility] with report of pt falling out of bed, hitting head, no LOC/blood thinners. Laceration to left forehead covered with dry dressing. Pt w/ PMHx of MS, in non verbal at baseline but follows commands and on thickened liquids.

## 2021-10-16 ENCOUNTER — HOSPITAL ENCOUNTER (EMERGENCY)
Age: 46
Discharge: HOME | End: 2021-10-17
Attending: EMERGENCY MEDICINE
Payer: MEDICAID

## 2021-10-16 ENCOUNTER — APPOINTMENT (OUTPATIENT)
Dept: CT IMAGING | Age: 46
End: 2021-10-16
Payer: MEDICAID

## 2021-10-16 VITALS
TEMPERATURE: 98.1 F | HEART RATE: 64 BPM | RESPIRATION RATE: 17 BRPM | DIASTOLIC BLOOD PRESSURE: 90 MMHG | SYSTOLIC BLOOD PRESSURE: 132 MMHG | OXYGEN SATURATION: 100 %
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
## 2021-10-16 LAB
ABSOLUTE EOS #: 0.07 K/UL (ref 0–0.44)
ABSOLUTE IMMATURE GRANULOCYTE: 0.03 K/UL (ref 0–0.3)
ABSOLUTE LYMPH #: 1.5 K/UL (ref 1.1–3.7)
ABSOLUTE MONO #: 0.53 K/UL (ref 0.1–1.2)
ANION GAP SERPL CALCULATED.3IONS-SCNC: 8 MMOL/L (ref 9–17)
BASOPHILS # BLD: 0 % (ref 0–2)
BASOPHILS ABSOLUTE: <0.03 K/UL (ref 0–0.2)
BUN BLDV-MCNC: 12 MG/DL (ref 6–20)
BUN/CREAT BLD: ABNORMAL (ref 9–20)
CALCIUM SERPL-MCNC: 9 MG/DL (ref 8.6–10.4)
CHLORIDE BLD-SCNC: 102 MMOL/L (ref 98–107)
CO2: 27 MMOL/L (ref 20–31)
CREAT SERPL-MCNC: 0.74 MG/DL (ref 0.7–1.2)
DIFFERENTIAL TYPE: ABNORMAL
EOSINOPHILS RELATIVE PERCENT: 1 % (ref 1–4)
GFR AFRICAN AMERICAN: >60 ML/MIN
GFR NON-AFRICAN AMERICAN: >60 ML/MIN
GFR SERPL CREATININE-BSD FRML MDRD: ABNORMAL ML/MIN/{1.73_M2}
GFR SERPL CREATININE-BSD FRML MDRD: ABNORMAL ML/MIN/{1.73_M2}
GLUCOSE BLD-MCNC: 81 MG/DL (ref 70–99)
HCT VFR BLD CALC: 45.5 % (ref 40.7–50.3)
HEMOGLOBIN: 14.5 G/DL (ref 13–17)
IMMATURE GRANULOCYTES: 0 %
LYMPHOCYTES # BLD: 21 % (ref 24–43)
MCH RBC QN AUTO: 31.5 PG (ref 25.2–33.5)
MCHC RBC AUTO-ENTMCNC: 31.9 G/DL (ref 28.4–34.8)
MCV RBC AUTO: 98.7 FL (ref 82.6–102.9)
MONOCYTES # BLD: 7 % (ref 3–12)
NRBC AUTOMATED: 0 PER 100 WBC
PDW BLD-RTO: 13.6 % (ref 11.8–14.4)
PLATELET # BLD: 222 K/UL (ref 138–453)
PLATELET ESTIMATE: ABNORMAL
PMV BLD AUTO: 11.4 FL (ref 8.1–13.5)
POTASSIUM SERPL-SCNC: 3.8 MMOL/L (ref 3.7–5.3)
RBC # BLD: 4.61 M/UL (ref 4.21–5.77)
RBC # BLD: ABNORMAL 10*6/UL
SEG NEUTROPHILS: 71 % (ref 36–65)
SEGMENTED NEUTROPHILS ABSOLUTE COUNT: 5.13 K/UL (ref 1.5–8.1)
SODIUM BLD-SCNC: 137 MMOL/L (ref 135–144)
WBC # BLD: 7.3 K/UL (ref 3.5–11.3)
WBC # BLD: ABNORMAL 10*3/UL

## 2021-10-16 PROCEDURE — 99285 EMERGENCY DEPT VISIT HI MDM: CPT

## 2021-10-16 PROCEDURE — 3209999900 CT LUMBAR SPINE TRAUMA RECONSTRUCTION

## 2021-10-16 PROCEDURE — 71260 CT THORAX DX C+: CPT

## 2021-10-16 PROCEDURE — 72125 CT NECK SPINE W/O DYE: CPT

## 2021-10-16 PROCEDURE — 3209999900 CT THORACIC SPINE TRAUMA RECONSTRUCTION

## 2021-10-16 PROCEDURE — 6360000004 HC RX CONTRAST MEDICATION: Performed by: STUDENT IN AN ORGANIZED HEALTH CARE EDUCATION/TRAINING PROGRAM

## 2021-10-16 PROCEDURE — 85025 COMPLETE CBC W/AUTO DIFF WBC: CPT

## 2021-10-16 PROCEDURE — 70450 CT HEAD/BRAIN W/O DYE: CPT

## 2021-10-16 PROCEDURE — 80048 BASIC METABOLIC PNL TOTAL CA: CPT

## 2021-10-16 RX ADMIN — IOPAMIDOL 75 ML: 755 INJECTION, SOLUTION INTRAVENOUS at 21:38

## 2021-10-16 ASSESSMENT — PAIN SCALES - WONG BAKER
WONGBAKER_NUMERICALRESPONSE: 4
WONGBAKER_NUMERICALRESPONSE: 6

## 2021-10-16 ASSESSMENT — PAIN DESCRIPTION - DESCRIPTORS: DESCRIPTORS: ACHING

## 2021-10-16 ASSESSMENT — PAIN DESCRIPTION - ORIENTATION
ORIENTATION: MID;LOWER
ORIENTATION: LOWER;MID

## 2021-10-16 ASSESSMENT — PAIN DESCRIPTION - PAIN TYPE
TYPE: ACUTE PAIN
TYPE: ACUTE PAIN

## 2021-10-16 ASSESSMENT — PAIN DESCRIPTION - FREQUENCY: FREQUENCY: CONTINUOUS

## 2021-10-16 ASSESSMENT — PAIN DESCRIPTION - LOCATION
LOCATION: BACK
LOCATION: BACK

## 2021-10-17 NOTE — ED PROVIDER NOTES
[address]  ED  Emergency Department Encounter  EmergencyMedicine Resident     Pt Shiv Shelby  MRN: [id number]  Latagfava 1975  Date of evaluation: 10/16/21  PCP:  No primary care provider on file. CHIEF COMPLAINT       Chief Complaint   Patient presents with    Fall       HISTORY OF PRESENT ILLNESS  (Location/Symptom, Timing/Onset, Context/Setting, Quality, Duration, Modifying Factors, Severity.)      Navin Blake is a 42 y.o. male who presents with fall. Patient [facility] bed at his [facility], and there is severe difficulty with communicating with him to determine what hurts. ECF staff relayed to EMS that he was complaining of back pain, but patient has difficulty with communicating secondary to history of MS. Per staff, the arm of the bed was accidentally left down and patient fell out of the bed and landed on his head. It is unknown how long he fell. PAST MEDICAL / SURGICAL / SOCIAL / FAMILY HISTORY      has a past medical history of Anxiety, Depression, and Multiple sclerosis ([facility] [address] has no past surgical history on file.       Social History     Socioeconomic History    Marital status: Legally      Spouse name: Not on file    Number of children: Not on file    Years of education: Not on file    Highest education level: Not on file   Occupational History    Not on file   Tobacco Use    Smoking status: Never Smoker    Smokeless tobacco: Never Used   Substance and Sexual Activity    Alcohol use: No     Alcohol/week: 0.0 standard drinks    Drug use: Yes     Types: Marijuana    Sexual activity: Not on file   Other Topics Concern    Not on file   Social History Narrative    Not on file     Social Determinants of Health     Financial Resource Strain:     Difficulty of Paying Living Expenses:    Food Insecurity:     Worried About Running Out of Food in the Last Year:     [address] in the Last Year:    Transportation Needs:     Lack of Transportation (Medical):  Lack of Transportation (Non-Medical):    Physical Activity:     Days of Exercise per Week:     Minutes of Exercise per Session:    Stress:     Feeling of Stress :    Social Connections:     Frequency of Communication with Friends and Family:     Frequency of Social Gatherings with Friends and Family:     Attends Shinto Services:     Active Member of Clubs or Organizations:     Attends Club or Organization Meetings:     Marital Status:    Intimate Partner Violence:     Fear of Current or Ex-Partner:     Emotionally Abused:     Physically Abused:     Sexually Abused:        Family History   Problem Relation Age of Onset    Asthma Father        Allergies:  Nalbuphine, Tramadol, and Ibuprofen    Home Medications:  Prior to Admission medications    Medication Sig Start Date End Date Taking? Authorizing Provider   clonazePAM (KLONOPIN) 0.5 MG tablet Take 0.5 mg by mouth daily. Historical Provider, MD   clonazePAM (KLONOPIN) 1 MG tablet Take 1 mg by mouth 2 times daily as needed.     Historical Provider, MD   DULoxetine (CYMBALTA) 60 MG extended release capsule Take 60 mg by mouth 2 times daily    Historical Provider, MD   fluticasone (FLONASE) 50 MCG/ACT nasal spray 1 spray by Each Nostril route daily    Historical Provider, MD   polyethylene glycol (GLYCOLAX) 17 g packet Take 17 g by mouth daily as needed for Constipation    Historical Provider, MD   Melatonin-Pyridoxine (MELATIN PO) Take 5 mg by mouth daily    Historical Provider, MD   senna (SENOKOT) 8.6 MG tablet Take 1 tablet by mouth daily    Historical Provider, MD   primidone (MYSOLINE) 250 MG tablet Take 1 tablet by mouth 2 times daily 9/21/18   Stacie Lucas MD   dextromethorphan-quiNIDine (NUEDEXTA) 20-10 MG CAPS per capsule Take 1 capsule by mouth every 12 hours 9/21/18   Stacie Lucas MD   Catheters (GIZMO CONDOM CATHETER) MISC 1 Units by Does not apply route daily 3/10/16   Ying Raza DO Incontinence Supplies (URINARY DRAINAGE BAG) [facility] 1 Units by Other route once a week Change fry bag at least once weekly. Empty every 4-6 hours. 3/10/16   Ying Raza,        REVIEW OF SYSTEMS    (2-9 systems for level 4, 10 or more for level 5)      Review of Systems   Unable to perform ROS: Patient nonverbal       PHYSICAL EXAM   (up to 7 for level 4, 8 or more for level 5)      INITIAL VITALS:   BP (!) 132/90   Pulse 64   Temp 98.1 °F (36.7 °C) (Oral)   Resp 17   SpO2 100%     Physical Exam  Constitutional:       General: He is not in acute distress. Appearance: He is well-developed. He is not diaphoretic. HENT:      Head: Normocephalic and atraumatic. Eyes:      Pupils: Pupils are equal, round, and reactive to light. Comments: Disconjugate gaze   Neck:      Vascular: No JVD. Trachea: No tracheal deviation. Comments: In c-collar  Cardiovascular:      Rate and Rhythm: Normal rate and regular rhythm. Pulses: Normal pulses. Heart sounds: Normal heart sounds. Pulmonary:      Effort: Pulmonary effort is normal. No respiratory distress. Breath sounds: Normal breath sounds. No wheezing or rales. Chest:      Chest wall: No tenderness. Abdominal:      General: Bowel sounds are normal. There is no distension. Palpations: Abdomen is soft. Tenderness: There is no abdominal tenderness. There is no guarding. Musculoskeletal:      Cervical back: No tenderness. Comments: Contracted bilateral upper and lower extremities   Skin:     General: Skin is warm and dry. Capillary Refill: Capillary refill takes less than 2 seconds. Coloration: Skin is not pale. Findings: No erythema or rash. Neurological:      Mental Status: He is alert and oriented to person, place, and time. Cranial Nerves: No cranial nerve deficit.          DIFFERENTIAL  DIAGNOSIS     PLAN (LABS / IMAGING / EKG):  Orders Placed This Encounter   Procedures    CT HEAD WO CONTRAST    CT CERVICAL SPINE WO CONTRAST    CT CHEST ABDOMEN PELVIS W CONTRAST    CT THORACIC SPINE TRAUMA RECONSTRUCTION    CT LUMBAR SPINE TRAUMA RECONSTRUCTION    CBC Auto Differential    Basic Metabolic Panel w/ Reflex to MG       MEDICATIONS ORDERED:  Orders Placed This Encounter   Medications    iopamidol (ISOVUE-370) 76 % injection 75 mL       DDX: fall, TBI, spinal injury    MDM/IMPRESSION: This is a 42 male for severe MS and contracted bilateral upper extremities that fell out of his hospital bed at [facility] today. Was complaining of back pain to staff, however very difficult with communication and unable to obtain further history or determine what hurts. He does somewhat shake his head yes to having the back pain. Plan for CT scans, anticipate discharge if negative.     DIAGNOSTIC RESULTS / EMERGENCY DEPARTMENT COURSE / MDM   LAB RESULTS:  Results for orders placed or performed during the hospital encounter of 10/16/21   CBC Auto Differential   Result Value Ref Range    WBC 7.3 3.5 - 11.3 k/uL    RBC 4.61 4.21 - 5.77 m/uL    Hemoglobin 14.5 13.0 - 17.0 g/dL    Hematocrit 45.5 40.7 - 50.3 %    MCV 98.7 82.6 - 102.9 fL    MCH 31.5 25.2 - 33.5 pg    MCHC 31.9 28.4 - 34.8 g/dL    RDW 13.6 11.8 - 14.4 %    Platelets 484 393 - 051 k/uL    MPV 11.4 8.1 - 13.5 fL    NRBC Automated 0.0 0.0 per 100 WBC    Differential Type NOT REPORTED     Seg Neutrophils 71 (H) 36 - 65 %    Lymphocytes 21 (L) 24 - 43 %    Monocytes 7 3 - 12 %    Eosinophils % 1 1 - 4 %    Basophils 0 0 - 2 %    Immature Granulocytes 0 0 %    Segs Absolute 5.13 1.50 - 8.10 k/uL    Absolute Lymph # 1.50 1.10 - 3.70 k/uL    Absolute Mono # 0.53 0.10 - 1.20 k/uL    Absolute Eos # 0.07 0.00 - 0.44 k/uL    Basophils Absolute <0.03 0.00 - 0.20 k/uL    Absolute Immature Granulocyte 0.03 0.00 - 0.30 k/uL    WBC Morphology NOT REPORTED     RBC Morphology NOT REPORTED     Platelet Estimate NOT REPORTED    Basic Metabolic Panel w/ Reflex to MG   Result Value Ref Range    Glucose 81 70 - 99 mg/dL    BUN 12 6 - 20 mg/dL    CREATININE 0.74 0.70 - 1.20 mg/dL    Bun/Cre Ratio NOT REPORTED 9 - 20    Calcium 9.0 8.6 - 10.4 mg/dL    Sodium 137 135 - 144 mmol/L    Potassium 3.8 3.7 - 5.3 mmol/L    Chloride 102 98 - 107 mmol/L    CO2 27 20 - 31 mmol/L    Anion Gap 8 (L) 9 - 17 mmol/L    GFR Non-African American >60 >60 mL/min    GFR African American >60 >60 mL/min    GFR Comment          GFR Staging NOT REPORTED          RADIOLOGY:  CT CHEST ABDOMEN PELVIS W CONTRAST   Final Result   No evidence of acute posttraumatic process involving chest/abdomen/pelvis. Negative acute fracture traumatic malalignment involving the thoracic or   lumbar spine. Large amount of stool rectosigmoid junction worrisome for fecal impaction   versus stercoral colitis. 2.1 heterogeneous enhancing mass involving left posterior hepatic lobe. Based on size and enhancement characteristics, consider hepatic MRI with   without contrast when clinically feasible. CT THORACIC SPINE TRAUMA RECONSTRUCTION   Final Result   No evidence of acute posttraumatic process involving chest/abdomen/pelvis. Negative acute fracture traumatic malalignment involving the thoracic or   lumbar spine. Large amount of stool rectosigmoid junction worrisome for fecal impaction   versus stercoral colitis. 2.1 heterogeneous enhancing mass involving left posterior hepatic lobe. Based on size and enhancement characteristics, consider hepatic MRI with   without contrast when clinically feasible. CT LUMBAR SPINE TRAUMA RECONSTRUCTION   Final Result   No evidence of acute posttraumatic process involving chest/abdomen/pelvis. Negative acute fracture traumatic malalignment involving the thoracic or   lumbar spine. Large amount of stool rectosigmoid junction worrisome for fecal impaction   versus stercoral colitis.       2.1 heterogeneous enhancing mass involving left posterior hepatic lobe. Based on size and enhancement characteristics, consider hepatic MRI with   without contrast when clinically feasible. CT HEAD WO CONTRAST   Final Result   No acute intracranial abnormality. Periventricular hypoattenuation,   consistent with patient's known history of multiple sclerosis. No acute abnormality in the cervical spine. CT CERVICAL SPINE WO CONTRAST   Final Result   No acute intracranial abnormality. Periventricular hypoattenuation,   consistent with patient's known history of multiple sclerosis. No acute abnormality in the cervical spine. EKG      All EKG's are interpreted by the Emergency Department Physician who either signs or Co-signs this chart in the absence of a cardiologist.    EMERGENCY DEPARTMENT COURSE:  ED Course as of Oct 16 2306   Sat Oct 16, 2021   2219 Patient and patient's mother updated on results. Awaiting transfer back to [facility]. [JG]      ED Course User Index  [JG] Janett Miller DO        PROCEDURES:      CONSULTS:  None    CRITICAL CARE:      FINAL IMPRESSION      1.  Fall, initial encounter          DISPOSITION / PLAN     DISPOSITION Decision To Discharge 10/16/2021 10:19:29 PM      PATIENT REFERRED TO:  [facility]  [address] [address]  [phone]  Go to   If symptoms worsen      DISCHARGE MEDICATIONS:  New Prescriptions    No medications on file       Janett Miller DO  Emergency Medicine Resident    (Please note that portions of thisnote were completed with a voice recognition program.  Efforts were made to edit the dictations but occasionally words are mis-transcribed.)     Janett Miller DO  10/16/21 8936

## 2021-10-17 NOTE — ED NOTES
Pt returned from CT.  Family at bedside     Parker Hutchins [facility]  10/16/21 2143       Parker Hutchins RN  10/16/21 214

## 2021-10-17 NOTE — ED NOTES
Report called to Rupert Rutledge  @ [place] [facility].  Awaiting transportation     [facility]  10/16/21 8973

## 2021-10-17 NOTE — ED PROVIDER NOTES
----- Message from Estela Hays sent at 2/4/2019  1:20 PM CST -----  Contact: self/ [phone]  Caller is requesting a sooner appointment. Caller declined first available appointment listed below. Caller will not accept being placed on the wait list and is requesting a message be sent to the provider.    When is the next available appointment:  5/1/2019  Did you offer to reyes  Symptoms:    Patient preference of timeframe to be scheduled:    What is the reason the patient is requesting a sooner appointment? (insurance terminating, changing jobs):    Would you prefer an answer via [employer]?:  no  Comments:     [address] [facility]     Emergency Department     Faculty Attestation    I performed a history and physical examination of the patient and discussed management with the resident. I reviewed the residents note and agree with the documented findings including all diagnostic interpretations and plan of care. Any areas of disagreement are noted on the chart. I was personally present for the key portions of any procedures. I have documented in the chart those procedures where I was not present during the key portions. I have reviewed the emergency nurses triage note. I agree with the chief complaint, past medical history, past surgical history, allergies, medications, social and family history as documented unless otherwise noted below. Documentation of the HPI, Physical Exam and Medical Decision Making performed by scribluzma is based on my personal performance of the HPI, PE and MDM. For Physician Assistant/ Nurse Practitioner cases/documentation I have personally evaluated this patient and have completed at least one if not all key elements of the E/M (history, physical exam, and MDM). Additional findings are as noted. This patient was evaluated in the Emergency Department for symptoms described in the history of present illness. He/she was evaluated in the context of the global COVID-19 pandemic, which necessitated consideration that the patient might be at risk for infection with the SARS-CoV-2 virus that causes COVID-19. Institutional protocols and algorithms that pertain to the evaluation of patients at risk for COVID-19 are in a state of rapid change based on information released by regulatory bodies including the CDC and federal and state organizations. These policies and algorithms were followed during the patient's care in the ED. Primary Care Physician: No primary care provider on file. History:  This is a 42 y.o. male who presents to the Emergency Department with complaint of fall from bed. Unwitnessed. History of MS. Minimally verbal. No blood thinners. Physical:     oral temperature is 98.1 °F (36.7 °C). His blood pressure is 132/81 and his pulse is 65. His respiration is 16 and oxygen saturation is 100%. 42 y.o. male not acutely distressed, c-collar in place, disconjugate gaze, will attempt to move head to voice, significant stutter with difficult to decipher language. Significant contractions in the extremities. No obvious deformity due to trauma. Cardiac exam regular rate and rhythm no murmurs rubs gallops, pulmonary clear bilaterally abdomen is soft nontender nondistended, pelvis is stable.     Impression: Unwitnessed fall from bed    Plan: CTs, reassess        Nori Melendez MD, [facility] Lynn  Attending Emergency Physician        Deepti Bahena MD  10/16/21 2110

## 2021-10-17 NOTE — CARE COORDINATION
Social work: spoke to [facility] they can transport at around 12:30 pm back to [facility]. Mom is in the room and is aware of the plan. If they can come sooner that is fine per RN and mom. Called snf to advise of time also. RN has paperwork for [facility].  Preet garcia

## 2021-10-17 NOTE — ED NOTES
Report taken from [facility]. Pt resting on stretcher. NAD noted. RR even and nonlabored. Call light within reach. Family at bedside. Awaiting [facility]. Will continue to monitor.        Elie Hartmann RN  10/16/21 1144

## 2021-10-17 NOTE — ED PROVIDER NOTES
Faculty Sign-Out Attestation  Handoff taken on the following patient from prior Attending Physician: Shari Tran    I was available and discussed any additional care issues that arose and coordinated the management plans with the resident(s) caring for the patient during my duty period. Any areas of disagreement with residents documentation of care or procedures are noted on the chart. I was personally present for the key portions of any/all procedures during my duty period. I have documented in the chart those procedures where I was not present during the key portions.     S/p fall, needing transport back to [facility],   Hx of debilitating MS, > discharged    Brittani Arnett DO  Attending Physician     Brittani Arnett DO  10/16/21 8322

## 2021-10-17 NOTE — ED NOTES
Bed: 23  Expected date:   Expected time:   Means of arrival:   Comments:  [place]  10/16/21 2045

## 2023-04-06 PROBLEM — J18.9 COMMUNITY ACQUIRED PNEUMONIA OF RIGHT LOWER LOBE OF LUNG: Status: ACTIVE | Noted: 2023-04-06

## 2023-04-10 PROBLEM — R16.0 LIVER MASS: Status: ACTIVE | Noted: 2023-04-10

## 2023-04-10 PROBLEM — R91.8 LUNG MASS: Status: ACTIVE | Noted: 2023-04-10

## 2023-04-10 PROBLEM — J18.9 PNEUMONIA OF RIGHT LOWER LOBE DUE TO INFECTIOUS ORGANISM: Status: ACTIVE | Noted: 2023-04-10

## 2023-08-31 ENCOUNTER — TRANSCRIBE ORDERS (OUTPATIENT)
Dept: ADMINISTRATIVE | Age: 48
End: 2023-08-31

## 2023-08-31 DIAGNOSIS — R13.12 OROPHARYNGEAL DYSPHAGIA: Primary | ICD-10-CM

## 2023-09-12 ENCOUNTER — HOSPITAL ENCOUNTER (OUTPATIENT)
Dept: GENERAL RADIOLOGY | Age: 48
Discharge: HOME | End: 2023-09-14
Payer: MEDICAID

## 2023-09-12 PROCEDURE — 74230 X-RAY XM SWLNG FUNCJ C+: CPT

## 2023-09-12 PROCEDURE — 92611 MOTION FLUOROSCOPY/SWALLOW: CPT

## 2023-09-12 NOTE — PROCEDURES
min pyriform residual  Nectar tsp/straw: spill to pyriform no penetration no aspiration no stasis  Thin straw: spill to pyriform no penetration no aspiration min pyriform stasis    Esophageal Phase  Esophageal Screen: WFL        Pain    0/10         Therapy Time:   Individual Concurrent Group Co-treatment   Time In  1045         Time Out  1100         Minutes  30[address] CAP[address] Fry, 9/12/2023, 1:02 PM

## 2024-02-04 NOTE — CONSULTS
CHIDI  IR decided to remove NPT 2/2 due to no urine output and limited fluid output for 5 days. No fluid collection on imaging. Agree with this decision. Would consider replacement if symptomatic or with pain. Otherwise patient can follow up per sign off plan on 1/31 with office follow up with Dr. Rm in one month.    [facility] Neurology   [address] [facility]  Neurology Consult Note      Reason for Consult: History of MS, evaluate to see if condition is related to MS  Requesting Physician:  Yousif Peña DO    CHIEF COMPLAINT:  AMS, fever, cough, anorexia, eye deviations     History Obtained From:  patient, mother, electronic medical record       HISTORY OF PRESENT ILLNESS:              The patient is a 39 y.o. right handed male with significant past medical history of RRMS, bedbound, bilateral lower extremity paraparesis, GLORY, ataxic speech, pseudobulbar palsy, spasticity, S/p Meningioma resection (2012) who presents as transfer from nursing home after being found unresponsive, altered mental status, fever, some eye deviations, cough. Dressed per mother who is in the room patient has a history of MS since he was 29years old and has been progressively getting worse. This couple of days mother noticed that he was having some fevers having some cough and they called that he was unresponsive and more altered and decision was to bring him to the ED. He was admitted for possible UTI, infectious process. Fever of 102 Fahrenheit's. Patient had other recent admissions for UTI and altered mental status     Neurology was consulted to evaluate to see if he has some MS worsening due to infection or if MS related his altered mental status    Intermittent Hx:  He follows with neurology (Dr Andra Felton) at [facility] and last note was from March 31, 2020. In system last MRI brain and cervical were from December 28, 2017 (admitted for possible flulike symptoms and planning LP which was not done due to in prove mentation. MRI did not show any new acute demyelinating process) and prior to that on March 7, 2016 (last infection, MRI did not show any acute demyelinating process). Last face to face office visit was on November 13, 2019    MS Hx as per Neurology note:  1. 2007:  Bilateral vision loss (and associated migraine headaches). Hospitalized at [address], [place] for two weeks. He recalls that his pupil was unreactive. He does not recall MRI imaging. Vision improved after two weeks and eventually returned to normal vision. 2. July 2008: Increased difficulty with balance and coordination and increased falls over a year. Symptoms (leg weakness, imbalalnce) started in February or March 2008. Prior to that (2006), he had symtoms of vision loss in both eyes; a physician examined his eyes and advised him that they did not see anything wrong with his eyes (while admitted to [address] [place] [address] for acute bilateral vision loss). He was discharged and told that his vision was within normal limits. Symptoms included vision \"completely out\" for a day followed by severe blurriness; he has residual blurriness and photosensitivity. His vision also \"bounces\" even prior to 2006. He had intermittent burning in the legs over 1 year and tingling in the toes, and was dropping objects. In 2008, he was able to walk approximately 1 block before losing strength in his legs. He also had increased urine hesitancy with initiating urinary stream and he does have a history of urinary incontinence. He also had headaches located at both temples, characterized as throbbing, with an intensity of 10/10. In the past the patient has tried Tylenol, aspirin and Motrin and stated they did not relieve his headache pain. Currently his headache pain only improves with sleep. 3. May 29, 2008: Brain MRI (May 29, 2008; [facility]) suggested severe white matter disease with a predominant periventricular distribution. The overall differential diagnosis includes advanced diabetes, hypertension, multiple sclerosis, Lyme disease or other demyelinating processes. The distribution is suggestive of multiple sclerosis, but requires clinical correlation and exclusion of other entities.  No convincing acute focus of ischemia and the seventh and eighth cranial nerves are within normal limits. 4. October 2008: He complained of worsening weakness, increased falls and increased difficulty with coordination. He was started on IV Solu-Medrol without any improvement despite a 5-day course of treatment. The patient was then started on plasmapheresis on October 16, 2008, and scheduled for five treatments. There was a signficant improvement in his gait, strength and balance. 5. Since July 2008: He uses a cane occasionally for gait imbalance. He has intermittent numbness from the waist down. Leg burning is long-standing (since prior to 2006) and is relieved by Neurontin (8/10 intensity off Neurontin, reduced to 0/10 after Neurontin). He has occasional falls without injuries. 6. July 22, 2009: Mitoxantrone (12 mg/m2) infusion complicated by increased generalized weakness, potential laryngeal edema, and pain throughout his body. 7. 9/2009: Plasma exhange (symptoms of fatigue, pain). Fatigue improved after exachanges. 8. 6/2010: Began Copaxone. 9. 1/2011: Discontinued Copaxone due to injection site pain. 10. 3/2012: Meningioma resection (Dr. Williams Peña). 11. 1/2016: Increase in ataxia amplitude noted in both upper extremities (loss of dexterity allowing him to use touch screen on digital devices in order to interact with others). 12. 3/2016: He was hopsitalized with flu for 5 days at [facility], [place], [place].     13. Baclofen for treatment of muscle spasms was associated with delirium and was disctontinued. He is on primidone and propranolol now for tremors and titubation with some relief. 14. Normal/negative labs (6/20/2017): TB quantiferon; heptiatis C antibody, hepatitis B core IgM; hepatitis B surface antigen;    15. 7/2017: He started Glenora Cirri in July 2017 without adverse events. MS Checklist (1/30/2019):  MS DMT compliance: He started Ocrevus in July 2017 without adverse events.  Next infusion is scheduled for February 2019.  Memory/cognition/depression/fatigue: Memory/cognition are affected moderately; short term memory is poor (old symptom, unchanged), and cognition is generally slow. He has depression for which he may still be on Seroquel. He has fatigue (old symptom, unchanged); he was on amantadine and was discontinued in 2014 due to ineffectiveness. Provigil was prescribed in 2/2014, but he states he never started it, though remains interested. Nuedexta (20/10) helped symptoms of pseudobulbar palsy. Sensory/pain: He denies sensory loss. Pain is of several characteristics:  a. Inactive. Burning pains affect both legs (worse with exercise or movement); he is on nortriptyline (old symptom, improved). Prior use of Lyrica was complicated by tremors and gabapentin was used in the past (unable to recall whether side effect). b. Inactive. He has intermittent numbness/tingling in his legs (old symptoms, unchanged). c. Inactive. Aching pain/tingling affects the arms and legs; intensity: 4-10/10. Lyrica helped but required doses of 300 mg by mouth twice a day, and was discontinued after it was felt that made the tremors worse. He uses nortriptyline 25 mg, 2 caps by mouth at bedtime for pain, also. d. Inactive. He also has head pain characterized as sharp and stabbing (10/10) intermittently on a daily basis; only Percocet has helped aching pain (reduces pain to 0/10 \"sometimes -- but not always -- for the rest of the day\"). Klonopin 1 mg po bid has been used for tightness. e. Knee pain. He complains of severe knee pain; Percocet helps relieve pain. Bowel/bladder: He denies urinary urgency, frequency. He wears briefs for urinary incontinence (old symptom, unchanged), instead of a condum catheter now. Intermittent constipation has been treated with OTC medications.   Gait/falls: Ataxia affects walking (he is confined to a wheelchair or bed and is immobile, and requires a Carol lift for transfers; he no longer transfers or uses a walker due to ataxia); (old symptom, unchanged). He also has problems with depth perception and diplopia, and feels that this predisposes him to falls. He denies falls. His wife notes that he is able to sit in a wheelchair, but is unable to control it (tremors) and requires restraint or he will slump. Other: He has intermittent dysphagia (old symptom, unchanged). Movements related to ataxia affects limbs, neck, and voice. He has diplopia and nystagmus. He no longer has prisms in his glasses. Tremor affects his head and limbs (improved partially with Seroquel and Cymbalta and more recently with propranolol and primodone). He does not sleep through the night, though the reason is unclear. Ancillary testing outpatient  Ancillary Testin. Normal/negative labs [facility], [id number]): Amylase, lipase, lactate, TSH, free thyroxine, CARMELA, FTA, ESR, Lyme IgG and IgM, HIV 1/2 antibody. 2. Abnormal labs ([facility], 2009): 25-OH-vit D 16 ng/ml (N: 30-80)    3. EMG/NCS ([facility], 3/23/2010): This is a normal study. There is no electrophysiologic evidence for carpal  tunnel syndrome or other mononeuropathy in the right arm. 4. CSF analysis ([facility], ):   a. Cell count/diff: 354 RBC, 6 WBC (tube 1); 15 RBC, 0 WBC (tube 4);  b. CSF protein: 30 mg/dL (N: 20-45); CSF glucose: 67 mg/dL (N: 40-70);   c. Normal/negative CSF microbiology: cryptococcal antigen, CSF VDRL and FTA, CSF Gram stain, routine CSF culture (3 days), CSF fungal culture (31 days), CSF AFB culture (46 days), CSF ACE level, CSF Lyme titers, CSF viral culture, CSF neuron specific enolase, Mycobacterium tuberculosis complex rRNA (by transcription-mediated amplification);  d. CSF oligoclonal bands: Positive. As compared to the serum, isoelectric focusing/immunofixation reveals 14 IgG bands that are unique to the CSF.  e. CSF IgG index: 1.43 (N: 0.28-0.66);  f. CSF IgG synthesis rate: 13.4 mg/day (N: 0.0-8.0);   g. CSF myelin basic protein: 2.58 ng/mL (N: 0.07-4.10). 5. Brain MRI done May 29, 2008 at [facility] suggested severe white matter disease with a predominant periventricular distribution. The overall differential diagnosis includes advanced diabetes, hypertension, multiple sclerosis, Lyme disease or other demyelinating processes. The distribution is suggestive of multiple sclerosis, but requires clinical correlation and exclusion of other entities. No convincing acute focus of ischemia and the seventh and eighth cranial nerves are within normal limits. 6. Brain MRI (without and with contrast, [facility] [address], 10/11/2008): Report -- \"Multiple rounded areas of hyperintensity on the FLAIR images seen throughout the white matter involving the periventricular, subcortical and centrum semiovale may suggest a demyelinating disease. Diffferentials include multiple sclerosis and acute demmyelinating encephalomyelitis. A rounded area of isointensity on T1 and T2 images which shows homogeneous contrast enhancement, possibly suggestive of an olfactory region extra-axial meningioma. \"    7. Brain MRI (without and with contrast, [facility], 2/4/2009): Report -- \"Multiple increased T2 signal lesions are noted in the periventriulcar and subcortical white matter, int he midbrain, in the left middle cerebellar peduncle and in the right and left cerebellar hemispheres. These were present on the previous study. No new lesions are seen. No abnormal enhancement is seen after contrast administration to suggest active demyelination. Frontal meningioma measures approximately 14 x 12 x 8 mm. \"    8. Brain MRI (without and with contrast, [facility] [address], [place], [place], 8/19/2011): Report -- \"Demyelinating lesions in the periventricular white matter and in the brainstem have increased in size and number since the previous study. None of these lesions show abnormal enhancement after contrast administration.  Increased size of the previously described frontal meningioma; it measured previously 14 x 12 x 8, and now measures 22 x 22 x 15 mm. \" Images reviewed in clinic 2/15/2012.    9. Brain [facility] and [facility] contrast[facility] [place], [place], 1/19/2013): Report -- \"No MR evidence of recurrence or residual tumor lesion. Minimal dural enhancement likely postsurgical changes similar to the previous exam. Re- demonstration of multiple periventricular white matter foci consistent with chronic multiple sclerosis. No new or enhancing white matter lesions. Right maxillary polyp. \"    10. CT brain ( without and with contrast, [facility], 4/12/2019): Report -- \"Findings: CT images of the brain were obtained. There are white matter changes most consistent with patient's known history of multiple sclerosis. Patient status post frontal craniotomy. Intravenous contrast was administered showing no focus of abnormal intra-axial or extra-axial enhancement. No cisternal effacement seen. No obvious recurrent mass appreciated. Impression: White matter changes most likely related to patient's known history of multiple sclerosis. \"    11. Cervical spine MRI (noncontrast study, [address], 7/18/2008): Report -- \"At C2-3, C3-4, C4-5, C5-6, C6-7, C7-T1, the discs, spinal canal, and neural foramina are unremarkable. Impression: Mucosal thickening in the paranalsal sinuses suggesting chronic sinusitis. Otherwise, unremarkable study. \"    12. Cervical spine MRI (without and with contrast[facility] 10/11/[facility]): Report -- \" No cervical spine demyelinating lesions seen. \"    13. Thoracic spine MRI (noncontrast [facility] 7/18/[facility]): Report -- \"Nonspecific increased T2 signal lesion at the posterior aspect of the spinal cord at T90 level extending over 1 cm in craniocaudal dimension. It may represent a demyelinating process.  Follow-up is recommended with MRI after contrast administration in one month for better characterization and to exclude a neoplastic process. \"      Past Medical History:        Diagnosis Date    Depression     Multiple sclerosis ([facility] [address]      Past Surgical History:    History reviewed. No pertinent surgical history. Current Medications:   Current Facility-Administered Medications: FLUoxetine (PROZAC) capsule 20 mg, 20 mg, Oral, Daily  primidone (MYSOLINE) tablet 250 mg, 250 mg, Oral, BID  QUEtiapine (SEROQUEL) tablet 25 mg, 25 mg, Oral, Nightly  vitamin D (CHOLECALCIFEROL) tablet 1,000 Units, 1,000 Units, Oral, BID  enoxaparin (LOVENOX) injection 40 mg, 40 mg, Subcutaneous, Daily  [START ON 12/6/2020] cefTRIAXone (ROCEPHIN) 1 g IVPB in 50 mL D5W minibag, 1 g, Intravenous, Q24H  vancomycin (VANCOCIN) intermittent dosing (placeholder), , Other, RX Placeholder  vancomycin (VANCOCIN) 1250 mg in dextrose 5 % 250 mL IVPB, 1,250 mg, Intravenous, Q12H  Allergies:  Nalbuphine; Tramadol; and Ibuprofen    Social History:  TOBACCO:   reports that he has never smoked. He has never used smokeless tobacco.  ETOH:   reports no history of alcohol use. DRUGS:   reports current drug use. Drug: Marijuana.   ACTIVITIES OF DAILY LIVING: She needs assistance 24/7  INSTRUMENTAL ACTIVITIES OF DAILY LIVING: Patient needs assistance 24/7    Family History:       Problem Relation Age of Onset    Asthma Father        REVIEW OF SYSTEMS:  CONSTITUTIONAL:  Positive for fever   EYES:  Positive for internuclear ophthalmoplegia   HEENT: negative for tinnitus and sore throat   RESPIRATORY:  Positive for cough, decreased breath sounds   CARDIOVASCULAR: negative for chest pain, palpitations, or syncope   GASTROINTESTINAL: negative for abdominal pain, nausea, vomiting, diarrhea, or constipation  Positive for some dysphagia   GENITOURINARY:  Positive for incontinence   MUSCULOSKELETAL: negative for neck or back pain, negative for extremity pain   NEUROLOGICAL: Negative for seizures, headaches, confusion, aphasia, dysarthria  Positive for weakness, numbness light touch sensation on thigh but below the knee will not feel any light touch, pinprick, vibration, proprioception   CEREBELLAR FUNCTION:  Unable to assess due to weakness, paraparesis, spasticity   REFLEX FUNCTION:  Right Triceps (C7): 2/2                                             Left Tricep (C7): 3/2   Right Bicep (C5, C6): 2/2                                         Left Bicep (C5, C6): 3/2   Right Brachiocephalic (C6): 2/2                               Left Brachiocephalic (C6): 3/2     Right Patella (L4): 2/2                                              Left Patella (L4): 2/2   Right Achilles (S1): 2/2                                            Left Achilles (S1): 2/2     Bilateral lower extremity 3-4 beating clonus     Negative Kerning & Brudzinski   STATION and GAIT  patient has bilateral lower extremity paraparesis              Additional Examination Elements and Findings:     LUNGS: Decreased breath sounds bilaterally  CARDIOVASCULAR:  RRR, S1, S2    DATA  Recent Results (from the past 24 hour(s))   EKG 12 Lead    Collection Time: 12/04/20  8:48 PM   Result Value Ref Range    Ventricular Rate 97 BPM    Atrial Rate 97 BPM    P-R Interval 188 ms    QRS Duration 86 ms    Q-T Interval 344 ms    QTc Calculation (Bazett) 436 ms    P Axis 63 degrees    R Axis 64 degrees    T Axis 71 degrees   Culture, Blood 1    Collection Time: 12/04/20  8:55 PM    Specimen: Blood   Result Value Ref Range    Specimen Description . BLOOD     Special Requests RT ARM 2ML     Culture NO GROWTH 14 HOURS    Culture, Blood 1    Collection Time: 12/04/20  9:05 PM    Specimen: Blood   Result Value Ref Range    Specimen Description . BLOOD     Special Requests RT HAND 1ML     Culture NO GROWTH 14 HOURS    CBC Auto Differential    Collection Time: 12/04/20  9:23 PM   Result Value Ref Range    WBC 7.4 3.5 - 11.3 k/uL    RBC 4.28 4.21 - 5.77 m/uL    Hemoglobin 13.5 13.0 - 17.0 g/dL    Hematocrit 42.1 40.7 - 50.3 %    MCV 98.4 82.6 - 102.9 fL    MCH 31.5 25.2 - 33.5 pg    MCHC 32.1 28.4 - 34.8 g/dL    RDW 13.8 11.8 - 14.4 %    Platelets 556 525 - 321 k/uL    MPV 11.8 8.1 - 13.5 fL    NRBC Automated 0.0 0.0 per 100 WBC    Differential Type NOT REPORTED     Seg Neutrophils 82 (H) 36 - 65 %    Lymphocytes 10 (L) 24 - 43 %    Monocytes 8 3 - 12 %    Eosinophils % 0 (L) 1 - 4 %    Basophils 0 0 - 2 %    Immature Granulocytes 0 0 %    Segs Absolute 6.04 1.50 - 8.10 k/uL    Absolute Lymph # 0.70 (L) 1.10 - 3.70 k/uL    Absolute Mono # 0.62 0.10 - 1.20 k/uL    Absolute Eos # <0.03 0.00 - 0.44 k/uL    Basophils Absolute <0.03 0.00 - 0.20 k/uL    Absolute Immature Granulocyte <0.03 0.00 - 0.30 k/uL    WBC Morphology NOT REPORTED     RBC Morphology NOT REPORTED     Platelet Estimate NOT REPORTED    Comprehensive Metabolic Panel    Collection Time: 12/04/20  9:23 PM   Result Value Ref Range    Glucose 90 70 - 99 mg/dL    BUN 11 6 - 20 mg/dL    CREATININE 0.86 0.70 - 1.20 mg/dL    Bun/Cre Ratio NOT REPORTED 9 - 20    Calcium 8.5 (L) 8.6 - 10.4 mg/dL    Sodium 139 135 - 144 mmol/L    Potassium 4.1 3.7 - 5.3 mmol/L    Chloride 104 98 - 107 mmol/L    CO2 24 20 - 31 mmol/L    Anion Gap 11 9 - 17 mmol/L    Alkaline Phosphatase 88 40 - 129 U/L    ALT 43 (H) 5 - 41 U/L    AST 25 <40 U/L    Total Bilirubin 0.18 (L) 0.3 - 1.2 mg/dL    Total Protein 6.2 (L) 6.4 - 8.3 g/dL    Alb 3.7 3.5 - 5.2 g/dL    Albumin/Globulin Ratio 1.5 1.0 - 2.5    GFR Non-African American >60 >60 mL/min    GFR African American >60 >60 mL/min    GFR Comment          GFR Staging NOT REPORTED    Lactate, Sepsis    Collection Time: 12/04/20  9:23 PM   Result Value Ref Range    Lactic Acid, Sepsis NOT REPORTED 0.5 - 1.9 mmol/L    Lactic Acid, Sepsis, Whole Blood 2.0 (H) 0.5 - 1.9 mmol/L   Protime-INR    Collection Time: 12/04/20  9:23 PM   Result Value Ref Range    Protime 11.8 9.0 - 12.0 sec    INR 1.1    APTT    Collection Time: 12/04/20  9:23 PM   Result Value Ref Range    PTT 28.7 20.5 - 30.5 sec D-Dimer, Quantitative    Collection Time: 12/04/20  9:23 PM   Result Value Ref Range    D-Dimer, Quant 0.39 mg/L FEU   FERRITIN    Collection Time: 12/04/20  9:23 PM   Result Value Ref Range    Ferritin 168 30 - 400 ug/L   LACTATE DEHYDROGENASE    Collection Time: 12/04/20  9:23 PM   Result Value Ref Range     135 - 225 U/L   C-Reactive Protein    Collection Time: 12/04/20  9:23 PM   Result Value Ref Range    CRP 23.0 (H) 0.0 - 5.0 mg/L   Troponin    Collection Time: 12/04/20  9:23 PM   Result Value Ref Range    Troponin, High Sensitivity 12 0 - 22 ng/L    Troponin T NOT REPORTED <0.03 ng/mL    Troponin Interp NOT REPORTED    Troponin    Collection Time: 12/04/20 10:59 PM   Result Value Ref Range    Troponin, High Sensitivity 14 0 - 22 ng/L    Troponin T NOT REPORTED <0.03 ng/mL    Troponin Interp NOT REPORTED    COVID-19    Collection Time: 12/04/20 11:49 PM    Specimen: Other   Result Value Ref Range    SARS-CoV-2          SARS-CoV-2, Rapid Not Detected Not Detected    Source . NASOPHARYNGEAL SWAB     SARS-CoV-2         Urinalysis with Microscopic    Collection Time: 12/05/20 12:05 AM   Result Value Ref Range    Color, UA DARK YELLOW (A) YELLOW    Turbidity UA CLEAR CLEAR    Glucose, Ur NEGATIVE NEGATIVE    Bilirubin Urine NEGATIVE  Verified by ictotest. (A) NEGATIVE    Ketones, Urine MODERATE (A) NEGATIVE    Specific Gravity, UA 1.034 (H) 1.005 - 1.030    Urine Hgb NEGATIVE NEGATIVE    pH, UA 5.5 5.0 - 8.0    Protein, UA 1+ (A) NEGATIVE    Urobilinogen, Urine Normal Normal    Nitrite, Urine NEGATIVE NEGATIVE    Leukocyte Esterase, Urine TRACE (A) NEGATIVE    -          WBC, UA 0 TO 2 0 - 5 /HPF    RBC, UA 2 TO 5 0 - 4 /HPF    Casts UA  0 - 8 /LPF     0 TO 2 HYALINE Reference range defined for non-centrifuged specimen.     Crystals, UA NOT REPORTED None /HPF    Epithelial Cells UA 0 TO 2 0 - 5 /HPF    Renal Epithelial, UA NOT REPORTED 0 /HPF    Bacteria, UA NOT REPORTED None    Mucus, UA NOT REPORTED None Trichomonas, UA NOT REPORTED None    Amorphous, UA NOT REPORTED None    Other Observations UA NOT REPORTED NOT REQ. Yeast, UA NOT REPORTED None   Lactate, Sepsis    Collection Time: 12/05/20 12:19 AM   Result Value Ref Range    Lactic Acid, Sepsis NOT REPORTED 0.5 - 1.9 mmol/L    Lactic Acid, Sepsis, Whole Blood 1.1 0.5 - 1.9 mmol/L   Procalcitonin    Collection Time: 12/05/20  2:32 PM   Result Value Ref Range    Procalcitonin 0.14 (H) <0.09 ng/mL   Brain Natriuretic Peptide    Collection Time: 12/05/20  2:32 PM   Result Value Ref Range    Pro-BNP 87 <300 pg/mL    BNP Interpretation Pro-BNP Reference Range:      IMAGING    · Head CT WO (12/4/2020)  Impression    1. No acute intracranial abnormality. 2. Again seen are areas of decreased attenuation in the periventricular and    subcortical white matter bilaterally. 3. Mild global parenchymal volume loss. 4. Scattered mucosal thickening of the paranasal sinuses with a small amount    of fluid in the right maxillary sinus.           · EEG Routine (12/29/2017)  ELECTRODIAGNOSTIC INTERPRETATION:  This EEG performed during wakefulness  and a brief period of drowsiness did not demonstrate any evidence of  ongoing electrographic seizure activity. No epileptiform discharges noted. No electrographic seizures noted. · Brain MRI W/WO and Cervical Spine MRI W/WO (12/28/2017)  Impression    1. Motion limited evaluation. 2. No acute intracranial abnormality. 3. No appreciable change in extensive T2/FLAIR hyperintense lesions in the    supratentorial white matter and brainstem in keeping with chronic    demyelinating disease.  No definite new lesions since 03/07/2016 or evidence    of active demyelination. 4. Stable bifrontal encephalomalacia in keeping with sequela of prior insult.           Impression    1. Motion limited evaluation.     2. No appreciable change in multiple T2 hyperintense lesions in the    visualized spinal cord in keeping with chronic demyelinating disease.  No    abnormal cord enhancement to suggest active demyelination or pre small new    lesions since 03/07/2016.    3. Mild C4-5 and C5-6 spinal canal stenosis. 4. Moderate right C5 and mild bilateral C6 neural foraminal narrowing.           · Brain MRI W/WO and Cervical Spine MRI W/WO (3/7/2016)  Impression    IMPRESSION:    1. Patchy and confluent areas of signal abnormality within the brainstem, subcortical and periventricular white matter which can be seen in patient's reported diagnosis of multiple sclerosis. At least one new area of demyelination involving the right    basal ganglia and right cerebral peduncle. No definite associated enhancement to suggest active demyelination. 2. Post operative changes consistent with previous frontal craniotomies. Dural thickening and enhancement within the frontal regions which may represent sequelae of the previous procedure. 3. Partial opacification of the paranasal sinuses suggest clinical correlation for signs of infection or trauma.         Final report electronically signed by Eric Alex M.D. on 3/7/2016 7:37 AM      Impression    IMPRESSION:    1.  Multifocal areas of cervical spinal cord T2 hyperintensity including just above the cervical medullary junction noted. Findings are consistent with history of demyelinating disease. 2.  Possible subtle enhancement of the lesion at C7 to indicate active demyelinating disease. 3.  Nonspecific posterior cord surface enhancement in the upper thoracic spine which may be related to superimposed vasculature versus a true posterior cord enhancement.     4.  Developmentally narrowed central spinal canal with disc protrusions at C4-5 and C5-6 and with mild ventral cord impingement at C4-5.         Final report electronically signed by Hollie Adam M.D. on 3/7/2016 8:07 AM      · EEG Routine (3/6/2016)  IMPRESSION:  This is an abnormal EEG Due to presence of diffuse   theta activity suggestive of moderate encephalopathy.  The EMG   was somewhat compromised by movement artifact. No clinical or   electrographic seizures were recorded during the study    IMPRESSION     Case of a 60-year-old male patient admitted for possible sepsis, infection with history of underlying MS.    // Hx relapsing remitting multiple sclerosis //   Patient with history of multiple sclerosis relapsing remitting and on Ocrevus with no side effects. As per care everywhere last dose this was February 2019. Mother was with patient and does not have too much of a history of the MS but wife does. Unable to contact wife will try tomorrow to have a little more history on his medications. For the moment will recommend brain MRI with and without contrast to evaluate for any enhancing lesions, EEG due to his altered mental status and eye deviation and due to the sepsis to evaluate that there is no seizure underlying. Will recommend also PT and OT to work with patient since he has some spasticity and bilateral lower extremity paraparesis. Currently on Dow catheter but as per mother she said that the patient is incontinent and on briefs on nursing home. Evaluate for dysphagia    // Infection //   Patient with fever at moment could be a UTI evaluation. Internal medicine as primary to evaluate why patient is having this fever and altered mental status. Could be secondary to sepsis but could be underlying due to his MS or some seizure-like activity. Will recommend internal medicine to evaluate all sources of infection and labs. RECOMMENDATIONS:   1. Internal medicine as primary for infectious work-up  2. Recommend brain MRI with and without contrast  3. We will recommend EEG routine  4. We will recommend physical therapy, Occupational Therapy, speech therapy  5. No steroids at the moment. Will evaluate imaging to see if there is any new demyelinating processes.   6. Patient can continue all home medications. 7. Evaluate for dysphagia    Thank you for the consultation. Will follow.  Case discussed with Eloisa Martins MD [facility]  Adult General Neurology Resident PGY-4  12/5/2020 at 3:14 PM

## 2024-06-28 ENCOUNTER — HOSPITAL ENCOUNTER (OUTPATIENT)
Age: 49
Setting detail: OBSERVATION
Discharge: NURSING, REHAB OR LONG-TERM CARE | End: 2024-06-30
Attending: EMERGENCY MEDICINE | Admitting: EMERGENCY MEDICINE
Payer: MEDICAID

## 2024-06-28 ENCOUNTER — APPOINTMENT (OUTPATIENT)
Dept: CT IMAGING | Age: 49
End: 2024-06-28
Payer: MEDICAID

## 2024-06-28 DIAGNOSIS — K56.41 FECAL IMPACTION (HCC): ICD-10-CM

## 2024-06-28 DIAGNOSIS — K52.89 STERCORAL COLITIS: Primary | ICD-10-CM

## 2024-06-28 LAB
ALBUMIN SERPL-MCNC: 4.2 G/DL (ref 3.5–5.2)
ALBUMIN/GLOB SERPL: 1 {RATIO} (ref 1–2.5)
ALP SERPL-CCNC: 108 U/L (ref 40–129)
ALT SERPL-CCNC: 38 U/L (ref 10–50)
ANION GAP SERPL CALCULATED.3IONS-SCNC: 7 MMOL/L (ref 9–16)
AST SERPL-CCNC: 24 U/L (ref 10–50)
BASOPHILS # BLD: 0.03 K/UL (ref 0–0.2)
BASOPHILS NFR BLD: 0 % (ref 0–2)
BILIRUB DIRECT SERPL-MCNC: <0.2 MG/DL (ref 0–0.3)
BILIRUB INDIRECT SERPL-MCNC: NORMAL MG/DL (ref 0–1)
BILIRUB SERPL-MCNC: 0.2 MG/DL (ref 0–1.2)
BUN SERPL-MCNC: 12 MG/DL (ref 6–20)
CALCIUM SERPL-MCNC: 9.2 MG/DL (ref 8.6–10.4)
CHLORIDE SERPL-SCNC: 103 MMOL/L (ref 98–107)
CO2 SERPL-SCNC: 29 MMOL/L (ref 20–31)
CREAT SERPL-MCNC: 0.6 MG/DL (ref 0.7–1.2)
EOSINOPHIL # BLD: 0.12 K/UL (ref 0–0.44)
EOSINOPHILS RELATIVE PERCENT: 1 % (ref 1–4)
ERYTHROCYTE [DISTWIDTH] IN BLOOD BY AUTOMATED COUNT: 14.2 % (ref 11.8–14.4)
GFR, ESTIMATED: >90 ML/MIN/1.73M2
GLOBULIN SER CALC-MCNC: 2.9 G/DL
GLUCOSE SERPL-MCNC: 87 MG/DL (ref 74–99)
HCT VFR BLD AUTO: 43.1 % (ref 40.7–50.3)
HGB BLD-MCNC: 14.3 G/DL (ref 13–17)
IMM GRANULOCYTES # BLD AUTO: 0.03 K/UL (ref 0–0.3)
IMM GRANULOCYTES NFR BLD: 0 %
LACTIC ACID, SEPSIS WHOLE BLOOD: 1.6 MMOL/L (ref 0.5–1.9)
LACTIC ACID, SEPSIS WHOLE BLOOD: 1.7 MMOL/L (ref 0.5–1.9)
LIPASE SERPL-CCNC: 27 U/L (ref 13–60)
LYMPHOCYTES NFR BLD: 2.23 K/UL (ref 1.1–3.7)
LYMPHOCYTES RELATIVE PERCENT: 24 % (ref 24–43)
MCH RBC QN AUTO: 31.8 PG (ref 25.2–33.5)
MCHC RBC AUTO-ENTMCNC: 33.2 G/DL (ref 28.4–34.8)
MCV RBC AUTO: 96 FL (ref 82.6–102.9)
MONOCYTES NFR BLD: 0.58 K/UL (ref 0.1–1.2)
MONOCYTES NFR BLD: 6 % (ref 3–12)
NEUTROPHILS NFR BLD: 69 % (ref 36–65)
NEUTS SEG NFR BLD: 6.15 K/UL (ref 1.5–8.1)
NRBC BLD-RTO: 0 PER 100 WBC
PLATELET # BLD AUTO: 254 K/UL (ref 138–453)
PMV BLD AUTO: 11 FL (ref 8.1–13.5)
POTASSIUM SERPL-SCNC: 4.2 MMOL/L (ref 3.7–5.3)
PROT SERPL-MCNC: 7.1 G/DL (ref 6.6–8.7)
RBC # BLD AUTO: 4.49 M/UL (ref 4.21–5.77)
SODIUM SERPL-SCNC: 139 MMOL/L (ref 136–145)
WBC OTHER # BLD: 9.1 K/UL (ref 3.5–11.3)

## 2024-06-28 PROCEDURE — 80048 BASIC METABOLIC PNL TOTAL CA: CPT

## 2024-06-28 PROCEDURE — G0378 HOSPITAL OBSERVATION PER HR: HCPCS

## 2024-06-28 PROCEDURE — 99285 EMERGENCY DEPT VISIT HI MDM: CPT

## 2024-06-28 PROCEDURE — 85025 COMPLETE CBC W/AUTO DIFF WBC: CPT

## 2024-06-28 PROCEDURE — 83605 ASSAY OF LACTIC ACID: CPT

## 2024-06-28 PROCEDURE — 80076 HEPATIC FUNCTION PANEL: CPT

## 2024-06-28 PROCEDURE — 83690 ASSAY OF LIPASE: CPT

## 2024-06-28 PROCEDURE — 74177 CT ABD & PELVIS W/CONTRAST: CPT

## 2024-06-28 PROCEDURE — 6360000004 HC RX CONTRAST MEDICATION: Performed by: EMERGENCY MEDICINE

## 2024-06-28 RX ADMIN — Medication 240 ML: at 20:09

## 2024-06-28 RX ADMIN — IOPAMIDOL 75 ML: 755 INJECTION, SOLUTION INTRAVENOUS at 17:55

## 2024-06-28 NOTE — ED PROVIDER NOTES
[facility] ED  [address] [address]  Phone: [phone]        Pt Name: Heri Steinberg  MRN: [id number]  Birthdate 1975  Date of evaluation: 6/28/24      CHIEF COMPLAINT     Chief Complaint   Patient presents with    Distended Abdomen          HISTORY OF PRESENT ILLNESS  (Location/Symptom, Timing/Onset, Context/Setting, Quality, Duration, Modifying Factors, Severity.)    Heri Steinberg is a 48 y.o. male who presents with abdominal distention.  Patient has a history of relapsing remitting multiple sclerosis, encephalopathy, and is nonverbal.  He is able to nod his head or shake his head to questions.  Patient states that he has not had any diarrhea.  He has been moving his bowels.  He denies abdominal pain.  He denies dysuria.  He is unsure if he has had a fever or chills.  He has never had any abdominal surgery in the past.  Medications listed on his medication list suggest that the patient is on a bowel regimen.      REVIEW OF SYSTEMS    (2-9 systems for level 4, 10 or more for level 5)     Review of Systems   Constitutional:  Negative for chills and fever.   Gastrointestinal:  Negative for abdominal pain, diarrhea, nausea and vomiting.   Genitourinary:  Negative for dysuria, frequency and urgency.       PAST MEDICAL HISTORY    has a past medical history of Anxiety, Depression, and Multiple sclerosis (HCC).    SURGICAL HISTORY      has no past surgical history on file.    CURRENTMEDICATIONS       Previous Medications    ACETAMINOPHEN (TYLENOL) 500 MG TABLET    Take 1 tablet by mouth every 6 hours as needed for Pain    ACETAMINOPHEN (TYLENOL) 650 MG SUPPOSITORY    Place 1 suppository rectally every 4 hours as needed for Fever    CATHETERS (GIZMO CONDOM CATHETER) MISC    1 Units by Does not apply route daily    CLONAZEPAM (KLONOPIN) 1 MG TABLET    Take 1 tablet by mouth in the morning and at bedtime.    DEXTROMETHORPHAN-QUINIDINE (NUEDEXTA) 20-10 MG CAPS PER CAPSULE

## 2024-06-28 NOTE — ED NOTES
Pt presents to ED room 1 via EMS from [facility]. Pt had a distended abdomen and had a x-ray there. Pt has a hx of MS and is non-verbal. Pt able to shake head for yes or no answers. Pt arrives in no acute distress. Abdomen visible distended. Pt denies pain to palpation of abdomen. IV established. EKG obtained. Monitor attached. Will continue with plan of care.

## 2024-06-29 LAB
BACTERIA URNS QL MICRO: ABNORMAL
BILIRUB UR QL STRIP: NEGATIVE
CASTS #/AREA URNS LPF: ABNORMAL /LPF (ref 0–8)
CLARITY UR: CLEAR
COLOR UR: YELLOW
EPI CELLS #/AREA URNS HPF: ABNORMAL /HPF (ref 0–5)
GLUCOSE UR STRIP-MCNC: NEGATIVE MG/DL
HGB UR QL STRIP.AUTO: NEGATIVE
KETONES UR STRIP-MCNC: NEGATIVE MG/DL
LEUKOCYTE ESTERASE UR QL STRIP: NEGATIVE
NITRITE UR QL STRIP: NEGATIVE
PH UR STRIP: 5.5 [PH] (ref 5–8)
PROT UR STRIP-MCNC: NEGATIVE MG/DL
RBC #/AREA URNS HPF: ABNORMAL /HPF (ref 0–4)
SP GR UR STRIP: 1.08 (ref 1–1.03)
UROBILINOGEN UR STRIP-ACNC: NORMAL EU/DL (ref 0–1)
WBC #/AREA URNS HPF: ABNORMAL /HPF (ref 0–5)

## 2024-06-29 PROCEDURE — 81001 URINALYSIS AUTO W/SCOPE: CPT

## 2024-06-29 PROCEDURE — 51798 US URINE CAPACITY MEASURE: CPT

## 2024-06-29 PROCEDURE — APPNB60 APP NON BILLABLE TIME 46-60 MINS: Performed by: NURSE PRACTITIONER

## 2024-06-29 PROCEDURE — G0378 HOSPITAL OBSERVATION PER HR: HCPCS

## 2024-06-29 PROCEDURE — 6360000002 HC RX W HCPCS: Performed by: EMERGENCY MEDICINE

## 2024-06-29 PROCEDURE — 6370000000 HC RX 637 (ALT 250 FOR IP)

## 2024-06-29 PROCEDURE — 96372 THER/PROPH/DIAG INJ SC/IM: CPT

## 2024-06-29 PROCEDURE — 6370000000 HC RX 637 (ALT 250 FOR IP): Performed by: EMERGENCY MEDICINE

## 2024-06-29 PROCEDURE — 99255 IP/OBS CONSLTJ NEW/EST HI 80: CPT | Performed by: INTERNAL MEDICINE

## 2024-06-29 PROCEDURE — 2580000003 HC RX 258: Performed by: EMERGENCY MEDICINE

## 2024-06-29 RX ORDER — ONDANSETRON 4 MG/1
8 TABLET, FILM COATED ORAL EVERY 8 HOURS PRN
Status: DISCONTINUED | OUTPATIENT
Start: 2024-06-29 | End: 2024-06-30 | Stop reason: HOSPADM

## 2024-06-29 RX ORDER — ACETAMINOPHEN 325 MG/1
650 TABLET ORAL EVERY 4 HOURS PRN
Status: DISCONTINUED | OUTPATIENT
Start: 2024-06-29 | End: 2024-06-30 | Stop reason: HOSPADM

## 2024-06-29 RX ORDER — DULOXETIN HYDROCHLORIDE 30 MG/1
60 CAPSULE, DELAYED RELEASE ORAL 2 TIMES DAILY
Status: DISCONTINUED | OUTPATIENT
Start: 2024-06-29 | End: 2024-06-30 | Stop reason: HOSPADM

## 2024-06-29 RX ORDER — ENOXAPARIN SODIUM 100 MG/ML
40 INJECTION SUBCUTANEOUS DAILY
Status: DISCONTINUED | OUTPATIENT
Start: 2024-06-29 | End: 2024-06-30 | Stop reason: HOSPADM

## 2024-06-29 RX ORDER — SODIUM CHLORIDE 9 MG/ML
INJECTION, SOLUTION INTRAVENOUS PRN
Status: DISCONTINUED | OUTPATIENT
Start: 2024-06-29 | End: 2024-06-30 | Stop reason: HOSPADM

## 2024-06-29 RX ORDER — BISACODYL 10 MG
10 SUPPOSITORY, RECTAL RECTAL DAILY PRN
Status: DISCONTINUED | OUTPATIENT
Start: 2024-06-29 | End: 2024-06-30 | Stop reason: HOSPADM

## 2024-06-29 RX ORDER — GABAPENTIN 300 MG/1
300 CAPSULE ORAL 3 TIMES DAILY
Status: DISCONTINUED | OUTPATIENT
Start: 2024-06-29 | End: 2024-06-30 | Stop reason: HOSPADM

## 2024-06-29 RX ORDER — SODIUM CHLORIDE 0.9 % (FLUSH) 0.9 %
5-40 SYRINGE (ML) INJECTION PRN
Status: DISCONTINUED | OUTPATIENT
Start: 2024-06-29 | End: 2024-06-30 | Stop reason: HOSPADM

## 2024-06-29 RX ORDER — POLYETHYLENE GLYCOL 3350 17 G/17G
17 POWDER, FOR SOLUTION ORAL DAILY PRN
Status: DISCONTINUED | OUTPATIENT
Start: 2024-06-29 | End: 2024-06-30 | Stop reason: HOSPADM

## 2024-06-29 RX ORDER — ENEMA 19; 7 G/133ML; G/133ML
133 ENEMA RECTAL ONCE
Status: COMPLETED | OUTPATIENT
Start: 2024-06-29 | End: 2024-06-29

## 2024-06-29 RX ORDER — CLONAZEPAM 1 MG/1
1 TABLET ORAL 2 TIMES DAILY
Status: DISCONTINUED | OUTPATIENT
Start: 2024-06-29 | End: 2024-06-30 | Stop reason: HOSPADM

## 2024-06-29 RX ORDER — SODIUM CHLORIDE 0.9 % (FLUSH) 0.9 %
5-40 SYRINGE (ML) INJECTION EVERY 12 HOURS SCHEDULED
Status: DISCONTINUED | OUTPATIENT
Start: 2024-06-29 | End: 2024-06-30 | Stop reason: HOSPADM

## 2024-06-29 RX ORDER — PRIMIDONE 250 MG/1
250 TABLET ORAL 2 TIMES DAILY
Status: DISCONTINUED | OUTPATIENT
Start: 2024-06-29 | End: 2024-06-30 | Stop reason: HOSPADM

## 2024-06-29 RX ORDER — ACETAMINOPHEN 500 MG
500 TABLET ORAL EVERY 6 HOURS PRN
Status: DISCONTINUED | OUTPATIENT
Start: 2024-06-29 | End: 2024-06-29 | Stop reason: SDUPTHER

## 2024-06-29 RX ADMIN — DULOXETINE HYDROCHLORIDE 60 MG: 30 CAPSULE, DELAYED RELEASE ORAL at 08:24

## 2024-06-29 RX ADMIN — ENOXAPARIN SODIUM 40 MG: 100 INJECTION SUBCUTANEOUS at 08:25

## 2024-06-29 RX ADMIN — POLYETHYLENE GLYCOL 3350, SODIUM SULFATE ANHYDROUS, SODIUM BICARBONATE, SODIUM CHLORIDE, POTASSIUM CHLORIDE 4000 ML: 236; 22.74; 6.74; 5.86; 2.97 POWDER, FOR SOLUTION ORAL at 14:11

## 2024-06-29 RX ADMIN — DULOXETINE HYDROCHLORIDE 60 MG: 30 CAPSULE, DELAYED RELEASE ORAL at 21:52

## 2024-06-29 RX ADMIN — SODIUM PHOSPHATE, DIBASIC AND SODIUM PHOSPHATE, MONOBASIC 1 ENEMA: 7; 19 ENEMA RECTAL at 17:33

## 2024-06-29 RX ADMIN — CLONAZEPAM 1 MG: 1 TABLET ORAL at 21:51

## 2024-06-29 RX ADMIN — GABAPENTIN 300 MG: 300 CAPSULE ORAL at 14:04

## 2024-06-29 RX ADMIN — SODIUM CHLORIDE, PRESERVATIVE FREE 10 ML: 5 INJECTION INTRAVENOUS at 21:00

## 2024-06-29 RX ADMIN — GABAPENTIN 300 MG: 300 CAPSULE ORAL at 21:52

## 2024-06-29 RX ADMIN — PRIMIDONE 250 MG: 250 TABLET ORAL at 21:52

## 2024-06-29 RX ADMIN — CLONAZEPAM 1 MG: 1 TABLET ORAL at 08:26

## 2024-06-29 RX ADMIN — GABAPENTIN 300 MG: 300 CAPSULE ORAL at 08:25

## 2024-06-29 RX ADMIN — PRIMIDONE 250 MG: 250 TABLET ORAL at 08:24

## 2024-06-29 NOTE — CONSULTS
[facility] Gastroenterology  Consultation Note     .  Chief Complaint:  Abdominal distention    Reason for consult:    Fecal impaction  The coral colitis    History of present illness: This is a 48 y.o. male with PMH including MS,Anxiety and depression who [facility] concerns of abdominal distention.  in the ER had CT abdomen and pelvis that showed heavy stool burden for which she received a milk of molasses enema and it was reported that patient had several BMs.   On exam this a.m. patient's abdomen is soft, nondistended, he is tolerating general diet without any nausea or vomiting or complaints of abdominal pain.   Review of patient's home meds show that patient is normally on Senokot at night, GlycoLax, milk of magnesia    Summary of imaging completed at this time:  6/28/2024 CT abdomen and pelvis with IV contrast showed massive rectal stool burden with diffuse rectal wall thickening and mild induration of the perirectal fat with redundant appearance of the sigmoid colon and moderate amount of upstream stool burden with gaseous distention of the colon suggestive of chronic constipation      Summary of current abnormal labs completed at this time:    Metabolic: Unremarkable  Hematology:Unremarkable  Coags:  Liver profile: Unremarkable  Cardiac profile:    Previous GI history:   See above  No previous EGD or colonoscopy    Past Medical/Social/Family History:  Past Medical History:   Diagnosis Date    Anxiety     Depression     Multiple sclerosis (HCC)      No past surgical history on file.  Family History   Problem Relation Age of Onset    Asthma Father      Previous records/history/ and notes were reviewed    Allergies:  Allergies   Allergen Reactions    Nalbuphine Other (See Comments)     Other reaction(s): Unknown    Tramadol Other (See Comments)    Ibuprofen Itching and Rash       Home medications:  Prior to Admission medications    Medication Sig Start Date End Date Taking? Authorizing

## 2024-06-29 NOTE — H&P
recognition computer software.  Although all attempts are made to edit the dictation for accuracy, there may be errors in the transcription that are not intended.

## 2024-06-29 NOTE — ED NOTES
Pt had three large bowel movements after milk and molasses enema. Pt cleaned up and placed back on clean bedpan d/t pt still passing some gas and stool, will re-evaluate.

## 2024-06-29 NOTE — ED NOTES
ED to inpatient nurses report      Chief Complaint:  Chief Complaint   Patient presents with    Distended Abdomen      Present to ED from: [place]    MOA:     LOC: alert and orientated to name, place, date  Mobility: Fully dependent  Oxygen Baseline: RA    Current needs required: None   Pending ED orders: None  Present condition: Stable    Why did the patient come to the ED? Pt presents to ED room 1 via EMS from [facility]. Pt had a distended abdomen and had a x-ray there. Pt has a hx of MS and is non-verbal. Pt able to shake head for yes or no answers. Pt arrives in no acute distress. Abdomen visible distended. Pt denies pain to palpation of abdomen. IV established. EKG obtained. Monitor attached.   What is the plan? Admit for observation, bowel clean out  Any procedures or intervention occur? CT, labs, EKG  Any safety concerns?? None    Mental Status:       Psych Assessment:   Psychosocial  Psychosocial (WDL): (S) Exceptions to WDL (Hx of MS. Non-verbal.)  Vital signs   Vitals:    06/28/24 1948 06/28/24 2018 06/28/24 2133 06/28/24 2201   BP: 117/88 114/86 118/87 (!) 127/91   Pulse: 70 70 69 72   Resp: 13 16 14 14   Temp:       TempSrc:       SpO2: 97% 96% 96% 95%        Vitals:  Patient Vitals for the past 24 hrs:   BP Temp Temp src Pulse Resp SpO2   06/28/24 2201 (!) 127/91 -- -- 72 14 95 %   06/28/24 2133 118/87 -- -- 69 14 96 %   06/28/24 2018 114/86 -- -- 70 16 96 %   06/28/24 1948 117/88 -- -- 70 13 97 %   06/28/24 1900 (!) 124/91 -- -- 75 19 94 %   06/28/24 1715 (!) 119/92 -- -- 84 17 97 %   06/28/24 1639 -- 98.3 °F (36.8 °C) Axillary -- -- --   06/28/24 1636 119/89 -- -- 80 16 100 %      Visit Vitals  BP (!) 127/91   Pulse 72   Temp 98.3 °F (36.8 °C) (Axillary)   Resp 14   SpO2 95%        LDAs:   Peripheral IV 06/28/24 Proximal;Right;Anterior Forearm (Active)       Ambulatory Status:  Presents to emergency department  because of falls (Syncope, seizure, or loss of consciousness):

## 2024-06-29 NOTE — PROGRESS NOTES
PIV attempt x2 blood flash but infiltrated; pt nods yes that he is ultrasound charge nurse notified   Patient ID: Tristan is a 18 year old male.  MRN: [id number]  Chief Complaint   Patient presents with   • Office Visit   • Imm/Inj     Meningococcal 2nd dose     Subjective   HISTORY OF PRESENT ILLNESS  HPI    Pt presents for immunizations  -Accompanied by mother    #Immunization  -Needs immunizations and school form signed  -Per mother, he does not need a physical and will have his done in July 2024     Denies acute complaints or recent hospitalizations       Review of Systems   Constitutional:  Negative for chills and fever.   Gastrointestinal:  Negative for diarrhea, nausea and vomiting.       Patient's medications, allergies, problem list, past medical, surgical, social and family histories were reviewed and updated as appropriate.     Objective   ALLERGIES  ALLERGIES:  No Known Allergies  MEDICAL HISTORY  Patient Active Problem List   Diagnosis   • Annual physical exam     SOCIAL HISTORY  Social History     Tobacco Use   • Smoking status: Never   • Smokeless tobacco: Never   Vaping Use   • Vaping Use: never used   Substance Use Topics   • Alcohol use: Never   • Drug use: Never     CURRENT MEDICATIONS  Current Outpatient Medications   Medication Instructions   • tretinoin (RETIN-A) 0.05 % cream Topical, DAILY, 1 application topically to affected area daily in the evening to face     OBJECTIVE  BP (!) 105/59 (BP Location: LUE - Left upper extremity, Patient Position: Sitting, Cuff Size: Large Adult)   Pulse 63   Ht 5' 10.5\" (1.791 m)   Wt (!) 99.9 kg (220 lb 3.2 oz)   BMI 31.15 kg/m²   BSA 2.19 m²   Physical Exam  Vitals reviewed.   Constitutional:       General: He is not in acute distress.     Appearance: Normal appearance.   HENT:      Head: Normocephalic and atraumatic.      Mouth/Throat:      Mouth: Mucous membranes are moist.      Pharynx: No posterior oropharyngeal erythema.      Neck: Normal range of motion and neck supple.   Eyes:      Extraocular Movements: Extraocular movements intact.    Cardiovascular:      Rate and Rhythm: Normal rate and regular rhythm.      Heart sounds: Normal heart sounds. No murmur heard.     No friction rub. No gallop.   Pulmonary:      Effort: Pulmonary effort is normal.      Breath sounds: Normal breath sounds. No wheezing, rhonchi or rales.   Abdominal:      General: Abdomen is flat. There is no distension.      Palpations: Abdomen is soft.   Skin:     General: Skin is warm and dry.   Neurological:      General: No focal deficit present.      Mental Status: He is alert.   Psychiatric:         Mood and Affect: Mood normal.         Behavior: Behavior normal.           ASSESSMENT AND PLAN  Diagnoses and associated orders for this visit:  1. Need for vaccination  -     MENINGOCOCCAL MENVEO MCV4O 10Y+     Health Maintenance Summary     Annual Physical (ages 3-18) (Yearly)  Overdue - never done    COVID-19 Vaccine (3 - 2023-24 season)  Overdue since 9/1/2023    Depression Screening (Yearly)  Next due on 12/18/2024    DTaP/Tdap/Td Vaccine (7 - Td or Tdap)  Next due on 6/28/2028    Hepatitis B Vaccine   Completed    Hepatitis A Vaccine   Completed    Pneumococcal Vaccine 0-64   Completed    IPV Vaccine   Completed    MMR Vaccine   Completed    Varicella Vaccine   Completed    HPV Vaccine   Completed    Influenza Vaccine   Completed    Meningococcal Vaccine   Completed          Schedule follow up: Return in about 4 months (around 7/2/2024) for Annual Physical .      Veda Diaz MD

## 2024-06-29 NOTE — CARE COORDINATION
DISCHARGE PLANNING EVALUATION: OP/OBSERVATION        6/29/24, 12:32 PM EDT    Heri MAX Steinberg         Location: 0325/0325-02   Reason for hospitalization: Fecal impaction (HCC) [K56.41]  Stercoral colitis [K52.89]         PCP: Jefry Jansen DO    Transportation/Food Security/Housekeeping Addressed:  called legal guardian and mother rickey briseno for patient to return to [facility]      Transition plan: return to [facility]

## 2024-06-29 NOTE — ED NOTES
Pt had one more small bowel movement, pt cleaned up and brief changed. Pt and family updated on plan of care.

## 2024-06-29 NOTE — ED NOTES
ED to inpatient nurses report      Chief Complaint:  Chief Complaint   Patient presents with    Distended Abdomen      Present to ED from: [place]     MOA:     LOC: alert and orientated to name, place, date  Mobility: Fully dependent  Oxygen Baseline: RA                  Current needs required: None   Pending ED orders: None  Present condition: Stable     Why did the patient come to the ED? Pt presents to ED room 1 via EMS from [facility]. Pt had a distended abdomen and had a x-ray there. Pt has a hx of MS and is non-verbal. Pt able to shake head for yes or no answers. Pt arrives in no acute distress. Abdomen visible distended. Pt denies pain to palpation of abdomen. IV established. EKG obtained. Monitor attached.   What is the plan? Admit for observation, bowel clean out  Any procedures or intervention occur? CT, labs, EKG  Any safety concerns?? None    Mental Status:       Psych Assessment:   Psychosocial  Psychosocial (WDL): (S) Exceptions to WDL (Hx of MS. Non-verbal.)  Vital signs   Vitals:    06/28/24 2255 06/28/24 2331 06/29/24 0100 06/29/24 0130   BP: 139/89 128/88 (!) 130/93 129/87   Pulse: 70 76 69 70   Resp: 16 14 15 17   Temp:       TempSrc:       SpO2: 97% 96% 96% 97%        Vitals:  Patient Vitals for the past 24 hrs:   BP Temp Temp src Pulse Resp SpO2   06/29/24 0130 129/87 -- -- 70 17 97 %   06/29/24 0100 (!) 130/93 -- -- 69 15 96 %   06/28/24 2331 128/88 -- -- 76 14 96 %   06/28/24 2255 139/89 -- -- 70 16 97 %   06/28/24 2201 (!) 127/91 -- -- 72 14 95 %   06/28/24 2133 118/87 -- -- 69 14 96 %   06/28/24 2018 114/86 -- -- 70 16 96 %   06/28/24 1948 117/88 -- -- 70 13 97 %   06/28/24 1900 (!) 124/91 -- -- 75 19 94 %   06/28/24 1715 (!) 119/92 -- -- 84 17 97 %   06/28/24 1639 -- 98.3 °F (36.8 °C) Axillary -- -- --   06/28/24 1636 119/89 -- -- 80 16 100 %      Visit Vitals  /87   Pulse 70   Temp 98.3 °F (36.8 °C) (Axillary)   Resp 17   SpO2 97%        LDAs:   Peripheral

## 2024-06-29 NOTE — ED NOTES
Pt cleaned up, had one small bowel movement. Brief changed and chucks pad changed. Given additional warm blankets.

## 2024-06-30 ENCOUNTER — APPOINTMENT (OUTPATIENT)
Dept: GENERAL RADIOLOGY | Age: 49
End: 2024-06-30
Payer: MEDICAID

## 2024-06-30 VITALS
HEART RATE: 72 BPM | DIASTOLIC BLOOD PRESSURE: 85 MMHG | RESPIRATION RATE: 16 BRPM | BODY MASS INDEX: 20.03 KG/M2 | SYSTOLIC BLOOD PRESSURE: 119 MMHG | WEIGHT: 147.71 LBS | TEMPERATURE: 97.9 F | OXYGEN SATURATION: 98 %

## 2024-06-30 PROCEDURE — 6360000002 HC RX W HCPCS: Performed by: EMERGENCY MEDICINE

## 2024-06-30 PROCEDURE — 96372 THER/PROPH/DIAG INJ SC/IM: CPT

## 2024-06-30 PROCEDURE — 6370000000 HC RX 637 (ALT 250 FOR IP): Performed by: EMERGENCY MEDICINE

## 2024-06-30 PROCEDURE — G0378 HOSPITAL OBSERVATION PER HR: HCPCS

## 2024-06-30 PROCEDURE — 74018 RADEX ABDOMEN 1 VIEW: CPT

## 2024-06-30 PROCEDURE — APPNB30 APP NON BILLABLE TIME 0-30 MINS: Performed by: NURSE PRACTITIONER

## 2024-06-30 PROCEDURE — 2580000003 HC RX 258: Performed by: EMERGENCY MEDICINE

## 2024-06-30 PROCEDURE — 99231 SBSQ HOSP IP/OBS SF/LOW 25: CPT | Performed by: INTERNAL MEDICINE

## 2024-06-30 RX ORDER — POLYETHYLENE GLYCOL 3350 17 G/17G
17 POWDER, FOR SOLUTION ORAL 2 TIMES DAILY PRN
Qty: 850 G | Refills: 0 | Status: SHIPPED | OUTPATIENT
Start: 2024-06-30 | End: 2024-08-19

## 2024-06-30 RX ADMIN — GABAPENTIN 300 MG: 300 CAPSULE ORAL at 14:53

## 2024-06-30 RX ADMIN — GABAPENTIN 300 MG: 300 CAPSULE ORAL at 08:47

## 2024-06-30 RX ADMIN — CLONAZEPAM 1 MG: 1 TABLET ORAL at 08:48

## 2024-06-30 RX ADMIN — DULOXETINE HYDROCHLORIDE 60 MG: 30 CAPSULE, DELAYED RELEASE ORAL at 08:47

## 2024-06-30 RX ADMIN — ENOXAPARIN SODIUM 40 MG: 100 INJECTION SUBCUTANEOUS at 08:48

## 2024-06-30 RX ADMIN — SODIUM CHLORIDE, PRESERVATIVE FREE 10 ML: 5 INJECTION INTRAVENOUS at 08:48

## 2024-06-30 RX ADMIN — PRIMIDONE 250 MG: 250 TABLET ORAL at 10:40

## 2024-06-30 NOTE — DISCHARGE INSTR - COC
Continuity of Care Form    Patient Name: Heri Steinberg   :  1975  MRN:  [id number]    Admit date:  2024  Discharge date:  2024    Code Status Order: Full Code   Advance Directives:     Admitting Physician:  Jac Lobo MD  PCP: Jefry Jansen DO    Discharging Nurse:   Antoinette  Discharging Hospital Unit/Room#: 0325/0325-02  Discharging Unit Phone Number: [phone]    Emergency Contact:   Extended Emergency Contact Information  Primary Emergency Contact: [place] [address]  Home Phone: [phone]  Work Phone: [phone]  Relation: Parent    Past Surgical History:  No past surgical history on file.    Immunization History:   Immunization History   Administered Date(s) Administered    COVID-19, PFIZER PURPLE top, DILUTE for use, (age 12 y+), 30mcg/0.3mL 2020, 2021, 2021    TDaP, ADACEL (age 10y-64y), BOOSTRIX (age 10y+), IM, 0.5mL 2021       Active Problems:  Patient Active Problem List   Diagnosis Code    Altered mental status R41.82    Lactic acidosis E87.20    Multiple sclerosis (HCC) G35    Acute metabolic encephalopathy G93.41    Acute respiratory failure (HCC) J96.00    Relapsing remitting multiple sclerosis (HCC) G35    Acute respiratory failure with hypoxia (HCC) J96.01    Encephalopathy acute G93.40    Aspiration pneumonia (HCC) J69.0    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

## 2024-06-30 NOTE — DISCHARGE SUMMARY
Tissues: No abnormality identified.  Chronic areas of soft tissue ossification involving both hips. *Unless otherwise specified, incidental findings do not require dedicated imaging follow-up.     Massive rectal stool burden with redundant colon with moderate amount of gas and stool consistent with chronic constipation and chronic fecal impaction, as demonstrated previously.  Mild diffuse rectal wall thickening and minimal fat induration for which stercoral colitis should be considered.           Physical Exam:    General appearance - NAD  Lungs -CTAB, no R/R/R  Heart - RRR, no M/R/G  Abdomen - Soft, NT/ND  Neurological:  nonverbal, no focal motor deficit, sensory loss  Extremities - Cap refil <2 sec in all ext., no edema  Skin -warm, dry      Hospital Course:  Clinical course has improved, labs and imaging reviewed.     Heri Steinberg originally presented to the hospital on 6/28/2024  4:34 PM with abdominal distention.  At that time it was determined that He required further observation and evaluation by gastroenterology. Labs and imaging were followed daily.  Imaging results as above.  He is medically stable to be discharged.       Disposition: Home    Patient stated that they will not drive themselves home from the hospital if they have gotten pain killers/ narcotics earlier that day and that they will arrange for transportation on their own or work with the  for a ride. Patient counseled NOT to drive while under the influence of narcotics/ pain killers.     Condition: Good    Patient stable and ready for discharge home. I have discussed plan of care with patient and they are in understanding. They were instructed to read discharge paperwork. All of their questions and concerns were addressed.     Time Spent: 0 day      --  Ethan Carrillo MD  Observation Physician    This dictation was generated by voice recognition computer software.  Although all attempts are made to edit the dictation for

## 2024-06-30 NOTE — PROGRESS NOTES
[facility]  CDU / OBSERVATION ENCOUNTER  ATTENDING NOTE         I performed a history and physical examination of the patient and discussed management with the resident or midlevel provider. I reviewed the resident or midlevel provider's note and agree with the documented findings and plan of care. Any areas of disagreement are noted on the chart. I was personally present for the key portions of any procedures. I have documented in the chart those procedures where I was not present during the key portions. I have reviewed the nurses notes. I agree with the chief complaint, past medical history, past surgical history, allergies, medications, social and family history as documented unless otherwise noted below.    The Family history, social history, and ROS are effectively unchanged since admission unless noted elsewhere in the chart.     This patient was placed in the observation unit for reevaluation for possible admission to the hospital     Patient with improvement today.  Patient had multiple episodes of stooling.  Patient is well according to caregiver.  Patient at his baseline.  Plain films did not show evidence of significant obstruction but rather that much of the stools been replaced with gas.  Patient for bowel regimen at home.  Patient much improved from initial presentation.  Appreciate GI input       Jac Lobo MD  Attending Emergency  Physician

## 2024-06-30 NOTE — PROGRESS NOTES
[facility]   Gastroenterology Progress Note    Heri Steinberg is a 48 y.o. male patient.  Hospitalization Day:0      Chief consult reason:   Fecal impaction  The coral colitis    Subjective:  Patient seen and examined.  Patient had multiple bowel movements overnight and this a.m..  Abdomen is soft nondistended, tolerating diet without any nausea vomiting or issues.  Patient resting comfortably in bed at this time with mother at bedside  KUB pending    VITALS:  /85   Pulse 72   Temp 97.9 °F (36.6 °C) (Oral)   Resp 16   Wt 67 kg (147 lb 11.3 oz)   SpO2 98%   BMI 20.03 kg/m²   TEMPERATURE:  Current - Temp: 97.9 °F (36.6 °C); Max - Temp  Av.3 °F (36.8 °C)  Min: 97.9 °F (36.6 °C)  Max: 98.6 °F (37 °C)    Physical Assessment:  General appearance:  alert, cooperative and no distress  Mental Status:  oriented to person, place and time and normal affect  Lungs:  clear to auscultation bilaterally, normal effort  Heart:  regular rate and rhythm, no murmur  Abdomen:  soft, nontender, nondistended, normal bowel sounds, no masses, hepatomegaly, splenomegaly  Extremities:  no edema, redness, tenderness in the calves  Skin:  no gross lesions, rashes, induration    Data Review:    Labs and Imaging:     CBC:  Recent Labs     24  1659   WBC 9.1   HGB 14.3   MCV 96.0   RDW 14.2          ANEMIA STUDIES:  No results for input(s): \"TIBC\", \"FERRITIN\", \"WNCYDVNJ10\", \"FOLATE\", \"OCCULTBLD\" in the last 72 hours.    Invalid input(s): \"LABIRON\"    BMP:  Recent Labs     24  1659      K 4.2      CO2 29   BUN 12   CREATININE 0.6*   GLUCOSE 87   CALCIUM 9.2       LFTS:  Recent Labs     24  1659   ALKPHOS 108   ALT 38   AST 24   BILITOT 0.2   BILIDIR <0.2       Amylase/Lipase and Ammonia:  Recent Labs     24  1659   LIPASE 27       Acute Hepatitis Panel:  No results found for: \"HEPBSAG\", \"HEPCAB\", \"HEPBIGM\", \"HEPAIGM\"    HCV Genotype:  No components found for:

## 2024-06-30 NOTE — PROGRESS NOTES
OBS/CDU   RESIDENT NOTE      Patients PCP is:  Jefry Jansen DO      SUBJECTIVE      No acute events overnight.  Patient reportedly had 3 large bowel movements per nursing staff.  He is tolerating regular diet with no nausea or vomiting.  Voiding on his own.      PHYSICAL EXAM      General: NAD, AO X 3  Heent: EMOI, PERRL  Neck: SUPPLE, NO JVD  Cardiovascular: RRR, S1S2  Pulmonary: CTAB, NO SOB  Abdomen: SOFT, NTTP, ND, +BS  Extremities: +2/4 PULSES DISTAL, NO SWELLING  Neuro / Psych: NO NUMBNESS OR TINGLING, MENTATION AT BASELINE    PERTINENT TEST /EXAMS      I have reviewed all available laboratory results.    MEDICATIONS CURRENT   clonazePAM (KLONOPIN) tablet 1 mg, BID  dextromethorphan-quiNIDine (NUEDEXTA) 20-10 MG per capsule 1 capsule (Patient Supplied), Q12H  DULoxetine (CYMBALTA) extended release capsule 60 mg, BID  gabapentin (NEURONTIN) capsule 300 mg, TID  ondansetron (ZOFRAN) tablet 8 mg, Q8H PRN  hypromellose (ISOPTO TEARS) 0.5 % ophthalmic solution 2 drop, PRN  polyethylene glycol (GLYCOLAX) packet 17 g, Daily PRN  primidone (MYSOLINE) tablet 250 mg, BID  sodium chloride flush 0.9 % injection 5-40 mL, 2 times per day  sodium chloride flush 0.9 % injection 5-40 mL, PRN  0.9 % sodium chloride infusion, PRN  enoxaparin (LOVENOX) injection 40 mg, Daily  acetaminophen (TYLENOL) tablet 650 mg, Q4H PRN  bisacodyl (DULCOLAX) suppository 10 mg, Daily PRN      All medication charted and reviewed.    CONSULTS      IP CONSULT TO GI  IP CONSULT TO VASCULAR ACCESS TEAM    ASSESSMENT/PLAN       Heri Steinberg is a 48 y.o. male with history of MS encephalopathy presents with abdominal distention.  CT scan showing stercoral colitis.  Requiring admission to observation for further evaluation by gastroenterology.    Gastroenterology consult  GoLytely versus Dulcolax suppository  Diet as tolerated  Avoid narcotics  KUB to assess stool burden   Continue home medications and pain control  Monitor vitals, labs, and  no

## 2024-06-30 NOTE — DISCHARGE INSTRUCTIONS
Family member was evaluated for abdominal distention, found to have a large amount of stool burden.  During hospitalization he received multiple suppositories and enemas followed by several bowel movements.  Follow-up x-ray of abdomen showed significantly decreased stool burden, much improved since admission.  Continue using MiraLAX twice daily as needed to facilitate regular bowel movements.  Avoid narcotics and other constipating medications.  Drink plenty of liquids and stay well-hydrated.

## 2024-06-30 NOTE — CARE COORDINATION
Discharge Report    [facility]  Clinical Case Management Department  Written by: DONATO DRAPER RN    Patient Name: Heri Steinberg  Attending Provider: Jac Lobo MD  Admit Date: 2024  4:34 PM  MRN: [id number]  Account: [id number]                     : 1975  Discharge Date: 24  Called kristin with [facility] left vm requesting return call  Faxed transport request to [facility] for will call    16:43 called kristin again left vm    17:46 called kristin again left vm  Return call [facility] covering weekends called      18:30 call from myndi patient can return  Called [facility] will call around to find transportation  [place] to [facility] @ 19:00    Disposition: [facility]    DONATO DRAPER RN

## 2024-06-30 NOTE — PLAN OF CARE
Problem: Discharge Planning  Goal: Discharge to home or other facility with appropriate resources  6/30/2024 1442 by Vaughn Spencer RN  Outcome: Progressing  6/30/2024 0414 by Melva Poon RN  Outcome: Progressing     Problem: Safety - Adult  Goal: Free from fall injury  6/30/2024 1442 by Vaughn Spencer RN  Outcome: Progressing  6/30/2024 0414 by Melva Poon RN  Outcome: Progressing     Problem: Skin/Tissue Integrity  Goal: Absence of new skin breakdown  Description: 1.  Monitor for areas of redness and/or skin breakdown  2.  Assess vascular access sites hourly  3.  Every 4-6 hours minimum:  Change oxygen saturation probe site  4.  Every 4-6 hours:  If on nasal continuous positive airway pressure, respiratory therapy assess nares and determine need for appliance change or resting period.  6/30/2024 1442 by Vaughn Spencer RN  Outcome: Progressing  6/30/2024 0414 by Melva Poon RN  Outcome: Progressing

## 2024-07-02 LAB
EKG ATRIAL RATE: 78 BPM
EKG P AXIS: 44 DEGREES
EKG P-R INTERVAL: 188 MS
EKG Q-T INTERVAL: 366 MS
EKG QRS DURATION: 96 MS
EKG QTC CALCULATION (BAZETT): 417 MS
EKG R AXIS: 36 DEGREES
EKG T AXIS: 47 DEGREES
EKG VENTRICULAR RATE: 78 BPM

## 2024-07-18 ENCOUNTER — APPOINTMENT (OUTPATIENT)
Dept: CT IMAGING | Age: 49
DRG: 137 | End: 2024-07-18
Payer: MEDICAID

## 2024-07-18 ENCOUNTER — APPOINTMENT (OUTPATIENT)
Dept: GENERAL RADIOLOGY | Age: 49
DRG: 137 | End: 2024-07-18
Payer: MEDICAID

## 2024-07-18 ENCOUNTER — HOSPITAL ENCOUNTER (INPATIENT)
Age: 49
LOS: 5 days | Discharge: NURSING, REHAB OR LONG-TERM CARE | DRG: 137 | End: 2024-07-23
Attending: EMERGENCY MEDICINE | Admitting: FAMILY MEDICINE
Payer: MEDICAID

## 2024-07-18 DIAGNOSIS — R78.81 BACTEREMIA: ICD-10-CM

## 2024-07-18 DIAGNOSIS — G92.8 TOXIC METABOLIC ENCEPHALOPATHY: Primary | ICD-10-CM

## 2024-07-18 DIAGNOSIS — J18.9 PNEUMONIA OF RIGHT UPPER LOBE DUE TO INFECTIOUS ORGANISM: ICD-10-CM

## 2024-07-18 PROBLEM — R41.82 AMS (ALTERED MENTAL STATUS): Status: ACTIVE | Noted: 2024-07-18

## 2024-07-18 PROBLEM — K59.04 CHRONIC IDIOPATHIC CONSTIPATION: Status: ACTIVE | Noted: 2024-07-18

## 2024-07-18 LAB
AMMONIA PLAS-SCNC: 37 UMOL/L (ref 16–60)
ANION GAP SERPL CALCULATED.3IONS-SCNC: 10 MMOL/L (ref 9–16)
BACTERIA URNS QL MICRO: NORMAL
BASOPHILS # BLD: <0.03 K/UL (ref 0–0.2)
BASOPHILS NFR BLD: 0 % (ref 0–2)
BILIRUB UR QL STRIP: NEGATIVE
BUN BLD-MCNC: 12 MG/DL (ref 8–26)
BUN SERPL-MCNC: 11 MG/DL (ref 6–20)
CA-I BLD-SCNC: 1.24 MMOL/L (ref 1.15–1.33)
CALCIUM SERPL-MCNC: 9.1 MG/DL (ref 8.6–10.4)
CASTS #/AREA URNS LPF: NORMAL /LPF (ref 0–8)
CHLORIDE BLD-SCNC: 106 MMOL/L (ref 98–107)
CHLORIDE SERPL-SCNC: 107 MMOL/L (ref 98–107)
CK SERPL-CCNC: 56 U/L (ref 39–308)
CLARITY UR: CLEAR
CO2 BLD CALC-SCNC: 28 MMOL/L (ref 22–30)
CO2 SERPL-SCNC: 24 MMOL/L (ref 20–31)
COLOR UR: YELLOW
CREAT SERPL-MCNC: 0.6 MG/DL (ref 0.7–1.2)
EGFR, POC: >90 ML/MIN/1.73M2
EOSINOPHIL # BLD: <0.03 K/UL (ref 0–0.44)
EOSINOPHILS RELATIVE PERCENT: 0 % (ref 1–4)
EPI CELLS #/AREA URNS HPF: NORMAL /HPF (ref 0–5)
ERYTHROCYTE [DISTWIDTH] IN BLOOD BY AUTOMATED COUNT: 14.1 % (ref 11.8–14.4)
GFR, ESTIMATED: >90 ML/MIN/1.73M2
GLUCOSE BLD-MCNC: 100 MG/DL (ref 75–110)
GLUCOSE BLD-MCNC: 132 MG/DL (ref 74–100)
GLUCOSE SERPL-MCNC: 129 MG/DL (ref 74–99)
GLUCOSE UR STRIP-MCNC: NEGATIVE MG/DL
HCO3 VENOUS: 28.2 MMOL/L (ref 22–29)
HCT VFR BLD AUTO: 44.9 % (ref 40.7–50.3)
HCT VFR BLD AUTO: 47 % (ref 41–53)
HGB BLD-MCNC: 14.6 G/DL (ref 13–17)
HGB UR QL STRIP.AUTO: NEGATIVE
IMM GRANULOCYTES # BLD AUTO: 0.03 K/UL (ref 0–0.3)
IMM GRANULOCYTES NFR BLD: 0 %
INR PPP: 1.1
KETONES UR STRIP-MCNC: NEGATIVE MG/DL
LACTIC ACID, SEPSIS WHOLE BLOOD: 1.9 MMOL/L (ref 0.5–1.9)
LACTIC ACID, SEPSIS WHOLE BLOOD: 1.9 MMOL/L (ref 0.5–1.9)
LEUKOCYTE ESTERASE UR QL STRIP: NEGATIVE
LYMPHOCYTES NFR BLD: 1.25 K/UL (ref 1.1–3.7)
LYMPHOCYTES RELATIVE PERCENT: 10 % (ref 24–43)
MCH RBC QN AUTO: 31.3 PG (ref 25.2–33.5)
MCHC RBC AUTO-ENTMCNC: 32.5 G/DL (ref 28.4–34.8)
MCV RBC AUTO: 96.4 FL (ref 82.6–102.9)
MONOCYTES NFR BLD: 0.73 K/UL (ref 0.1–1.2)
MONOCYTES NFR BLD: 6 % (ref 3–12)
MYOGLOBIN SERPL-MCNC: 40 NG/ML (ref 28–72)
NEUTROPHILS NFR BLD: 84 % (ref 36–65)
NEUTS SEG NFR BLD: 10.53 K/UL (ref 1.5–8.1)
NITRITE UR QL STRIP: NEGATIVE
NRBC BLD-RTO: 0 PER 100 WBC
O2 SAT, VEN: 83.9 % (ref 60–85)
PARTIAL THROMBOPLASTIN TIME: 33.2 SEC (ref 23–36.5)
PCO2 VENOUS: 44.9 MM HG (ref 41–51)
PH UR STRIP: 8.5 [PH] (ref 5–8)
PH VENOUS: 7.41 (ref 7.32–7.43)
PLATELET # BLD AUTO: 249 K/UL (ref 138–453)
PMV BLD AUTO: 10.6 FL (ref 8.1–13.5)
PO2 VENOUS: 48.8 MM HG (ref 30–50)
POC ANION GAP: 11 MMOL/L (ref 7–16)
POC CREATININE: 0.5 MG/DL (ref 0.51–1.19)
POC HEMOGLOBIN (CALC): 15.9 G/DL (ref 13.5–17.5)
POC LACTIC ACID: 1.8 MMOL/L (ref 0.56–1.39)
POSITIVE BASE EXCESS, VEN: 2.7 MMOL/L (ref 0–3)
POTASSIUM BLD-SCNC: 4 MMOL/L (ref 3.5–4.5)
POTASSIUM SERPL-SCNC: 3.9 MMOL/L (ref 3.7–5.3)
PROCALCITONIN SERPL-MCNC: 0.08 NG/ML (ref 0–0.09)
PROT UR STRIP-MCNC: ABNORMAL MG/DL
PROTHROMBIN TIME: 14.2 SEC (ref 11.7–14.9)
RBC # BLD AUTO: 4.66 M/UL (ref 4.21–5.77)
RBC #/AREA URNS HPF: NORMAL /HPF (ref 0–4)
SODIUM BLD-SCNC: 144 MMOL/L (ref 138–146)
SODIUM SERPL-SCNC: 141 MMOL/L (ref 136–145)
SP GR UR STRIP: 1.07 (ref 1–1.03)
TROPONIN I SERPL HS-MCNC: 8 NG/L (ref 0–22)
UROBILINOGEN UR STRIP-ACNC: NORMAL EU/DL (ref 0–1)
WBC #/AREA URNS HPF: NORMAL /HPF (ref 0–5)
WBC OTHER # BLD: 12.6 K/UL (ref 3.5–11.3)

## 2024-07-18 PROCEDURE — 6370000000 HC RX 637 (ALT 250 FOR IP): Performed by: NURSE PRACTITIONER

## 2024-07-18 PROCEDURE — 96365 THER/PROPH/DIAG IV INF INIT: CPT

## 2024-07-18 PROCEDURE — 2580000003 HC RX 258

## 2024-07-18 PROCEDURE — 74018 RADEX ABDOMEN 1 VIEW: CPT

## 2024-07-18 PROCEDURE — 6370000000 HC RX 637 (ALT 250 FOR IP)

## 2024-07-18 PROCEDURE — 82565 ASSAY OF CREATININE: CPT

## 2024-07-18 PROCEDURE — 80048 BASIC METABOLIC PNL TOTAL CA: CPT

## 2024-07-18 PROCEDURE — 85730 THROMBOPLASTIN TIME PARTIAL: CPT

## 2024-07-18 PROCEDURE — 95705 EEG W/O VID 2-12 HR UNMNTR: CPT

## 2024-07-18 PROCEDURE — 82330 ASSAY OF CALCIUM: CPT

## 2024-07-18 PROCEDURE — 87077 CULTURE AEROBIC IDENTIFY: CPT

## 2024-07-18 PROCEDURE — 82803 BLOOD GASES ANY COMBINATION: CPT

## 2024-07-18 PROCEDURE — 93005 ELECTROCARDIOGRAM TRACING: CPT

## 2024-07-18 PROCEDURE — 85014 HEMATOCRIT: CPT

## 2024-07-18 PROCEDURE — 82140 ASSAY OF AMMONIA: CPT

## 2024-07-18 PROCEDURE — 86403 PARTICLE AGGLUT ANTBDY SCRN: CPT

## 2024-07-18 PROCEDURE — 82553 CREATINE MB FRACTION: CPT

## 2024-07-18 PROCEDURE — 83874 ASSAY OF MYOGLOBIN: CPT

## 2024-07-18 PROCEDURE — 87205 SMEAR GRAM STAIN: CPT

## 2024-07-18 PROCEDURE — 6360000004 HC RX CONTRAST MEDICATION

## 2024-07-18 PROCEDURE — 99285 EMERGENCY DEPT VISIT HI MDM: CPT | Performed by: PSYCHIATRY & NEUROLOGY

## 2024-07-18 PROCEDURE — 87040 BLOOD CULTURE FOR BACTERIA: CPT

## 2024-07-18 PROCEDURE — 1200000000 HC SEMI PRIVATE

## 2024-07-18 PROCEDURE — 85610 PROTHROMBIN TIME: CPT

## 2024-07-18 PROCEDURE — 71045 X-RAY EXAM CHEST 1 VIEW: CPT

## 2024-07-18 PROCEDURE — 96367 TX/PROPH/DG ADDL SEQ IV INF: CPT

## 2024-07-18 PROCEDURE — 87186 SC STD MICRODIL/AGAR DIL: CPT

## 2024-07-18 PROCEDURE — 70450 CT HEAD/BRAIN W/O DYE: CPT

## 2024-07-18 PROCEDURE — 99285 EMERGENCY DEPT VISIT HI MDM: CPT

## 2024-07-18 PROCEDURE — 84520 ASSAY OF UREA NITROGEN: CPT

## 2024-07-18 PROCEDURE — 84484 ASSAY OF TROPONIN QUANT: CPT

## 2024-07-18 PROCEDURE — 83605 ASSAY OF LACTIC ACID: CPT

## 2024-07-18 PROCEDURE — 2580000003 HC RX 258: Performed by: NURSE PRACTITIONER

## 2024-07-18 PROCEDURE — 80051 ELECTROLYTE PANEL: CPT

## 2024-07-18 PROCEDURE — 70498 CT ANGIOGRAPHY NECK: CPT

## 2024-07-18 PROCEDURE — 99222 1ST HOSP IP/OBS MODERATE 55: CPT | Performed by: NURSE PRACTITIONER

## 2024-07-18 PROCEDURE — 82550 ASSAY OF CK (CPK): CPT

## 2024-07-18 PROCEDURE — 6360000002 HC RX W HCPCS

## 2024-07-18 PROCEDURE — 82947 ASSAY GLUCOSE BLOOD QUANT: CPT

## 2024-07-18 PROCEDURE — 84145 PROCALCITONIN (PCT): CPT

## 2024-07-18 PROCEDURE — 85025 COMPLETE CBC W/AUTO DIFF WBC: CPT

## 2024-07-18 PROCEDURE — 81001 URINALYSIS AUTO W/SCOPE: CPT

## 2024-07-18 PROCEDURE — 71250 CT THORAX DX C-: CPT

## 2024-07-18 PROCEDURE — 87154 CUL TYP ID BLD PTHGN 6+ TRGT: CPT

## 2024-07-18 RX ORDER — ACETAMINOPHEN 325 MG/1
650 TABLET ORAL EVERY 6 HOURS PRN
Status: DISCONTINUED | OUTPATIENT
Start: 2024-07-18 | End: 2024-07-23 | Stop reason: HOSPADM

## 2024-07-18 RX ORDER — CARBOXYMETHYLCELLULOSE SODIUM 10 MG/ML
2 GEL OPHTHALMIC DAILY PRN
Status: DISCONTINUED | OUTPATIENT
Start: 2024-07-18 | End: 2024-07-23 | Stop reason: HOSPADM

## 2024-07-18 RX ORDER — CLONAZEPAM 0.5 MG/1
0.5 TABLET ORAL
Status: ON HOLD | COMMUNITY
End: 2024-07-22 | Stop reason: HOSPADM

## 2024-07-18 RX ORDER — GUAIFENESIN/DEXTROMETHORPHAN 100-10MG/5
10 SYRUP ORAL EVERY 4 HOURS PRN
Status: DISCONTINUED | OUTPATIENT
Start: 2024-07-18 | End: 2024-07-23 | Stop reason: HOSPADM

## 2024-07-18 RX ORDER — ENOXAPARIN SODIUM 100 MG/ML
40 INJECTION SUBCUTANEOUS DAILY
Status: DISCONTINUED | OUTPATIENT
Start: 2024-07-19 | End: 2024-07-23 | Stop reason: HOSPADM

## 2024-07-18 RX ORDER — ONDANSETRON 4 MG/1
4 TABLET, ORALLY DISINTEGRATING ORAL EVERY 8 HOURS PRN
Status: DISCONTINUED | OUTPATIENT
Start: 2024-07-18 | End: 2024-07-23 | Stop reason: HOSPADM

## 2024-07-18 RX ORDER — ACETAMINOPHEN 650 MG/1
650 SUPPOSITORY RECTAL ONCE
Status: COMPLETED | OUTPATIENT
Start: 2024-07-18 | End: 2024-07-18

## 2024-07-18 RX ORDER — CLONAZEPAM 0.5 MG/1
0.5 TABLET ORAL 2 TIMES DAILY PRN
Status: ON HOLD | COMMUNITY
End: 2024-07-22 | Stop reason: HOSPADM

## 2024-07-18 RX ORDER — GLYCERIN .002; .002; .01 MG/MG; MG/MG; MG/MG
2 SOLUTION/ DROPS OPHTHALMIC DAILY PRN
COMMUNITY

## 2024-07-18 RX ORDER — POLYETHYLENE GLYCOL 3350 17 G/17G
17 POWDER, FOR SOLUTION ORAL 2 TIMES DAILY
Status: DISCONTINUED | OUTPATIENT
Start: 2024-07-18 | End: 2024-07-18

## 2024-07-18 RX ORDER — IPRATROPIUM BROMIDE AND ALBUTEROL SULFATE 2.5; .5 MG/3ML; MG/3ML
1 SOLUTION RESPIRATORY (INHALATION)
Status: DISCONTINUED | OUTPATIENT
Start: 2024-07-18 | End: 2024-07-20

## 2024-07-18 RX ORDER — SODIUM CHLORIDE 0.9 % (FLUSH) 0.9 %
5-40 SYRINGE (ML) INJECTION EVERY 12 HOURS SCHEDULED
Status: DISCONTINUED | OUTPATIENT
Start: 2024-07-18 | End: 2024-07-23 | Stop reason: HOSPADM

## 2024-07-18 RX ORDER — ONDANSETRON 2 MG/ML
4 INJECTION INTRAMUSCULAR; INTRAVENOUS EVERY 6 HOURS PRN
Status: DISCONTINUED | OUTPATIENT
Start: 2024-07-18 | End: 2024-07-23 | Stop reason: HOSPADM

## 2024-07-18 RX ORDER — SENNOSIDES A AND B 8.6 MG/1
1 TABLET, FILM COATED ORAL NIGHTLY
Status: DISCONTINUED | OUTPATIENT
Start: 2024-07-18 | End: 2024-07-23 | Stop reason: HOSPADM

## 2024-07-18 RX ORDER — POLYETHYLENE GLYCOL 3350 17 G/17G
17 POWDER, FOR SOLUTION ORAL 2 TIMES DAILY
Status: DISCONTINUED | OUTPATIENT
Start: 2024-07-18 | End: 2024-07-23 | Stop reason: HOSPADM

## 2024-07-18 RX ORDER — SODIUM CHLORIDE 9 MG/ML
INJECTION, SOLUTION INTRAVENOUS PRN
Status: DISCONTINUED | OUTPATIENT
Start: 2024-07-18 | End: 2024-07-23 | Stop reason: HOSPADM

## 2024-07-18 RX ORDER — DULOXETIN HYDROCHLORIDE 30 MG/1
60 CAPSULE, DELAYED RELEASE ORAL 2 TIMES DAILY
Status: DISCONTINUED | OUTPATIENT
Start: 2024-07-18 | End: 2024-07-23 | Stop reason: HOSPADM

## 2024-07-18 RX ORDER — ACETAMINOPHEN 650 MG/1
650 SUPPOSITORY RECTAL EVERY 6 HOURS PRN
Status: DISCONTINUED | OUTPATIENT
Start: 2024-07-18 | End: 2024-07-23 | Stop reason: HOSPADM

## 2024-07-18 RX ORDER — SODIUM CHLORIDE 0.9 % (FLUSH) 0.9 %
5-40 SYRINGE (ML) INJECTION PRN
Status: DISCONTINUED | OUTPATIENT
Start: 2024-07-18 | End: 2024-07-23 | Stop reason: HOSPADM

## 2024-07-18 RX ORDER — SODIUM CHLORIDE 9 MG/ML
INJECTION, SOLUTION INTRAVENOUS CONTINUOUS
Status: ACTIVE | OUTPATIENT
Start: 2024-07-18 | End: 2024-07-19

## 2024-07-18 RX ADMIN — DULOXETINE HYDROCHLORIDE 60 MG: 30 CAPSULE, DELAYED RELEASE ORAL at 23:21

## 2024-07-18 RX ADMIN — SODIUM CHLORIDE: 9 INJECTION, SOLUTION INTRAVENOUS at 23:06

## 2024-07-18 RX ADMIN — IOPAMIDOL 90 ML: 755 INJECTION, SOLUTION INTRAVENOUS at 13:22

## 2024-07-18 RX ADMIN — CARBOXYMETHYLCELLULOSE SODIUM 2 DROP: 10 GEL OPHTHALMIC at 23:22

## 2024-07-18 RX ADMIN — SODIUM CHLORIDE, PRESERVATIVE FREE 10 ML: 5 INJECTION INTRAVENOUS at 23:54

## 2024-07-18 RX ADMIN — VANCOMYCIN HYDROCHLORIDE 1000 MG: 1 INJECTION, POWDER, LYOPHILIZED, FOR SOLUTION INTRAVENOUS at 15:43

## 2024-07-18 RX ADMIN — POLYETHYLENE GLYCOL 3350 17 G: 17 POWDER, FOR SOLUTION ORAL at 23:22

## 2024-07-18 RX ADMIN — ACETAMINOPHEN 650 MG: 650 SUPPOSITORY RECTAL at 14:15

## 2024-07-18 RX ADMIN — PIPERACILLIN AND TAZOBACTAM 3375 MG: 3; .375 INJECTION, POWDER, LYOPHILIZED, FOR SOLUTION INTRAVENOUS at 14:21

## 2024-07-18 RX ADMIN — SENNOSIDES 8.6 MG: 8.6 TABLET, COATED ORAL at 23:22

## 2024-07-18 NOTE — ED PROVIDER NOTES
[facility] ED  Emergency Department Encounter  Emergency Medicine Resident     Pt Name:Heri Steinberg  MRN: [id number]  Birthdate 1975  Date of evaluation: 7/18/24  PCP:  Jefry Jansen DO  Note Started: 1:52 PM EDT      CHIEF COMPLAINT       Chief Complaint   Patient presents with    Altered Mental Status       HISTORY OF PRESENT ILLNESS  (Location/Symptom, Timing/Onset, Context/Setting, Quality, Duration, Modifying Factors, Severity.)      Heri Steinberg is a 48 y.o. male, past medical history of relapsing remitting MS, asthma, to the bulbar palsy, bilateral upper and lower extremity contractures, nonverbal at baseline, able to eat regular food,.  At baseline he is able to nod his head and shake his no also groans and follows with his eyes.  Per EMS staff who have previously seen this patient, they also report the disconjugate gaze in his left eye is new.  Per EMS staff, [facility], they had noticed that he was not interactive and no longer moaning or following individuals with his eyes and brought him into the ER.    PAST MEDICAL / SURGICAL / SOCIAL / FAMILY HISTORY      has a past medical history of Anxiety, Depression, and Multiple sclerosis (HCC).     has no past surgical history on file.    Social History     Socioeconomic History    Marital status: Legally      Spouse name: Not on file    Number of children: Not on file    Years of education: Not on file    Highest education level: Not on file   Occupational History    Not on file   Tobacco Use    Smoking status: Never    Smokeless tobacco: Never   Substance and Sexual Activity    Alcohol use: No     Alcohol/week: 0.0 standard drinks of alcohol    Drug use: Yes     Types: Marijuana (Weed)    Sexual activity: Not on file   Other Topics Concern    Not on file   Social History Narrative    Not on file     Social Determinants of Health     Financial Resource Strain: Not on file   Food 
      [facility]     Emergency Department     Faculty Note/ Attestation      Pt Name: Heri Steinberg                                       MRN: [id number]  Birthdate 1975  Date of evaluation: 7/18/2024  Note Started: 1:19 PM EDT    Patients PCP:    Jefry Jansen, DO    Attestation  I performed a history and physical examination of the patient and discussed management with the resident. I reviewed the resident’s note and agree with the documented findings and plan of care. Any areas of disagreement are noted on the chart. I was personally present for the key portions of any procedures. I have documented in the chart those procedures where I was not present during the key portions. I have reviewed the emergency nurses triage note. I agree with the chief complaint, past medical history, past surgical history, allergies, medications, social and family history as documented unless otherwise noted below.    For Physician Assistant/ Nurse Practitioner cases/documentation I have personally evaluated this patient and have completed at least one if not all key elements of the E/M (history, physical exam, and MDM). Additional findings are as noted.    Initial Screens:        Greg Coma Scale  Eye Opening: Spontaneous  Best Verbal Response: None  Best Motor Response: Obeys commands  [place] Coma Scale Score: 11    Vitals:    Vitals:    07/18/24 1308 07/18/24 1317   Pulse: (!) 104 (!) 102   Resp: 20    Temp: 100.4 °F (38 °C)    TempSrc: Oral    SpO2: 95% 96%       CHIEF COMPLAINT       Chief Complaint   Patient presents with   • Altered Mental Status     Patient is a 48-year-old who has history of encephalopathy patient was normal per the nursing home staff until about 2 hours prior to arrival suddenly became much worse per EMS report patient had developed new gaze deviation.  Patient also found to be febrile warm to the touch patient unable to provide any history all history needed to be obtained 
     [facility] ED  Emergency Department  Emergency Medicine Resident Turn-Over     Note Started: 5:44 PM EDT    Care of Heri Steinberg was assumed from Dr. Borrego and is being seen for Altered Mental Status  .  The patient's initial evaluation and plan have been discussed with the prior provider who initially evaluated the patient.     EMERGENCY DEPARTMENT COURSE / MEDICAL DECISION MAKING:       MEDICATIONS GIVEN:  Orders Placed This Encounter   Medications    iopamidol (ISOVUE-370) 76 % injection 90 mL    acetaminophen (TYLENOL) suppository 650 mg    vancomycin (VANCOCIN) 1,000 mg in sodium chloride 0.9 % 250 mL IVPB ([id number])     Order Specific Question:   Antimicrobial Indications     Answer:   Sepsis of Unknown Etiology     Order Specific Question:   Sepsis duration of therapy     Answer:   Other    piperacillin-tazobactam (ZOSYN) 3,375 mg in sodium chloride 0.9 % 50 mL IVPB (mini-bag)     Order Specific Question:   Antimicrobial Indications     Answer:   Sepsis of Unknown Etiology     Order Specific Question:   Sepsis duration of therapy     Answer:   Other       LABS / RADIOLOGY:     Labs Reviewed   STROKE PANEL - Abnormal; Notable for the following components:       Result Value    Glucose 129 (*)     Creatinine 0.6 (*)     WBC 12.6 (*)     Neutrophils % 84 (*)     Lymphocytes % 10 (*)     Eosinophils % 0 (*)     Neutrophils Absolute 10.53 (*)     All other components within normal limits   URINALYSIS WITH REFLEX TO CULTURE - Abnormal; Notable for the following components:    Specific Gravity, UA 1.070 (*)     pH, Urine 8.5 (*)     Protein, UA NEGATIVE  Verified by sulfosalicylic acid test. (*)     All other components within normal limits   CREATININE W/GFR POINT OF CARE - Abnormal; Notable for the following components:    POC Creatinine 0.5 (*)     All other components within normal limits   LACTIC ACID,POINT OF CARE - Abnormal; Notable for the following components:    POC Lactic Acid 
left and unable to cross midline.  Also concern for pneumonia.  Stroke alert.  Cerebral in place.  Laboratory workup, imaging.  Likely admission    OUTSTANDING TASKS / RECOMMENDATIONS:    Broad laboratory workup  Follow-up on neurology recommendations and cerebral results  Disposition, likely admission      Orlando Leblanc MD, FACEP, FAAEM  Attending Emergency Physician  [facility] Emergency Department ED        Orlando Leblanc MD  07/18/24 1081

## 2024-07-19 ENCOUNTER — APPOINTMENT (OUTPATIENT)
Dept: GENERAL RADIOLOGY | Age: 49
DRG: 137 | End: 2024-07-19
Payer: MEDICAID

## 2024-07-19 ENCOUNTER — APPOINTMENT (OUTPATIENT)
Age: 49
DRG: 137 | End: 2024-07-19
Attending: STUDENT IN AN ORGANIZED HEALTH CARE EDUCATION/TRAINING PROGRAM
Payer: MEDICAID

## 2024-07-19 PROBLEM — R78.81 COAG NEGATIVE STAPHYLOCOCCUS BACTEREMIA: Status: ACTIVE | Noted: 2024-07-19

## 2024-07-19 PROBLEM — B95.7 COAG NEGATIVE STAPHYLOCOCCUS BACTEREMIA: Status: ACTIVE | Noted: 2024-07-19

## 2024-07-19 PROBLEM — D72.825 BANDEMIA: Status: ACTIVE | Noted: 2024-07-19

## 2024-07-19 PROBLEM — G92.8 TOXIC METABOLIC ENCEPHALOPATHY: Status: ACTIVE | Noted: 2017-12-27

## 2024-07-19 LAB
ANION GAP SERPL CALCULATED.3IONS-SCNC: 8 MMOL/L (ref 9–16)
BASOPHILS # BLD: 0.03 K/UL (ref 0–0.2)
BASOPHILS NFR BLD: 0 % (ref 0–2)
BUN SERPL-MCNC: 12 MG/DL (ref 6–20)
CALCIUM SERPL-MCNC: 8.6 MG/DL (ref 8.6–10.4)
CHLORIDE SERPL-SCNC: 107 MMOL/L (ref 98–107)
CO2 SERPL-SCNC: 26 MMOL/L (ref 20–31)
CREAT SERPL-MCNC: 0.5 MG/DL (ref 0.7–1.2)
EOSINOPHIL # BLD: 0.16 K/UL (ref 0–0.44)
EOSINOPHILS RELATIVE PERCENT: 2 % (ref 1–4)
ERYTHROCYTE [DISTWIDTH] IN BLOOD BY AUTOMATED COUNT: 13.8 % (ref 11.8–14.4)
GFR, ESTIMATED: >90 ML/MIN/1.73M2
GLUCOSE SERPL-MCNC: 105 MG/DL (ref 74–99)
HCT VFR BLD AUTO: 43.2 % (ref 40.7–50.3)
HGB BLD-MCNC: 14 G/DL (ref 13–17)
IMM GRANULOCYTES # BLD AUTO: <0.03 K/UL (ref 0–0.3)
IMM GRANULOCYTES NFR BLD: 0 %
LYMPHOCYTES NFR BLD: 2.29 K/UL (ref 1.1–3.7)
LYMPHOCYTES RELATIVE PERCENT: 33 % (ref 24–43)
MCH RBC QN AUTO: 31.4 PG (ref 25.2–33.5)
MCHC RBC AUTO-ENTMCNC: 32.4 G/DL (ref 28.4–34.8)
MCV RBC AUTO: 96.9 FL (ref 82.6–102.9)
MONOCYTES NFR BLD: 0.57 K/UL (ref 0.1–1.2)
MONOCYTES NFR BLD: 8 % (ref 3–12)
MRSA, DNA, NASAL: NEGATIVE
NEUTROPHILS NFR BLD: 56 % (ref 36–65)
NEUTS SEG NFR BLD: 3.9 K/UL (ref 1.5–8.1)
NRBC BLD-RTO: 0 PER 100 WBC
PLATELET # BLD AUTO: 226 K/UL (ref 138–453)
PMV BLD AUTO: 10.8 FL (ref 8.1–13.5)
POTASSIUM SERPL-SCNC: 3.7 MMOL/L (ref 3.7–5.3)
PROCALCITONIN SERPL-MCNC: 0.15 NG/ML (ref 0–0.09)
RBC # BLD AUTO: 4.46 M/UL (ref 4.21–5.77)
SODIUM SERPL-SCNC: 141 MMOL/L (ref 136–145)
SPECIMEN DESCRIPTION: NORMAL
WBC OTHER # BLD: 7 K/UL (ref 3.5–11.3)

## 2024-07-19 PROCEDURE — 93306 TTE W/DOPPLER COMPLETE: CPT

## 2024-07-19 PROCEDURE — 99232 SBSQ HOSP IP/OBS MODERATE 35: CPT | Performed by: STUDENT IN AN ORGANIZED HEALTH CARE EDUCATION/TRAINING PROGRAM

## 2024-07-19 PROCEDURE — 51798 US URINE CAPACITY MEASURE: CPT

## 2024-07-19 PROCEDURE — 80048 BASIC METABOLIC PNL TOTAL CA: CPT

## 2024-07-19 PROCEDURE — 1200000000 HC SEMI PRIVATE

## 2024-07-19 PROCEDURE — 94640 AIRWAY INHALATION TREATMENT: CPT

## 2024-07-19 PROCEDURE — 84145 PROCALCITONIN (PCT): CPT

## 2024-07-19 PROCEDURE — 6370000000 HC RX 637 (ALT 250 FOR IP): Performed by: NURSE PRACTITIONER

## 2024-07-19 PROCEDURE — 36415 COLL VENOUS BLD VENIPUNCTURE: CPT

## 2024-07-19 PROCEDURE — 87641 MR-STAPH DNA AMP PROBE: CPT

## 2024-07-19 PROCEDURE — 87040 BLOOD CULTURE FOR BACTERIA: CPT

## 2024-07-19 PROCEDURE — 2580000003 HC RX 258: Performed by: STUDENT IN AN ORGANIZED HEALTH CARE EDUCATION/TRAINING PROGRAM

## 2024-07-19 PROCEDURE — 85025 COMPLETE CBC W/AUTO DIFF WBC: CPT

## 2024-07-19 PROCEDURE — 93306 TTE W/DOPPLER COMPLETE: CPT | Performed by: INTERNAL MEDICINE

## 2024-07-19 PROCEDURE — 6360000002 HC RX W HCPCS: Performed by: STUDENT IN AN ORGANIZED HEALTH CARE EDUCATION/TRAINING PROGRAM

## 2024-07-19 PROCEDURE — 74230 X-RAY XM SWLNG FUNCJ C+: CPT

## 2024-07-19 PROCEDURE — 6360000002 HC RX W HCPCS: Performed by: NURSE PRACTITIONER

## 2024-07-19 PROCEDURE — 2580000003 HC RX 258: Performed by: NURSE PRACTITIONER

## 2024-07-19 PROCEDURE — 94761 N-INVAS EAR/PLS OXIMETRY MLT: CPT

## 2024-07-19 PROCEDURE — 99255 IP/OBS CONSLTJ NEW/EST HI 80: CPT | Performed by: INTERNAL MEDICINE

## 2024-07-19 PROCEDURE — 92611 MOTION FLUOROSCOPY/SWALLOW: CPT

## 2024-07-19 RX ADMIN — POLYETHYLENE GLYCOL 3350 17 G: 17 POWDER, FOR SOLUTION ORAL at 20:28

## 2024-07-19 RX ADMIN — SENNOSIDES 8.6 MG: 8.6 TABLET, COATED ORAL at 20:28

## 2024-07-19 RX ADMIN — IPRATROPIUM BROMIDE AND ALBUTEROL SULFATE 1 DOSE: .5; 3 SOLUTION RESPIRATORY (INHALATION) at 09:24

## 2024-07-19 RX ADMIN — DULOXETINE HYDROCHLORIDE 60 MG: 30 CAPSULE, DELAYED RELEASE ORAL at 20:28

## 2024-07-19 RX ADMIN — IPRATROPIUM BROMIDE AND ALBUTEROL SULFATE 1 DOSE: .5; 3 SOLUTION RESPIRATORY (INHALATION) at 19:53

## 2024-07-19 RX ADMIN — IPRATROPIUM BROMIDE AND ALBUTEROL SULFATE 1 DOSE: .5; 3 SOLUTION RESPIRATORY (INHALATION) at 16:46

## 2024-07-19 RX ADMIN — PIPERACILLIN AND TAZOBACTAM 3375 MG: 3; .375 INJECTION, POWDER, LYOPHILIZED, FOR SOLUTION INTRAVENOUS at 06:07

## 2024-07-19 RX ADMIN — IPRATROPIUM BROMIDE AND ALBUTEROL SULFATE 1 DOSE: .5; 3 SOLUTION RESPIRATORY (INHALATION) at 12:09

## 2024-07-19 RX ADMIN — SODIUM CHLORIDE 1500 MG: 9 INJECTION, SOLUTION INTRAVENOUS at 14:27

## 2024-07-19 RX ADMIN — VANCOMYCIN HYDROCHLORIDE 1000 MG: 1 INJECTION, POWDER, LYOPHILIZED, FOR SOLUTION INTRAVENOUS at 22:48

## 2024-07-19 RX ADMIN — ENOXAPARIN SODIUM 40 MG: 100 INJECTION SUBCUTANEOUS at 11:02

## 2024-07-19 RX ADMIN — PIPERACILLIN AND TAZOBACTAM 3375 MG: 3; .375 INJECTION, POWDER, LYOPHILIZED, FOR SOLUTION INTRAVENOUS at 18:13

## 2024-07-19 RX ADMIN — PIPERACILLIN AND TAZOBACTAM 3375 MG: 3; .375 INJECTION, POWDER, LYOPHILIZED, FOR SOLUTION INTRAVENOUS at 00:52

## 2024-07-19 RX ADMIN — POLYETHYLENE GLYCOL 3350 17 G: 17 POWDER, FOR SOLUTION ORAL at 11:02

## 2024-07-19 RX ADMIN — DULOXETINE HYDROCHLORIDE 60 MG: 30 CAPSULE, DELAYED RELEASE ORAL at 11:01

## 2024-07-19 RX ADMIN — SODIUM CHLORIDE, PRESERVATIVE FREE 10 ML: 5 INJECTION INTRAVENOUS at 20:28

## 2024-07-19 NOTE — PLAN OF CARE
he patient is a 48 y.o. male w sig PMH of RRMS, with paraplegia, bilateral upper and lower extremities contracture, LGORY and pseudobulbar palsy. Patient also nonverbal at baseline.     Patient presenting from nursing home, with concern that patient is no able to mimick sounds as he does at baseline, he is also has new dysconjugate gaze. Patient last known well presumably 2 hours prior to presentation. Upon presentation, chart review and patient image on Nursing home paper, shows that his gaze is at baseline. Patient with sig bilateral lower extrmeity contracture with sig weakness of bilateral upper extremities. Patient was awake and able to follow simple commands. He is able to nod head appropriately.      Patient underwent CTH CTA which were without acute intracranial abnormalities. Patient febrile at presentation and leukocytotic with concern of sepsis.    GENERAL Well developed, sig dysconjugate gaze. Appears ill    HEENT  NC/ AT   HEART  S1 and S2 heard; palpation of pulses: radial pulse    NECK  Supple and no bruits heard   MENTAL STATUS:  Alert, oriented, intact memory, no confusion, normal speech, normal language, no hallucination or delusion   CRANIAL NERVES: II     -      Visual fields intact to confrontation  III,IV,VI -  PERRLA. GLORY   V     -     Normal facial sensation   VII    -     Normal facial symmetry  VIII   -     Intact hearing   IX,X -     Symmetrical palate  XI    -     Symmetrical shoulder shrug  XII   -     Midline tongue, no atrophy    MOTOR FUNCTION: Drift:  present - 4  Tone:  abnormal - hypertonia     1-2/5 in the right upper. Otherwise no muscle movement noted.          SENSORY FUNCTION:  Normal touch, normal pinprick, normal vibration, normal proprioception   CEREBELLAR FUNCTION: Cannot be completed given contractures and known quadriplegia.   STATION and GAIT Patient is bedbound. Non mobile.       Patient is currently at baseline, per patient's mom at bedside.   We would suggest patient

## 2024-07-19 NOTE — ED NOTES
Ceribell placed on patient   
Headband: applied  Date/Time: 07/18/2024 @1349  Recorder: recording started  Skin: intact  Highest Seizure [place] Percentage past hour:       General info regarding Seizure [place] %:  Minimum duration of study is 2 hours. If Seizure [place] has remained 0% throughout the entire 2-hour duration, communicate with provider to stop the recording.     Seizure [place] 0-10% - Continue to monitor and complete 2-hour study.  Seizure [place] 11-89% - Epileptiform activity present. Notify provider for next steps.  Seizure [place] >/= 90% - Epileptiform activity consistent with Status Epilepticus. Immediately notify provider.     *Patients with Seizure [place] above 10% that persists may require a study longer than 2 hours. Maximum recording duration is 24 hours. Please update provider with a persistent increase in Seizure [place] above 10%.    
Labeled blood specimens sent to lab via tube system.    [] Lavender   [] on ice   [] Blue   [] Green/yellow  [] Green/black [] on ice  [] Pink  [] Red  [] Yellow  [] on ice  [x] Blood Cultures  [] x1 [x] x2   
Pt resting on stretcher, alert, RR even and unlabored. NAD. Pt updated on plan of care. Call light within reach.   
Pt resting on stretcher, alert, RR even and unlabored. Pt following commands. Pt mother at bedside, provided box lunch to feed pt. Pt and family deny any needs. Call light within reach.   
Pt to ED via EMS alert but base line pt is nonverbal. Pt has hx of paraplegic and hx of MS. Per EMS pt was \"altered\" onset of 2 hrs ago. Per EMS pt blinks and moans but was no longer doing this for [facility] staff. Pt is currently following commands. Pt placed on monitor. Pt is febrile on arrival. ER team is at bedside    
vessel occlusion in the head or neck.     Partially visualized right lung consolidation concerning for pneumonia.   [HS]   1504 Mom at bedside, mom is updated on plan of care.  Mom is agreeable, requesting imaging for constipation.  Discussed with mom KUB versus CT scan given patient recently had CT.  Mom agreeable for KUB at this time.  Per mom, patient is able to swallow pills at baseline. [HS]   1719 Discussed with [facility], reports that the patient would benefit from observation instead of inpatient, patient is paraplegic and does not meet criteria for admission to the observation unit.  However given the request, I have discussed it with the attending, attending confirms that the patient cannot be admitted to the observation unit and will require inpatient admission. [HS]   1912 [facility] to admit [KR]      ED Course User Index  [HS] Jeremy Borrego MD  [KR] Daniel Grissom MD  [WK] Damien Oden DO          Skin Assessment:        Pain Score:         SOCIAL HISTORY       Social History     Socioeconomic History    Marital status: Legally    Tobacco Use    Smoking status: Never    Smokeless tobacco: Never   Substance and Sexual Activity    Alcohol use: No     Alcohol/week: 0.0 standard drinks of alcohol    Drug use: Yes     Types: Marijuana (Weed)     Social Determinants of Health     Food Insecurity: No Food Insecurity (6/29/2024)    Hunger Vital Sign     Worried About Running Out of Food in the Last Year: Never true     Ran Out of Food in the Last Year: Never true   Transportation Needs: No Transportation Needs (6/29/2024)    PRAPARE - Transportation     Lack of Transportation (Medical): No     Lack of Transportation (Non-Medical): No   Housing Stability: Low Risk  (6/29/2024)    Housing Stability Vital Sign     Unable to Pay for Housing in the Last Year: No     Number of Places Lived in the Last Year: 1     Unstable Housing in the Last Year: No       FAMILY HISTORY       Family History   Problem 
other components within normal limits   CREATININE W/GFR POINT OF CARE - Abnormal; Notable for the following components:    POC Creatinine 0.5 (*)     All other components within normal limits   LACTIC ACID,POINT OF CARE - Abnormal; Notable for the following components:    POC Lactic Acid 1.8 (*)     All other components within normal limits   POCT GLUCOSE - Abnormal; Notable for the following components:    POC Glucose 132 (*)     All other components within normal limits   CULTURE, BLOOD 1   CULTURE, BLOOD 2   LACTATE, SEPSIS   LACTATE, SEPSIS   PROCALCITONIN   AMMONIA   ELECTROLYTES PLUS   HGB/HCT   CALCIUM, IONIC (POC)   MICROSCOPIC URINALYSIS   POC GLUCOSE FINGERSTICK   VENOUS BLOOD GAS, POINT OF CARE   UREA N (POC)       Electronically signed by Fang Wilson RN on 7/18/2024 at 4:48 PM

## 2024-07-19 NOTE — PROCEDURES
INSTRUMENTAL SWALLOW REPORT  MODIFIED BARIUM SWALLOW    NAME: Heri Steinberg   : 1975  MRN: [id number]       Date of Eval: 2024          Referring Diagnosis(es):      Past Medical History:  has a past medical history of Anxiety, Depression, and Multiple sclerosis (HCC).  Past Surgical History:  has no past surgical history on file.     Type of Study: Initial MBS    Patient Complaints/Reason for Referral:  Heri Steinberg was referred for a MBS to assess the efficiency of his/her swallow function, assess for aspiration, and to make recommendations regarding safe dietary consistencies, effective compensatory strategies, and safe eating environment.       Onset of problem:      Heri Steinberg is a 48 y.o.  male w sig PMH of RRMS, with paraplegia, bilateral upper and lower extremities contracture, GLORY and pseudobulbar palsy/ nonverbal at baseline who presenting from nursing home, with concern that patient is no able to mimick sounds as he does at baseline and he new dysconjugate gaze. Patient last known well presumably 2 hours prior to presentation to ER.     Per neuro notes--> Nursing home paper, shows that his gaze is at baseline. Patient with sig bilateral lower extrmeity contracture with sig weakness of bilateral upper extremities. Patient was awake and able to follow simple commands. He is was able to nod head appropriately upon ER assessment .      Patient underwent CTH CTA which were without acute intracranial abnormalities.      Patient febrile at presentation and leukocytotic with concern of sepsis.      Chest x-ray--> no acute process CTA--> suggestive of partially visualized right upper lobe pneumonia     Is admitted to the hospital for the management of AMS (altered mental status).          Behavior/Cognition/Vision/Hearing:  Behavior/Cognition: Alert;Cooperative    Impressions:  Patient presents with  safe swallow for Dysphagia Minced and Moist (Dysphagia II)  diet with Mildly

## 2024-07-19 NOTE — H&P
[facility]  Office: [phone]  Kranthi Correa DO, Tuan Garza DO, Hugo Peterson DO, Jose Bills DO, Georgi Wong MD, Yeni Mei MD, Carmina Tiwari MD, Chanell Maldonado MD,  Ethan Silva MD, Dom Perry MD, Ben Painter MD,  Catarina Garcia DO, Owen Hartmann MD, Chilango Chavez MD, Davonte Correa DO, Roseann Mitchell MD,  Brendan Li DO, Fabiola Mc MD, Evie Valderrama MD, Sol Casiano MD, Va Waite MD,  Demetris De La Vega MD, Sandor Leung MD, Megan Rodriguez MD, Olu Cortez MD, Carlos Walsh MD, Jeffry Luna MD, Adonis Mattson DO, Craig Pacheco DO, Ismael Kilpatrick MD,  Luca Cabello MD, Shirley Waterhouse, CNP,  Leonora Fairbanks CNP, Enrrique Bales, CNP,  Jesusita Tariq, DNP, Park Nassar, CNP, Ansley Robles, CNP, Morenita Duque CNP, Corina Lora, CNP, Chanda Mccord, PA-C, Jocelyn Broderick PA-C, Doreen King, CNP, Angela Zuniga, CNP, Francheska Steinberg, CNP, Karen Diego, CNP, Jolanta Oshea, CNP, Ludmila Gracia, CNS, Shayla Rosenbaum, CNP, Jazmin Awan CNP, Tracy Schwab, CNP         [facility]   IN-PATIENT SERVICE   [facility]    HISTORY AND PHYSICAL EXAMINATION            Date:   7/18/2024  Patient name:  Heri Steinberg  Date of admission:  7/18/2024  1:07 PM  MRN:   [id number]  Account:  [id number]  YOB: 1975  PCP:    Jefry Jansen DO  Room:   24/24  Code Status:    Full Code    Chief Complaint:     Chief Complaint   Patient presents with    Altered Mental Status       History Obtained From:     family member - electronic medical record    History of Present Illness:     Heri Steinberg is a 48 y.o.  male w sig PMH of RRMS, with paraplegia, bilateral upper and lower extremities contracture, GLORY and pseudobulbar palsy/ nonverbal at baseline who presenting from nursing home, with concern that patient is no able to mimick sounds as he does at baseline and he new dysconjugate gaze. Patient last known well

## 2024-07-19 NOTE — PLAN OF CARE
Problem: Discharge Planning  Goal: Discharge to home or other facility with appropriate resources  Outcome: Progressing  Flowsheets (Taken 7/19/2024 1591)  Discharge to home or other facility with appropriate resources: Identify barriers to discharge with patient and caregiver     Problem: Skin/Tissue Integrity  Goal: Absence of new skin breakdown  Description: 1.  Monitor for areas of redness and/or skin breakdown  2.  Assess vascular access sites hourly  3.  Every 4-6 hours minimum:  Change oxygen saturation probe site  4.  Every 4-6 hours:  If on nasal continuous positive airway pressure, respiratory therapy assess nares and determine need for appliance change or resting period.  Outcome: Progressing     Problem: Safety - Adult  Goal: Free from fall injury  Outcome: Progressing

## 2024-07-19 NOTE — CONSULTS
Department of Neurology/Telestroke/Stroke  Resident Consult Note  Stroke Alert @ 1:10 pm  Arrival at bedside @ 1:12 pm    Reason for Consult:  ED Stroke Alert  Requesting Physician:  Dr. Borrego  Endovascular Neurosurgeon:   Larry Lam MD    Stroke Team:  Luis Scherer MD      History Obtained From:  electronic medical record, nurse team    CHIEF COMPLAINT:       AMS    HISTORY OF PRESENT ILLNESS:       The patient is a 48 y.o. male w sig PMH of RRMS, with paraplegia, bilateral upper and lower extremities contracture, GLORY and pseudobulbar palsy. Patient also nonverbal at baseline.    Patient presenting from nursing home, with concern that patient is no able to mimick sounds as he does at baseline, he is also has new dysconjugate gaze. Patient last known well presumably 2 hours prior to presentation. Upon presentation, chart review and patient image on Nursing home paper, shows that his gaze is at baseline. Patient with sig bilateral lower extrmeity contracture with sig weakness of bilateral upper extremities. Patient was awake and able to follow simple commands. He is able to nod head appropriately.     Patient underwent CTH CTA which were without acute intracranial abnormalities. Patient febrile at presentation and leukocytotic with concern of sepsis.        LKW: 9 AM  NIHSS:  Modified [place] Scale:5  SBP:138  Glucose:132  CT head without contrast:completed  TNK:not given, absence of sig change in examination.   Endovascular:not candidate.        PAST MEDICAL HISTORY :       Past Medical History:        Diagnosis Date    Anxiety     Depression     Multiple sclerosis (HCC)        Past Surgical History:    No past surgical history on file.    Social History:   Social History     Socioeconomic History    Marital status: Legally      Spouse name: Not on file    Number of children: Not on file    Years of education: Not on file    Highest education level: Not on file   Occupational History    Not on file 
on file   Stress: Not on file   Social Connections: Not on file   Intimate Partner Violence: Not on file   Housing Stability: Low Risk  (7/18/2024)    Housing Stability Vital Sign     Unable to Pay for Housing in the Last Year: No     Number of Places Lived in the Last Year: 1     Unstable Housing in the Last Year: No       Family History:     Family History   Problem Relation Age of Onset    Asthma Father       Medical Decision Making:   I have independently reviewed/ordered the following labs:    CBC with Differential:   Recent Labs     07/18/24  1316 07/19/24  0423   WBC 12.6* 7.0   HGB 14.6 14.0   HCT 44.9 43.2    226   LYMPHOPCT 10* 33   MONOPCT 6 8   EOSPCT 0* 2     BMP:  Recent Labs     07/18/24  1316 07/18/24  1322 07/19/24  0423     --  141   K 3.9  --  3.7     --  107   CO2 24  --  26   BUN 11  --  12   CREATININE 0.6* 0.5* 0.5*     Hepatic Function Panel: No results for input(s): \"LABALBU\", \"BILIDIR\", \"IBILI\", \"BILITOT\", \"ALKPHOS\", \"ALT\", \"AST\" in the last 72 hours.    Invalid input(s): \"PROT\"  No results for input(s): \"RPR\" in the last 72 hours.  No results for input(s): \"HIV\" in the last 72 hours.  No results for input(s): \"BC\" in the last 72 hours.  Lab Results   Component Value Date/Time    CREATININE 0.5 07/19/2024 04:23 AM    GLUCOSE 105 07/19/2024 04:23 AM    GLUCOSE 80 11/11/2023 07:11 AM       Detailed results:        Thank you for allowing us to participate in the care of this patient.Please call with questions.    This note is created with the assistance of a speech recognition program.  While intending to generate adocument that actually reflects the content of the visit, the document can still have some errors including those of syntax and sound a like substitutions which may escape proof reading.  It such instances, actual meaningcan be extrapolated by contextual diversion.    Laura Rico MD  Office: [phone]  Perfect serve / office [phone]

## 2024-07-20 PROBLEM — G35 MS (MULTIPLE SCLEROSIS) (HCC): Status: ACTIVE | Noted: 2024-07-20

## 2024-07-20 PROBLEM — R13.10 DYSPHAGIA: Status: ACTIVE | Noted: 2024-07-20

## 2024-07-20 PROBLEM — R78.81 BACTEREMIA: Status: ACTIVE | Noted: 2024-07-20

## 2024-07-20 LAB
BUN SERPL-MCNC: 7 MG/DL (ref 6–20)
CREAT SERPL-MCNC: 0.5 MG/DL (ref 0.7–1.2)
ECHO AO ROOT DIAM: 3.1 CM
ECHO AO ROOT INDEX: 1.66 CM/M2
ECHO AV AREA PEAK VELOCITY: 2.2 CM2
ECHO AV AREA VTI: 2.2 CM2
ECHO AV AREA/BSA PEAK VELOCITY: 1.2 CM2/M2
ECHO AV AREA/BSA VTI: 1.2 CM2/M2
ECHO AV MEAN GRADIENT: 3 MMHG
ECHO AV MEAN VELOCITY: 0.8 M/S
ECHO AV PEAK GRADIENT: 5 MMHG
ECHO AV PEAK VELOCITY: 1.2 M/S
ECHO AV VELOCITY RATIO: 0.75
ECHO AV VTI: 22.4 CM
ECHO BSA: 1.84 M2
ECHO EST RA PRESSURE: 3 MMHG
ECHO LA AREA 2C: 10 CM2
ECHO LA AREA 4C: 13 CM2
ECHO LA DIAMETER INDEX: 1.39 CM/M2
ECHO LA DIAMETER: 2.6 CM
ECHO LA MAJOR AXIS: 5.2 CM
ECHO LA MINOR AXIS: 3.9 CM
ECHO LA TO AORTIC ROOT RATIO: 0.84
ECHO LA VOL MOD A2C: 19 ML (ref 18–58)
ECHO LA VOL MOD A4C: 25 ML (ref 18–58)
ECHO LA VOLUME INDEX MOD A2C: 10 ML/M2 (ref 16–34)
ECHO LA VOLUME INDEX MOD A4C: 13 ML/M2 (ref 16–34)
ECHO LV E' LATERAL VELOCITY: 11 CM/S
ECHO LV E' SEPTAL VELOCITY: 10 CM/S
ECHO LV EDV 3D: 120 ML
ECHO LV EDV INDEX 3D: 64 ML/M2
ECHO LV EJECTION FRACTION 3D: 65 %
ECHO LV ESV 3D: 42 ML
ECHO LV ESV INDEX 3D: 22 ML/M2
ECHO LV FRACTIONAL SHORTENING: 14 % (ref 28–44)
ECHO LV INTERNAL DIMENSION DIASTOLE INDEX: 2.25 CM/M2
ECHO LV INTERNAL DIMENSION DIASTOLIC: 4.2 CM (ref 4.2–5.9)
ECHO LV INTERNAL DIMENSION SYSTOLIC INDEX: 1.93 CM/M2
ECHO LV INTERNAL DIMENSION SYSTOLIC: 3.6 CM
ECHO LV IVSD: 1.2 CM (ref 0.6–1)
ECHO LV MASS 2D: 167.4 G (ref 88–224)
ECHO LV MASS 3D INDEX: 70.1 G/M2
ECHO LV MASS 3D: 131 G
ECHO LV MASS INDEX 2D: 89.5 G/M2 (ref 49–115)
ECHO LV POSTERIOR WALL DIASTOLIC: 1.1 CM (ref 0.6–1)
ECHO LV RELATIVE WALL THICKNESS RATIO: 0.52
ECHO LVOT AREA: 2.8 CM2
ECHO LVOT AV VTI INDEX: 0.78
ECHO LVOT DIAM: 1.9 CM
ECHO LVOT MEAN GRADIENT: 2 MMHG
ECHO LVOT PEAK GRADIENT: 3 MMHG
ECHO LVOT PEAK VELOCITY: 0.9 M/S
ECHO LVOT STROKE VOLUME INDEX: 26.4 ML/M2
ECHO LVOT SV: 49.3 ML
ECHO LVOT VTI: 17.4 CM
ECHO MV A VELOCITY: 0.44 M/S
ECHO MV AREA VTI: 2.6 CM2
ECHO MV E DECELERATION TIME (DT): 197 MS
ECHO MV E VELOCITY: 0.62 M/S
ECHO MV E/A RATIO: 1.41
ECHO MV E/E' LATERAL: 5.64
ECHO MV E/E' RATIO (AVERAGED): 5.92
ECHO MV E/E' SEPTAL: 6.2
ECHO MV LVOT VTI INDEX: 1.1
ECHO MV MAX VELOCITY: 0.7 M/S
ECHO MV MEAN GRADIENT: 1 MMHG
ECHO MV MEAN VELOCITY: 0.4 M/S
ECHO MV PEAK GRADIENT: 2 MMHG
ECHO MV VTI: 19.1 CM
ECHO PV MAX VELOCITY: 1 M/S
ECHO PV PEAK GRADIENT: 4 MMHG
ECHO RIGHT VENTRICULAR SYSTOLIC PRESSURE (RVSP): 25 MMHG
ECHO RV BASAL DIMENSION: 3.1 CM
ECHO RV FREE WALL PEAK S': 15 CM/S
ECHO RV TAPSE: 2.6 CM (ref 1.7–?)
ECHO TV REGURGITANT MAX VELOCITY: 2.33 M/S
ECHO TV REGURGITANT PEAK GRADIENT: 22 MMHG
EKG ATRIAL RATE: 109 BPM
EKG P AXIS: 52 DEGREES
EKG P-R INTERVAL: 178 MS
EKG Q-T INTERVAL: 320 MS
EKG QRS DURATION: 86 MS
EKG QTC CALCULATION (BAZETT): 430 MS
EKG R AXIS: 55 DEGREES
EKG T AXIS: 56 DEGREES
EKG VENTRICULAR RATE: 109 BPM
GFR, ESTIMATED: >90 ML/MIN/1.73M2
L PNEUMO1 AG UR QL IA.RAPID: NEGATIVE

## 2024-07-20 PROCEDURE — 36415 COLL VENOUS BLD VENIPUNCTURE: CPT

## 2024-07-20 PROCEDURE — 2580000003 HC RX 258: Performed by: STUDENT IN AN ORGANIZED HEALTH CARE EDUCATION/TRAINING PROGRAM

## 2024-07-20 PROCEDURE — 6370000000 HC RX 637 (ALT 250 FOR IP): Performed by: NURSE PRACTITIONER

## 2024-07-20 PROCEDURE — 99232 SBSQ HOSP IP/OBS MODERATE 35: CPT | Performed by: INTERNAL MEDICINE

## 2024-07-20 PROCEDURE — 6370000000 HC RX 637 (ALT 250 FOR IP): Performed by: STUDENT IN AN ORGANIZED HEALTH CARE EDUCATION/TRAINING PROGRAM

## 2024-07-20 PROCEDURE — 99232 SBSQ HOSP IP/OBS MODERATE 35: CPT | Performed by: STUDENT IN AN ORGANIZED HEALTH CARE EDUCATION/TRAINING PROGRAM

## 2024-07-20 PROCEDURE — 6360000002 HC RX W HCPCS: Performed by: NURSE PRACTITIONER

## 2024-07-20 PROCEDURE — 6360000002 HC RX W HCPCS: Performed by: STUDENT IN AN ORGANIZED HEALTH CARE EDUCATION/TRAINING PROGRAM

## 2024-07-20 PROCEDURE — 1200000000 HC SEMI PRIVATE

## 2024-07-20 PROCEDURE — 2580000003 HC RX 258: Performed by: NURSE PRACTITIONER

## 2024-07-20 PROCEDURE — 94640 AIRWAY INHALATION TREATMENT: CPT

## 2024-07-20 PROCEDURE — 94761 N-INVAS EAR/PLS OXIMETRY MLT: CPT

## 2024-07-20 PROCEDURE — 82565 ASSAY OF CREATININE: CPT

## 2024-07-20 PROCEDURE — 84520 ASSAY OF UREA NITROGEN: CPT

## 2024-07-20 PROCEDURE — 87449 NOS EACH ORGANISM AG IA: CPT

## 2024-07-20 RX ORDER — IPRATROPIUM BROMIDE AND ALBUTEROL SULFATE 2.5; .5 MG/3ML; MG/3ML
1 SOLUTION RESPIRATORY (INHALATION) EVERY 4 HOURS PRN
Status: DISCONTINUED | OUTPATIENT
Start: 2024-07-20 | End: 2024-07-23 | Stop reason: HOSPADM

## 2024-07-20 RX ORDER — PRIMIDONE 250 MG/1
250 TABLET ORAL 2 TIMES DAILY
Status: DISCONTINUED | OUTPATIENT
Start: 2024-07-20 | End: 2024-07-23 | Stop reason: HOSPADM

## 2024-07-20 RX ORDER — GABAPENTIN 300 MG/1
300 CAPSULE ORAL 3 TIMES DAILY
Status: DISCONTINUED | OUTPATIENT
Start: 2024-07-20 | End: 2024-07-23 | Stop reason: HOSPADM

## 2024-07-20 RX ORDER — CLONAZEPAM 0.5 MG/1
0.5 TABLET ORAL 2 TIMES DAILY
Status: DISCONTINUED | OUTPATIENT
Start: 2024-07-20 | End: 2024-07-23 | Stop reason: HOSPADM

## 2024-07-20 RX ORDER — CLONAZEPAM 0.5 MG/1
1 TABLET ORAL 2 TIMES DAILY
Status: DISCONTINUED | OUTPATIENT
Start: 2024-07-20 | End: 2024-07-20

## 2024-07-20 RX ADMIN — SENNOSIDES 8.6 MG: 8.6 TABLET, COATED ORAL at 20:48

## 2024-07-20 RX ADMIN — DULOXETINE HYDROCHLORIDE 60 MG: 30 CAPSULE, DELAYED RELEASE ORAL at 09:56

## 2024-07-20 RX ADMIN — CLONAZEPAM 0.5 MG: 0.5 TABLET ORAL at 20:47

## 2024-07-20 RX ADMIN — VANCOMYCIN HYDROCHLORIDE 1000 MG: 1 INJECTION, POWDER, LYOPHILIZED, FOR SOLUTION INTRAVENOUS at 06:48

## 2024-07-20 RX ADMIN — POLYETHYLENE GLYCOL 3350 17 G: 17 POWDER, FOR SOLUTION ORAL at 20:47

## 2024-07-20 RX ADMIN — PIPERACILLIN AND TAZOBACTAM 3375 MG: 3; .375 INJECTION, POWDER, LYOPHILIZED, FOR SOLUTION INTRAVENOUS at 02:40

## 2024-07-20 RX ADMIN — PRIMIDONE 250 MG: 250 TABLET ORAL at 20:48

## 2024-07-20 RX ADMIN — IPRATROPIUM BROMIDE AND ALBUTEROL SULFATE 1 DOSE: .5; 3 SOLUTION RESPIRATORY (INHALATION) at 09:12

## 2024-07-20 RX ADMIN — ENOXAPARIN SODIUM 40 MG: 100 INJECTION SUBCUTANEOUS at 09:56

## 2024-07-20 RX ADMIN — GABAPENTIN 300 MG: 300 CAPSULE ORAL at 12:52

## 2024-07-20 RX ADMIN — POLYETHYLENE GLYCOL 3350 17 G: 17 POWDER, FOR SOLUTION ORAL at 09:56

## 2024-07-20 RX ADMIN — MEROPENEM 1000 MG: 1 INJECTION INTRAVENOUS at 18:40

## 2024-07-20 RX ADMIN — CLONAZEPAM 0.5 MG: 0.5 TABLET ORAL at 12:52

## 2024-07-20 RX ADMIN — PRIMIDONE 250 MG: 250 TABLET ORAL at 16:26

## 2024-07-20 RX ADMIN — MEROPENEM 1000 MG: 1 INJECTION INTRAVENOUS at 12:53

## 2024-07-20 RX ADMIN — GABAPENTIN 300 MG: 300 CAPSULE ORAL at 20:47

## 2024-07-20 RX ADMIN — DULOXETINE HYDROCHLORIDE 60 MG: 30 CAPSULE, DELAYED RELEASE ORAL at 20:47

## 2024-07-20 RX ADMIN — SODIUM CHLORIDE, PRESERVATIVE FREE 10 ML: 5 INJECTION INTRAVENOUS at 20:47

## 2024-07-20 NOTE — PLAN OF CARE
Problem: Discharge Planning  Goal: Discharge to home or other facility with appropriate resources  Outcome: Progressing  Flowsheets (Taken 7/19/2024 2031)  Discharge to home or other facility with appropriate resources: Identify barriers to discharge with patient and caregiver     Problem: Skin/Tissue Integrity  Goal: Absence of new skin breakdown  Description: 1.  Monitor for areas of redness and/or skin breakdown  2.  Assess vascular access sites hourly  3.  Every 4-6 hours minimum:  Change oxygen saturation probe site  4.  Every 4-6 hours:  If on nasal continuous positive airway pressure, respiratory therapy assess nares and determine need for appliance change or resting period.  Outcome: Progressing     Problem: Safety - Adult  Goal: Free from fall injury  Outcome: Progressing  Flowsheets (Taken 7/19/2024 2137)  Free From Fall Injury: Instruct family/caregiver on patient safety     Problem: Respiratory - Adult  Goal: Achieves optimal ventilation and oxygenation  7/19/2024 2138 by Leatha Marrero, RN  Outcome: Progressing  Flowsheets (Taken 7/19/2024 2031)  Achieves optimal ventilation and oxygenation: Assess for changes in respiratory status  7/19/2024 0756 by Nette Saunders RCP  Outcome: Progressing

## 2024-07-20 NOTE — RT PROTOCOL NOTE
RT Nebulizer Bronchodilator Protocol Note    There is a bronchodilator order in the chart from a provider indicating to follow the RT Bronchodilator Protocol and there is an “Initiate RT Bronchodilator Protocol” order as well (see protocol at bottom of note).    CXR Findings:  XR CHEST PORTABLE    Result Date: 7/18/2024  No acute process.       The findings from the last RT Protocol Assessment were as follows:  Smoking: None or smoker <15 pack years  Respiratory Pattern: Regular pattern and RR 12-20 bpm  Breath Sounds: Slightly diminished and/or crackles  Cough: Strong, spontaneous, non-productive  Indication for Bronchodilator Therapy:    Bronchodilator Assessment Score: 2    Aerosolized bronchodilator medication orders have been revised according to the RT Nebulizer Bronchodilator Protocol below.    Respiratory Therapist to perform RT Therapy Protocol Assessment initially then follow the protocol.  Repeat RT Therapy Protocol Assessment PRN for score 0-3 or on second treatment, BID, and PRN for scores above 3.    No Indications - adjust the frequency to every 6 hours PRN wheezing or bronchospasm, if no treatments needed after 48 hours then discontinue using Per Protocol order mode.     If indication present, adjust the RT bronchodilator orders based on the Bronchodilator Assessment Score as indicated below.  If a patient is on this medication at home then do not decrease Frequency below that used at home.    0-3 - enter or revise RT bronchodilator order(s) to equivalent RT Bronchodilator order with Frequency of every 4 hours PRN for wheezing or increased work of breathing using Per Protocol order mode.       4-6 - enter or revise RT Bronchodilator order(s) to two equivalent RT bronchodilator orders with one order with BID Frequency and one order with Frequency of every 4 hours PRN wheezing or increased work of breathing using Per Protocol order mode.         7-10 - enter or revise RT Bronchodilator order(s) to two

## 2024-07-21 LAB
MICROORGANISM SPEC CULT: ABNORMAL
SERVICE CMNT-IMP: ABNORMAL
SERVICE CMNT-IMP: ABNORMAL
SPECIMEN DESCRIPTION: ABNORMAL
SPECIMEN DESCRIPTION: ABNORMAL

## 2024-07-21 PROCEDURE — 6370000000 HC RX 637 (ALT 250 FOR IP): Performed by: NURSE PRACTITIONER

## 2024-07-21 PROCEDURE — 2580000003 HC RX 258: Performed by: NURSE PRACTITIONER

## 2024-07-21 PROCEDURE — 6370000000 HC RX 637 (ALT 250 FOR IP): Performed by: STUDENT IN AN ORGANIZED HEALTH CARE EDUCATION/TRAINING PROGRAM

## 2024-07-21 PROCEDURE — 99232 SBSQ HOSP IP/OBS MODERATE 35: CPT | Performed by: STUDENT IN AN ORGANIZED HEALTH CARE EDUCATION/TRAINING PROGRAM

## 2024-07-21 PROCEDURE — 6360000002 HC RX W HCPCS: Performed by: STUDENT IN AN ORGANIZED HEALTH CARE EDUCATION/TRAINING PROGRAM

## 2024-07-21 PROCEDURE — 2580000003 HC RX 258: Performed by: STUDENT IN AN ORGANIZED HEALTH CARE EDUCATION/TRAINING PROGRAM

## 2024-07-21 PROCEDURE — 99232 SBSQ HOSP IP/OBS MODERATE 35: CPT | Performed by: INTERNAL MEDICINE

## 2024-07-21 PROCEDURE — 94761 N-INVAS EAR/PLS OXIMETRY MLT: CPT

## 2024-07-21 PROCEDURE — 6360000002 HC RX W HCPCS: Performed by: NURSE PRACTITIONER

## 2024-07-21 PROCEDURE — 94640 AIRWAY INHALATION TREATMENT: CPT

## 2024-07-21 PROCEDURE — 1200000000 HC SEMI PRIVATE

## 2024-07-21 RX ADMIN — IPRATROPIUM BROMIDE AND ALBUTEROL SULFATE 1 DOSE: .5; 2.5 SOLUTION RESPIRATORY (INHALATION) at 17:00

## 2024-07-21 RX ADMIN — LANSOPRAZOLE 30 MG: 15 TABLET, ORALLY DISINTEGRATING ORAL at 09:21

## 2024-07-21 RX ADMIN — PRIMIDONE 250 MG: 250 TABLET ORAL at 20:49

## 2024-07-21 RX ADMIN — PRIMIDONE 250 MG: 250 TABLET ORAL at 09:23

## 2024-07-21 RX ADMIN — CLONAZEPAM 0.5 MG: 0.5 TABLET ORAL at 20:48

## 2024-07-21 RX ADMIN — MEROPENEM 1000 MG: 1 INJECTION INTRAVENOUS at 01:52

## 2024-07-21 RX ADMIN — GABAPENTIN 300 MG: 300 CAPSULE ORAL at 20:48

## 2024-07-21 RX ADMIN — DULOXETINE HYDROCHLORIDE 60 MG: 30 CAPSULE, DELAYED RELEASE ORAL at 09:21

## 2024-07-21 RX ADMIN — SODIUM CHLORIDE, PRESERVATIVE FREE 10 ML: 5 INJECTION INTRAVENOUS at 09:22

## 2024-07-21 RX ADMIN — MEROPENEM 1000 MG: 1 INJECTION INTRAVENOUS at 09:24

## 2024-07-21 RX ADMIN — GABAPENTIN 300 MG: 300 CAPSULE ORAL at 13:49

## 2024-07-21 RX ADMIN — MEROPENEM 1000 MG: 1 INJECTION INTRAVENOUS at 18:07

## 2024-07-21 RX ADMIN — CLONAZEPAM 0.5 MG: 0.5 TABLET ORAL at 09:21

## 2024-07-21 RX ADMIN — ENOXAPARIN SODIUM 40 MG: 100 INJECTION SUBCUTANEOUS at 09:22

## 2024-07-21 RX ADMIN — GABAPENTIN 300 MG: 300 CAPSULE ORAL at 09:21

## 2024-07-21 RX ADMIN — SENNOSIDES 8.6 MG: 8.6 TABLET, COATED ORAL at 20:49

## 2024-07-21 RX ADMIN — DULOXETINE HYDROCHLORIDE 60 MG: 30 CAPSULE, DELAYED RELEASE ORAL at 20:48

## 2024-07-21 NOTE — PLAN OF CARE
Problem: Discharge Planning  Goal: Discharge to home or other facility with appropriate resources  Outcome: Progressing     Problem: Skin/Tissue Integrity  Goal: Absence of new skin breakdown  Description: 1.  Monitor for areas of redness and/or skin breakdown  2.  Assess vascular access sites hourly  3.  Every 4-6 hours minimum:  Change oxygen saturation probe site  4.  Every 4-6 hours:  If on nasal continuous positive airway pressure, respiratory therapy assess nares and determine need for appliance change or resting period.  Outcome: Progressing     Problem: Safety - Adult  Goal: Free from fall injury  Outcome: Progressing     Problem: Respiratory - Adult  Goal: Achieves optimal ventilation and oxygenation  Outcome: Progressing

## 2024-07-22 PROBLEM — G35 MS (MULTIPLE SCLEROSIS) (HCC): Status: RESOLVED | Noted: 2024-07-20 | Resolved: 2024-07-22

## 2024-07-22 PROCEDURE — 6370000000 HC RX 637 (ALT 250 FOR IP): Performed by: STUDENT IN AN ORGANIZED HEALTH CARE EDUCATION/TRAINING PROGRAM

## 2024-07-22 PROCEDURE — 99232 SBSQ HOSP IP/OBS MODERATE 35: CPT | Performed by: STUDENT IN AN ORGANIZED HEALTH CARE EDUCATION/TRAINING PROGRAM

## 2024-07-22 PROCEDURE — 6370000000 HC RX 637 (ALT 250 FOR IP): Performed by: NURSE PRACTITIONER

## 2024-07-22 PROCEDURE — 2580000003 HC RX 258: Performed by: NURSE PRACTITIONER

## 2024-07-22 PROCEDURE — 2580000003 HC RX 258: Performed by: STUDENT IN AN ORGANIZED HEALTH CARE EDUCATION/TRAINING PROGRAM

## 2024-07-22 PROCEDURE — 99232 SBSQ HOSP IP/OBS MODERATE 35: CPT | Performed by: INTERNAL MEDICINE

## 2024-07-22 PROCEDURE — 6360000002 HC RX W HCPCS: Performed by: STUDENT IN AN ORGANIZED HEALTH CARE EDUCATION/TRAINING PROGRAM

## 2024-07-22 PROCEDURE — 6360000002 HC RX W HCPCS: Performed by: NURSE PRACTITIONER

## 2024-07-22 PROCEDURE — 1200000000 HC SEMI PRIVATE

## 2024-07-22 RX ORDER — DEXTROMETHORPHAN HYDROBROMIDE AND QUINIDINE SULFATE 20; 10 MG/1; MG/1
1 CAPSULE, GELATIN COATED ORAL EVERY 12 HOURS
Qty: 60 CAPSULE | Refills: 0 | Status: SHIPPED | OUTPATIENT
Start: 2024-07-22

## 2024-07-22 RX ADMIN — MEROPENEM 1000 MG: 1 INJECTION INTRAVENOUS at 17:54

## 2024-07-22 RX ADMIN — ENOXAPARIN SODIUM 40 MG: 100 INJECTION SUBCUTANEOUS at 08:57

## 2024-07-22 RX ADMIN — PRIMIDONE 250 MG: 250 TABLET ORAL at 08:57

## 2024-07-22 RX ADMIN — DULOXETINE HYDROCHLORIDE 60 MG: 30 CAPSULE, DELAYED RELEASE ORAL at 08:57

## 2024-07-22 RX ADMIN — LANSOPRAZOLE 30 MG: 15 TABLET, ORALLY DISINTEGRATING ORAL at 08:57

## 2024-07-22 RX ADMIN — PRIMIDONE 250 MG: 250 TABLET ORAL at 20:37

## 2024-07-22 RX ADMIN — GABAPENTIN 300 MG: 300 CAPSULE ORAL at 13:33

## 2024-07-22 RX ADMIN — DULOXETINE HYDROCHLORIDE 60 MG: 30 CAPSULE, DELAYED RELEASE ORAL at 20:40

## 2024-07-22 RX ADMIN — SODIUM CHLORIDE, PRESERVATIVE FREE 10 ML: 5 INJECTION INTRAVENOUS at 08:58

## 2024-07-22 RX ADMIN — CLONAZEPAM 0.5 MG: 0.5 TABLET ORAL at 08:57

## 2024-07-22 RX ADMIN — CLONAZEPAM 0.5 MG: 0.5 TABLET ORAL at 20:40

## 2024-07-22 RX ADMIN — POLYETHYLENE GLYCOL 3350 17 G: 17 POWDER, FOR SOLUTION ORAL at 08:57

## 2024-07-22 RX ADMIN — GABAPENTIN 300 MG: 300 CAPSULE ORAL at 08:57

## 2024-07-22 RX ADMIN — MEROPENEM 1000 MG: 1 INJECTION INTRAVENOUS at 11:12

## 2024-07-22 RX ADMIN — SENNOSIDES 8.6 MG: 8.6 TABLET, COATED ORAL at 20:37

## 2024-07-22 RX ADMIN — GABAPENTIN 300 MG: 300 CAPSULE ORAL at 20:40

## 2024-07-22 RX ADMIN — MEROPENEM 1000 MG: 1 INJECTION INTRAVENOUS at 01:36

## 2024-07-22 ASSESSMENT — ENCOUNTER SYMPTOMS
ABDOMINAL DISTENTION: 0
APNEA: 0
EYE DISCHARGE: 0

## 2024-07-22 NOTE — DISCHARGE SUMMARY
with thin consistency. Please see separate speech pathology report for full discussion of findings and recommendations.     CT CHEST ABDOMEN PELVIS WO CONTRAST Additional Contrast? None    Result Date: 7/18/2024  1. Multiple consolidations involving the posterior segments of the lungs bilaterally and also in the right apex. Findings are concerning for multifocal pneumonia. 2. Large amount of stool burden in the sigmoid colon and the rectum. Correlate for constipation.     CTA HEAD NECK W CONTRAST    Result Date: 7/18/2024  No large vessel occlusion in the head or neck. Partially visualized right lung consolidation concerning for pneumonia.     XR ABDOMEN (KUB) (SINGLE AP VIEW)    Result Date: 7/18/2024  Significant amount of fecal material in the left colon in keeping with constipation.     XR CHEST PORTABLE    Result Date: 7/18/2024  No acute process.     CT HEAD WO CONTRAST    Addendum Date: 7/18/2024    ADDENDUM: Results were conveyed to Dr. Luis Scherer by the radiology results communication center with confirmation of received result at 1:53 p.m. on 07/18/2024.     Result Date: 7/18/2024  1. No acute intracranial abnormality. 2. Extensive hypoattenuation throughout the bilateral cerebral white matter, similar compared with prior examinations, consistent with patient's known history of multiple sclerosis. The findings were sent to the Radiology Results Communication Center at 1:49 pm on 7/18/2024 to be communicated to a licensed caregiver.       Consultations:    Consults:     Final Specialist Recommendations/Findings:   IP CONSULT TO STROKE TEAM  IP CONSULT TO HOSPITALIST  IP CONSULT TO INFECTIOUS DISEASES  IP CONSULT TO VASCULAR ACCESS TEAM  IP CONSULT TO VASCULAR ACCESS TEAM      The patient was seen and examined on day of discharge and this discharge summary is in conjunction with any daily progress note from day of discharge.    Discharge plan:     Disposition: Nursing home.    Physician Follow Up:

## 2024-07-22 NOTE — DISCHARGE INSTR - COC
Assessment:  Last Vital Signs: /69   Pulse 90   Temp 98.5 °F (36.9 °C) (Axillary)   Resp 15   Ht 1.833 m (6' 0.17\")   Wt 66.7 kg (147 lb)   SpO2 100%   BMI 19.85 kg/m²     Last documented pain score (0-10 scale):    Last Weight:   Wt Readings from Last 1 Encounters:   07/19/24 66.7 kg (147 lb)     Mental Status:  oriented    IV Access:  - midline placed R brachial 7/23/24    Nursing Mobility/ADLs:  Walking   Dependent  Transfer  Dependent  Bathing  Dependent  Dressing  Dependent  Toileting  Dependent  Feeding  Dependent  Med Admin  Dependent  Med Delivery   whole    Wound Care Documentation and Therapy:        Elimination:  Continence:   Bowel: Yes  Bladder: Yes  Urinary Catheter: None   Colostomy/Ileostomy/Ileal Conduit: No       Date of Last BM: ***    Intake/Output Summary (Last 24 hours) at 7/22/2024 1505  Last data filed at 7/22/2024 0336  Gross per 24 hour   Intake --   Output 200 ml   Net -200 ml     I/O last 3 completed shifts:  In: -   Out: 950 [Urine:950]    Safety Concerns:     Aspiration Risk    Impairments/Disabilities:      Contractures - bilat extremeties and Paralysis - bilateral    Nutrition Therapy:  Current Nutrition Therapy:   - Oral Diet:  Dysphagia - Soft and Bite Sized    Routes of Feeding: Oral  Liquids: Nectar Thick Liquids  Daily Fluid Restriction: no  Last Modified Barium Swallow with Video (Video Swallowing Test): not done    Treatments at the Time of Hospital Discharge:   Respiratory Treatments: ***  Oxygen Therapy:  is not on home oxygen therapy.  Ventilator:    - No ventilator support    Rehab Therapies: Physical Therapy, Occupational Therapy, and Speech/Language Therapy  Weight Bearing Status/Restrictions: No weight bearing restrictions  Other Medical Equipment (for information only, NOT a DME order):  {EQUIPMENT:[id number]}  Other Treatments: ***    Patient's personal belongings (please select all that are sent with patient):  {[facility] DME Belongings:[id number]}    MARC

## 2024-07-23 VITALS
HEIGHT: 72 IN | OXYGEN SATURATION: 97 % | DIASTOLIC BLOOD PRESSURE: 81 MMHG | HEART RATE: 80 BPM | TEMPERATURE: 98.9 F | SYSTOLIC BLOOD PRESSURE: 121 MMHG | RESPIRATION RATE: 16 BRPM | WEIGHT: 147 LBS | BODY MASS INDEX: 19.91 KG/M2

## 2024-07-23 PROBLEM — D72.825 BANDEMIA: Status: RESOLVED | Noted: 2024-07-19 | Resolved: 2024-07-23

## 2024-07-23 PROBLEM — R78.81 BACTEREMIA: Status: RESOLVED | Noted: 2024-07-20 | Resolved: 2024-07-23

## 2024-07-23 PROCEDURE — 6360000002 HC RX W HCPCS: Performed by: NURSE PRACTITIONER

## 2024-07-23 PROCEDURE — 99239 HOSP IP/OBS DSCHRG MGMT >30: CPT | Performed by: STUDENT IN AN ORGANIZED HEALTH CARE EDUCATION/TRAINING PROGRAM

## 2024-07-23 PROCEDURE — 6370000000 HC RX 637 (ALT 250 FOR IP): Performed by: NURSE PRACTITIONER

## 2024-07-23 PROCEDURE — 6360000002 HC RX W HCPCS: Performed by: STUDENT IN AN ORGANIZED HEALTH CARE EDUCATION/TRAINING PROGRAM

## 2024-07-23 PROCEDURE — 99232 SBSQ HOSP IP/OBS MODERATE 35: CPT | Performed by: INTERNAL MEDICINE

## 2024-07-23 PROCEDURE — 36410 VNPNXR 3YR/> PHY/QHP DX/THER: CPT

## 2024-07-23 PROCEDURE — 2580000003 HC RX 258: Performed by: NURSE PRACTITIONER

## 2024-07-23 PROCEDURE — 05HY33Z INSERTION OF INFUSION DEVICE INTO UPPER VEIN, PERCUTANEOUS APPROACH: ICD-10-PCS | Performed by: INTERNAL MEDICINE

## 2024-07-23 PROCEDURE — 95717 EEG PHYS/QHP 2-12 HR W/O VID: CPT | Performed by: PSYCHIATRY & NEUROLOGY

## 2024-07-23 PROCEDURE — 6370000000 HC RX 637 (ALT 250 FOR IP): Performed by: STUDENT IN AN ORGANIZED HEALTH CARE EDUCATION/TRAINING PROGRAM

## 2024-07-23 PROCEDURE — 36415 COLL VENOUS BLD VENIPUNCTURE: CPT

## 2024-07-23 PROCEDURE — 2580000003 HC RX 258: Performed by: STUDENT IN AN ORGANIZED HEALTH CARE EDUCATION/TRAINING PROGRAM

## 2024-07-23 PROCEDURE — 76937 US GUIDE VASCULAR ACCESS: CPT

## 2024-07-23 RX ADMIN — DULOXETINE HYDROCHLORIDE 60 MG: 30 CAPSULE, DELAYED RELEASE ORAL at 08:26

## 2024-07-23 RX ADMIN — CLONAZEPAM 0.5 MG: 0.5 TABLET ORAL at 08:26

## 2024-07-23 RX ADMIN — ENOXAPARIN SODIUM 40 MG: 100 INJECTION SUBCUTANEOUS at 08:27

## 2024-07-23 RX ADMIN — PRIMIDONE 250 MG: 250 TABLET ORAL at 08:28

## 2024-07-23 RX ADMIN — GABAPENTIN 300 MG: 300 CAPSULE ORAL at 08:26

## 2024-07-23 RX ADMIN — SODIUM CHLORIDE, PRESERVATIVE FREE 10 ML: 5 INJECTION INTRAVENOUS at 08:28

## 2024-07-23 RX ADMIN — LANSOPRAZOLE 30 MG: 15 TABLET, ORALLY DISINTEGRATING ORAL at 07:48

## 2024-07-23 RX ADMIN — MEROPENEM 1000 MG: 1 INJECTION INTRAVENOUS at 08:33

## 2024-07-23 RX ADMIN — MEROPENEM 1000 MG: 1 INJECTION INTRAVENOUS at 02:08

## 2024-07-23 RX ADMIN — POLYETHYLENE GLYCOL 3350 17 G: 17 POWDER, FOR SOLUTION ORAL at 08:26

## 2024-07-23 RX ADMIN — GABAPENTIN 300 MG: 300 CAPSULE ORAL at 14:05

## 2024-07-23 ASSESSMENT — ENCOUNTER SYMPTOMS
ABDOMINAL DISTENTION: 0
EYE PAIN: 0
APNEA: 0
EYE DISCHARGE: 0

## 2024-07-23 NOTE — PROCEDURES
PROCEDURE NOTE  Date: 7/23/2024   Name: Heri Steinberg  YOB: 1975    Procedures    Midline catheter insertion note:    Reason for placement: IV Meropenem till 7/27/24 per ID  Device inserted - 22g 8cm midline  Ultrasound preassessment done. Target vessel is right brachial vein.   Vessel depth = 1.5 cm.   Vein measures .73 cm. CVR is 1.5 %. (Preffered area-based CVR is less than 20%).  Placed using ANTT fashion, US prepared with sterile probe cover.   Visualization of needle entry into vessel, 1 attempt using AST technique.   Total 8 cm inserted, 0cm external.   Lot # = [id number]  Expires = 7/31/25  Easy aspiration of dark non-pulsating blood. Flushes easily with 10ml of 0.9 NS.   Catheter secured with adhesive securement device.   Dressing placed - CHG-TSM.   Injection cap and swab cap applied.   No bleeding or hematoma noted.   Estimated blood loss = 0ml.   Device should have blood return, if no blood return assess for infiltration and/or call VAT for consult.    Instructions given on insertion and care.  RN aware no lab draws without a physician order, line is power injectable, keep clamped when not in use, pulsatile 10ml NS flush every 8 hours when not in use or after intermittent use with medication. Line ready for use.    Midline education:     [ x ] Post care line insertion was discussed with Family prior to procedure. Risks, benefits, alternatives, and reason for procedure were discussed and teaching was reinforced. An educational handout on post insertion line care and maintenance was left at bedside with patient. Family acknowledged understanding of information relayed.

## 2024-07-23 NOTE — PROGRESS NOTES
[facility] -   Infectious diseases evaluation  admission date 7/18/2024    reason for consultation:   pneumonia     Impression :   Current:  Paralplegia an contracted   Chronic encephalopathy  Acute on chronic encephalopathy  Bandemia  RUL  and bilat LL pneumonia  -HCA likely aspiration   Staphylococcus coagulase negative bacteremia x 2  Relapsing debilitating multiple sclerosis since age 32   Chronic constipation    Other:    Discussion / summary of stay / plan of care/ Recommendations:   Legionella and strep pneumo U AG are both neg  HENCE:   Suspect aspiration pneumonia at a [facility]  Staphylococcus coagulase negative bacteremia w no port - likely contamination -   Repeat blood cx neg  Meropenem 7/20 -7/27  Stop Zyvox - ( nasal MRSA neg,)    Infection Control Recommendations   [place] Precautions      Antimicrobial Stewardship Recommendations   Simplification of therapy  Targeted therapy      History of Present Illness:   Initial history:  Heri Steinberg is a 48 y.o.-year-old male contracted and paralpegic - lives at [facility] - brought due to unusual dysconjugate gaze and not responding as usual    In ER fo,oowed simple commands and febrile leukocytosis and BC x 2 Staphylococcus coagulase negative   CT chest RUL pneumonia     ID called for AMS bacteremia and  pneumonia.    On RA and responding very appropriately by noding his head - abd soft no rash - lungs clear - could not give a good cough  He usually eats po - suspect aspiration pneumonia        Interval changes  7/22/2024   Patient Vitals for the past 8 hrs:   BP Temp Temp src Pulse Resp SpO2   07/22/24 1900 129/87 98.6 °F (37 °C) Axillary 82 18 100 %   07/22/24 1600 116/79 98.8 °F (37.1 °C) Axillary 82 15 --   07/22/24 1200 -- -- -- 87 -- --     7/22/24  No fever and on RA - comfortable- abd soft non tender    Summary of relevant labs:  Labs:  W 12  - 7  Plts 226  Creat 0.5    Micro:  Bc 7/18 - x 2  - 
[facility]   Pharmacy Pharmacokinetic Monitoring Service - Vancomycin     Heri Steinberg is a 48 y.o. male starting on vancomycin therapy for bacteremia. Pharmacy consulted by Dr. Jeffry Luna for monitoring and adjustment.    Target Concentration: Goal AUC/MIKAEL 400-600 mg*hr/L    Additional Antimicrobials: zosyn    Pertinent Laboratory Values:   Wt Readings from Last 1 Encounters:   07/18/24 67 kg (147 lb 11.3 oz)     Temp Readings from Last 1 Encounters:   07/19/24 98.1 °F (36.7 °C) (Oral)     Estimated Creatinine Clearance: 175 mL/min (A) (based on SCr of 0.5 mg/dL (L)).  Recent Labs     07/18/24  1316 07/18/24  1322 07/19/24  0423   CREATININE 0.6* 0.5* 0.5*   BUN 11  --  12   WBC 12.6*  --  7.0       Pertinent Cultures:  Culture Date Source Results   7/18 Blood x2 Coag negative staph, GPC in clusters   7/19 Blood x2 Sent     Plan:  Dosing recommendations based on Bayesian software  Patient received Vancomycin 1000 mg IV x1 on 7/18 at 1600  Based on age, weight, renal function and indication will initiate Vancomycin 1500 mg IV x1, followed by Vancomycin 1000 mg IV q8h  Anticipated AUC of 430 and trough concentration of 11 at steady state  Renal labs as indicated   Pharmacy will continue to monitor patient and adjust therapy as indicated    Thank you for the consult,  Kisha Wharton, PharmD, 7/19/2024 1:33 PM     
Patient was discharged with all belongings. No distress noted. Patient discharged to home care facility. Taken down via EMS.  
[facility]    Admission Medication Reconciliation       The patient's list of current home medications has been reviewed.     Source(s) of information: Facility Medication List    Based on information provided by the above source(s), I have updated the patient's home med list as described below.     I changed or updated the following medications on the patient's home medication list:  Removed Nudexta 20-10 mg (no longer taking)     Added Clonazepam 0.5 mg every afternoon      Adjusted   N/A   Other Notes N/A       Please feel free to call me with any questions about this encounter. Thank you.    Electronically signed by ERICA GARCIAS RPH on 7/18/2024 at 2:03 PM   
[facility]  Office: [phone]  Kranthi Correa DO, Tuan Garza DO, Hugo Peterson DO, Jose Bills DO, Georgi Wong MD, Yeni Mei MD, Carmina Tiwari MD, Chanell Maldonado MD,  Ethan Silva MD, Dom Perry MD, Ben Painter MD,  Catarina Garcia DO, Owen Hartmann MD, Chilango Chavez MD, Davonte Correa DO, Roseann Mitchell MD,  Brendan Li DO, Fabiola Mc MD, Evie Valderrama MD, Sol Casiano MD, Va Waite MD,  Demetris De La Vega MD, Sandor Leung MD, Megan Rodriguez MD, Olu Cortez MD, Carlos Walsh MD, Jeffry Luna MD, Adonis Mattson DO, Craig Pacheco DO, Ismael Kilpatrick MD,  Luca Cabello MD, Shirley Waterhouse, CNP,  Leonora Fairbanks CNP, Enrrique Bales, CNP,  Jesusita Tariq, DNP, Park Nassar, CNP, Ansley Robles, CNP, Morenita Duque CNP, Corina Lora, CNP, Chanda Mccord, PA-C, Jocelyn Broderick PA-C, Doreen King, CNP, Angela Zuniga, CNP, Francheska Steinberg, CNP, Karen Diego, CNP, Jolanta Oshea, CNP, Ludmila Gracia, CNS, Shayla Rosenbaum, CNP, Jazmin Awan CNP, Tracy Schwab, CNP         [facility]   IN-PATIENT SERVICE   [facility]    Progress Note    7/19/2024    6:42 AM    Name:   Heri Steinberg  MRN:     [id number]     Acct:      [id number]   Room:   0107/0107-01   Day:  1  Admit Date:  7/18/2024  1:07 PM    PCP:   Jefry Jansen DO  Code Status:  Full Code    Subjective:     C/C:   Chief Complaint   Patient presents with    Altered Mental Status     Interval History Status: improved.     Patient seen and examined at bedside this morning. No acute events overnight. Patient resting in bed. Nods to simple questions. Denies any pain or SOB. Continues on Zosyn for his PNA.     Brief History:     Heri Steinberg is a 48 y.o.  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 
Report given to Brian TRAN at [facility]  
[facility]  Office: [phone]  Kranthi Correa DO, Tuan Garza DO, Hugo Peterson DO, Jose Bills DO, Georgi Wong MD, Yeni Mei MD, Carmina Tiwari MD, Chanell Maldonado MD,  Ethan Silva MD, Dom Perry MD, Ben Painter MD,  Catarina Garcia DO, Owen Hartmann MD, Chilango Chavez MD, Davonte Correa DO, Roseann Mitchell MD,  Brendan Li DO, Fabiola Mc MD, Evie Valderrama MD, Sol Casiano MD, Va Waite MD,  Demetris De La Vega MD, Sandor Leung MD, Megan Rodriguez MD, Olu Cortez MD, Carlos Walsh MD, Jeffry Luna MD, Adonis Mattson DO, Craig Pacheco DO, Ismael Kilpatrick MD,  Luca Cabello MD, Shirley Waterhouse, CNP,  Leonora Fairbanks CNP, Enrrique Bales, CNP,  Jesusita Tariq, DNP, Park Nassar, CNP, Ansley Robles, CNP, Morenita Duque CNP, Corina Lora, CNP, Chanda Mccord, PA-C, Jocelyn Broderick PA-C, Doreen King, CNP, Angela Zuniga, CNP, Francheska Steinberg, CNP, Karen Diego, CNP, Jolanta Oshea, CNP, Ludmila Gracia, CNS, Shayla Rosenbaum, CNP, Jazmin Awan CNP, Tracy Schwab, CNP         [facility]   IN-PATIENT SERVICE   [facility]    Progress Note    7/21/2024    8:01 AM    Name:   Heri Steinberg  MRN:     [id number]     Acct:      [id number]   Room:   0107/0107-01   Day:  3  Admit Date:  7/18/2024  1:07 PM    PCP:   Jefry Jansen DO  Code Status:  Full Code    Subjective:     C/C:   Chief Complaint   Patient presents with    Altered Mental Status     Interval History Status: improved.     Patient seen and examined at bedside. Mother at bedside.  Hemodynamically stable.  Afebrile.  Clinically better.  Continued on meropenem. ID following.    Brief History:     Heri Steinberg is a 48 y.o.  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 
  Skin:     Coloration: Skin is not jaundiced.      Findings: No bruising.   Neurological:      Mental Status: He is alert.      Cranial Nerves: No cranial nerve deficit.      Comments: Contracted and appropriate responses by nodding or shaking his head - very awake   Psychiatric:         Mood and Affect: Mood normal.         Thought Content: Thought content normal.         Past Medical History:     Past Medical History:   Diagnosis Date    Anxiety     Depression     Multiple sclerosis (HCC)        Past Surgical  History:   No past surgical history on file.    Medications:      primidone  250 mg Oral BID    dextromethorphan-quiNIDine  1 capsule Oral Q12H    gabapentin  300 mg Oral TID    meropenem  1,000 mg IntraVENous Q8H    clonazePAM  0.5 mg Oral BID    lansoprazole  30 mg Oral QAM AC    DULoxetine  60 mg Oral BID    senna  1 tablet Oral Nightly    sodium chloride flush  5-40 mL IntraVENous 2 times per day    enoxaparin  40 mg SubCUTAneous Daily    polyethylene glycol  17 g Oral BID       Social History:     Social History     Socioeconomic History    Marital status: Legally      Spouse name: Not on file    Number of children: Not on file    Years of education: Not on file    Highest education level: Not on file   Occupational History    Not on file   Tobacco Use    Smoking status: Never    Smokeless tobacco: Never   Substance and Sexual Activity    Alcohol use: No     Alcohol/week: 0.0 standard drinks of alcohol    Drug use: Yes     Types: Marijuana (Weed)    Sexual activity: Not on file   Other Topics Concern    Not on file   Social History Narrative    Not on file     Social Determinants of Health     Financial Resource Strain: Not on file   Food Insecurity: No Food Insecurity (7/18/2024)    Hunger Vital Sign     Worried About Running Out of Food in the Last Year: Never true     Ran Out of Food in the Last Year: Never true   Transportation Needs: No Transportation Needs (7/18/2024)    PRAPARE - 
Date: 7/18/2024  No large vessel occlusion in the head or neck. Partially visualized right lung consolidation concerning for pneumonia.     XR ABDOMEN (KUB) (SINGLE AP VIEW)    Result Date: 7/18/2024  Significant amount of fecal material in the left colon in keeping with constipation.     XR CHEST PORTABLE    Result Date: 7/18/2024  No acute process.     CT HEAD WO CONTRAST    Addendum Date: 7/18/2024    ADDENDUM: Results were conveyed to Dr. Luis Scherer by the radiology results communication center with confirmation of received result at 1:53 p.m. on 07/18/2024.     Result Date: 7/18/2024  1. No acute intracranial abnormality. 2. Extensive hypoattenuation throughout the bilateral cerebral white matter, similar compared with prior examinations, consistent with patient's known history of multiple sclerosis. The findings were sent to the Radiology Results Communication Center at 1:49 pm on 7/18/2024 to be communicated to a licensed caregiver.       Physical Examination:        General appearance:  alert, tracking, nodding appropriately.   Mental Status:  unable to assess as patient non verbal, follows simple commands   Lungs:  diminished on the right side  Heart:  regular rate and rhythm, no murmur  Abdomen:  soft, nontender, nondistended, normal bowel sounds  Extremities:  contractures of the upper and lower extremities  Skin:  no gross lesions, rashes, induration    Assessment:        Hospital Problems             Last Modified POA    * (Principal) AMS (altered mental status) 7/18/2024 Yes    Altered mental status 7/18/2024 Yes    Relapsing remitting multiple sclerosis (HCC) 7/18/2024 Yes    Toxic metabolic encephalopathy 7/19/2024 Yes    Benign neoplasm of cerebral meninges (HCC) 7/20/2024 Yes    Mild intermittent asthma without complication 7/18/2024 Yes    Multifocal pneumonia 7/19/2024 Yes    Chronic idiopathic constipation 7/18/2024 Yes    Bandemia 7/19/2024 Yes    Coag negative Staphylococcus bacteremia 
visualized right lung consolidation concerning for pneumonia.     XR ABDOMEN (KUB) (SINGLE AP VIEW)    Result Date: 7/18/2024  Significant amount of fecal material in the left colon in keeping with constipation.     XR CHEST PORTABLE    Result Date: 7/18/2024  No acute process.     CT HEAD WO CONTRAST    Addendum Date: 7/18/2024    ADDENDUM: Results were conveyed to Dr. Luis Scherer by the radiology results communication center with confirmation of received result at 1:53 p.m. on 07/18/2024.     Result Date: 7/18/2024  1. No acute intracranial abnormality. 2. Extensive hypoattenuation throughout the bilateral cerebral white matter, similar compared with prior examinations, consistent with patient's known history of multiple sclerosis. The findings were sent to the Radiology Results Communication Center at 1:49 pm on 7/18/2024 to be communicated to a licensed caregiver.       Physical Examination:        General appearance:  alert, tracking, nodding appropriately.   Mental Status:  unable to assess as patient non verbal, follows simple commands   Lungs:  diminished on the right side  Heart:  regular rate and rhythm, no murmur  Abdomen:  soft, nontender, nondistended, normal bowel sounds  Extremities:  contractures of the upper and lower extremities  Skin:  no gross lesions, rashes, induration    Assessment:        Hospital Problems             Last Modified POA    * (Principal) AMS (altered mental status) 7/18/2024 Yes    Altered mental status 7/18/2024 Yes    Relapsing remitting multiple sclerosis (HCC) 7/18/2024 Yes    Toxic metabolic encephalopathy 7/19/2024 Yes    Mild intermittent asthma without complication 7/18/2024 Yes    Multifocal pneumonia 7/19/2024 Yes    Chronic idiopathic constipation 7/18/2024 Yes    Bandemia 7/19/2024 Yes    Coag negative Staphylococcus bacteremia 7/19/2024 Yes     Plan:        Altered mental status in the setting of multifocal PNA. Mentation appears to be baseline, mother agrees. 
of syntax and sound a like substitutions which may escape proof reading.  It such instances, actual meaningcan be extrapolated by contextual diversion.    MD Jeovany Alvarez MD Heather Helweg, APRN    ATTESTATION:    I have discussed the case, including pertinent history and exam findings with the APRN. I have evaluated the  History, physical findings and pictures of the patient and the key elements of the encounter have been performed by me. I have reviewed the laboratory data, other diagnostic studies and discussed them with the APRN. I have updated the medical record where necessary.    I agree with the assessment, plan and orders as documented by the APRN.    In addition diagnostic and decision making elements, performed by the Attending Physician, are included in the Diagnostic and Decision Making Section of the text.    Jeovany Mar MD     7/20/2024          Office: [phone]  [employer] [facility] / office [phone]          
syntax and sound a like substitutions which may escape proof reading.  It such instances, actual meaningcan be extrapolated by contextual diversion.    Jeovany Mar MD        7/21/2024          Office: [phone]  [employer] [facility] / office [phone]

## 2024-07-23 NOTE — PLAN OF CARE
Problem: Discharge Planning  Goal: Discharge to home or other facility with appropriate resources  7/22/2024 2244 by James Arriola RN  Outcome: Progressing  7/22/2024 1818 by Agustina Isaac RN  Outcome: Progressing     Problem: Skin/Tissue Integrity  Goal: Absence of new skin breakdown  Description: 1.  Monitor for areas of redness and/or skin breakdown  2.  Assess vascular access sites hourly  3.  Every 4-6 hours minimum:  Change oxygen saturation probe site  4.  Every 4-6 hours:  If on nasal continuous positive airway pressure, respiratory therapy assess nares and determine need for appliance change or resting period.  7/22/2024 2244 by James Arriola RN  Outcome: Progressing  7/22/2024 1818 by Agustina Isaac RN  Outcome: Progressing     Problem: Safety - Adult  Goal: Free from fall injury  7/22/2024 2244 by James Arriola RN  Outcome: Progressing  7/22/2024 1818 by Agustina Isaac RN  Outcome: Progressing     Problem: Respiratory - Adult  Goal: Achieves optimal ventilation and oxygenation  7/22/2024 2244 by James Arriola RN  Outcome: Progressing  7/22/2024 1818 by Agustina Isaac RN  Outcome: Progressing

## 2024-07-23 NOTE — CARE COORDINATION
07/19/24 1637   Readmission Assessment   Number of Days since last admission? 8-30 days   Previous Disposition Long Term Care   Who is being Interviewed Patient   What was the patient's/caregiver's perception as to why they think they needed to return back to the hospital? Other (Comment)  (AMS)   Did you visit your Primary Care Physician after you left the hospital, before you returned this time? Yes  (See ECF MD/NP)   Did you see a specialist, such as Cardiac, Pulmonary, Orthopedic Physician, etc. after you left the hospital? No   Who advised the patient to return to the hospital? Other (Comment)  (ecf)   Does the patient report anything that got in the way of taking their medications? No   In our efforts to provide the best possible care to you and others like you, can you think of anything that we could have done to help you after you left the hospital the first time, so that you might not have needed to return so soon? Other (Comment)  (nothing)       
Transitional Planning    1500 Discharge orders in for patient, call to [facility] and left a VM for a call back regarding bed hold.      1555 Spoke to Diane at [facility], updated that patient is able to return long term and writer will make sure ATBs are addressed.  Plan for AM admission on 7/23.  No HENS needed.     1615 Sent request for transportation to [facility] 11am 7/23/24.     
Transitional Planning  Called [facility], spoke with Kirstie  time is 11 am.  Message left with [facility] admissions to return call.      Spoke with Dr. Waite, patient needs IV atb until   and will need a line.      Called [facility], spoke with Kirstie, transportation post-pone to 5 pm. Awaiting a call back from [facility].      Called patient's mother, Tr, notified of transportation time.      1153 am Called [facility], spoke with Diane, update provided regarding transportation time and need for IV atb thru .  Faxed [facility] and midline information to [phone].  Report number [phone].      400 pm Call from Kirstie at [facility], [place]  at 530 pm.      Discharge Report    [facility]  Clinical Case Management Department  Written by: Shahnaz Desir RN    Patient Name: Heri Steinberg  Attending Provider: Va Waite MD  Admit Date: 2024  1:07 PM  MRN: [id number]  Account: [id number]                     : 1975  Discharge Date: 24      Disposition: long term care facility [facility]     Shahnaz Desir RN    
Transitional Planning  Plan to return to [facility]-long term patient, bed hold.  Has legal guardian.   
other family members/significant others, and if so, who? (P) Yes (mother legal guardian)  Plans to Return to Present Housing: (P) Yes  Other Identified Issues/Barriers to RETURNING to current housing: current medical condition   Potential Assistance needed at discharge: (P) Transportation            Potential DME:    Patient expects to discharge to: (P) Long-term care  Plan for transportation at discharge: (P) Other (see comment) (medical transportation)    Financial    Pay[place] / Plan: [place]PT [place] / Product Type: *No Product type* /     Does insurance require precert for SNF: No    Potential assistance Purchasing Medications: (P) No  Meds-to-Beds request: Yes      [place], [place] - [address] [facility] [phone] - F [phone]  [address] [place] [address]  Phone: [phone] Fax: [phone]      Notes:    Factors facilitating achievement of predicted outcomes: Family support    Barriers to discharge: Medical complications and Medication managment    Additional Case Management Notes: Called and spoke with legal guardian, mother Tr Isaac.  Role explained. Patient is a long term patient at [facility]. Plan is to return.  Called [facility], [phone] spoke with Diane, patient is a bed hold and can return.      The Plan for Transition of Care is related to the following treatment goals of Pneumonia of right upper lobe due to infectious organism [J18.9]  Toxic metabolic encephalopathy [G92.8]  AMS (altered mental status) [R41.82]    IF APPLICABLE: The Patient and/or patient representative Heri and his family were provided with a choice of provider and agrees with the discharge plan. Freedom of choice list with basic dialogue that supports the patient's individualized plan of care/goals and shares the quality data associated with the providers was provided to: (P) Patient Representative   Patient Representative Name: (P) Legal Guardian Tr Isaac

## 2024-07-23 NOTE — PLAN OF CARE
Problem: Discharge Planning  Goal: Discharge to home or other facility with appropriate resources  7/23/2024 0609 by Agustina Isaac RN  Outcome: Progressing  7/22/2024 2244 by James Arriola RN  Outcome: Progressing  7/22/2024 1818 by Agustina Isaac RN  Outcome: Progressing     Problem: Skin/Tissue Integrity  Goal: Absence of new skin breakdown  Description: 1.  Monitor for areas of redness and/or skin breakdown  2.  Assess vascular access sites hourly  3.  Every 4-6 hours minimum:  Change oxygen saturation probe site  4.  Every 4-6 hours:  If on nasal continuous positive airway pressure, respiratory therapy assess nares and determine need for appliance change or resting period.  7/23/2024 0609 by Agustina Isaac RN  Outcome: Progressing  7/22/2024 2244 by James Arriola RN  Outcome: Progressing  7/22/2024 1818 by Agustina Isaac RN  Outcome: Progressing     Problem: Safety - Adult  Goal: Free from fall injury  7/23/2024 0609 by Agustina Isaac RN  Outcome: Progressing  7/22/2024 2244 by James Arriola RN  Outcome: Progressing  7/22/2024 1818 by Agustina Isaac RN  Outcome: Progressing     Problem: Respiratory - Adult  Goal: Achieves optimal ventilation and oxygenation  7/23/2024 0609 by Agustina Isaac RN  Outcome: Progressing  7/22/2024 2244 by James Arriola RN  Outcome: Progressing  7/22/2024 1818 by Agustina Isaac RN  Outcome: Progressing

## 2024-07-24 LAB
MICROORGANISM SPEC CULT: NORMAL
MICROORGANISM SPEC CULT: NORMAL
SERVICE CMNT-IMP: NORMAL
SERVICE CMNT-IMP: NORMAL
SPECIMEN DESCRIPTION: NORMAL
SPECIMEN DESCRIPTION: NORMAL

## 2024-07-24 NOTE — PROCEDURES
PROCEDURE NOTE  Date: 7/24/2024   Name: Heri Steinberg  YOB: 1975    Procedures            Date: 7/18/2024  Referring physician: Dr. Waite    Indication  Patient aged 48 y with encephalopathy. EEG done to assess for epileptiform activity.    Introduction  This 4 hrs EEG was recorded using the [employer] one band system. Automated seizure detection algorithms were applied.    Description  During the maximal alert state, a well-regulated, symmetric, and reactive 7 Hz posterior dominant rhythm was seen. Questionable left hemispheric  focal slowing or interhemispheric asymmetry was noted. Stage I and stage II sleep were not observed. There were no interictal epileptiform discharges or electrographic seizures.    Activations  Hyperventilation was not performed. Intermittent photic stimulation was not performed.    Impression  Abnormal awake EEG. The slowing mentioned above suggests mild non specific encephalopathy.     Possible L hemispheric asymmetry suggest focal lesion/dysfunction.     No epileptiform discharges were identified. Please note the absence of such activity on this record cannot conclusively rule out an epileptic disorder. If such is still clinically suspected, a repeat study with sleep deprivation and/or prolonged sampling may be helpful.    Please note this EEG was meant to screen for emergent condition and is prone to artifact and with some limitations. The interpretation of this EEG result should be taken only with clinical correlation. Ideally regular EEG with full leads should be considered when possible.     Catarina Marcos MD  Epilepsy Board Certified.  Neurology Board Certified.    Electronically Signed

## 2024-11-25 ENCOUNTER — OFFICE VISIT (OUTPATIENT)
Dept: NEUROLOGY | Age: 49
End: 2024-11-25
Payer: MEDICAID

## 2024-11-25 ENCOUNTER — TELEPHONE (OUTPATIENT)
Dept: NEUROLOGY | Age: 49
End: 2024-11-25

## 2024-11-25 VITALS — HEART RATE: 72 BPM | SYSTOLIC BLOOD PRESSURE: 116 MMHG | DIASTOLIC BLOOD PRESSURE: 81 MMHG

## 2024-11-25 DIAGNOSIS — R47.1 DYSARTHRIA: ICD-10-CM

## 2024-11-25 DIAGNOSIS — Z91.89 AT RISK FOR SIDE EFFECT OF MEDICATION: ICD-10-CM

## 2024-11-25 DIAGNOSIS — E55.9 VITAMIN D DEFICIENCY: ICD-10-CM

## 2024-11-25 DIAGNOSIS — G35 ACUTE RELAPSING MULTIPLE SCLEROSIS (HCC): Primary | ICD-10-CM

## 2024-11-25 PROCEDURE — 99204 OFFICE O/P NEW MOD 45 MIN: CPT | Performed by: PSYCHIATRY & NEUROLOGY

## 2024-11-25 RX ORDER — GUAIFENESIN 600 MG/1
600 TABLET, EXTENDED RELEASE ORAL 2 TIMES DAILY
COMMUNITY

## 2024-11-25 RX ORDER — POLYETHYLENE GLYCOL 3350 17 G/17G
17 POWDER, FOR SOLUTION ORAL DAILY PRN
COMMUNITY

## 2024-11-25 RX ORDER — BACLOFEN 5 MG/1
5 TABLET ORAL 2 TIMES DAILY
COMMUNITY
Start: 2024-11-10

## 2024-11-25 NOTE — TELEPHONE ENCOUNTER
EMILY to follow up with [facility] facility about DME order for asssitive device for communicating and referral for Speech therapy.   Call EMILY Zacarias at [phone]

## 2024-11-25 NOTE — PATIENT INSTRUCTIONS
Information about DMTs:     A.  Mavenclad (cladribine):     Mavenclad is an oral medication used to treat relapsing forms of multiple sclerosis.  Unlike other disease modifying therapies, Mavenclad is given discontinuously.  Mavenclad works by something called immune reconstitution.  Mavenclad temporarily reduces some of the immune cells (specifically the B and T cells), including cells responsible for inflammation and damage to the brain and spinal cord in multiple sclerosis.  After taking Mavenclad, the immune cells gradually grow back and return to normal levels over time.  When the immune cells grow back, they are thought to be less likely to be auto-reactive.  Mavenclad dosing depends on your weight.  Most people take Mavenclad daily for 5 days, wait one month, and then take the drug for another 5 days.  After completing the first two months of Mavenclad, no additional medication is given for a one year period.  After one year, Mavenclad is again given for 5 days, wait one month, and then 5 additional days. Even though the medication is out of your system within about 1 week of stopping it, the effects on the immune system continue and you are protected from multiple sclerosis over time          Mavenclad Efficacy:  Mavenclad was studied in a clinical trial called CLARITY taking place over 96 weeks (about 2 years).  In this clinical trial, 433 people took Mavenclad and 437 people took placebo (a sugar pill).  Mavenclad reduced the annual relapse rate by 58% compared to placebo and 81% of people on Mavenclad were free from relapses during the two-year trial.  Mavenclad reduced the number of active, inflamed lesions by 86% and reduced the number of new lesions by 73%.  Importantly, Mavenclad reduced the risk of disability progression by 33% compared to placebo.      Long-term Mavenclad Efficacy:  A phase 4 trial called CLASSIC-MS, was done following 227 people who were in the original treatment group for STELLA

## 2024-11-25 NOTE — PROGRESS NOTES
EMILY talked with provider and guardian about assistive device will be in coordination with the facility once has speech therapy evaluation.    Will aide with referral for speech therapy at facility since patient lives at [facility].    EMILY talked to [place] to obtain fax number is [phone] to send in speech therapy referral.   [place] EMILY Zacarias is currently in morning meeting, but can be reached at [phone].     left for EMILY at facility at 11:10am   
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     Social History     Socioeconomic History    Marital status: Legally      Spouse name: Not on file    Number of children: Not on file    Years of education: Not on file    Highest education level: Not on file   Occupational History    Not on file   Tobacco Use    Smoking status: Never     Passive exposure: Never    Smokeless tobacco: Never   Vaping Use    Vaping status: Never Used   Substance and Sexual Activity    Alcohol use: No     Alcohol/week: 0.0 standard drinks of alcohol    Drug use: Yes     Types: Marijuana (Weed)    Sexual activity: Not on file   Other Topics Concern    Not on file   Social History Narrative    Not on file     Social Determinants of Health     Financial Resource Strain: Not on file   Food Insecurity: No Food Insecurity (7/18/2024)    Hunger Vital Sign     Worried About Running Out of Food in the Last Year: Never true     Ran Out of Food in the Last Year: Never true   Transportation Needs: No Transportation Needs (7/18/2024)    PRAPARE - Transportation     Lack of Transportation (Medical): No     Lack of Transportation (Non-Medical): No   Physical Activity: Not on file   Stress: Not on file   Social Connections: Not on file   Intimate Partner Violence: Unknown (2/22/2024)    Received

## 2024-11-26 NOTE — TELEPHONE ENCOUNTER
Yemi was able to talk to [facility] who stated they will work on the DME for a communication device and provided a fax number for referral for speech therapy and communication device can be sent to [phone]

## 2025-01-23 ENCOUNTER — APPOINTMENT (OUTPATIENT)
Dept: CT IMAGING | Age: 50
End: 2025-01-23
Payer: MEDICAID

## 2025-01-23 ENCOUNTER — APPOINTMENT (OUTPATIENT)
Dept: GENERAL RADIOLOGY | Age: 50
End: 2025-01-23
Payer: MEDICAID

## 2025-01-23 ENCOUNTER — HOSPITAL ENCOUNTER (OUTPATIENT)
Age: 50
Setting detail: OBSERVATION
Discharge: HOME | End: 2025-01-27
Attending: EMERGENCY MEDICINE | Admitting: INTERNAL MEDICINE
Payer: MEDICAID

## 2025-01-23 DIAGNOSIS — R41.82 ALTERED MENTAL STATUS, UNSPECIFIED ALTERED MENTAL STATUS TYPE: ICD-10-CM

## 2025-01-23 DIAGNOSIS — A41.9 SEPTICEMIA (HCC): Primary | ICD-10-CM

## 2025-01-23 DIAGNOSIS — K59.00 CONSTIPATION, UNSPECIFIED CONSTIPATION TYPE: ICD-10-CM

## 2025-01-23 LAB
ALBUMIN SERPL-MCNC: 4 G/DL (ref 3.5–5.2)
ALBUMIN/GLOB SERPL: 1.3 {RATIO} (ref 1–2.5)
ALP SERPL-CCNC: 110 U/L (ref 40–129)
ALT SERPL-CCNC: 40 U/L (ref 10–50)
ANION GAP SERPL CALCULATED.3IONS-SCNC: 13 MMOL/L (ref 9–16)
APAP SERPL-MCNC: <5 UG/ML (ref 10–30)
AST SERPL-CCNC: 24 U/L (ref 10–50)
B PARAP IS1001 DNA NPH QL NAA+NON-PROBE: NOT DETECTED
B PERT DNA SPEC QL NAA+PROBE: NOT DETECTED
BACTERIA URNS QL MICRO: NORMAL
BASOPHILS # BLD: <0.03 K/UL (ref 0–0.2)
BASOPHILS NFR BLD: 0 % (ref 0–2)
BILIRUB SERPL-MCNC: 0.2 MG/DL (ref 0–1.2)
BILIRUB UR QL STRIP: ABNORMAL
BUN BLD-MCNC: 16 MG/DL (ref 8–26)
BUN SERPL-MCNC: 17 MG/DL (ref 6–20)
C PNEUM DNA NPH QL NAA+NON-PROBE: NOT DETECTED
CA-I BLD-SCNC: 1.22 MMOL/L (ref 1.15–1.33)
CALCIUM SERPL-MCNC: 9.1 MG/DL (ref 8.6–10.4)
CASTS #/AREA URNS LPF: NORMAL /LPF (ref 0–8)
CHLORIDE BLD-SCNC: 107 MMOL/L (ref 98–107)
CHLORIDE SERPL-SCNC: 107 MMOL/L (ref 98–107)
CLARITY UR: CLEAR
CO2 BLD CALC-SCNC: 29 MMOL/L (ref 22–30)
CO2 SERPL-SCNC: 22 MMOL/L (ref 20–31)
COLOR UR: ABNORMAL
CREAT SERPL-MCNC: 0.6 MG/DL (ref 0.7–1.2)
EGFR, POC: >90 ML/MIN/1.73M2
EOSINOPHIL # BLD: <0.03 K/UL (ref 0–0.44)
EOSINOPHILS RELATIVE PERCENT: 0 % (ref 1–4)
EPI CELLS #/AREA URNS HPF: NORMAL /HPF (ref 0–5)
ERYTHROCYTE [DISTWIDTH] IN BLOOD BY AUTOMATED COUNT: 13.5 % (ref 11.8–14.4)
ETHANOL PERCENT: <0.01 %
ETHANOLAMINE SERPL-MCNC: <10 MG/DL (ref 0–0.08)
FLUAV AG SPEC QL: NEGATIVE
FLUAV RNA NPH QL NAA+NON-PROBE: NOT DETECTED
FLUBV AG SPEC QL: NEGATIVE
FLUBV RNA NPH QL NAA+NON-PROBE: NOT DETECTED
GFR, ESTIMATED: >90 ML/MIN/1.73M2
GLUCOSE BLD-MCNC: 133 MG/DL (ref 74–100)
GLUCOSE SERPL-MCNC: 120 MG/DL (ref 74–99)
GLUCOSE UR STRIP-MCNC: NEGATIVE MG/DL
HADV DNA NPH QL NAA+NON-PROBE: NOT DETECTED
HCO3 VENOUS: 30.4 MMOL/L (ref 22–29)
HCOV 229E RNA NPH QL NAA+NON-PROBE: NOT DETECTED
HCOV HKU1 RNA NPH QL NAA+NON-PROBE: NOT DETECTED
HCOV NL63 RNA NPH QL NAA+NON-PROBE: NOT DETECTED
HCOV OC43 RNA NPH QL NAA+NON-PROBE: NOT DETECTED
HCT VFR BLD AUTO: 44.7 % (ref 40.7–50.3)
HCT VFR BLD AUTO: 47 % (ref 41–53)
HGB BLD-MCNC: 15.1 G/DL (ref 13–17)
HGB UR QL STRIP.AUTO: ABNORMAL
HMPV RNA NPH QL NAA+NON-PROBE: NOT DETECTED
HPIV1 RNA NPH QL NAA+NON-PROBE: NOT DETECTED
HPIV2 RNA NPH QL NAA+NON-PROBE: NOT DETECTED
HPIV3 RNA NPH QL NAA+NON-PROBE: NOT DETECTED
HPIV4 RNA NPH QL NAA+NON-PROBE: NOT DETECTED
IMM GRANULOCYTES # BLD AUTO: 0.04 K/UL (ref 0–0.3)
IMM GRANULOCYTES NFR BLD: 1 %
INR PPP: 1.1
KETONES UR STRIP-MCNC: ABNORMAL MG/DL
LACTIC ACID, SEPSIS WHOLE BLOOD: 1.2 MMOL/L (ref 0.5–1.9)
LEUKOCYTE ESTERASE UR QL STRIP: NEGATIVE
LYMPHOCYTES NFR BLD: 1.02 K/UL (ref 1.1–3.7)
LYMPHOCYTES RELATIVE PERCENT: 13 % (ref 24–43)
M PNEUMO DNA NPH QL NAA+NON-PROBE: NOT DETECTED
MAGNESIUM SERPL-MCNC: 2.1 MG/DL (ref 1.6–2.6)
MCH RBC QN AUTO: 31.3 PG (ref 25.2–33.5)
MCHC RBC AUTO-ENTMCNC: 33.8 G/DL (ref 28.4–34.8)
MCV RBC AUTO: 92.5 FL (ref 82.6–102.9)
MONOCYTES NFR BLD: 0.49 K/UL (ref 0.1–1.2)
MONOCYTES NFR BLD: 6 % (ref 3–12)
NEUTROPHILS NFR BLD: 80 % (ref 36–65)
NEUTS SEG NFR BLD: 6.09 K/UL (ref 1.5–8.1)
NITRITE UR QL STRIP: NEGATIVE
NRBC BLD-RTO: 0 PER 100 WBC
O2 SAT, VEN: 93 % (ref 60–85)
PCO2 VENOUS: 36.3 MM HG (ref 41–51)
PH UR STRIP: 5 [PH] (ref 5–8)
PH VENOUS: 7.53 (ref 7.32–7.43)
PLATELET # BLD AUTO: 216 K/UL (ref 138–453)
PMV BLD AUTO: 11.3 FL (ref 8.1–13.5)
PO2 VENOUS: 59 MM HG (ref 30–50)
POC ANION GAP: 9 MMOL/L (ref 7–16)
POC CREATININE: 0.7 MG/DL (ref 0.51–1.19)
POC HEMOGLOBIN (CALC): 15.8 G/DL (ref 13.5–17.5)
POC LACTIC ACID: 1 MMOL/L (ref 0.56–1.39)
POSITIVE BASE EXCESS, VEN: 7.4 MMOL/L (ref 0–3)
POTASSIUM BLD-SCNC: 3 MMOL/L (ref 3.5–4.5)
POTASSIUM SERPL-SCNC: 3.2 MMOL/L (ref 3.7–5.3)
PROCALCITONIN SERPL-MCNC: 0.23 NG/ML (ref 0–0.09)
PROT SERPL-MCNC: 7.2 G/DL (ref 6.6–8.7)
PROT UR STRIP-MCNC: ABNORMAL MG/DL
PROTHROMBIN TIME: 13.9 SEC (ref 11.7–14.9)
RBC # BLD AUTO: 4.83 M/UL (ref 4.21–5.77)
RBC #/AREA URNS HPF: NORMAL /HPF (ref 0–4)
RSV RNA NPH QL NAA+NON-PROBE: NOT DETECTED
RV+EV RNA NPH QL NAA+NON-PROBE: NOT DETECTED
SALICYLATES SERPL-MCNC: <0.5 MG/DL (ref 0–10)
SARS-COV-2 RDRP RESP QL NAA+PROBE: NOT DETECTED
SARS-COV-2 RNA NPH QL NAA+NON-PROBE: NOT DETECTED
SODIUM BLD-SCNC: 144 MMOL/L (ref 138–146)
SODIUM SERPL-SCNC: 142 MMOL/L (ref 136–145)
SP GR UR STRIP: 1.04 (ref 1–1.03)
SPECIMEN DESCRIPTION: NORMAL
SPECIMEN DESCRIPTION: NORMAL
TROPONIN I SERPL HS-MCNC: 11 NG/L (ref 0–22)
TROPONIN I SERPL HS-MCNC: 17 NG/L (ref 0–22)
UROBILINOGEN UR STRIP-ACNC: NORMAL EU/DL (ref 0–1)
WBC #/AREA URNS HPF: NORMAL /HPF (ref 0–5)
WBC OTHER # BLD: 7.7 K/UL (ref 3.5–11.3)

## 2025-01-23 PROCEDURE — 0202U NFCT DS 22 TRGT SARS-COV-2: CPT

## 2025-01-23 PROCEDURE — 82330 ASSAY OF CALCIUM: CPT

## 2025-01-23 PROCEDURE — 84484 ASSAY OF TROPONIN QUANT: CPT

## 2025-01-23 PROCEDURE — 93005 ELECTROCARDIOGRAM TRACING: CPT | Performed by: STUDENT IN AN ORGANIZED HEALTH CARE EDUCATION/TRAINING PROGRAM

## 2025-01-23 PROCEDURE — 80053 COMPREHEN METABOLIC PANEL: CPT

## 2025-01-23 PROCEDURE — 84145 PROCALCITONIN (PCT): CPT

## 2025-01-23 PROCEDURE — 83735 ASSAY OF MAGNESIUM: CPT

## 2025-01-23 PROCEDURE — 87040 BLOOD CULTURE FOR BACTERIA: CPT

## 2025-01-23 PROCEDURE — 96375 TX/PRO/DX INJ NEW DRUG ADDON: CPT

## 2025-01-23 PROCEDURE — 80051 ELECTROLYTE PANEL: CPT

## 2025-01-23 PROCEDURE — 96376 TX/PRO/DX INJ SAME DRUG ADON: CPT

## 2025-01-23 PROCEDURE — 82947 ASSAY GLUCOSE BLOOD QUANT: CPT

## 2025-01-23 PROCEDURE — 6360000002 HC RX W HCPCS: Performed by: STUDENT IN AN ORGANIZED HEALTH CARE EDUCATION/TRAINING PROGRAM

## 2025-01-23 PROCEDURE — 87804 INFLUENZA ASSAY W/OPTIC: CPT

## 2025-01-23 PROCEDURE — 87635 SARS-COV-2 COVID-19 AMP PRB: CPT

## 2025-01-23 PROCEDURE — 82803 BLOOD GASES ANY COMBINATION: CPT

## 2025-01-23 PROCEDURE — 80143 DRUG ASSAY ACETAMINOPHEN: CPT

## 2025-01-23 PROCEDURE — 83605 ASSAY OF LACTIC ACID: CPT

## 2025-01-23 PROCEDURE — 71260 CT THORAX DX C+: CPT

## 2025-01-23 PROCEDURE — 85014 HEMATOCRIT: CPT

## 2025-01-23 PROCEDURE — 70450 CT HEAD/BRAIN W/O DYE: CPT

## 2025-01-23 PROCEDURE — 84520 ASSAY OF UREA NITROGEN: CPT

## 2025-01-23 PROCEDURE — 2580000003 HC RX 258: Performed by: STUDENT IN AN ORGANIZED HEALTH CARE EDUCATION/TRAINING PROGRAM

## 2025-01-23 PROCEDURE — G0480 DRUG TEST DEF 1-7 CLASSES: HCPCS

## 2025-01-23 PROCEDURE — 82565 ASSAY OF CREATININE: CPT

## 2025-01-23 PROCEDURE — 81001 URINALYSIS AUTO W/SCOPE: CPT

## 2025-01-23 PROCEDURE — 6370000000 HC RX 637 (ALT 250 FOR IP): Performed by: STUDENT IN AN ORGANIZED HEALTH CARE EDUCATION/TRAINING PROGRAM

## 2025-01-23 PROCEDURE — 80179 DRUG ASSAY SALICYLATE: CPT

## 2025-01-23 PROCEDURE — 6360000004 HC RX CONTRAST MEDICATION: Performed by: STUDENT IN AN ORGANIZED HEALTH CARE EDUCATION/TRAINING PROGRAM

## 2025-01-23 PROCEDURE — 96368 THER/DIAG CONCURRENT INF: CPT

## 2025-01-23 PROCEDURE — 85610 PROTHROMBIN TIME: CPT

## 2025-01-23 PROCEDURE — 99285 EMERGENCY DEPT VISIT HI MDM: CPT

## 2025-01-23 PROCEDURE — 85025 COMPLETE CBC W/AUTO DIFF WBC: CPT

## 2025-01-23 PROCEDURE — 71045 X-RAY EXAM CHEST 1 VIEW: CPT

## 2025-01-23 PROCEDURE — 96366 THER/PROPH/DIAG IV INF ADDON: CPT

## 2025-01-23 PROCEDURE — 96365 THER/PROPH/DIAG IV INF INIT: CPT

## 2025-01-23 RX ORDER — 0.9 % SODIUM CHLORIDE 0.9 %
30 INTRAVENOUS SOLUTION INTRAVENOUS ONCE
Status: COMPLETED | OUTPATIENT
Start: 2025-01-23 | End: 2025-01-23

## 2025-01-23 RX ORDER — ACETAMINOPHEN 650 MG/1
650 SUPPOSITORY RECTAL ONCE
Status: COMPLETED | OUTPATIENT
Start: 2025-01-23 | End: 2025-01-23

## 2025-01-23 RX ORDER — IOPAMIDOL 755 MG/ML
75 INJECTION, SOLUTION INTRAVASCULAR
Status: COMPLETED | OUTPATIENT
Start: 2025-01-23 | End: 2025-01-23

## 2025-01-23 RX ORDER — POTASSIUM CHLORIDE 7.45 MG/ML
10 INJECTION INTRAVENOUS
Status: COMPLETED | OUTPATIENT
Start: 2025-01-23 | End: 2025-01-24

## 2025-01-23 RX ORDER — KETOROLAC TROMETHAMINE 15 MG/ML
15 INJECTION, SOLUTION INTRAMUSCULAR; INTRAVENOUS ONCE
Status: COMPLETED | OUTPATIENT
Start: 2025-01-23 | End: 2025-01-23

## 2025-01-23 RX ADMIN — SODIUM CHLORIDE 1500 MG: 9 INJECTION, SOLUTION INTRAVENOUS at 19:53

## 2025-01-23 RX ADMIN — ACETAMINOPHEN 650 MG: 650 SUPPOSITORY RECTAL at 19:15

## 2025-01-23 RX ADMIN — POTASSIUM CHLORIDE 10 MEQ: 7.45 INJECTION INTRAVENOUS at 23:46

## 2025-01-23 RX ADMIN — POTASSIUM CHLORIDE 10 MEQ: 7.45 INJECTION INTRAVENOUS at 22:47

## 2025-01-23 RX ADMIN — POTASSIUM CHLORIDE 10 MEQ: 7.45 INJECTION INTRAVENOUS at 22:03

## 2025-01-23 RX ADMIN — SODIUM CHLORIDE 2010 ML: 9 INJECTION, SOLUTION INTRAVENOUS at 19:18

## 2025-01-23 RX ADMIN — KETOROLAC TROMETHAMINE 15 MG: 15 INJECTION, SOLUTION INTRAMUSCULAR; INTRAVENOUS at 22:01

## 2025-01-23 RX ADMIN — IOPAMIDOL 75 ML: 755 INJECTION, SOLUTION INTRAVENOUS at 20:37

## 2025-01-23 RX ADMIN — PIPERACILLIN AND TAZOBACTAM 4500 MG: 4; .5 INJECTION, POWDER, LYOPHILIZED, FOR SOLUTION INTRAVENOUS at 19:37

## 2025-01-24 PROBLEM — R50.9 FEVER OF UNKNOWN ORIGIN: Status: ACTIVE | Noted: 2025-01-24

## 2025-01-24 PROBLEM — G93.40 ENCEPHALOPATHY: Status: ACTIVE | Noted: 2025-01-24

## 2025-01-24 PROBLEM — R50.9 FEVER: Status: ACTIVE | Noted: 2025-01-24

## 2025-01-24 LAB
AMMONIA PLAS-SCNC: 46 UMOL/L (ref 16–60)
ANION GAP SERPL CALCULATED.3IONS-SCNC: 11 MMOL/L (ref 9–16)
BASOPHILS # BLD: <0.03 K/UL (ref 0–0.2)
BASOPHILS NFR BLD: 0 % (ref 0–2)
BUN SERPL-MCNC: 14 MG/DL (ref 6–20)
CALCIUM SERPL-MCNC: 8 MG/DL (ref 8.6–10.4)
CHLORIDE SERPL-SCNC: 111 MMOL/L (ref 98–107)
CO2 SERPL-SCNC: 19 MMOL/L (ref 20–31)
CREAT SERPL-MCNC: 0.6 MG/DL (ref 0.7–1.2)
CRP SERPL HS-MCNC: 95.5 MG/L (ref 0–5)
EOSINOPHIL # BLD: <0.03 K/UL (ref 0–0.44)
EOSINOPHILS RELATIVE PERCENT: 0 % (ref 1–4)
ERYTHROCYTE [DISTWIDTH] IN BLOOD BY AUTOMATED COUNT: 13.9 % (ref 11.8–14.4)
GFR, ESTIMATED: >90 ML/MIN/1.73M2
GLUCOSE SERPL-MCNC: 88 MG/DL (ref 74–99)
HCT VFR BLD AUTO: 39.4 % (ref 40.7–50.3)
HGB BLD-MCNC: 12.6 G/DL (ref 13–17)
IMM GRANULOCYTES # BLD AUTO: 0.03 K/UL (ref 0–0.3)
IMM GRANULOCYTES NFR BLD: 1 %
LACTIC ACID, SEPSIS WHOLE BLOOD: 2.3 MMOL/L (ref 0.5–1.9)
LACTIC ACID, SEPSIS WHOLE BLOOD: 2.3 MMOL/L (ref 0.5–1.9)
LIPASE SERPL-CCNC: 21 U/L (ref 13–60)
LYMPHOCYTES NFR BLD: 1.31 K/UL (ref 1.1–3.7)
LYMPHOCYTES RELATIVE PERCENT: 20 % (ref 24–43)
MAGNESIUM SERPL-MCNC: 1.9 MG/DL (ref 1.6–2.6)
MCH RBC QN AUTO: 31 PG (ref 25.2–33.5)
MCHC RBC AUTO-ENTMCNC: 32 G/DL (ref 28.4–34.8)
MCV RBC AUTO: 96.8 FL (ref 82.6–102.9)
MONOCYTES NFR BLD: 0.75 K/UL (ref 0.1–1.2)
MONOCYTES NFR BLD: 11 % (ref 3–12)
NEUTROPHILS NFR BLD: 68 % (ref 36–65)
NEUTS SEG NFR BLD: 4.52 K/UL (ref 1.5–8.1)
NRBC BLD-RTO: 0 PER 100 WBC
PLATELET # BLD AUTO: 161 K/UL (ref 138–453)
PMV BLD AUTO: 11.5 FL (ref 8.1–13.5)
POTASSIUM SERPL-SCNC: 3.4 MMOL/L (ref 3.7–5.3)
POTASSIUM SERPL-SCNC: 3.7 MMOL/L (ref 3.7–5.3)
RBC # BLD AUTO: 4.07 M/UL (ref 4.21–5.77)
SODIUM SERPL-SCNC: 141 MMOL/L (ref 136–145)
WBC OTHER # BLD: 6.6 K/UL (ref 3.5–11.3)

## 2025-01-24 PROCEDURE — 36415 COLL VENOUS BLD VENIPUNCTURE: CPT

## 2025-01-24 PROCEDURE — 2580000003 HC RX 258: Performed by: NURSE PRACTITIONER

## 2025-01-24 PROCEDURE — 6360000002 HC RX W HCPCS: Performed by: STUDENT IN AN ORGANIZED HEALTH CARE EDUCATION/TRAINING PROGRAM

## 2025-01-24 PROCEDURE — 84132 ASSAY OF SERUM POTASSIUM: CPT

## 2025-01-24 PROCEDURE — 99222 1ST HOSP IP/OBS MODERATE 55: CPT | Performed by: INTERNAL MEDICINE

## 2025-01-24 PROCEDURE — 2580000003 HC RX 258: Performed by: STUDENT IN AN ORGANIZED HEALTH CARE EDUCATION/TRAINING PROGRAM

## 2025-01-24 PROCEDURE — 2500000003 HC RX 250 WO HCPCS: Performed by: NURSE PRACTITIONER

## 2025-01-24 PROCEDURE — 83690 ASSAY OF LIPASE: CPT

## 2025-01-24 PROCEDURE — 83735 ASSAY OF MAGNESIUM: CPT

## 2025-01-24 PROCEDURE — 6370000000 HC RX 637 (ALT 250 FOR IP): Performed by: STUDENT IN AN ORGANIZED HEALTH CARE EDUCATION/TRAINING PROGRAM

## 2025-01-24 PROCEDURE — 96361 HYDRATE IV INFUSION ADD-ON: CPT

## 2025-01-24 PROCEDURE — 86738 MYCOPLASMA ANTIBODY: CPT

## 2025-01-24 PROCEDURE — 82140 ASSAY OF AMMONIA: CPT

## 2025-01-24 PROCEDURE — 96366 THER/PROPH/DIAG IV INF ADDON: CPT

## 2025-01-24 PROCEDURE — 6360000002 HC RX W HCPCS: Performed by: NURSE PRACTITIONER

## 2025-01-24 PROCEDURE — 99222 1ST HOSP IP/OBS MODERATE 55: CPT | Performed by: STUDENT IN AN ORGANIZED HEALTH CARE EDUCATION/TRAINING PROGRAM

## 2025-01-24 PROCEDURE — 6370000000 HC RX 637 (ALT 250 FOR IP): Performed by: NURSE PRACTITIONER

## 2025-01-24 PROCEDURE — 96367 TX/PROPH/DG ADDL SEQ IV INF: CPT

## 2025-01-24 PROCEDURE — G0378 HOSPITAL OBSERVATION PER HR: HCPCS

## 2025-01-24 PROCEDURE — 80048 BASIC METABOLIC PNL TOTAL CA: CPT

## 2025-01-24 PROCEDURE — 86140 C-REACTIVE PROTEIN: CPT

## 2025-01-24 PROCEDURE — 96372 THER/PROPH/DIAG INJ SC/IM: CPT

## 2025-01-24 PROCEDURE — 85025 COMPLETE CBC W/AUTO DIFF WBC: CPT

## 2025-01-24 PROCEDURE — 83605 ASSAY OF LACTIC ACID: CPT

## 2025-01-24 RX ORDER — ACETAMINOPHEN 325 MG/1
650 TABLET ORAL EVERY 6 HOURS PRN
COMMUNITY

## 2025-01-24 RX ORDER — MAGNESIUM SULFATE 1 G/100ML
1000 INJECTION INTRAVENOUS PRN
Status: DISCONTINUED | OUTPATIENT
Start: 2025-01-24 | End: 2025-01-27 | Stop reason: HOSPADM

## 2025-01-24 RX ORDER — SODIUM CHLORIDE 9 MG/ML
INJECTION, SOLUTION INTRAVENOUS CONTINUOUS
Status: ACTIVE | OUTPATIENT
Start: 2025-01-24 | End: 2025-01-25

## 2025-01-24 RX ORDER — SENNOSIDES A AND B 8.6 MG/1
1 TABLET, FILM COATED ORAL NIGHTLY
Status: DISCONTINUED | OUTPATIENT
Start: 2025-01-24 | End: 2025-01-25

## 2025-01-24 RX ORDER — 0.9 % SODIUM CHLORIDE 0.9 %
500 INTRAVENOUS SOLUTION INTRAVENOUS ONCE
Status: COMPLETED | OUTPATIENT
Start: 2025-01-24 | End: 2025-01-24

## 2025-01-24 RX ORDER — POTASSIUM CHLORIDE 1500 MG/1
40 TABLET, EXTENDED RELEASE ORAL PRN
Status: DISCONTINUED | OUTPATIENT
Start: 2025-01-24 | End: 2025-01-27 | Stop reason: HOSPADM

## 2025-01-24 RX ORDER — POLYETHYLENE GLYCOL 3350 17 G/17G
17 POWDER, FOR SOLUTION ORAL DAILY
Status: DISCONTINUED | OUTPATIENT
Start: 2025-01-24 | End: 2025-01-27 | Stop reason: HOSPADM

## 2025-01-24 RX ORDER — ACETAMINOPHEN 650 MG/1
650 SUPPOSITORY RECTAL EVERY 6 HOURS PRN
Status: DISCONTINUED | OUTPATIENT
Start: 2025-01-24 | End: 2025-01-27 | Stop reason: HOSPADM

## 2025-01-24 RX ORDER — DULOXETIN HYDROCHLORIDE 30 MG/1
60 CAPSULE, DELAYED RELEASE ORAL NIGHTLY
Status: DISCONTINUED | OUTPATIENT
Start: 2025-01-24 | End: 2025-01-27 | Stop reason: HOSPADM

## 2025-01-24 RX ORDER — POLYETHYLENE GLYCOL 3350 17 G/17G
17 POWDER, FOR SOLUTION ORAL DAILY PRN
Status: DISCONTINUED | OUTPATIENT
Start: 2025-01-24 | End: 2025-01-25

## 2025-01-24 RX ORDER — GUAIFENESIN 600 MG/1
600 TABLET, EXTENDED RELEASE ORAL 2 TIMES DAILY
Status: DISCONTINUED | OUTPATIENT
Start: 2025-01-24 | End: 2025-01-27 | Stop reason: HOSPADM

## 2025-01-24 RX ORDER — ACETAMINOPHEN 325 MG/1
650 TABLET ORAL EVERY 6 HOURS PRN
Status: DISCONTINUED | OUTPATIENT
Start: 2025-01-24 | End: 2025-01-27 | Stop reason: HOSPADM

## 2025-01-24 RX ORDER — SODIUM CHLORIDE 0.9 % (FLUSH) 0.9 %
10 SYRINGE (ML) INJECTION PRN
Status: DISCONTINUED | OUTPATIENT
Start: 2025-01-24 | End: 2025-01-27 | Stop reason: HOSPADM

## 2025-01-24 RX ORDER — GABAPENTIN 300 MG/1
300 CAPSULE ORAL 3 TIMES DAILY
Status: DISCONTINUED | OUTPATIENT
Start: 2025-01-24 | End: 2025-01-27 | Stop reason: HOSPADM

## 2025-01-24 RX ORDER — SODIUM CHLORIDE 0.9 % (FLUSH) 0.9 %
5-40 SYRINGE (ML) INJECTION EVERY 12 HOURS SCHEDULED
Status: DISCONTINUED | OUTPATIENT
Start: 2025-01-24 | End: 2025-01-27 | Stop reason: HOSPADM

## 2025-01-24 RX ORDER — CLONAZEPAM 0.5 MG/1
0.5 TABLET ORAL 3 TIMES DAILY PRN
Status: DISCONTINUED | OUTPATIENT
Start: 2025-01-24 | End: 2025-01-27 | Stop reason: HOSPADM

## 2025-01-24 RX ORDER — SODIUM CHLORIDE 9 MG/ML
INJECTION, SOLUTION INTRAVENOUS PRN
Status: DISCONTINUED | OUTPATIENT
Start: 2025-01-24 | End: 2025-01-27 | Stop reason: HOSPADM

## 2025-01-24 RX ORDER — ONDANSETRON 4 MG/1
4 TABLET, ORALLY DISINTEGRATING ORAL EVERY 8 HOURS PRN
Status: DISCONTINUED | OUTPATIENT
Start: 2025-01-24 | End: 2025-01-27 | Stop reason: HOSPADM

## 2025-01-24 RX ORDER — HYDROPHILIC CREAM
1 PASTE (GRAM) TOPICAL NIGHTLY
COMMUNITY

## 2025-01-24 RX ORDER — ONDANSETRON 2 MG/ML
4 INJECTION INTRAMUSCULAR; INTRAVENOUS EVERY 6 HOURS PRN
Status: DISCONTINUED | OUTPATIENT
Start: 2025-01-24 | End: 2025-01-27 | Stop reason: HOSPADM

## 2025-01-24 RX ORDER — POTASSIUM CHLORIDE 1500 MG/1
40 TABLET, EXTENDED RELEASE ORAL ONCE
Status: DISCONTINUED | OUTPATIENT
Start: 2025-01-24 | End: 2025-01-24

## 2025-01-24 RX ORDER — POTASSIUM CHLORIDE 7.45 MG/ML
10 INJECTION INTRAVENOUS PRN
Status: DISCONTINUED | OUTPATIENT
Start: 2025-01-24 | End: 2025-01-27 | Stop reason: HOSPADM

## 2025-01-24 RX ORDER — ENOXAPARIN SODIUM 100 MG/ML
40 INJECTION SUBCUTANEOUS DAILY
Status: DISCONTINUED | OUTPATIENT
Start: 2025-01-24 | End: 2025-01-27 | Stop reason: HOSPADM

## 2025-01-24 RX ORDER — PRIMIDONE 250 MG/1
250 TABLET ORAL 2 TIMES DAILY
Status: DISCONTINUED | OUTPATIENT
Start: 2025-01-24 | End: 2025-01-27 | Stop reason: HOSPADM

## 2025-01-24 RX ORDER — BACLOFEN 10 MG/1
5 TABLET ORAL 2 TIMES DAILY
Status: DISCONTINUED | OUTPATIENT
Start: 2025-01-24 | End: 2025-01-27 | Stop reason: HOSPADM

## 2025-01-24 RX ADMIN — POLYETHYLENE GLYCOL 3350 17 G: 17 POWDER, FOR SOLUTION ORAL at 10:38

## 2025-01-24 RX ADMIN — SENNOSIDES 8.6 MG: 8.6 TABLET, FILM COATED ORAL at 20:43

## 2025-01-24 RX ADMIN — GABAPENTIN 300 MG: 300 CAPSULE ORAL at 08:50

## 2025-01-24 RX ADMIN — BACLOFEN 5 MG: 10 TABLET ORAL at 02:38

## 2025-01-24 RX ADMIN — PRIMIDONE 250 MG: 250 TABLET ORAL at 02:39

## 2025-01-24 RX ADMIN — ENOXAPARIN SODIUM 40 MG: 100 INJECTION SUBCUTANEOUS at 08:50

## 2025-01-24 RX ADMIN — GUAIFENESIN 600 MG: 600 TABLET ORAL at 20:44

## 2025-01-24 RX ADMIN — SODIUM CHLORIDE, PRESERVATIVE FREE 10 ML: 5 INJECTION INTRAVENOUS at 08:51

## 2025-01-24 RX ADMIN — GUAIFENESIN 600 MG: 600 TABLET ORAL at 08:50

## 2025-01-24 RX ADMIN — BACLOFEN 5 MG: 10 TABLET ORAL at 08:50

## 2025-01-24 RX ADMIN — PIPERACILLIN AND TAZOBACTAM 3375 MG: 3; .375 INJECTION, POWDER, LYOPHILIZED, FOR SOLUTION INTRAVENOUS at 17:49

## 2025-01-24 RX ADMIN — DULOXETINE HYDROCHLORIDE 60 MG: 30 CAPSULE, DELAYED RELEASE ORAL at 20:43

## 2025-01-24 RX ADMIN — GUAIFENESIN 600 MG: 600 TABLET ORAL at 02:39

## 2025-01-24 RX ADMIN — BACLOFEN 5 MG: 10 TABLET ORAL at 20:43

## 2025-01-24 RX ADMIN — PRIMIDONE 250 MG: 250 TABLET ORAL at 08:50

## 2025-01-24 RX ADMIN — POTASSIUM BICARBONATE 40 MEQ: 782 TABLET, EFFERVESCENT ORAL at 08:50

## 2025-01-24 RX ADMIN — GABAPENTIN 300 MG: 300 CAPSULE ORAL at 20:43

## 2025-01-24 RX ADMIN — DULOXETINE HYDROCHLORIDE 60 MG: 30 CAPSULE, DELAYED RELEASE ORAL at 02:38

## 2025-01-24 RX ADMIN — PIPERACILLIN AND TAZOBACTAM 3375 MG: 3; .375 INJECTION, POWDER, LYOPHILIZED, FOR SOLUTION INTRAVENOUS at 10:36

## 2025-01-24 RX ADMIN — GABAPENTIN 300 MG: 300 CAPSULE ORAL at 14:55

## 2025-01-24 RX ADMIN — SODIUM CHLORIDE: 9 INJECTION, SOLUTION INTRAVENOUS at 02:41

## 2025-01-24 RX ADMIN — SODIUM CHLORIDE 500 ML: 9 INJECTION, SOLUTION INTRAVENOUS at 05:05

## 2025-01-24 RX ADMIN — POTASSIUM CHLORIDE 10 MEQ: 7.45 INJECTION INTRAVENOUS at 01:11

## 2025-01-24 RX ADMIN — PRIMIDONE 250 MG: 250 TABLET ORAL at 20:45

## 2025-01-24 ASSESSMENT — LIFESTYLE VARIABLES
HOW OFTEN DO YOU HAVE A DRINK CONTAINING ALCOHOL: NEVER
HOW MANY STANDARD DRINKS CONTAINING ALCOHOL DO YOU HAVE ON A TYPICAL DAY: PATIENT DOES NOT DRINK

## 2025-01-24 NOTE — H&P
leukocytosis, mildly elevated lactic acid level on arrival, mildly elevated Pro-Shaun, low potassium, CRP 95, no concern for UTI, respiratory panel negative, imaging concerning for constipation versus enteritis?  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     Medications Prior to Admission:     Prior to Admission medications    Medication Sig Start Date End Date Taking? Authorizing Provider   vitamin D (CHOLECALCIFEROL) 50 MCG (2000 UT) CAPS capsule Take 1 capsule by mouth daily   Yes Irma Soriano MD   zinc oxide (TRIAD HYDROPHILIC) PSTE paste Apply 1 application  topically nightly   Yes Irma Soriano MD   acetaminophen (TYLENOL) 325 MG tablet Take 2 tablets by mouth every 6 hours as needed for Pain or Fever   Yes Irma Soriano MD   Baclofen (LIORESAL) 5 MG tablet Take 1 tablet by mouth 2 times daily for abnormal movements 11/10/24   Irma Soriano MD   guaiFENesin (MUCINEX) 600 MG extended release tablet Take 1 tablet by mouth 2 times daily    Irma Soriano MD   polyethylene glycol (GLYCOLAX) 17 g packet Take 1 packet by mouth daily as needed for

## 2025-01-24 NOTE — ED PROVIDER NOTES
[facility] EMERGENCY DEPARTMENT  eMERGENCY dEPARTMENT eNCOUnter   Attending Attestation     Pt Name: Heri Steinberg  MRN: [id number]  Birthdate 1975  Date of evaluation: 1/23/25       Heri Steinberg is a 49 y.o. male who presents with Altered Mental Status      10:15 PM EST      History: Patient presents with altered mental status.  Patient had a fever.  Patient from nursing facility.  No other history is obtainable.    Exam: Heart rate elevated.  Lungs clear.  Abdomen is soft, except in the lower abdomen where there seems to be a mass of stool.    Plan for altered mental status workup, concern for infection, will start antibiotics, plan for admission.    I performed a history and physical examination of the patient and discussed management with the resident. I reviewed the resident’s note and agree with the documented findings and plan of care. Any areas of disagreement are noted on the chart. I was personally present for the key portions of any procedures. I have documented in the chart those procedures where I was not present during the key portions. I have personally reviewed all images and agree with the resident's interpretation. I have reviewed the emergency nurses triage note. I agree with the chief complaint, past medical history, past surgical history, allergies, medications, social and family history as documented unless otherwise noted below. Documentation of the HPI, Physical Exam and Medical Decision Making performed by medical students or scribes is based on my personal performance of the HPI, PE and MDM. For Phys Assistant/ Nurse Practitioner cases/documentation I have had a face to face evaluation of this patient and have completed at least one if not all key elements of the E/M (history, physical exam, and MDM). Additional findings are as noted.    For APC cases I have personally evaluated and examined the patient in conjunction with the APC and agree with the treatment plan and

## 2025-01-24 NOTE — ED PROVIDER NOTES
Faculty Sign-Out Attestation  Handoff taken on the following patient from prior Attending Physician: Ellie  Note Started: 12:21 AM EST    I was available and discussed any additional care issues that arose and coordinated the management plans with the resident(s) caring for the patient during my duty period. Any areas of disagreement with resident’s documentation of care or procedures are noted on the chart. I was personally present for the key portions of any/all procedures during my duty period. I have documented in the chart those procedures where I was not present during the key portions.    Altered, febrile, sepsis work up, abx,   Needing admit    Gerson Mccloud DO  Attending Physician       Gerson Mccloud DO  01/24/25 0021

## 2025-01-24 NOTE — ED NOTES
Patient brief changed. Patient repositioned. Patient is in NAD, respirations are even and unlabored. Writer will continue with plan of care.   
Pt had large bowel movement. Pt changed and repositioned   
Pt to ED via [facility] from [facility]. Pt has hx of MS. Pt is bedbound. Pt is is currently not responding to staff or painful stim. Pt is maintaining his airway. Pt was given 2mg Narcan IN with no change in mental status. Per EMS pt mother was visiting at 12 and patient was acting appropriate for his baseline. Nursing staff told EMS they let the patient sleep most of the day and when they checked on him he was unresponsive. Pt is a full code. Pt placed on full monitor, all light is in reach . Pt is febrile on arrival   
EXTENDED RELEASE TABLET    Take 1 tablet by mouth 2 times daily    GUAIFENESIN-DEXTROMETHORPHAN (ROBITUSSIN DM) 100-10 MG/5ML SYRUP    Take 10 mLs by mouth every 4 hours as needed for Cough Cough or congestion    MAGNESIUM HYDROXIDE (MILK OF MAGNESIA) 400 MG/5ML SUSPENSION    Take 30 mLs by mouth daily as needed for Constipation If no BM in 3 days    POLYETHYLENE GLYCOL (GLYCOLAX) 17 G PACKET    Take 1 packet by mouth daily as needed for Constipation    PRIMIDONE (MYSOLINE) 250 MG TABLET    Take 1 tablet by mouth 2 times daily    SENNA (SENOKOT) 8.6 MG TABLET    Take 1 tablet by mouth nightly     Orders Placed This Encounter   Medications    sodium chloride 0.9 % bolus 2,010 mL    acetaminophen (TYLENOL) suppository 650 mg    DISCONTD: piperacillin-tazobactam (ZOSYN) 3,375 mg in sodium chloride 0.9 % 50 mL IVPB (mini-bag)     Order Specific Question:   Antimicrobial Indications     Answer:   Sepsis of Unknown Etiology     Order Specific Question:   Sepsis duration of therapy     Answer:   Other    vancomycin (VANCOCIN) 1,500 mg in sodium chloride 0.9 % 250 mL IVPB ([id number])     Order Specific Question:   Antimicrobial Indications     Answer:   Sepsis of Unknown Etiology     Order Specific Question:   Sepsis duration of therapy     Answer:   Other    piperacillin-tazobactam (ZOSYN) 4,500 mg in sodium chloride 0.9 % 100 mL IVPB ([id number])     Order Specific Question:   Antimicrobial Indications     Answer:   Sepsis of Unknown Etiology     Order Specific Question:   Sepsis duration of therapy     Answer:   Other    iopamidol (ISOVUE-370) 76 % injection 75 mL    potassium chloride 10 mEq/100 mL IVPB (Peripheral Line)    ketorolac (TORADOL) injection 15 mg       SURGICAL HISTORY     No past surgical history on file.    PAST MEDICAL HISTORY       Past Medical History:   Diagnosis Date    Abdominal distension     Anxiety     Benign neoplasm of cerebral meninges (HCC)     Cerebellar ataxia in diseases classified elsewhere

## 2025-01-24 NOTE — PLAN OF CARE
Problem: Safety - Adult  Goal: Free from fall injury  Outcome: Progressing     Problem: Discharge Planning  Goal: Discharge to home or other facility with appropriate resources  Outcome: Progressing     Problem: Skin/Tissue Integrity  Goal: Skin integrity remains intact  Description: 1.  Monitor for areas of redness and/or skin breakdown  2.  Assess vascular access sites hourly  3.  Every 4-6 hours minimum:  Change oxygen saturation probe site  4.  Every 4-6 hours:  If on nasal continuous positive airway pressure, respiratory therapy assess nares and determine need for appliance change or resting period  Outcome: Progressing     Problem: Confusion  Goal: Confusion, delirium, dementia, or psychosis is improved or at baseline  Description: INTERVENTIONS:  1. Assess for possible contributors to thought disturbance, including medications, impaired vision or hearing, underlying metabolic abnormalities, dehydration, psychiatric diagnoses, and notify attending LIP  2. [place] high risk fall precautions, as indicated  3. Provide frequent short contacts to provide reality reorientation, refocusing and direction  4. Decrease environmental stimuli, including noise as appropriate  5. Monitor and intervene to maintain adequate nutrition, hydration, elimination, sleep and activity  6. If unable to ensure safety without constant attention obtain sitter and review sitter guidelines with assigned personnel  7. Initiate Psychosocial CNS and Spiritual Care consult, as indicated  1/24/2025 1718 by Kirstie Chapman, RN  Outcome: Progressing  1/24/2025 0631 by Liliana Woo, RN  Outcome: Progressing

## 2025-01-24 NOTE — CARE COORDINATION
Case Management Assessment  Initial Evaluation    Date/Time of Evaluation: 1/24/2025 2:56 PM  Assessment Completed by: Georgie De Paz RN    If patient is discharged prior to next notation, then this note serves as note for discharge by case management.    Patient Name: Heri Steinberg                   YOB: 1975  Diagnosis: Fever of unknown origin [R50.9]  Septicemia (HCC) [A41.9]  Constipation, unspecified constipation type [K59.00]  Altered mental status, unspecified altered mental status type [R41.82]                   Date / Time: 1/23/2025  6:49 PM    Patient Admission Status: Observation   Readmission Risk (Low < 19, Mod (19-27), High > 27): Readmission Risk Score: 11.7    Current PCP: Jefry Jansen, DO  PCP verified by ? Yes    Chart Reviewed: Yes      History Provided by: Other (see comment) (mother who is Guardian)  Patient Orientation: Unable to Assess    Patient Cognition: Alert    Hospitalization in the last 30 days (Readmission):  No    If yes, Readmission Assessment in  Navigator will be completed.    Advance Directives:      Code Status: Full Code   Patient's Primary Decision Maker is: Legal Next of Kin (mother is stated Guardian)      Discharge Planning:    Patient lives with: Other (Comment) (other residents) Type of Home: Long-Term Acute Care  Primary Care Giver:    Patient Support Systems include: Parent   Current Financial resources: Medicaid  Current community resources: ECF/Home Care  Current services prior to admission: Extended Care Facility            Current DME:              Type of Home Care services:  None    ADLS  Prior functional level: Assistance with the following:, Bathing, Dressing, Toileting, Feeding, Cooking, Housework, Shopping, Mobility, Other (see comment) (total dependent)  Current functional level: Bathing, Dressing, Toileting, Feeding, Cooking, Housework, Shopping, Mobility, Other (see comment) (total dependent)    PT AM-PAC:   /24  OT AM-PAC:

## 2025-01-24 NOTE — CONSULTS
[facility] - Initial Consult Note  Today's Date and Time: 1/24/2025, 1:36 PM    Impression :   Fever   Multiple sclerosis   Chronic encephalopathy  Chronic constipation    Prior Septicemia  on 7-18-24: Staph capitis    Recommendations:   Continue zosyn for now  Discontinue antibiotics on 1-25-25 if cultures: no growth  Follow up blood cultures    Medical Decision Making/Summary/Discussion:1/24/2025       Infection Control Recommendations   [place] Precautions    Antimicrobial Stewardship Recommendations     Simplification of therapy  Targeted therapy    Coordination of Outpatient Care:   Estimated Length of IV antimicrobials:TBD  Patient will need Midline Catheter Insertion: TBD  Patient will need PICC line Insertion:TBD  Patient will need: Home IV , Infusion Center,  SNF,  LTAC: TBD  Patient will need outpatient wound care: no     Chief complaint/reason for consultation:   Fever of unknown origin       History of Present Illness:   Heri Steinberg is a 49 y.o.-year-old male who was initially admitted on 1/23/2025. Patient seen at the request of Dr. Rodriguez.     INITIAL HISTORY:    Patient presented to the ED on 1/23 for fevers and AMS Tmax 101.1.   Patient has PMHx of MS and paraplegia.   Patient came from nursing facility.     Patient was started on Vancomycin and zosyn in the emergency department.     Upon examination today patient is afebrile responding to questions appropriately. Does not complain of any pain anywhere. Oxygen saturations 100% on room air.     Labs:     WBC: 6.6   Lactic 2.3  Procal: pending   CRP: pending   Sed rate: pending     CXR 1/23/25: No acute cardiopulmonary findings   CT chest, abdomen, pelvis 1/23/25: 1. No evidence of acute cardiopulmonary process.  2. Very large amount of retained stool in the mid and distal sigmoid colon  and rectum. The distal sigmoid colon and rectum are markedly distended with  retained stool up to 9 cm. No perirectal

## 2025-01-24 NOTE — ED PROVIDER NOTES
[facility] EMERGENCY DEPARTMENT  Emergency Department  Emergency Medicine Resident Sign-out     Care of Heri Steinberg was assumed from Dr. Espinal and is being seen for Altered Mental Status  .  The patient's initial evaluation and plan have been discussed with the prior provider who initially evaluated the patient.     EMERGENCY DEPARTMENT COURSE / MEDICAL DECISION MAKING:       MEDICATIONS GIVEN:  Orders Placed This Encounter   Medications    sodium chloride 0.9 % bolus 2,010 mL    acetaminophen (TYLENOL) suppository 650 mg    DISCONTD: piperacillin-tazobactam (ZOSYN) 3,375 mg in sodium chloride 0.9 % 50 mL IVPB (mini-bag)     Order Specific Question:   Antimicrobial Indications     Answer:   Sepsis of Unknown Etiology     Order Specific Question:   Sepsis duration of therapy     Answer:   Other    vancomycin (VANCOCIN) 1,500 mg in sodium chloride 0.9 % 250 mL IVPB ([id number])     Order Specific Question:   Antimicrobial Indications     Answer:   Sepsis of Unknown Etiology     Order Specific Question:   Sepsis duration of therapy     Answer:   Other    piperacillin-tazobactam (ZOSYN) 4,500 mg in sodium chloride 0.9 % 100 mL IVPB ([id number])     Order Specific Question:   Antimicrobial Indications     Answer:   Sepsis of Unknown Etiology     Order Specific Question:   Sepsis duration of therapy     Answer:   Other    iopamidol (ISOVUE-370) 76 % injection 75 mL    potassium chloride 10 mEq/100 mL IVPB (Peripheral Line)    ketorolac (TORADOL) injection 15 mg       LABS / RADIOLOGY:     Labs Reviewed   ELECTROLYTES PLUS - Abnormal; Notable for the following components:       Result Value    POC Potassium 3.0 (*)     All other components within normal limits   CBC WITH AUTO DIFFERENTIAL - Abnormal; Notable for the following components:    Neutrophils % 80 (*)     Lymphocytes % 13 (*)     Eosinophils % 0 (*)     Immature Granulocytes % 1 (*)     Lymphocytes Absolute 1.02 (*)     All other components

## 2025-01-24 NOTE — PLAN OF CARE
Problem: Confusion  Goal: Confusion, delirium, dementia, or psychosis is improved or at baseline  Description: INTERVENTIONS:  1. Assess for possible contributors to thought disturbance, including medications, impaired vision or hearing, underlying metabolic abnormalities, dehydration, psychiatric diagnoses, and notify attending LIP  2. [place] high risk fall precautions, as indicated  3. Provide frequent short contacts to provide reality reorientation, refocusing and direction  4. Decrease environmental stimuli, including noise as appropriate  5. Monitor and intervene to maintain adequate nutrition, hydration, elimination, sleep and activity  6. If unable to ensure safety without constant attention obtain sitter and review sitter guidelines with assigned personnel  7. Initiate Psychosocial CNS and Spiritual Care consult, as indicated  Outcome: Progressing

## 2025-01-24 NOTE — ED PROVIDER NOTES
[facility] EMERGENCY DEPARTMENT  Emergency Department Encounter  Emergency Medicine Resident     Pt Name:Heri Steinberg  MRN: [id number]  Birthdate 1975  Date of evaluation: 1/23/25  PCP:  Jefry Jansen DO  Note Started: 7:06 PM EST      CHIEF COMPLAINT       Chief Complaint   Patient presents with    Altered Mental Status       HISTORY OF PRESENT ILLNESS  (Location/Symptom, Timing/Onset, Context/Setting, Quality, Duration, Modifying Factors, Severity.)      Heri Steinberg is a 49 y.o. male past medical history of paraplegia, multiple sclerosis, baseline nonverbal, presenting from his ECF due to concern for altered mental status.  At baseline patient is awake and will attempt to mimic sounds and communicate but it appears he is currently departed from this.  Duration unclear.  He is not able to provide any history on examination.    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       has no past surgical history on file.      Social History     Socioeconomic History    Marital status: Legally      Spouse name: Not on file    Number of children: Not on file    Years of education: Not on file    Highest education level: Not on file   Occupational History    Not on file   Tobacco Use    Smoking status: Never     Passive exposure: Never    Smokeless tobacco: Never   Vaping Use    Vaping status: Never Used

## 2025-01-25 PROBLEM — K59.09 CHRONIC CONSTIPATION: Status: ACTIVE | Noted: 2024-07-18

## 2025-01-25 PROBLEM — G93.49 ENCEPHALOPATHY CHRONIC: Status: ACTIVE | Noted: 2025-01-25

## 2025-01-25 PROBLEM — K59.00 CONSTIPATION: Status: ACTIVE | Noted: 2025-01-25

## 2025-01-25 LAB
ANION GAP SERPL CALCULATED.3IONS-SCNC: 10 MMOL/L (ref 9–16)
BASOPHILS # BLD: <0.03 K/UL (ref 0–0.2)
BASOPHILS NFR BLD: 0 % (ref 0–2)
BUN SERPL-MCNC: 7 MG/DL (ref 6–20)
CALCIUM SERPL-MCNC: 8.2 MG/DL (ref 8.6–10.4)
CHLORIDE SERPL-SCNC: 106 MMOL/L (ref 98–107)
CO2 SERPL-SCNC: 21 MMOL/L (ref 20–31)
CREAT SERPL-MCNC: 0.5 MG/DL (ref 0.7–1.2)
CRP SERPL HS-MCNC: 74.3 MG/L (ref 0–5)
EKG ATRIAL RATE: 103 BPM
EKG P AXIS: 56 DEGREES
EKG P-R INTERVAL: 180 MS
EKG Q-T INTERVAL: 334 MS
EKG QRS DURATION: 94 MS
EKG QTC CALCULATION (BAZETT): 437 MS
EKG R AXIS: 69 DEGREES
EKG T AXIS: 65 DEGREES
EKG VENTRICULAR RATE: 103 BPM
EOSINOPHIL # BLD: 0.05 K/UL (ref 0–0.44)
EOSINOPHILS RELATIVE PERCENT: 1 % (ref 1–4)
ERYTHROCYTE [DISTWIDTH] IN BLOOD BY AUTOMATED COUNT: 13.6 % (ref 11.8–14.4)
GFR, ESTIMATED: >90 ML/MIN/1.73M2
GLUCOSE SERPL-MCNC: 95 MG/DL (ref 74–99)
HCT VFR BLD AUTO: 36.8 % (ref 40.7–50.3)
HGB BLD-MCNC: 12.1 G/DL (ref 13–17)
IMM GRANULOCYTES # BLD AUTO: <0.03 K/UL (ref 0–0.3)
IMM GRANULOCYTES NFR BLD: 0 %
LACTIC ACID, WHOLE BLOOD: 0.9 MMOL/L (ref 0.7–2.1)
LYMPHOCYTES NFR BLD: 2.35 K/UL (ref 1.1–3.7)
LYMPHOCYTES RELATIVE PERCENT: 32 % (ref 24–43)
MCH RBC QN AUTO: 30.9 PG (ref 25.2–33.5)
MCHC RBC AUTO-ENTMCNC: 32.9 G/DL (ref 28.4–34.8)
MCV RBC AUTO: 93.9 FL (ref 82.6–102.9)
MONOCYTES NFR BLD: 0.84 K/UL (ref 0.1–1.2)
MONOCYTES NFR BLD: 11 % (ref 3–12)
NEUTROPHILS NFR BLD: 56 % (ref 36–65)
NEUTS SEG NFR BLD: 4.17 K/UL (ref 1.5–8.1)
NRBC BLD-RTO: 0 PER 100 WBC
PLATELET # BLD AUTO: 181 K/UL (ref 138–453)
PMV BLD AUTO: 11.5 FL (ref 8.1–13.5)
POTASSIUM SERPL-SCNC: 3.8 MMOL/L (ref 3.7–5.3)
RBC # BLD AUTO: 3.92 M/UL (ref 4.21–5.77)
SODIUM SERPL-SCNC: 137 MMOL/L (ref 136–145)
WBC OTHER # BLD: 7.4 K/UL (ref 3.5–11.3)

## 2025-01-25 PROCEDURE — 6370000000 HC RX 637 (ALT 250 FOR IP): Performed by: NURSE PRACTITIONER

## 2025-01-25 PROCEDURE — 85025 COMPLETE CBC W/AUTO DIFF WBC: CPT

## 2025-01-25 PROCEDURE — 83605 ASSAY OF LACTIC ACID: CPT

## 2025-01-25 PROCEDURE — 36415 COLL VENOUS BLD VENIPUNCTURE: CPT

## 2025-01-25 PROCEDURE — 6370000000 HC RX 637 (ALT 250 FOR IP): Performed by: STUDENT IN AN ORGANIZED HEALTH CARE EDUCATION/TRAINING PROGRAM

## 2025-01-25 PROCEDURE — 6370000000 HC RX 637 (ALT 250 FOR IP): Performed by: INTERNAL MEDICINE

## 2025-01-25 PROCEDURE — G0378 HOSPITAL OBSERVATION PER HR: HCPCS

## 2025-01-25 PROCEDURE — 6360000002 HC RX W HCPCS: Performed by: STUDENT IN AN ORGANIZED HEALTH CARE EDUCATION/TRAINING PROGRAM

## 2025-01-25 PROCEDURE — 80048 BASIC METABOLIC PNL TOTAL CA: CPT

## 2025-01-25 PROCEDURE — G0545 PR INHERENT VISIT TO INPT: HCPCS | Performed by: INTERNAL MEDICINE

## 2025-01-25 PROCEDURE — 96366 THER/PROPH/DIAG IV INF ADDON: CPT

## 2025-01-25 PROCEDURE — 6360000002 HC RX W HCPCS: Performed by: NURSE PRACTITIONER

## 2025-01-25 PROCEDURE — 86140 C-REACTIVE PROTEIN: CPT

## 2025-01-25 PROCEDURE — 2580000003 HC RX 258: Performed by: STUDENT IN AN ORGANIZED HEALTH CARE EDUCATION/TRAINING PROGRAM

## 2025-01-25 PROCEDURE — 99232 SBSQ HOSP IP/OBS MODERATE 35: CPT | Performed by: INTERNAL MEDICINE

## 2025-01-25 PROCEDURE — 96372 THER/PROPH/DIAG INJ SC/IM: CPT

## 2025-01-25 PROCEDURE — 93010 ELECTROCARDIOGRAM REPORT: CPT | Performed by: INTERNAL MEDICINE

## 2025-01-25 RX ORDER — BISACODYL 10 MG
10 SUPPOSITORY, RECTAL RECTAL DAILY PRN
Status: DISCONTINUED | OUTPATIENT
Start: 2025-01-25 | End: 2025-01-27 | Stop reason: HOSPADM

## 2025-01-25 RX ORDER — SENNA AND DOCUSATE SODIUM 50; 8.6 MG/1; MG/1
1 TABLET, FILM COATED ORAL DAILY
Status: DISCONTINUED | OUTPATIENT
Start: 2025-01-25 | End: 2025-01-27 | Stop reason: HOSPADM

## 2025-01-25 RX ORDER — POLYETHYLENE GLYCOL 3350 17 G/17G
17 POWDER, FOR SOLUTION ORAL DAILY
Status: DISCONTINUED | OUTPATIENT
Start: 2025-01-25 | End: 2025-01-25

## 2025-01-25 RX ADMIN — PIPERACILLIN AND TAZOBACTAM 3375 MG: 3; .375 INJECTION, POWDER, LYOPHILIZED, FOR SOLUTION INTRAVENOUS at 09:27

## 2025-01-25 RX ADMIN — GUAIFENESIN 600 MG: 600 TABLET ORAL at 08:57

## 2025-01-25 RX ADMIN — PRIMIDONE 250 MG: 250 TABLET ORAL at 20:38

## 2025-01-25 RX ADMIN — PIPERACILLIN AND TAZOBACTAM 3375 MG: 3; .375 INJECTION, POWDER, LYOPHILIZED, FOR SOLUTION INTRAVENOUS at 02:26

## 2025-01-25 RX ADMIN — ENOXAPARIN SODIUM 40 MG: 100 INJECTION SUBCUTANEOUS at 08:57

## 2025-01-25 RX ADMIN — GABAPENTIN 300 MG: 300 CAPSULE ORAL at 20:39

## 2025-01-25 RX ADMIN — PIPERACILLIN AND TAZOBACTAM 3375 MG: 3; .375 INJECTION, POWDER, LYOPHILIZED, FOR SOLUTION INTRAVENOUS at 19:51

## 2025-01-25 RX ADMIN — PRIMIDONE 250 MG: 250 TABLET ORAL at 08:57

## 2025-01-25 RX ADMIN — GABAPENTIN 300 MG: 300 CAPSULE ORAL at 12:44

## 2025-01-25 RX ADMIN — BACLOFEN 5 MG: 10 TABLET ORAL at 08:57

## 2025-01-25 RX ADMIN — DULOXETINE HYDROCHLORIDE 60 MG: 30 CAPSULE, DELAYED RELEASE ORAL at 20:39

## 2025-01-25 RX ADMIN — POLYETHYLENE GLYCOL 3350 17 G: 17 POWDER, FOR SOLUTION ORAL at 08:57

## 2025-01-25 RX ADMIN — GUAIFENESIN 600 MG: 600 TABLET ORAL at 20:39

## 2025-01-25 RX ADMIN — GABAPENTIN 300 MG: 300 CAPSULE ORAL at 08:57

## 2025-01-25 RX ADMIN — BISACODYL 10 MG: 10 SUPPOSITORY RECTAL at 18:13

## 2025-01-25 RX ADMIN — BACLOFEN 5 MG: 10 TABLET ORAL at 20:39

## 2025-01-25 RX ADMIN — SENNOSIDES AND DOCUSATE SODIUM 1 TABLET: 50; 8.6 TABLET ORAL at 12:44

## 2025-01-26 PROBLEM — R50.9 FEVER OF UNKNOWN ORIGIN: Status: RESOLVED | Noted: 2025-01-24 | Resolved: 2025-01-26

## 2025-01-26 PROBLEM — G93.40 ENCEPHALOPATHY: Status: RESOLVED | Noted: 2025-01-24 | Resolved: 2025-01-26

## 2025-01-26 LAB
ANION GAP SERPL CALCULATED.3IONS-SCNC: 12 MMOL/L (ref 9–16)
BASOPHILS # BLD: <0.03 K/UL (ref 0–0.2)
BASOPHILS NFR BLD: 0 % (ref 0–2)
BUN SERPL-MCNC: 7 MG/DL (ref 6–20)
CALCIUM SERPL-MCNC: 8.5 MG/DL (ref 8.6–10.4)
CHLORIDE SERPL-SCNC: 106 MMOL/L (ref 98–107)
CO2 SERPL-SCNC: 22 MMOL/L (ref 20–31)
CREAT SERPL-MCNC: 0.6 MG/DL (ref 0.7–1.2)
CRP SERPL HS-MCNC: 51.1 MG/L (ref 0–5)
EOSINOPHIL # BLD: <0.03 K/UL (ref 0–0.44)
EOSINOPHILS RELATIVE PERCENT: 0 % (ref 1–4)
ERYTHROCYTE [DISTWIDTH] IN BLOOD BY AUTOMATED COUNT: 13.4 % (ref 11.8–14.4)
GFR, ESTIMATED: >90 ML/MIN/1.73M2
GLUCOSE SERPL-MCNC: 101 MG/DL (ref 74–99)
HCT VFR BLD AUTO: 39.9 % (ref 40.7–50.3)
HGB BLD-MCNC: 13.1 G/DL (ref 13–17)
IMM GRANULOCYTES # BLD AUTO: <0.03 K/UL (ref 0–0.3)
IMM GRANULOCYTES NFR BLD: 0 %
LYMPHOCYTES NFR BLD: 2.02 K/UL (ref 1.1–3.7)
LYMPHOCYTES RELATIVE PERCENT: 29 % (ref 24–43)
MCH RBC QN AUTO: 30.6 PG (ref 25.2–33.5)
MCHC RBC AUTO-ENTMCNC: 32.8 G/DL (ref 28.4–34.8)
MCV RBC AUTO: 93.2 FL (ref 82.6–102.9)
MONOCYTES NFR BLD: 0.73 K/UL (ref 0.1–1.2)
MONOCYTES NFR BLD: 10 % (ref 3–12)
NEUTROPHILS NFR BLD: 61 % (ref 36–65)
NEUTS SEG NFR BLD: 4.28 K/UL (ref 1.5–8.1)
NRBC BLD-RTO: 0 PER 100 WBC
PLATELET # BLD AUTO: 193 K/UL (ref 138–453)
PMV BLD AUTO: 11.4 FL (ref 8.1–13.5)
POTASSIUM SERPL-SCNC: 3.6 MMOL/L (ref 3.7–5.3)
RBC # BLD AUTO: 4.28 M/UL (ref 4.21–5.77)
SODIUM SERPL-SCNC: 140 MMOL/L (ref 136–145)
WBC OTHER # BLD: 7.1 K/UL (ref 3.5–11.3)

## 2025-01-26 PROCEDURE — 99239 HOSP IP/OBS DSCHRG MGMT >30: CPT | Performed by: INTERNAL MEDICINE

## 2025-01-26 PROCEDURE — 2500000003 HC RX 250 WO HCPCS: Performed by: NURSE PRACTITIONER

## 2025-01-26 PROCEDURE — 6360000002 HC RX W HCPCS: Performed by: NURSE PRACTITIONER

## 2025-01-26 PROCEDURE — 6370000000 HC RX 637 (ALT 250 FOR IP): Performed by: NURSE PRACTITIONER

## 2025-01-26 PROCEDURE — 6370000000 HC RX 637 (ALT 250 FOR IP): Performed by: INTERNAL MEDICINE

## 2025-01-26 PROCEDURE — G0378 HOSPITAL OBSERVATION PER HR: HCPCS

## 2025-01-26 PROCEDURE — 96366 THER/PROPH/DIAG IV INF ADDON: CPT

## 2025-01-26 PROCEDURE — 6360000002 HC RX W HCPCS: Performed by: STUDENT IN AN ORGANIZED HEALTH CARE EDUCATION/TRAINING PROGRAM

## 2025-01-26 PROCEDURE — 96372 THER/PROPH/DIAG INJ SC/IM: CPT

## 2025-01-26 PROCEDURE — 6370000000 HC RX 637 (ALT 250 FOR IP): Performed by: STUDENT IN AN ORGANIZED HEALTH CARE EDUCATION/TRAINING PROGRAM

## 2025-01-26 PROCEDURE — 2580000003 HC RX 258: Performed by: STUDENT IN AN ORGANIZED HEALTH CARE EDUCATION/TRAINING PROGRAM

## 2025-01-26 RX ORDER — SENNA AND DOCUSATE SODIUM 50; 8.6 MG/1; MG/1
1 TABLET, FILM COATED ORAL DAILY
Qty: 10 TABLET | Refills: 0 | Status: SHIPPED | OUTPATIENT
Start: 2025-01-27

## 2025-01-26 RX ORDER — CEFUROXIME AXETIL 500 MG/1
500 TABLET ORAL EVERY 12 HOURS SCHEDULED
Status: DISCONTINUED | OUTPATIENT
Start: 2025-01-26 | End: 2025-01-27 | Stop reason: HOSPADM

## 2025-01-26 RX ORDER — CEFUROXIME AXETIL 500 MG/1
500 TABLET ORAL EVERY 12 HOURS SCHEDULED
Qty: 10 TABLET | Refills: 0 | Status: SHIPPED | OUTPATIENT
Start: 2025-01-26 | End: 2025-01-31

## 2025-01-26 RX ADMIN — PRIMIDONE 250 MG: 250 TABLET ORAL at 08:56

## 2025-01-26 RX ADMIN — PRIMIDONE 250 MG: 250 TABLET ORAL at 21:50

## 2025-01-26 RX ADMIN — GUAIFENESIN 600 MG: 600 TABLET ORAL at 21:50

## 2025-01-26 RX ADMIN — DULOXETINE HYDROCHLORIDE 60 MG: 30 CAPSULE, DELAYED RELEASE ORAL at 21:50

## 2025-01-26 RX ADMIN — BACLOFEN 5 MG: 10 TABLET ORAL at 08:56

## 2025-01-26 RX ADMIN — GUAIFENESIN 600 MG: 600 TABLET ORAL at 08:56

## 2025-01-26 RX ADMIN — GABAPENTIN 300 MG: 300 CAPSULE ORAL at 08:56

## 2025-01-26 RX ADMIN — GABAPENTIN 300 MG: 300 CAPSULE ORAL at 21:50

## 2025-01-26 RX ADMIN — SODIUM CHLORIDE, PRESERVATIVE FREE 10 ML: 5 INJECTION INTRAVENOUS at 21:50

## 2025-01-26 RX ADMIN — ENOXAPARIN SODIUM 40 MG: 100 INJECTION SUBCUTANEOUS at 08:56

## 2025-01-26 RX ADMIN — CEFUROXIME AXETIL 500 MG: 500 TABLET ORAL at 21:56

## 2025-01-26 RX ADMIN — PIPERACILLIN AND TAZOBACTAM 3375 MG: 3; .375 INJECTION, POWDER, LYOPHILIZED, FOR SOLUTION INTRAVENOUS at 10:26

## 2025-01-26 RX ADMIN — SENNOSIDES AND DOCUSATE SODIUM 1 TABLET: 50; 8.6 TABLET ORAL at 08:56

## 2025-01-26 RX ADMIN — GABAPENTIN 300 MG: 300 CAPSULE ORAL at 16:22

## 2025-01-26 RX ADMIN — BACLOFEN 5 MG: 10 TABLET ORAL at 21:50

## 2025-01-26 RX ADMIN — PIPERACILLIN AND TAZOBACTAM 3375 MG: 3; .375 INJECTION, POWDER, LYOPHILIZED, FOR SOLUTION INTRAVENOUS at 04:23

## 2025-01-26 RX ADMIN — POLYETHYLENE GLYCOL 3350 17 G: 17 POWDER, FOR SOLUTION ORAL at 08:56

## 2025-01-26 NOTE — DISCHARGE INSTR - COC
1/27/25 at 11:33 AM EST    CASE MANAGEMENT/SOCIAL WORK SECTION    Inpatient Status Date: ***    Readmission Risk Assessment Score:  [facility] RISK OF UNPLANNED READMISSION 2.0             0 Total Score        Discharging to Facility/ Agency   Name: [facility]    / signature: Electronically signed by Gwendolyn Luque RN on 1/26/25 at 3:02 PM EST    PHYSICIAN SECTION    Prognosis: Fair    Condition at Discharge: Stable    Rehab Potential (if transferring to Rehab): Guarded    Recommended Labs or Other Treatments After Discharge:     Physician Certification: I certify the above information and transfer of Heri Steinberg  is necessary for the continuing treatment of the diagnosis listed and that he requires SNF for greater 30 days.     Update Admission H&P: No change in H&P    PHYSICIAN SIGNATURE:  Electronically signed by Fabiola Mc MD on 1/26/25 at 1:21 PM EST

## 2025-01-26 NOTE — CARE COORDINATION
Voicemail left for Jeri from [facility] requesting return call to notify of potential discharge    1100 received voicemail from Jeri from [facility]. They are able to accept patient today if he is ready for discharge    1500 transportation request faxed to [facility] [employer] for \"will call.\" Spoke to Willa from [facility]. They would not be able to transport patient before 10 pm. Voicemail left for Jeri from [facility] requesting return call to notify

## 2025-01-26 NOTE — CARE COORDINATION
Transitional Planning    1710 PC to Jeri at [facility], updated that the soonest transport time is 10pm.  Jeri has asked that we hold off on discharge until tomorrow as she is pretty sure that the patient's mom would not want to admit that late.    1715 PC to unit, spoke to Brian RN- updated on above info as well as spoke to patient's mom and she did ask that we not transport pt so late.      1720 PC to [facility], spoke to Willa, scheduled for transport 1/27 at 12pm.

## 2025-01-26 NOTE — DISCHARGE SUMMARY
[facility]  Office: [phone]  Kranthi Correa DO, Tuan Garza DO, Hugo Peterson DO, Jose Bills DO, Georgi Wong MD, Yeni Mei MD, Carmina Tiwari MD, Chanell Maldonado MD,  Ethan Silva MD, Dom Perry MD, Ben Painter MD,  Catarina Garcia DO, Owen Hartmann MD, Chilango Chavez MD, Davonte Correa DO, Roseann Mitchell MD,  Brendan Li DO, Fabiola Mc MD, Evie Valderrama MD, Sol Casiano MD, Va Waite MD,  Demetris De La Vega MD, Sandor Leung MD, Megan Rodriguez MD, Olu Cortez MD, Carlos Walsh MD, Jeffry Luna MD, Enrrique Schaeffer DO, Luca Cabello MD, Catarina Kemp MD, Mohsin Reza, MD, Shirley Waterhouse, CNP,  Leonora Fairbanks CNP, Enrrique Bales, CNP,  Jesusita Tariq, JORDYN, Park Nassar, CNP, Ansley Robles, CNP, Morenita Duque, CNP, Corina Lora, CNP, Chanda Mccord, PA-C, Jocelyn Broderick PA-C, Doreen King, CNP, Francheska Steinberg, CNP,  Almaz Garcia, CNP, Sarita Webber, CNP, Karen Diego, CNP,  Ludmila Gracia, CNS, Jolanta Oshea, CNP, Jazmin Awan, CNP,   Deisi White, CNP         [facility]   IN-PATIENT SERVICE   [facility]    Discharge Summary     Patient ID: Heri Steinberg  :  1975   MRN: [id number]     ACCOUNT:  [id number]   Patient's PCP: Jefry Jansen DO  Admit Date: 2025   Discharge Date: 2025     Length of Stay: 0  Code Status:  Full Code  Admitting Physician: Fabiola Mc MD  Discharge Physician: Fabiola Mc MD     Active Discharge Diagnoses:     Hospital Problem Lists:  Principal Problem (Resolved):    Fever of unknown origin  Active Problems:    Multiple sclerosis (HCC)    Chronic constipation    Constipation    Encephalopathy chronic  Resolved Problems:    Encephalopathy      Admission Condition:  serious     Discharged Condition: stable    Hospital Stay:     Hospital Course:  Heri Steinberg is a 49 y.o. male who was admitted for the management of   Fever of unknown origin ,

## 2025-01-27 VITALS
BODY MASS INDEX: 19.91 KG/M2 | RESPIRATION RATE: 18 BRPM | WEIGHT: 147 LBS | SYSTOLIC BLOOD PRESSURE: 123 MMHG | HEART RATE: 67 BPM | DIASTOLIC BLOOD PRESSURE: 87 MMHG | OXYGEN SATURATION: 95 % | TEMPERATURE: 97.9 F | HEIGHT: 72 IN

## 2025-01-27 LAB
ANION GAP SERPL CALCULATED.3IONS-SCNC: 9 MMOL/L (ref 9–16)
BASOPHILS # BLD: <0.03 K/UL (ref 0–0.2)
BASOPHILS NFR BLD: 0 % (ref 0–2)
BUN SERPL-MCNC: 10 MG/DL (ref 6–20)
CALCIUM SERPL-MCNC: 8.6 MG/DL (ref 8.6–10.4)
CHLORIDE SERPL-SCNC: 109 MMOL/L (ref 98–107)
CO2 SERPL-SCNC: 24 MMOL/L (ref 20–31)
CREAT SERPL-MCNC: 0.4 MG/DL (ref 0.7–1.2)
EOSINOPHIL # BLD: 0.07 K/UL (ref 0–0.44)
EOSINOPHILS RELATIVE PERCENT: 2 % (ref 1–4)
ERYTHROCYTE [DISTWIDTH] IN BLOOD BY AUTOMATED COUNT: 13.8 % (ref 11.8–14.4)
GFR, ESTIMATED: >90 ML/MIN/1.73M2
GLUCOSE SERPL-MCNC: 92 MG/DL (ref 74–99)
HCT VFR BLD AUTO: 36.3 % (ref 40.7–50.3)
HGB BLD-MCNC: 12.1 G/DL (ref 13–17)
IMM GRANULOCYTES # BLD AUTO: <0.03 K/UL (ref 0–0.3)
IMM GRANULOCYTES NFR BLD: 0 %
LYMPHOCYTES NFR BLD: 2.8 K/UL (ref 1.1–3.7)
LYMPHOCYTES RELATIVE PERCENT: 59 % (ref 24–43)
M PNEUMO IGM SER QL IA: 0.33
MCH RBC QN AUTO: 31.1 PG (ref 25.2–33.5)
MCHC RBC AUTO-ENTMCNC: 33.3 G/DL (ref 28.4–34.8)
MCV RBC AUTO: 93.3 FL (ref 82.6–102.9)
MONOCYTES NFR BLD: 0.44 K/UL (ref 0.1–1.2)
MONOCYTES NFR BLD: 9 % (ref 3–12)
NEUTROPHILS NFR BLD: 30 % (ref 36–65)
NEUTS SEG NFR BLD: 1.46 K/UL (ref 1.5–8.1)
NRBC BLD-RTO: 0 PER 100 WBC
PLATELET # BLD AUTO: 185 K/UL (ref 138–453)
PMV BLD AUTO: 11.2 FL (ref 8.1–13.5)
POTASSIUM SERPL-SCNC: 3.8 MMOL/L (ref 3.7–5.3)
RBC # BLD AUTO: 3.89 M/UL (ref 4.21–5.77)
SODIUM SERPL-SCNC: 142 MMOL/L (ref 136–145)
WBC OTHER # BLD: 4.8 K/UL (ref 3.5–11.3)

## 2025-01-27 PROCEDURE — 99232 SBSQ HOSP IP/OBS MODERATE 35: CPT | Performed by: STUDENT IN AN ORGANIZED HEALTH CARE EDUCATION/TRAINING PROGRAM

## 2025-01-27 PROCEDURE — 96372 THER/PROPH/DIAG INJ SC/IM: CPT

## 2025-01-27 PROCEDURE — 6370000000 HC RX 637 (ALT 250 FOR IP): Performed by: NURSE PRACTITIONER

## 2025-01-27 PROCEDURE — 6360000002 HC RX W HCPCS: Performed by: NURSE PRACTITIONER

## 2025-01-27 PROCEDURE — 85025 COMPLETE CBC W/AUTO DIFF WBC: CPT

## 2025-01-27 PROCEDURE — 36415 COLL VENOUS BLD VENIPUNCTURE: CPT

## 2025-01-27 PROCEDURE — 6370000000 HC RX 637 (ALT 250 FOR IP): Performed by: STUDENT IN AN ORGANIZED HEALTH CARE EDUCATION/TRAINING PROGRAM

## 2025-01-27 PROCEDURE — 80048 BASIC METABOLIC PNL TOTAL CA: CPT

## 2025-01-27 PROCEDURE — 6370000000 HC RX 637 (ALT 250 FOR IP): Performed by: INTERNAL MEDICINE

## 2025-01-27 PROCEDURE — G0378 HOSPITAL OBSERVATION PER HR: HCPCS

## 2025-01-27 RX ADMIN — POLYETHYLENE GLYCOL 3350 17 G: 17 POWDER, FOR SOLUTION ORAL at 09:15

## 2025-01-27 RX ADMIN — SENNOSIDES AND DOCUSATE SODIUM 1 TABLET: 50; 8.6 TABLET ORAL at 09:14

## 2025-01-27 RX ADMIN — CEFUROXIME AXETIL 500 MG: 500 TABLET ORAL at 09:14

## 2025-01-27 RX ADMIN — GABAPENTIN 300 MG: 300 CAPSULE ORAL at 09:14

## 2025-01-27 RX ADMIN — PRIMIDONE 250 MG: 250 TABLET ORAL at 09:14

## 2025-01-27 RX ADMIN — BACLOFEN 5 MG: 10 TABLET ORAL at 09:14

## 2025-01-27 RX ADMIN — GUAIFENESIN 600 MG: 600 TABLET ORAL at 09:14

## 2025-01-27 RX ADMIN — ENOXAPARIN SODIUM 40 MG: 100 INJECTION SUBCUTANEOUS at 09:15

## 2025-01-27 NOTE — PLAN OF CARE
Problem: Safety - Adult  Goal: Free from fall injury  1/27/2025 1258 by Brian Jennings RN  Outcome: Progressing  1/27/2025 0437 by Marie Macedo  Outcome: Progressing     Problem: Discharge Planning  Goal: Discharge to home or other facility with appropriate resources  1/27/2025 1258 by Brian Jennings RN  Outcome: Progressing  1/27/2025 0437 by Marie Macedo  Outcome: Progressing     Problem: Skin/Tissue Integrity  Goal: Skin integrity remains intact  Description: 1.  Monitor for areas of redness and/or skin breakdown  2.  Assess vascular access sites hourly  3.  Every 4-6 hours minimum:  Change oxygen saturation probe site  4.  Every 4-6 hours:  If on nasal continuous positive airway pressure, respiratory therapy assess nares and determine need for appliance change or resting period  1/27/2025 1258 by Brian Jennings RN  Outcome: Progressing  1/27/2025 0437 by Marie Macedo  Outcome: Progressing     Problem: Confusion  Goal: Confusion, delirium, dementia, or psychosis is improved or at baseline  Description: INTERVENTIONS:  1. Assess for possible contributors to thought disturbance, including medications, impaired vision or hearing, underlying metabolic abnormalities, dehydration, psychiatric diagnoses, and notify attending LIP  2. [place] high risk fall precautions, as indicated  3. Provide frequent short contacts to provide reality reorientation, refocusing and direction  4. Decrease environmental stimuli, including noise as appropriate  5. Monitor and intervene to maintain adequate nutrition, hydration, elimination, sleep and activity  6. If unable to ensure safety without constant attention obtain sitter and review sitter guidelines with assigned personnel  7. Initiate Psychosocial CNS and Spiritual Care consult, as indicated  1/27/2025 1258 by Brian Jennings RN  Outcome: Progressing  1/27/2025 0437 by Marie Macedo  Outcome: Progressing

## 2025-01-27 NOTE — PLAN OF CARE
Problem: Safety - Adult  Goal: Free from fall injury  Outcome: Progressing     Problem: Discharge Planning  Goal: Discharge to home or other facility with appropriate resources  Outcome: Progressing     Problem: Skin/Tissue Integrity  Goal: Skin integrity remains intact  Description: 1.  Monitor for areas of redness and/or skin breakdown  2.  Assess vascular access sites hourly  3.  Every 4-6 hours minimum:  Change oxygen saturation probe site  4.  Every 4-6 hours:  If on nasal continuous positive airway pressure, respiratory therapy assess nares and determine need for appliance change or resting period  Outcome: Progressing

## 2025-01-27 NOTE — PROGRESS NOTES
[facility] -   Infectious diseases evaluation  admission date 1/23/2025    reason for consultation:   Fever     Impression :   Current:  Fever likely from severe constipation  Multiple sclerosis  Chronic encephalopathy  CRP 74 elevated  Lactic acid elevation 2.3      Other:    Discussion / summary of stay / plan of care/ Recommendations:     HENCE:   Zosyn till BC out   CRP and fever might be  from constipation  Disc w mom on bedside  Infection Control Recommendations   [place] Precautions  Contact Isolation       Antimicrobial Stewardship Recommendations   Simplification of therapy  Targeted therapy    History of Present Illness:   Initial history:  Heri Steinberg is a 49 y.o.-year-old male     Interval changes  1/25/2025   Patient Vitals for the past 8 hrs:   BP Temp Temp src Pulse Resp SpO2   01/25/25 1620 120/84 99.1 °F (37.3 °C) Oral 79 18 98 %   1/25  Resp panel neg  CT chest  and AP -no pneumonia but constipation     Summary of relevant labs:  Labs:    Micro:      Procedures      Cardiology      Imaging:      I have personally reviewed the past medical history, past surgical history, medications, social history, and family history, and I haveupdated the database accordingly.      Allergies:   Nalbuphine, Tramadol, and Ibuprofen     Review of Systems:     Review of Systems   Reason unable to perform ROS: alert and limited communication, arms contracted -alert responsive.       Physical Examination :       Physical Exam  Constitutional:       General: He is not in acute distress.     Appearance: He is not ill-appearing.   HENT:      Head: Normocephalic and atraumatic.      Nose: Nose normal.      Mouth/Throat:      Mouth: Mucous membranes are moist.   Eyes:      General: No scleral icterus.     Conjunctiva/sclera: Conjunctivae normal.   Cardiovascular:      Rate and Rhythm: Normal rate and regular rhythm.      Heart sounds: No murmur heard.     No gallop. 
[facility]  Office: [phone]  Kranthi Correa DO, Tuan Garza DO, Hugo Peterson DO, Jose Bills DO, Georgi Wong MD, Yeni Mei MD, Carmina Tiwari MD, Chanell Maldonado MD,  Ethan Silva MD, Dom Perry MD, Ben Painter MD,  Catarina Garcia DO, Owen Hartmann MD, Chilango Chavez MD, Davonte Correa DO, Roseann Mitchell MD,  Brendan Li DO, Fabiola Mc MD, Evie Valderrama MD, Sol Casiano MD, Va Waite MD,  Demetris De La Vega MD, Sandor Leung MD, Megan Rodriguez MD, Olu Cortez MD, Carlos Walsh MD, Jeffry Luna MD, Enrrique Schaeffer DO, Luca Cabello MD, Catarina Kemp MD, Mohsin Reza, MD, Shirley Waterhouse, CNP,  Leonora Fairbanks CNP, Enrrique Bales, CNP,  Jesusita Tariq, DNP, Park Nassar, CNP, Ansley Robles, CNP, Morenita Duque, CNP, Corina Lora, CNP, Chanda Mccord, PA-C, Jocelyn Broderick PA-C, Doreen King, CNP, Francheska Steinberg, CNP,  Almaz Garcia, CNP, Sarita Webber, CNP, Karen Diego, CNP,  Ludmila Gracia, CNS, Jolanta Oshea, CNP, Jazmin Awan, CNP,   Deisi White, CNP         [facility]   IN-PATIENT SERVICE   [facility]    Progress Note    1/26/2025    11:30 AM    Name:   Heri Steinberg  MRN:     [id number]     Acct:      [id number]   Room:   0319/0319-01   Day:  0  Admit Date:  1/23/2025  6:49 PM    PCP:   Jefry Jansen DO  Code Status:  Full Code    Subjective:     C/C:   Chief Complaint   Patient presents with    Altered Mental Status     Interval History Status: improved.     01/26/2025  No new changes. He had 2 large BM. Dow removed. Bladder scans to be checked. Afebrile. Blood c/s so far negative. Will stop IV antibiotics. CRP trending down. Mother at bedside.    01/25/2025  Patient nonverbal. Mother at bedside. He is alert and responsive. He is able to communicate with nods and follows commands. Afebrile for last 24 hours. Cultures so far negative.  Constipation being treated.  Appears comfortable and 
[facility]  Office: [phone]  Kranthi Correa DO, Tuan Garza DO, Hugo Peterson DO, Jose Bills DO, Georgi Wong MD, Yeni Mei MD, Carmina Tiwari MD, Chanell Maldonado MD,  Ethan Silva MD, Dom Perry MD, Ben Painter MD,  Catarina Garcia DO, Owen Hartmann MD, Chilango Chavez MD, Davonte Correa DO, Roseann Mitchell MD,  Brendan Li DO, Fabiola Mc MD, Evie Valderrama MD, Sol Casiano MD, Va Waite MD,  Demetris De La Vega MD, Sandor Leung MD, Megan Rodriguez MD, Olu Cortez MD, Carlos Walsh MD, Jeffry Luna MD, Enrrique Schaeffer DO, Luca Cabello MD, Catarina Kemp MD, Mohsin Reza, MD, Shirley Waterhouse, CNP,  Leonora Fairbanks CNP, Enrrique Bales, CNP,  Jesusita Tariq, DNP, Park Nassar, CNP, Ansley Robles, CNP, Morenita Duque, CNP, Corina Lora, CNP, Chanda Mccord, PA-C, Jocelyn Broderick PA-C, Doreen King, CNP, Francheska Steinberg, CNP,  Almaz Garcia, CNP, Sarita Webber, CNP, Karen Diego, CNP,  Ludmila Gracia, CNS, Jolanta Oshea, CNP, Jazmin Awan, CNP,   Deisi White, CNP         [facility]   IN-PATIENT SERVICE   [facility]    Progress Note    1/27/2025    11:08 AM    Name:   Heri Steinberg  MRN:     [id number]     Acct:      [id number]   Room:   0319/0319-01   Day:  0  Admit Date:  1/23/2025  6:49 PM    PCP:   Jefry Jansen DO  Code Status:  Full Code    Subjective:     C/C:   Chief Complaint   Patient presents with    Altered Mental Status     Interval History Status: improved.   01/27/25:  No acute events overnight, no concern for urinary retention.   He was discharged on 1/26 however didn't leave as it was quite late in the evening for transport.  Would be going at noon today.      01/26/2025  No new changes. He had 2 large BM. Dow removed. Bladder scans to be checked. Afebrile. Blood c/s so far negative. Will stop IV antibiotics. CRP trending down. Mother at bedside.    01/25/2025  Patient nonverbal. Mother 
Occupational Therapy    [facility]  Occupational Therapy Not Seen Note    DATE: 2025    NAME: Heri Steinberg  MRN: [id number]   : 1975      Patient not seen this date for Occupational Therapy due to:    Patient at baseline functional level with no acute OT needs. Pt arrived from [facility], is bed bound at baseline, is at current maximal rehab potential. Will defer OT evaluation at this time.    Electronically signed by ROSA ROJAS on 2025 at 11:24 AM    
Physical Therapy        Physical Therapy Cancel Note      DATE: 2025    NAME: Heri Steinberg  MRN: [id number]   : 1975      Patient not seen this date for Physical Therapy due to:    Patient at baseline functional level with no acute PT needs. Will defer PT evaluation at this time. Please reorder PT if future needs arise.       Electronically signed by Tena Christian PT on 2025 at 12:04 PM     
Mild atelectatic/fibrotic changes in the posterior portions of the lower lobes of both lungs, right more than left, but these are unchanged as compared to previous CT scan on 07/18/2024. In the suprahilar portion of the right pulmonary upper lobe, there are mild atelectatic or fibrotic changes, similar to findings on previous study on 07/18/2024.     CT HEAD WO CONTRAST    Result Date: 1/23/2025  No acute intracranial abnormality.     XR CHEST PORTABLE    Result Date: 1/23/2025  No acute cardiopulmonary findings       Physical Examination:        Physical Exam  Vitals and nursing note reviewed.   Constitutional:       General: He is not in acute distress.     Appearance: He is not toxic-appearing.   HENT:      Head: Normocephalic and atraumatic.      Mouth/Throat:      Mouth: Mucous membranes are moist.   Cardiovascular:      Rate and Rhythm: Normal rate and regular rhythm.   Pulmonary:      Effort: Pulmonary effort is normal.      Breath sounds: Normal breath sounds. No wheezing or rales.   Abdominal:      Palpations: Abdomen is soft.      Tenderness: There is no abdominal tenderness.   Musculoskeletal:      Right lower leg: No edema.      Left lower leg: No edema.   Skin:     Comments: Small left great toe ulcer on tip which is bleeding. No erythema.    Neurological:      Mental Status: He is alert.         Assessment:        Hospital Problems             Last Modified POA    * (Principal) Fever of unknown origin 1/24/2025 Yes    Multiple sclerosis (HCC) 1/24/2025 Yes    Chronic constipation 1/25/2025 Yes    Encephalopathy 1/24/2025 Yes       Plan:        Fever - cultures so far negative. Afebrile since yesterday. CRP trending down. Lactic acid improved. Continue Zosyn till final cultures. Possibly d/c later today and monitor off antibiotics  Encephalopathy- resolved. Mentation seems back to baseline   MS - continue home meds  Chronic constipation - had BM in ER. Aggressive bowel regimen. Discussed with RN  DVT

## 2025-01-27 NOTE — CARE COORDINATION
Dx: Fever of unknown origin [R50.9]  Septicemia (HCC) [A41.9]  Constipation, unspecified constipation type [K59.00]  Altered mental status, unspecified altered mental status type [R41.82]    Admit date: 1/23/2025  Hospital day:      ______________________________________      Patients goal/ Transitional Planning: Spoke to Jeri at [facility] and confirmed the patient is a long-term bed hold at the facility. Pt. Is able to return today. Informed Jeri that transport is arranged for 12pm today. She verbalized understanding.       PT/OT recs: Baseline      Continued needs/services: CLINT, Continue to follow until discharge.         Contacts: N/A          [facility] RISK OF UNPLANNED READMISSION 2.0             0 Total Score

## 2025-02-24 ENCOUNTER — OFFICE VISIT (OUTPATIENT)
Dept: NEUROLOGY | Age: 50
End: 2025-02-24
Payer: MEDICAID

## 2025-02-24 VITALS — SYSTOLIC BLOOD PRESSURE: 120 MMHG | DIASTOLIC BLOOD PRESSURE: 86 MMHG | HEART RATE: 86 BPM

## 2025-02-24 DIAGNOSIS — Z91.89 AT RISK FOR SIDE EFFECT OF MEDICATION: ICD-10-CM

## 2025-02-24 DIAGNOSIS — G35 ACUTE RELAPSING MULTIPLE SCLEROSIS (HCC): ICD-10-CM

## 2025-02-24 DIAGNOSIS — R25.2 SPASTICITY: ICD-10-CM

## 2025-02-24 DIAGNOSIS — R47.1 DYSARTHRIA: ICD-10-CM

## 2025-02-24 DIAGNOSIS — G35 MULTIPLE SCLEROSIS (HCC): Primary | ICD-10-CM

## 2025-02-24 PROCEDURE — 99214 OFFICE O/P EST MOD 30 MIN: CPT | Performed by: PSYCHIATRY & NEUROLOGY

## 2025-02-24 RX ORDER — TERIFLUNOMIDE 14 MG/1
14 TABLET, FILM COATED ORAL DAILY
Qty: 30 TABLET | Refills: 11 | Status: ACTIVE | OUTPATIENT
Start: 2025-03-10

## 2025-02-24 RX ORDER — TERIFLUNOMIDE 7 MG/1
7 TABLET, FILM COATED ORAL DAILY
Qty: 14 TABLET | Refills: 0 | Status: ACTIVE | OUTPATIENT
Start: 2025-02-24 | End: 2025-03-10

## 2025-02-24 NOTE — PATIENT INSTRUCTIONS
Teriflunomide (Aubagio) is a disease modifying therapy to treat relapsing remitting multiple sclerosis.     Mechanism of Action: It is the active metabolite of Leflunomide (ARAVA) in treatment of Rheumatoid Arthritis. This once a day pill interrupts pyrimidine synthesis and works as a form of mild immunosuppression.     Efficacy, : We reviewed efficacy, safety and tolerability data from the TEMSO, TOWER and TENERE  trials, long term follow-up studies, and the [facility]'s experience with teriflunomide (Aubagio). In the TEMSO trial, 14mg teriflunomide  (ARR 0.37) decreased relapse rate by 31% compared to placebo (ARR 0.54). In the TENERE trial, no differences in risk of treatment failure (from relapse or other reasons to stop drug) were found between teriflunomide and IFNb1a (rebif). Both drugs had similar relapse rates (teriflunomide ARR 0.26, rebif  ARR 0.22). ANGELLA (no evidence of disease activity: no attacks, no 3 month confirmed disability progression, no new / enlarged T2 bright lesions or rosa+ lesions on MRI) was achieved in 23% of 14mg teriflunomide treated patients, compared to 14% of placebo patients.     Safety and Tolerability data: Common side effects of teriflunomide include diarrhea, nausea, transient hair thinning, ALT elevations and changes to blood pressure.    Teriflunomide and preganancy: Teriflunomide is preganancy category X. Patients who might consider having children in the next several years may not be the best fit for this drug. It takes up to 18-24 months for the drug to clear your system, unless a rapid elimination protocol is used to clear the drug faster. Any patient taking teriflunomide must use proper birth control.     Required screening tests: We must check a TB test (PPD or quantiferon), pregnancy test, CBC and LFTs before starting this therapy.     Saftey Monitoring: We must check monthly ALT for the first 6 months while on teriflunomide, then every 3-6 months

## 2025-02-25 ENCOUNTER — TELEPHONE (OUTPATIENT)
Dept: NEUROLOGY | Age: 50
End: 2025-02-25

## 2025-02-25 NOTE — TELEPHONE ENCOUNTER
Received a call from Kiera with [facility] regarding a denial that she received this morning for the patient's teriflunomide, stating that they are requiring dimethyl fumarate. I advised her that our system is showing that the medication does not require a prior authorization and the patient should have a $0 copay. I attempted to contact the patient's pharmacy, [facility], and I could not get through. Will reach out to [facility].

## 2025-02-27 NOTE — TELEPHONE ENCOUNTER
Discussed with Ethan with [facility]. He stated that he would contact [facility] to see what the issue is.

## 2025-03-04 ENCOUNTER — APPOINTMENT (OUTPATIENT)
Dept: CT IMAGING | Age: 50
DRG: 720 | End: 2025-03-04
Payer: MEDICAID

## 2025-03-04 ENCOUNTER — APPOINTMENT (OUTPATIENT)
Dept: GENERAL RADIOLOGY | Age: 50
DRG: 720 | End: 2025-03-04
Payer: MEDICAID

## 2025-03-04 ENCOUNTER — ANCILLARY PROCEDURE (OUTPATIENT)
Dept: EMERGENCY DEPT | Age: 50
DRG: 720 | End: 2025-03-04
Attending: EMERGENCY MEDICINE
Payer: MEDICAID

## 2025-03-04 ENCOUNTER — HOSPITAL ENCOUNTER (INPATIENT)
Age: 50
LOS: 7 days | Discharge: NURSING, REHAB OR LONG-TERM CARE | DRG: 720 | End: 2025-03-11
Attending: EMERGENCY MEDICINE | Admitting: STUDENT IN AN ORGANIZED HEALTH CARE EDUCATION/TRAINING PROGRAM
Payer: MEDICAID

## 2025-03-04 DIAGNOSIS — G93.40 ACUTE ENCEPHALOPATHY: ICD-10-CM

## 2025-03-04 DIAGNOSIS — K52.89 STERCORAL COLITIS: ICD-10-CM

## 2025-03-04 DIAGNOSIS — R65.10 SIRS (SYSTEMIC INFLAMMATORY RESPONSE SYNDROME) (HCC): ICD-10-CM

## 2025-03-04 DIAGNOSIS — R41.89 DECREASED LEVEL OF CONSCIOUSNESS: Primary | ICD-10-CM

## 2025-03-04 DIAGNOSIS — G35 RELAPSING REMITTING MULTIPLE SCLEROSIS (HCC): ICD-10-CM

## 2025-03-04 PROBLEM — R55 SYNCOPE AND COLLAPSE: Status: ACTIVE | Noted: 2025-03-04

## 2025-03-04 LAB
ALBUMIN SERPL-MCNC: 3.7 G/DL (ref 3.5–5.2)
ALBUMIN/GLOB SERPL: 1.6 {RATIO} (ref 1–2.5)
ALP SERPL-CCNC: 95 U/L (ref 40–129)
ALT SERPL-CCNC: 24 U/L (ref 10–50)
AMMONIA PLAS-SCNC: 30 UMOL/L (ref 16–60)
ANION GAP SERPL CALCULATED.3IONS-SCNC: 11 MMOL/L (ref 9–16)
AST SERPL-CCNC: 20 U/L (ref 10–50)
B PARAP IS1001 DNA NPH QL NAA+NON-PROBE: NOT DETECTED
B PERT DNA SPEC QL NAA+PROBE: NOT DETECTED
BACTERIA URNS QL MICRO: ABNORMAL
BASOPHILS # BLD: <0.03 K/UL (ref 0–0.2)
BASOPHILS NFR BLD: 0 % (ref 0–2)
BILIRUB SERPL-MCNC: 0.3 MG/DL (ref 0–1.2)
BILIRUB UR QL STRIP: NEGATIVE
BUN BLD-MCNC: 7 MG/DL (ref 8–26)
BUN SERPL-MCNC: 10 MG/DL (ref 6–20)
C PNEUM DNA NPH QL NAA+NON-PROBE: NOT DETECTED
CA-I BLD-SCNC: 1.05 MMOL/L (ref 1.15–1.33)
CALCIUM SERPL-MCNC: 7.4 MG/DL (ref 8.6–10.4)
CASTS #/AREA URNS LPF: ABNORMAL /LPF (ref 0–8)
CHLORIDE BLD-SCNC: 110 MMOL/L (ref 98–107)
CHLORIDE SERPL-SCNC: 107 MMOL/L (ref 98–107)
CLARITY UR: CLEAR
CO2 BLD CALC-SCNC: 20 MMOL/L (ref 22–30)
CO2 SERPL-SCNC: 20 MMOL/L (ref 20–31)
COLOR UR: YELLOW
CREAT SERPL-MCNC: 0.5 MG/DL (ref 0.7–1.2)
EGFR, POC: >90 ML/MIN/1.73M2
EOSINOPHIL # BLD: <0.03 K/UL (ref 0–0.44)
EOSINOPHILS RELATIVE PERCENT: 0 % (ref 1–4)
EPI CELLS #/AREA URNS HPF: ABNORMAL /HPF (ref 0–5)
ERYTHROCYTE [DISTWIDTH] IN BLOOD BY AUTOMATED COUNT: 13.8 % (ref 11.8–14.4)
FLUAV RNA NPH QL NAA+NON-PROBE: NOT DETECTED
FLUBV RNA NPH QL NAA+NON-PROBE: NOT DETECTED
GFR, ESTIMATED: >90 ML/MIN/1.73M2
GLUCOSE BLD-MCNC: 74 MG/DL (ref 75–110)
GLUCOSE BLD-MCNC: 81 MG/DL (ref 74–100)
GLUCOSE SERPL-MCNC: 89 MG/DL (ref 74–99)
GLUCOSE UR STRIP-MCNC: NEGATIVE MG/DL
HADV DNA NPH QL NAA+NON-PROBE: NOT DETECTED
HCO3 VENOUS: 20.7 MMOL/L (ref 22–29)
HCOV 229E RNA NPH QL NAA+NON-PROBE: NOT DETECTED
HCOV HKU1 RNA NPH QL NAA+NON-PROBE: NOT DETECTED
HCOV NL63 RNA NPH QL NAA+NON-PROBE: NOT DETECTED
HCOV OC43 RNA NPH QL NAA+NON-PROBE: NOT DETECTED
HCT VFR BLD AUTO: 33 % (ref 41–53)
HCT VFR BLD AUTO: 39.8 % (ref 40.7–50.3)
HGB BLD-MCNC: 13.4 G/DL (ref 13–17)
HGB UR QL STRIP.AUTO: ABNORMAL
HMPV RNA NPH QL NAA+NON-PROBE: NOT DETECTED
HPIV1 RNA NPH QL NAA+NON-PROBE: NOT DETECTED
HPIV2 RNA NPH QL NAA+NON-PROBE: NOT DETECTED
HPIV3 RNA NPH QL NAA+NON-PROBE: NOT DETECTED
HPIV4 RNA NPH QL NAA+NON-PROBE: NOT DETECTED
IMM GRANULOCYTES # BLD AUTO: <0.03 K/UL (ref 0–0.3)
IMM GRANULOCYTES NFR BLD: 0 %
KETONES UR STRIP-MCNC: ABNORMAL MG/DL
LACTIC ACID, SEPSIS WHOLE BLOOD: 1 MMOL/L (ref 0.5–1.9)
LACTIC ACID, SEPSIS WHOLE BLOOD: 2.1 MMOL/L (ref 0.5–1.9)
LEUKOCYTE ESTERASE UR QL STRIP: ABNORMAL
LYMPHOCYTES NFR BLD: 1.23 K/UL (ref 1.1–3.7)
LYMPHOCYTES RELATIVE PERCENT: 19 % (ref 24–43)
M PNEUMO DNA NPH QL NAA+NON-PROBE: NOT DETECTED
MAGNESIUM SERPL-MCNC: 1.6 MG/DL (ref 1.6–2.6)
MCH RBC QN AUTO: 31.5 PG (ref 25.2–33.5)
MCHC RBC AUTO-ENTMCNC: 33.7 G/DL (ref 28.4–34.8)
MCV RBC AUTO: 93.4 FL (ref 82.6–102.9)
MONOCYTES NFR BLD: 0.41 K/UL (ref 0.1–1.2)
MONOCYTES NFR BLD: 6 % (ref 3–12)
NEGATIVE BASE EXCESS, VEN: 5.2 MMOL/L (ref 0–2)
NEUTROPHILS NFR BLD: 75 % (ref 36–65)
NEUTS SEG NFR BLD: 4.97 K/UL (ref 1.5–8.1)
NITRITE UR QL STRIP: NEGATIVE
NRBC BLD-RTO: 0 PER 100 WBC
O2 SAT, VEN: 53.3 % (ref 60–85)
PCO2 VENOUS: 40.8 MM HG (ref 41–51)
PH UR STRIP: 7 [PH] (ref 5–8)
PH VENOUS: 7.31 (ref 7.32–7.43)
PLATELET # BLD AUTO: 195 K/UL (ref 138–453)
PMV BLD AUTO: 10.9 FL (ref 8.1–13.5)
PO2 VENOUS: 30.7 MM HG (ref 30–50)
POC ANION GAP: 14 MMOL/L (ref 7–16)
POC CREATININE: 0.5 MG/DL (ref 0.51–1.19)
POC HEMOGLOBIN (CALC): 11.3 G/DL (ref 13.5–17.5)
POC LACTIC ACID: 2.4 MMOL/L (ref 0.56–1.39)
POTASSIUM BLD-SCNC: 3.3 MMOL/L (ref 3.5–4.5)
POTASSIUM SERPL-SCNC: 3.6 MMOL/L (ref 3.7–5.3)
PROT SERPL-MCNC: 6 G/DL (ref 6.6–8.7)
PROT UR STRIP-MCNC: ABNORMAL MG/DL
RBC # BLD AUTO: 4.26 M/UL (ref 4.21–5.77)
RBC #/AREA URNS HPF: ABNORMAL /HPF (ref 0–4)
RSV RNA NPH QL NAA+NON-PROBE: NOT DETECTED
RV+EV RNA NPH QL NAA+NON-PROBE: NOT DETECTED
SARS-COV-2 RNA NPH QL NAA+NON-PROBE: DETECTED
SODIUM BLD-SCNC: 143 MMOL/L (ref 138–146)
SODIUM SERPL-SCNC: 138 MMOL/L (ref 136–145)
SP GR UR STRIP: 1.03 (ref 1–1.03)
SPECIMEN DESCRIPTION: ABNORMAL
T4 FREE SERPL-MCNC: 0.8 NG/DL (ref 0.9–1.7)
TSH SERPL DL<=0.05 MIU/L-ACNC: 0.2 UIU/ML (ref 0.27–4.2)
UROBILINOGEN UR STRIP-ACNC: NORMAL EU/DL (ref 0–1)
WBC #/AREA URNS HPF: ABNORMAL /HPF (ref 0–5)
WBC OTHER # BLD: 6.6 K/UL (ref 3.5–11.3)

## 2025-03-04 PROCEDURE — 74177 CT ABD & PELVIS W/CONTRAST: CPT

## 2025-03-04 PROCEDURE — 93005 ELECTROCARDIOGRAM TRACING: CPT | Performed by: EMERGENCY MEDICINE

## 2025-03-04 PROCEDURE — 82803 BLOOD GASES ANY COMBINATION: CPT

## 2025-03-04 PROCEDURE — 87086 URINE CULTURE/COLONY COUNT: CPT

## 2025-03-04 PROCEDURE — 85014 HEMATOCRIT: CPT

## 2025-03-04 PROCEDURE — 70450 CT HEAD/BRAIN W/O DYE: CPT

## 2025-03-04 PROCEDURE — 0202U NFCT DS 22 TRGT SARS-COV-2: CPT

## 2025-03-04 PROCEDURE — 6370000000 HC RX 637 (ALT 250 FOR IP): Performed by: EMERGENCY MEDICINE

## 2025-03-04 PROCEDURE — 87205 SMEAR GRAM STAIN: CPT

## 2025-03-04 PROCEDURE — 2580000003 HC RX 258: Performed by: INTERNAL MEDICINE

## 2025-03-04 PROCEDURE — 76604 US EXAM CHEST: CPT

## 2025-03-04 PROCEDURE — 83735 ASSAY OF MAGNESIUM: CPT

## 2025-03-04 PROCEDURE — 99291 CRITICAL CARE FIRST HOUR: CPT | Performed by: INTERNAL MEDICINE

## 2025-03-04 PROCEDURE — 96375 TX/PRO/DX INJ NEW DRUG ADDON: CPT

## 2025-03-04 PROCEDURE — 80053 COMPREHEN METABOLIC PANEL: CPT

## 2025-03-04 PROCEDURE — 99285 EMERGENCY DEPT VISIT HI MDM: CPT

## 2025-03-04 PROCEDURE — 1200000000 HC SEMI PRIVATE

## 2025-03-04 PROCEDURE — 76705 ECHO EXAM OF ABDOMEN: CPT

## 2025-03-04 PROCEDURE — 71260 CT THORAX DX C+: CPT

## 2025-03-04 PROCEDURE — 82140 ASSAY OF AMMONIA: CPT

## 2025-03-04 PROCEDURE — 6360000002 HC RX W HCPCS: Performed by: EMERGENCY MEDICINE

## 2025-03-04 PROCEDURE — 80051 ELECTROLYTE PANEL: CPT

## 2025-03-04 PROCEDURE — 85025 COMPLETE CBC W/AUTO DIFF WBC: CPT

## 2025-03-04 PROCEDURE — 82330 ASSAY OF CALCIUM: CPT

## 2025-03-04 PROCEDURE — 99253 IP/OBS CNSLTJ NEW/EST LOW 45: CPT | Performed by: SURGERY

## 2025-03-04 PROCEDURE — 84443 ASSAY THYROID STIM HORMONE: CPT

## 2025-03-04 PROCEDURE — 96365 THER/PROPH/DIAG IV INF INIT: CPT

## 2025-03-04 PROCEDURE — 6360000004 HC RX CONTRAST MEDICATION: Performed by: EMERGENCY MEDICINE

## 2025-03-04 PROCEDURE — 93308 TTE F-UP OR LMTD: CPT

## 2025-03-04 PROCEDURE — 71045 X-RAY EXAM CHEST 1 VIEW: CPT

## 2025-03-04 PROCEDURE — 83605 ASSAY OF LACTIC ACID: CPT

## 2025-03-04 PROCEDURE — 82947 ASSAY GLUCOSE BLOOD QUANT: CPT

## 2025-03-04 PROCEDURE — 2580000003 HC RX 258

## 2025-03-04 PROCEDURE — 6370000000 HC RX 637 (ALT 250 FOR IP): Performed by: STUDENT IN AN ORGANIZED HEALTH CARE EDUCATION/TRAINING PROGRAM

## 2025-03-04 PROCEDURE — 84439 ASSAY OF FREE THYROXINE: CPT

## 2025-03-04 PROCEDURE — 6360000002 HC RX W HCPCS

## 2025-03-04 PROCEDURE — 87040 BLOOD CULTURE FOR BACTERIA: CPT

## 2025-03-04 PROCEDURE — 82565 ASSAY OF CREATININE: CPT

## 2025-03-04 PROCEDURE — 87154 CUL TYP ID BLD PTHGN 6+ TRGT: CPT

## 2025-03-04 PROCEDURE — 2580000003 HC RX 258: Performed by: EMERGENCY MEDICINE

## 2025-03-04 PROCEDURE — 99223 1ST HOSP IP/OBS HIGH 75: CPT | Performed by: INTERNAL MEDICINE

## 2025-03-04 PROCEDURE — 81001 URINALYSIS AUTO W/SCOPE: CPT

## 2025-03-04 PROCEDURE — 84520 ASSAY OF UREA NITROGEN: CPT

## 2025-03-04 PROCEDURE — 6370000000 HC RX 637 (ALT 250 FOR IP): Performed by: INTERNAL MEDICINE

## 2025-03-04 PROCEDURE — 6360000002 HC RX W HCPCS: Performed by: INTERNAL MEDICINE

## 2025-03-04 RX ORDER — SODIUM CHLORIDE 0.9 % (FLUSH) 0.9 %
5-40 SYRINGE (ML) INJECTION PRN
Status: DISCONTINUED | OUTPATIENT
Start: 2025-03-04 | End: 2025-03-11 | Stop reason: HOSPADM

## 2025-03-04 RX ORDER — ENOXAPARIN SODIUM 100 MG/ML
40 INJECTION SUBCUTANEOUS DAILY
Status: DISCONTINUED | OUTPATIENT
Start: 2025-03-05 | End: 2025-03-11 | Stop reason: HOSPADM

## 2025-03-04 RX ORDER — ONDANSETRON 4 MG/1
4 TABLET, ORALLY DISINTEGRATING ORAL EVERY 8 HOURS PRN
Status: DISCONTINUED | OUTPATIENT
Start: 2025-03-04 | End: 2025-03-11 | Stop reason: HOSPADM

## 2025-03-04 RX ORDER — IOPAMIDOL 755 MG/ML
75 INJECTION, SOLUTION INTRAVASCULAR
Status: COMPLETED | OUTPATIENT
Start: 2025-03-04 | End: 2025-03-04

## 2025-03-04 RX ORDER — TERIFLUNOMIDE 14 MG/1
14 TABLET, FILM COATED ORAL DAILY
Status: DISCONTINUED | OUTPATIENT
Start: 2025-03-05 | End: 2025-03-11 | Stop reason: HOSPADM

## 2025-03-04 RX ORDER — VITAMIN B COMPLEX
2000 TABLET ORAL DAILY
Status: DISCONTINUED | OUTPATIENT
Start: 2025-03-05 | End: 2025-03-11 | Stop reason: HOSPADM

## 2025-03-04 RX ORDER — SENNA AND DOCUSATE SODIUM 50; 8.6 MG/1; MG/1
1 TABLET, FILM COATED ORAL DAILY
Status: DISCONTINUED | OUTPATIENT
Start: 2025-03-05 | End: 2025-03-11 | Stop reason: HOSPADM

## 2025-03-04 RX ORDER — CARBOXYMETHYLCELLULOSE SODIUM 10 MG/ML
2 GEL OPHTHALMIC DAILY PRN
Status: DISCONTINUED | OUTPATIENT
Start: 2025-03-04 | End: 2025-03-11 | Stop reason: HOSPADM

## 2025-03-04 RX ORDER — SODIUM CHLORIDE 9 MG/ML
INJECTION, SOLUTION INTRAVENOUS CONTINUOUS
Status: ACTIVE | OUTPATIENT
Start: 2025-03-04 | End: 2025-03-05

## 2025-03-04 RX ORDER — ACETAMINOPHEN 325 MG/1
650 TABLET ORAL EVERY 6 HOURS PRN
Status: DISCONTINUED | OUTPATIENT
Start: 2025-03-04 | End: 2025-03-11 | Stop reason: HOSPADM

## 2025-03-04 RX ORDER — CLONAZEPAM 0.5 MG/1
0.5 TABLET ORAL 3 TIMES DAILY PRN
Status: DISCONTINUED | OUTPATIENT
Start: 2025-03-04 | End: 2025-03-11 | Stop reason: HOSPADM

## 2025-03-04 RX ORDER — PRIMIDONE 250 MG/1
250 TABLET ORAL 2 TIMES DAILY
Status: DISCONTINUED | OUTPATIENT
Start: 2025-03-04 | End: 2025-03-11 | Stop reason: HOSPADM

## 2025-03-04 RX ORDER — 0.9 % SODIUM CHLORIDE 0.9 %
1000 INTRAVENOUS SOLUTION INTRAVENOUS ONCE
Status: COMPLETED | OUTPATIENT
Start: 2025-03-04 | End: 2025-03-04

## 2025-03-04 RX ORDER — KETOROLAC TROMETHAMINE 15 MG/ML
15 INJECTION, SOLUTION INTRAMUSCULAR; INTRAVENOUS ONCE
Status: COMPLETED | OUTPATIENT
Start: 2025-03-04 | End: 2025-03-04

## 2025-03-04 RX ORDER — TERIFLUNOMIDE 7 MG/1
7 TABLET, FILM COATED ORAL DAILY
Status: DISCONTINUED | OUTPATIENT
Start: 2025-03-05 | End: 2025-03-11 | Stop reason: HOSPADM

## 2025-03-04 RX ORDER — POTASSIUM CHLORIDE 1500 MG/1
40 TABLET, EXTENDED RELEASE ORAL PRN
Status: DISCONTINUED | OUTPATIENT
Start: 2025-03-04 | End: 2025-03-11 | Stop reason: HOSPADM

## 2025-03-04 RX ORDER — BISACODYL 10 MG
10 SUPPOSITORY, RECTAL RECTAL DAILY
Status: DISCONTINUED | OUTPATIENT
Start: 2025-03-05 | End: 2025-03-09

## 2025-03-04 RX ORDER — SODIUM CHLORIDE 0.9 % (FLUSH) 0.9 %
5-40 SYRINGE (ML) INJECTION EVERY 12 HOURS SCHEDULED
Status: DISCONTINUED | OUTPATIENT
Start: 2025-03-04 | End: 2025-03-11 | Stop reason: HOSPADM

## 2025-03-04 RX ORDER — GABAPENTIN 300 MG/1
300 CAPSULE ORAL 3 TIMES DAILY
Status: DISCONTINUED | OUTPATIENT
Start: 2025-03-04 | End: 2025-03-11 | Stop reason: HOSPADM

## 2025-03-04 RX ORDER — ACETAMINOPHEN 650 MG/1
650 SUPPOSITORY RECTAL EVERY 4 HOURS PRN
Status: DISCONTINUED | OUTPATIENT
Start: 2025-03-04 | End: 2025-03-11 | Stop reason: HOSPADM

## 2025-03-04 RX ORDER — POLYETHYLENE GLYCOL 3350 17 G/17G
17 POWDER, FOR SOLUTION ORAL 2 TIMES DAILY
Status: DISCONTINUED | OUTPATIENT
Start: 2025-03-04 | End: 2025-03-11 | Stop reason: HOSPADM

## 2025-03-04 RX ORDER — POTASSIUM CHLORIDE 7.45 MG/ML
10 INJECTION INTRAVENOUS PRN
Status: DISCONTINUED | OUTPATIENT
Start: 2025-03-04 | End: 2025-03-11 | Stop reason: HOSPADM

## 2025-03-04 RX ORDER — DULOXETIN HYDROCHLORIDE 30 MG/1
60 CAPSULE, DELAYED RELEASE ORAL NIGHTLY
Status: DISCONTINUED | OUTPATIENT
Start: 2025-03-04 | End: 2025-03-11 | Stop reason: HOSPADM

## 2025-03-04 RX ORDER — ACETAMINOPHEN 650 MG/1
650 SUPPOSITORY RECTAL ONCE
Status: COMPLETED | OUTPATIENT
Start: 2025-03-04 | End: 2025-03-04

## 2025-03-04 RX ORDER — ONDANSETRON 2 MG/ML
4 INJECTION INTRAMUSCULAR; INTRAVENOUS EVERY 6 HOURS PRN
Status: DISCONTINUED | OUTPATIENT
Start: 2025-03-04 | End: 2025-03-11 | Stop reason: HOSPADM

## 2025-03-04 RX ORDER — BACLOFEN 5 MG/1
5 TABLET ORAL 2 TIMES DAILY
Status: DISCONTINUED | OUTPATIENT
Start: 2025-03-04 | End: 2025-03-08

## 2025-03-04 RX ORDER — MAGNESIUM SULFATE IN WATER 40 MG/ML
2000 INJECTION, SOLUTION INTRAVENOUS PRN
Status: DISCONTINUED | OUTPATIENT
Start: 2025-03-04 | End: 2025-03-11 | Stop reason: HOSPADM

## 2025-03-04 RX ORDER — SODIUM CHLORIDE 9 MG/ML
INJECTION, SOLUTION INTRAVENOUS PRN
Status: DISCONTINUED | OUTPATIENT
Start: 2025-03-04 | End: 2025-03-11 | Stop reason: HOSPADM

## 2025-03-04 RX ORDER — SODIUM CHLORIDE, SODIUM LACTATE, POTASSIUM CHLORIDE, CALCIUM CHLORIDE 600; 310; 30; 20 MG/100ML; MG/100ML; MG/100ML; MG/100ML
INJECTION, SOLUTION INTRAVENOUS CONTINUOUS
Status: DISCONTINUED | OUTPATIENT
Start: 2025-03-04 | End: 2025-03-04

## 2025-03-04 RX ORDER — ACETAMINOPHEN 650 MG/1
650 SUPPOSITORY RECTAL EVERY 6 HOURS PRN
Status: DISCONTINUED | OUTPATIENT
Start: 2025-03-04 | End: 2025-03-11 | Stop reason: HOSPADM

## 2025-03-04 RX ORDER — POLYETHYLENE GLYCOL 3350 17 G/17G
17 POWDER, FOR SOLUTION ORAL DAILY PRN
Status: DISCONTINUED | OUTPATIENT
Start: 2025-03-04 | End: 2025-03-11 | Stop reason: HOSPADM

## 2025-03-04 RX ORDER — METRONIDAZOLE 500 MG/100ML
500 INJECTION, SOLUTION INTRAVENOUS EVERY 8 HOURS
Status: DISCONTINUED | OUTPATIENT
Start: 2025-03-04 | End: 2025-03-04

## 2025-03-04 RX ADMIN — ACETAMINOPHEN 650 MG: 650 SUPPOSITORY RECTAL at 22:53

## 2025-03-04 RX ADMIN — IOPAMIDOL 75 ML: 755 INJECTION, SOLUTION INTRAVENOUS at 15:41

## 2025-03-04 RX ADMIN — PIPERACILLIN AND TAZOBACTAM 3375 MG: 3; .375 INJECTION, POWDER, LYOPHILIZED, FOR SOLUTION INTRAVENOUS at 22:47

## 2025-03-04 RX ADMIN — SODIUM CHLORIDE 1000 ML: 0.9 INJECTION, SOLUTION INTRAVENOUS at 13:07

## 2025-03-04 RX ADMIN — SODIUM CHLORIDE: 0.9 INJECTION, SOLUTION INTRAVENOUS at 22:41

## 2025-03-04 RX ADMIN — MAGNESIUM HYDROXIDE 30 ML: 400 SUSPENSION ORAL at 19:16

## 2025-03-04 RX ADMIN — KETOROLAC TROMETHAMINE 15 MG: 15 INJECTION, SOLUTION INTRAMUSCULAR; INTRAVENOUS at 15:19

## 2025-03-04 RX ADMIN — SODIUM CHLORIDE 1000 ML: 0.9 INJECTION, SOLUTION INTRAVENOUS at 15:18

## 2025-03-04 RX ADMIN — ACETAMINOPHEN 650 MG: 650 SUPPOSITORY RECTAL at 13:11

## 2025-03-04 RX ADMIN — CEFEPIME 2000 MG: 2 INJECTION, POWDER, FOR SOLUTION INTRAVENOUS at 15:22

## 2025-03-04 RX ADMIN — VANCOMYCIN HYDROCHLORIDE 1000 MG: 1 INJECTION, POWDER, LYOPHILIZED, FOR SOLUTION INTRAVENOUS at 18:18

## 2025-03-04 RX ADMIN — POLYETHYLENE GLYCOL 3350 17 G: 17 POWDER, FOR SOLUTION ORAL at 21:12

## 2025-03-04 NOTE — ED NOTES
Pt presents to ED via EMS with c/o unresponsiveness.  Per EMS pt has snoring respirations but is maintaining airway with stable vital signs.  Upon arrival pt not arousal with painful stimuli.  Per EMS, pt gets like this when he's septic.  Dr. Hunt at bedside.

## 2025-03-04 NOTE — ED PROVIDER NOTES
[facility] EMERGENCY DEPARTMENT  Emergency Department Encounter  Emergency Medicine Resident     Pt Name:Heri Steinberg  MRN: [id number]  Birthdate 1975  Date of evaluation: 3/4/25  PCP:  Jefry Jansen DO  Note Started: 1:27 PM EST      CHIEF COMPLAINT       Chief Complaint   Patient presents with    Unresponsive        HISTORY OF PRESENT ILLNESS  (Location/Symptom, Timing/Onset, Context/Setting, Quality, Duration, Modifying Factors, Severity.)      Heri Steinberg is a 49 y.o. male with a history of paraplegia, MS who currently resides at a nursing facility who presents with decreased level of consciousness.  Patient was resting earlier today when he was noted to have difficulty arousing.  He was also noted to have a fever of 102 at his facility.  Patient's baseline unclear, however multiple hospital admissions for sepsis.    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       has no past surgical history on file.      Social History     Socioeconomic History    Marital status: Legally      Spouse name: Not on file    Number of children: Not on file    Years of education: Not on file    Highest education level: Not on file   Occupational History    Not on file   Tobacco Use    Smoking status: Never     Passive exposure: Never    Smokeless tobacco: Never   Vaping Use    Vaping status: Never Used   Substance and

## 2025-03-04 NOTE — CONSULTS
General Surgery  Consult    PATIENT NAME: Heri Steinberg  AGE: 49 y.o.  MEDICAL RECORD NO. [id number]  DATE: 3/4/2025  SURGEON: Dr. Mcpherson  PRIMARY CARE PHYSICIAN: Jefry Jansen DO    Patient evaluated at the request of  Dr. Leach   Reason for evaluation: stercoral proctitis    Patient information was obtained from parent.  History/Exam limitations: mental status.  Patient presented to the Emergency Department by private vehicle.    IMPRESSION:     Patient Active Problem List   Diagnosis    Multiple sclerosis (HCC)    Acute metabolic encephalopathy    Relapsing remitting multiple sclerosis (HCC)    Acute respiratory failure with hypoxia (HCC)    Toxic metabolic encephalopathy    Aspiration pneumonia (HCC)    Benign neoplasm of cerebral meninges (HCC)    Cerebellar ataxia (HCC)    Mild intermittent asthma without complication    Septicemia (HCC)    Community acquired pneumonia of right lower lobe of lung    Lung mass    Liver mass    Pneumonia of right upper lobe due to infectious organism    Stercoral colitis    AMS (altered mental status)    Chronic constipation    Coag negative Staphylococcus bacteremia    Dysphagia    Fever    Constipation    Encephalopathy chronic     Heri Steinberg 49-year-old male with multiple sclerosis who presents with stercoral colitis from chronic constipation      PLAN:   No acute surgical intervention indicated at this time.  Patient is not impacted, there is just a large volume of stool noted in the rectum and colon.  Recommend aggressive bowel regimen oral and rectal.  MiraLAX, milk of magnesia ordered as well as soapsuds enemas and suppositories.      HISTORY:   History of Chief Complaint:    Heri Steinberg is a 49 y.o. male with history of multiple sclerosis who is paraplegic who presents with 1 day history of abdominal pain.  Mother is at bedside to provide history.  She last saw the patient yesterday and he appeared well and was tolerating a regular diet and

## 2025-03-04 NOTE — ED PROVIDER NOTES
[facility] EMERGENCY DEPARTMENT  Emergency Department  Emergency Medicine Resident Turn-Over     Care of Heri Steinberg was assumed from Dr. Hunt and is being seen for Unresponsive   .  The patient's initial evaluation and plan have been discussed with the prior provider who initially evaluated the patient.     EMERGENCY DEPARTMENT COURSE / MEDICAL DECISION MAKING:       MEDICATIONS GIVEN:  Orders Placed This Encounter   Medications    sodium chloride 0.9 % bolus 1,000 mL    acetaminophen (TYLENOL) suppository 650 mg    DISCONTD: ceFEPIme (MAXIPIME) 2,000 mg in sodium chloride 0.9 % 100 mL IVPB (addEASE)     Order Specific Question:   Antimicrobial Indications     Answer:   Sepsis of Unknown Etiology     Order Specific Question:   Sepsis duration of therapy     Answer:   Other    ketorolac (TORADOL) injection 15 mg    sodium chloride 0.9 % bolus 1,000 mL    iopamidol (ISOVUE-370) 76 % injection 75 mL    magnesium hydroxide (MILK OF MAGNESIA) 400 MG/5ML suspension 30 mL    polyethylene glycol (GLYCOLAX) packet 17 g    bisacodyl (DULCOLAX) suppository 10 mg    DISCONTD: vancomycin (VANCOCIN) intermittent dosing (placeholder)     Order Specific Question:   Antimicrobial Indications     Answer:   Sepsis of Unknown Etiology     Order Specific Question:   Sepsis duration of therapy     Answer:   7 days    DISCONTD: vancomycin (VANCOCIN) 1,000 mg in sodium chloride 0.9 % 250 mL IVPB (Eacl3Stl)     Order Specific Question:   Antimicrobial Indications     Answer:   Sepsis of Unknown Etiology     Order Specific Question:   Sepsis duration of therapy     Answer:   7 days    acetaminophen (TYLENOL) tablet 650 mg    sodium chloride flush 0.9 % injection 5-40 mL    sodium chloride flush 0.9 % injection 5-40 mL    0.9 % sodium chloride infusion    OR Linked Order Group     potassium chloride (KLOR-CON M) extended release tablet 40 mEq     potassium bicarb-citric acid (EFFER-K) effervescent tablet 40 mEq

## 2025-03-04 NOTE — ED PROVIDER NOTES
[facility]     Emergency Department     Faculty Note/ Attestation      12:54 PM EST  Pt Name: Heri Steinberg                                       MRN: [id number]  Birthdate 1975  Date of evaluation: 3/4/2025  Patients PCP:    Jefry Jansen DO    Attestation  I performed a history and physical examination of the patient/ or directly observed  and discussed management with the resident. I reviewed the resident’s note and agree with the documented findings and plan of care. Any areas of disagreement are noted on the chart. I was personally present for the key portions of any procedures. I have documented in the chart those procedures where I was not present during the key portions. I have reviewed the emergency nurses triage note. I agree with the chief complaint, past medical history, past surgical history, allergies, medications, social and family history as documented unless otherwise noted below.    For Physician Assistant/ Nurse Practitioner cases/documentation I have personally evaluated this patient and have completed at least one if not all key elements of the E/M (history, physical exam, and MDM). Additional findings are as noted.       Initial Screens:     -------------------------------------     ----------------------------------------  Greg Coma Scale  Eye Opening: None  Best Verbal Response: None  Best Motor Response: None  Greg Coma Scale Score: 3  ------------------------------------------------------------------------------------------------------------  Vitals:    Vitals:    03/04/25 1252   BP: (!) 150/106   Pulse: (!) 122   Resp: 28   Temp: (!) 103 °F (39.4 °C)   SpO2: 97%       Chief Complaint    No chief complaint on file.         temperature is 103 °F (39.4 °C) (abnormal). His blood pressure is 150/106 (abnormal) and his pulse is 122 (abnormal). His respiration is 28 and oxygen saturation is 97%.            DIAGNOSTIC RESULTS       RADIOLOGY:   XR CHEST

## 2025-03-04 NOTE — ED NOTES
Labeled urine specimen sent to lab via tube system.    [x] Urine Sample   []  Clean catch   [] Straight cath   [x] Urine voided   []  Indwelling catheter   []  Suprapubic catheter

## 2025-03-04 NOTE — ED NOTES
ED to inpatient nurses report      Chief Complaint:  Chief Complaint   Patient presents with    Unresponsive      Present to ED from: [place]    MOA:     LOC: alert to only name  Mobility: Requires assistance * 2  Oxygen Baseline: 100%    Current needs required: RA   Pending ED orders: none  Present condition: stable    Why did the patient come to the ED?  Pt presents to ED via EMS with c/o unresponsiveness.  Per EMS pt has snoring respirations but is maintaining airway with stable vital signs.  Upon arrival pt not arousal with painful stimuli.  Per EMS, pt gets like this when he's septic.  What is the plan? admit  Any procedures or intervention occur? CT-PE, labs, meds, blood cultures, x-ray, consults  Any safety concerns??    Mental Status:  Level of Consciousness: Unresponsive (3)    Psych Assessment:      Vital signs   Vitals:    03/04/25 1556 03/04/25 1630 03/04/25 1645 03/04/25 1730   BP:  (!) 140/89 (!) 132/90    Pulse:  98 98    Resp:  23 23    Temp: 100.3 °F (37.9 °C)      TempSrc:       SpO2:  100% 100%    Weight:    66.7 kg (147 lb 0.8 oz)        Vitals:  Patient Vitals for the past 24 hrs:   BP Temp Temp src Pulse Resp SpO2 Weight   03/04/25 1730 -- -- -- -- -- -- 66.7 kg (147 lb 0.8 oz)   03/04/25 1645 (!) 132/90 -- -- 98 23 100 % --   03/04/25 1630 (!) 140/89 -- -- 98 23 100 % --   03/04/25 1556 -- 100.3 °F (37.9 °C) -- -- -- -- --   03/04/25 1523 -- (!) 101.7 °F (38.7 °C) Oral -- -- -- --   03/04/25 1425 -- (!) 102.7 °F (39.3 °C) Oral -- -- -- --   03/04/25 1342 -- -- -- (!) 115 23 100 % --   03/04/25 1315 (!) 148/100 -- -- (!) 126 30 95 % --   03/04/25 1252 (!) 150/106 (!) 103 °F (39.4 °C) -- (!) 122 28 97 % --      Visit Vitals  BP (!) 132/90   Pulse 98   Temp 100.3 °F (37.9 °C)   Resp 23   Wt 66.7 kg (147 lb 0.8 oz)   SpO2 100%   BMI 19.94 kg/m²        LDAs:   Peripheral IV 03/04/25 Right Forearm (Active)   Site Assessment Clean, dry & intact 03/04/25 1258   Line Status Blood return

## 2025-03-05 ENCOUNTER — APPOINTMENT (OUTPATIENT)
Dept: GENERAL RADIOLOGY | Age: 50
DRG: 720 | End: 2025-03-05
Payer: MEDICAID

## 2025-03-05 PROBLEM — A41.9 SEPSIS (HCC): Status: ACTIVE | Noted: 2025-03-05

## 2025-03-05 PROBLEM — R65.10 SIRS (SYSTEMIC INFLAMMATORY RESPONSE SYNDROME) (HCC): Status: ACTIVE | Noted: 2025-03-05

## 2025-03-05 PROBLEM — R79.82 CRP ELEVATED: Status: ACTIVE | Noted: 2025-03-05

## 2025-03-05 PROBLEM — G93.40 ACUTE ENCEPHALOPATHY: Status: ACTIVE | Noted: 2025-03-05

## 2025-03-05 LAB
ALBUMIN SERPL-MCNC: 2.9 G/DL (ref 3.5–5.2)
ALBUMIN SERPL-MCNC: 3.5 G/DL (ref 3.5–5.2)
ALBUMIN/GLOB SERPL: 1.2 {RATIO} (ref 1–2.5)
ALBUMIN/GLOB SERPL: 1.4 {RATIO} (ref 1–2.5)
ALP SERPL-CCNC: 66 U/L (ref 40–129)
ALP SERPL-CCNC: 86 U/L (ref 40–129)
ALT SERPL-CCNC: 20 U/L (ref 10–50)
ALT SERPL-CCNC: 31 U/L (ref 10–50)
ANION GAP SERPL CALCULATED.3IONS-SCNC: 11 MMOL/L (ref 9–16)
ANION GAP SERPL CALCULATED.3IONS-SCNC: 14 MMOL/L (ref 9–16)
AST SERPL-CCNC: 27 U/L (ref 10–50)
AST SERPL-CCNC: 40 U/L (ref 10–50)
BASOPHILS # BLD: <0.03 K/UL (ref 0–0.2)
BASOPHILS # BLD: <0.03 K/UL (ref 0–0.2)
BASOPHILS NFR BLD: 0 % (ref 0–2)
BASOPHILS NFR BLD: 0 % (ref 0–2)
BILIRUB DIRECT SERPL-MCNC: 0.2 MG/DL (ref 0–0.2)
BILIRUB INDIRECT SERPL-MCNC: 0.3 MG/DL (ref 0–1)
BILIRUB SERPL-MCNC: 0.4 MG/DL (ref 0–1.2)
BILIRUB SERPL-MCNC: 0.5 MG/DL (ref 0–1.2)
BUN SERPL-MCNC: 7 MG/DL (ref 6–20)
BUN SERPL-MCNC: 8 MG/DL (ref 6–20)
CALCIUM SERPL-MCNC: 6.2 MG/DL (ref 8.6–10.4)
CALCIUM SERPL-MCNC: 8.3 MG/DL (ref 8.6–10.4)
CHLORIDE SERPL-SCNC: 104 MMOL/L (ref 98–107)
CHLORIDE SERPL-SCNC: 113 MMOL/L (ref 98–107)
CO2 SERPL-SCNC: 16 MMOL/L (ref 20–31)
CO2 SERPL-SCNC: 16 MMOL/L (ref 20–31)
CREAT SERPL-MCNC: 0.4 MG/DL (ref 0.7–1.2)
CREAT SERPL-MCNC: 0.5 MG/DL (ref 0.7–1.2)
CRP SERPL HS-MCNC: 212 MG/L (ref 0–5)
EKG ATRIAL RATE: 126 BPM
EKG P AXIS: 72 DEGREES
EKG P-R INTERVAL: 168 MS
EKG Q-T INTERVAL: 296 MS
EKG QRS DURATION: 84 MS
EKG QTC CALCULATION (BAZETT): 428 MS
EKG R AXIS: 80 DEGREES
EKG T AXIS: 68 DEGREES
EKG VENTRICULAR RATE: 126 BPM
EOSINOPHIL # BLD: <0.03 K/UL (ref 0–0.44)
EOSINOPHIL # BLD: <0.03 K/UL (ref 0–0.44)
EOSINOPHILS RELATIVE PERCENT: 0 % (ref 1–4)
EOSINOPHILS RELATIVE PERCENT: 0 % (ref 1–4)
ERYTHROCYTE [DISTWIDTH] IN BLOOD BY AUTOMATED COUNT: 13.5 % (ref 11.8–14.4)
ERYTHROCYTE [DISTWIDTH] IN BLOOD BY AUTOMATED COUNT: 13.5 % (ref 11.8–14.4)
GFR, ESTIMATED: >90 ML/MIN/1.73M2
GFR, ESTIMATED: >90 ML/MIN/1.73M2
GLUCOSE SERPL-MCNC: 71 MG/DL (ref 74–99)
GLUCOSE SERPL-MCNC: 90 MG/DL (ref 74–99)
HCT VFR BLD AUTO: 38.3 % (ref 40.7–50.3)
HCT VFR BLD AUTO: 39.6 % (ref 40.7–50.3)
HGB BLD-MCNC: 12.9 G/DL (ref 13–17)
HGB BLD-MCNC: 13.2 G/DL (ref 13–17)
IMM GRANULOCYTES # BLD AUTO: 0.03 K/UL (ref 0–0.3)
IMM GRANULOCYTES # BLD AUTO: 0.05 K/UL (ref 0–0.3)
IMM GRANULOCYTES NFR BLD: 0 %
IMM GRANULOCYTES NFR BLD: 1 %
LACTIC ACID, WHOLE BLOOD: 1.4 MMOL/L (ref 0.7–2.1)
LACTIC ACID, WHOLE BLOOD: 1.7 MMOL/L (ref 0.7–2.1)
LYMPHOCYTES NFR BLD: 0.98 K/UL (ref 1.1–3.7)
LYMPHOCYTES NFR BLD: 1.21 K/UL (ref 1.1–3.7)
LYMPHOCYTES RELATIVE PERCENT: 10 % (ref 24–43)
LYMPHOCYTES RELATIVE PERCENT: 11 % (ref 24–43)
MAGNESIUM SERPL-MCNC: 1.4 MG/DL (ref 1.6–2.6)
MCH RBC QN AUTO: 30.9 PG (ref 25.2–33.5)
MCH RBC QN AUTO: 31 PG (ref 25.2–33.5)
MCHC RBC AUTO-ENTMCNC: 33.3 G/DL (ref 28.4–34.8)
MCHC RBC AUTO-ENTMCNC: 33.7 G/DL (ref 28.4–34.8)
MCV RBC AUTO: 92.1 FL (ref 82.6–102.9)
MCV RBC AUTO: 92.7 FL (ref 82.6–102.9)
MICROORGANISM SPEC CULT: ABNORMAL
MICROORGANISM SPEC CULT: NORMAL
MONOCYTES NFR BLD: 0.66 K/UL (ref 0.1–1.2)
MONOCYTES NFR BLD: 0.86 K/UL (ref 0.1–1.2)
MONOCYTES NFR BLD: 7 % (ref 3–12)
MONOCYTES NFR BLD: 8 % (ref 3–12)
NEUTROPHILS NFR BLD: 81 % (ref 36–65)
NEUTROPHILS NFR BLD: 82 % (ref 36–65)
NEUTS SEG NFR BLD: 8.13 K/UL (ref 1.5–8.1)
NEUTS SEG NFR BLD: 8.88 K/UL (ref 1.5–8.1)
NRBC BLD-RTO: 0 PER 100 WBC
NRBC BLD-RTO: 0 PER 100 WBC
PLATELET # BLD AUTO: 171 K/UL (ref 138–453)
PLATELET # BLD AUTO: 172 K/UL (ref 138–453)
PMV BLD AUTO: 10.9 FL (ref 8.1–13.5)
PMV BLD AUTO: 11.2 FL (ref 8.1–13.5)
POTASSIUM SERPL-SCNC: 3.1 MMOL/L (ref 3.7–5.3)
POTASSIUM SERPL-SCNC: 3.5 MMOL/L (ref 3.7–5.3)
PROCALCITONIN SERPL-MCNC: 0.37 NG/ML (ref 0–0.09)
PROCALCITONIN SERPL-MCNC: 0.71 NG/ML (ref 0–0.09)
PROT SERPL-MCNC: 5 G/DL (ref 6.6–8.7)
PROT SERPL-MCNC: 6.4 G/DL (ref 6.6–8.7)
RBC # BLD AUTO: 4.16 M/UL (ref 4.21–5.77)
RBC # BLD AUTO: 4.27 M/UL (ref 4.21–5.77)
SERVICE CMNT-IMP: ABNORMAL
SERVICE CMNT-IMP: NORMAL
SODIUM SERPL-SCNC: 134 MMOL/L (ref 136–145)
SODIUM SERPL-SCNC: 140 MMOL/L (ref 136–145)
SPECIMEN DESCRIPTION: ABNORMAL
SPECIMEN DESCRIPTION: NORMAL
WBC OTHER # BLD: 11 K/UL (ref 3.5–11.3)
WBC OTHER # BLD: 9.8 K/UL (ref 3.5–11.3)

## 2025-03-05 PROCEDURE — 92611 MOTION FLUOROSCOPY/SWALLOW: CPT

## 2025-03-05 PROCEDURE — 6360000002 HC RX W HCPCS: Performed by: STUDENT IN AN ORGANIZED HEALTH CARE EDUCATION/TRAINING PROGRAM

## 2025-03-05 PROCEDURE — 1200000000 HC SEMI PRIVATE

## 2025-03-05 PROCEDURE — 6360000002 HC RX W HCPCS: Performed by: INTERNAL MEDICINE

## 2025-03-05 PROCEDURE — 2580000003 HC RX 258: Performed by: INTERNAL MEDICINE

## 2025-03-05 PROCEDURE — 80053 COMPREHEN METABOLIC PANEL: CPT

## 2025-03-05 PROCEDURE — 99255 IP/OBS CONSLTJ NEW/EST HI 80: CPT | Performed by: INTERNAL MEDICINE

## 2025-03-05 PROCEDURE — 74230 X-RAY XM SWLNG FUNCJ C+: CPT

## 2025-03-05 PROCEDURE — 2500000003 HC RX 250 WO HCPCS: Performed by: INTERNAL MEDICINE

## 2025-03-05 PROCEDURE — 84145 PROCALCITONIN (PCT): CPT

## 2025-03-05 PROCEDURE — 95819 EEG AWAKE AND ASLEEP: CPT | Performed by: PSYCHIATRY & NEUROLOGY

## 2025-03-05 PROCEDURE — 99232 SBSQ HOSP IP/OBS MODERATE 35: CPT | Performed by: STUDENT IN AN ORGANIZED HEALTH CARE EDUCATION/TRAINING PROGRAM

## 2025-03-05 PROCEDURE — 6370000000 HC RX 637 (ALT 250 FOR IP): Performed by: INTERNAL MEDICINE

## 2025-03-05 PROCEDURE — 83735 ASSAY OF MAGNESIUM: CPT

## 2025-03-05 PROCEDURE — 83605 ASSAY OF LACTIC ACID: CPT

## 2025-03-05 PROCEDURE — 95816 EEG AWAKE AND DROWSY: CPT

## 2025-03-05 PROCEDURE — 6360000002 HC RX W HCPCS: Performed by: PHYSICIAN ASSISTANT

## 2025-03-05 PROCEDURE — 99231 SBSQ HOSP IP/OBS SF/LOW 25: CPT | Performed by: SURGERY

## 2025-03-05 PROCEDURE — 99254 IP/OBS CNSLTJ NEW/EST MOD 60: CPT | Performed by: PSYCHIATRY & NEUROLOGY

## 2025-03-05 PROCEDURE — 2580000003 HC RX 258: Performed by: STUDENT IN AN ORGANIZED HEALTH CARE EDUCATION/TRAINING PROGRAM

## 2025-03-05 PROCEDURE — 86140 C-REACTIVE PROTEIN: CPT

## 2025-03-05 PROCEDURE — 85025 COMPLETE CBC W/AUTO DIFF WBC: CPT

## 2025-03-05 PROCEDURE — 99231 SBSQ HOSP IP/OBS SF/LOW 25: CPT | Performed by: PHYSICIAN ASSISTANT

## 2025-03-05 PROCEDURE — 93010 ELECTROCARDIOGRAM REPORT: CPT | Performed by: INTERNAL MEDICINE

## 2025-03-05 PROCEDURE — XW033E5 INTRODUCTION OF REMDESIVIR ANTI-INFECTIVE INTO PERIPHERAL VEIN, PERCUTANEOUS APPROACH, NEW TECHNOLOGY GROUP 5: ICD-10-PCS | Performed by: INTERNAL MEDICINE

## 2025-03-05 PROCEDURE — 82248 BILIRUBIN DIRECT: CPT

## 2025-03-05 RX ORDER — POTASSIUM CHLORIDE 7.45 MG/ML
10 INJECTION INTRAVENOUS
Status: DISPENSED | OUTPATIENT
Start: 2025-03-05 | End: 2025-03-05

## 2025-03-05 RX ORDER — 0.9 % SODIUM CHLORIDE 0.9 %
30 INTRAVENOUS SOLUTION INTRAVENOUS PRN
Status: DISCONTINUED | OUTPATIENT
Start: 2025-03-05 | End: 2025-03-11 | Stop reason: HOSPADM

## 2025-03-05 RX ORDER — MAGNESIUM SULFATE IN WATER 40 MG/ML
2000 INJECTION, SOLUTION INTRAVENOUS
Status: DISPENSED | OUTPATIENT
Start: 2025-03-05 | End: 2025-03-05

## 2025-03-05 RX ADMIN — SODIUM CHLORIDE, PRESERVATIVE FREE 5 ML: 5 INJECTION INTRAVENOUS at 09:00

## 2025-03-05 RX ADMIN — ENOXAPARIN SODIUM 40 MG: 100 INJECTION SUBCUTANEOUS at 10:52

## 2025-03-05 RX ADMIN — BACLOFEN 5 MG: 5 TABLET ORAL at 20:56

## 2025-03-05 RX ADMIN — POTASSIUM CHLORIDE 10 MEQ: 7.46 INJECTION, SOLUTION INTRAVENOUS at 10:51

## 2025-03-05 RX ADMIN — MAGNESIUM SULFATE HEPTAHYDRATE 2000 MG: 40 INJECTION, SOLUTION INTRAVENOUS at 10:52

## 2025-03-05 RX ADMIN — PRIMIDONE 250 MG: 250 TABLET ORAL at 20:57

## 2025-03-05 RX ADMIN — SODIUM CHLORIDE: 0.9 INJECTION, SOLUTION INTRAVENOUS at 18:09

## 2025-03-05 RX ADMIN — POTASSIUM CHLORIDE 10 MEQ: 7.46 INJECTION, SOLUTION INTRAVENOUS at 12:25

## 2025-03-05 RX ADMIN — POTASSIUM CHLORIDE 10 MEQ: 7.46 INJECTION, SOLUTION INTRAVENOUS at 07:35

## 2025-03-05 RX ADMIN — ACETAMINOPHEN 650 MG: 650 SUPPOSITORY RECTAL at 03:55

## 2025-03-05 RX ADMIN — VANCOMYCIN HYDROCHLORIDE 1000 MG: 1 INJECTION, POWDER, LYOPHILIZED, FOR SOLUTION INTRAVENOUS at 13:02

## 2025-03-05 RX ADMIN — BISACODYL 10 MG: 10 SUPPOSITORY RECTAL at 10:52

## 2025-03-05 RX ADMIN — DULOXETINE HYDROCHLORIDE 60 MG: 30 CAPSULE, DELAYED RELEASE ORAL at 20:57

## 2025-03-05 RX ADMIN — POLYETHYLENE GLYCOL 3350 17 G: 17 POWDER, FOR SOLUTION ORAL at 20:59

## 2025-03-05 RX ADMIN — MAGNESIUM SULFATE HEPTAHYDRATE 2000 MG: 40 INJECTION, SOLUTION INTRAVENOUS at 14:34

## 2025-03-05 RX ADMIN — PIPERACILLIN AND TAZOBACTAM 3375 MG: 3; .375 INJECTION, POWDER, LYOPHILIZED, FOR SOLUTION INTRAVENOUS at 13:25

## 2025-03-05 RX ADMIN — PIPERACILLIN AND TAZOBACTAM 3375 MG: 3; .375 INJECTION, POWDER, LYOPHILIZED, FOR SOLUTION INTRAVENOUS at 06:00

## 2025-03-05 RX ADMIN — REMDESIVIR 200 MG: 100 INJECTION, POWDER, LYOPHILIZED, FOR SOLUTION INTRAVENOUS at 12:41

## 2025-03-05 RX ADMIN — ACETAMINOPHEN 650 MG: 650 SUPPOSITORY RECTAL at 10:52

## 2025-03-05 RX ADMIN — PIPERACILLIN AND TAZOBACTAM 3375 MG: 3; .375 INJECTION, POWDER, LYOPHILIZED, FOR SOLUTION INTRAVENOUS at 21:12

## 2025-03-05 NOTE — CARE COORDINATION
Case Management Assessment  Initial Evaluation    Date/Time of Evaluation: 3/5/2025 10:44 AM  Assessment Completed by: JOYCE MONTES    If patient is discharged prior to next notation, then this note serves as note for discharge by case management.    Patient Name: Heri Steinberg                   YOB: 1975  Diagnosis: Syncope and collapse [R55]                   Date / Time: 3/4/2025 12:42 PM    Patient Admission Status: Inpatient   Readmission Risk (Low < 19, Mod (19-27), High > 27): Readmission Risk Score: 14.4    Current PCP: Jefry Jansen, DO  PCP verified by CM? (P) Yes    Chart Reviewed: Yes      History Provided by: (P) Child/Family (mom legal guardian)  Patient Orientation: (P) Unable to Assess (pt not oriented)    Patient Cognition: (P) Alert    Hospitalization in the last 30 days (Readmission):  No    If yes, Readmission Assessment in CM Navigator will be completed.    Advance Directives:      Code Status: Full Code   Patient's Primary Decision Maker is: (P) Named in Scanned ACP Document      Discharge Planning:    Patient lives with: (P) Other (Comment) ([facility]) Type of Home: (P) Long-Term Care  Primary Care Giver: (P) Other (Comment) ([facility])  Patient Support Systems include: (P) Parent, /, Other (Comment) ([facility] L[facility])   Current Financial resources: (P) Medicaid  Current community resources: (P) ECF/Home Care  Current services prior to admission: (P) Durable Medical Equipment            Current DME: (P) Wheelchair, Hospital Bed, Shower Chair            Type of Home Care services:  (P) None    ADLS  Prior functional level: (P) Assistance with the following:, Bathing, Dressing, Toileting, Feeding, Cooking, Housework, Shopping, Mobility  Current functional level: (P) Assistance with the following:, Bathing, Dressing, Toileting, Feeding, Cooking, Housework, Shopping, Mobility    PT AM-PAC:   /24  OT AM-PAC:   /24    Family

## 2025-03-05 NOTE — CONSULTS
[facility] -   Infectious diseases evaluation  admission date 3/4/2025    reason for consultation:   Fever? Concern for sepsis    Impression :   Current:  High-grade fever 107 w sirs from infection  COVID-19 positive with mild bronchitis   elevated  Severe constipation with proctocolitis and fecal impaction  Acute on chronic encephalopathy  Concerns for aspiration pneumonia  Dysphagia - failed swall study - has a peg  Multiple sclerosis-relapsing remitting type  Paraplegia and contractures  UTI neg -no UTI this time    Other:    Discussion / summary of stay / plan of care/ Recommendations:     HENCE:   Respiratory panel positive for COVID-19  CT Abdo pelvis shows fecal impaction with severe constipation and proctocolitis and sigmoid colitis  RLL infil possible pneumonia   Dysphagia w gross aspiration -test 3/5/25  has a peg -NPO  Altered mental status-CT head negative for acute changes,  awaiting MRI brain and C-spine  Fever and SIRS  -- could be from all above  CRP elevation 212  urine culture 3/4 neg   Blood cultures x 1 gram-stain-coagulase-negative staph- likely contamination for now    antibiotics   Will continue IV Zosyn for intra-abdominal infection  Start on IV remdesivir-pharmacy to dose for 3 days  Watch fever response     Infection Control Recommendations   [place] Precautions  Contact Isolation plus till 3/14    Antimicrobial Stewardship Recommendations   Simplification of therapy  Targeted therapy      History of Present Illness:   Initial history:  Heri Steinberg is a 49 y.o.-year-old male  with a history of tetraparesis, GLORY, pseudobulbar palsy, and relapsing multiple sclerosis is presenting after being found unresponsive by mother and having severe constipation and being found to have high fever spike.  He had a fever of 107 at home and spiked to 103 in the hospital.  He continues to have fevers and was recently started on

## 2025-03-05 NOTE — H&P
[facility]  Office: [phone]  Kranthi Correa DO, Tuan Garza DO, Hugo Peterson DO, Jose Bills DO, Georgi Wong MD, Yeni Mei MD, Carmina Tiwari MD, Chanell Maldonado MD,  Ethan Silva MD, Dom Perry MD, Ben Painter MD,  Catarina Garcia DO, Owen Hartmann MD, Chilango Chavez MD, Davonte Correa DO, Roseann Mitchell MD,  Brendan Li DO, Fabiola Mc MD, Evie Valderrama MD, Sol Casiano MD, Va Waite MD,  Demetris De La Vega MD, Sandor Leung MD, Megan Rodriguez MD, Olu Cortez MD, Carlos Walsh MD, Jeffry Luna MD, Enrrique Schaeffer DO, Luca Cabello MD, Catarina Kemp MD, Mohsin Reza, MD, Shirley Waterhouse, CNP,  Leonora Fairbanks CNP, Enrrique Bales, CNP,  Jesusita Tariq, JORDYN, Park Nassar, CNP, Ansley Robles, CNP, Morenita Duque, CNP, Corina Lora CNP, SULMA Henry-MICHAEL, Doreen King, CNP, Francheska Steinberg, CNP,  Almaz Garcia, CNP, Sarita Webber, CNP, Karen Diego, CNP,  Ludmila Gracia, CNS, Jolanta Oshea CNP, Jazmin Awan CNP,   Deisi White, CNP          [facility]    HISTORY AND PHYSICAL EXAMINATION            Date:   3/4/2025  Patient name:  Heri Steinberg  Date of admission:  3/4/2025 12:42 PM  MRN:   [id number]  Account:  [id number]  YOB: 1975  PCP:    Jefry Jansen DO  Room:   25/25  Code Status:    Prior    Chief Complaint:     Chief Complaint   Patient presents with    Unresponsive        History Obtained From:     family member , electronic medical record    History of Present Illness:     Heri Steinberg is a 49 y.o. Unavailable / unknown male who presents with Unresponsive    and is admitted to the hospital for the management of Syncope and collapse.    Patient was found unresponsive by his family who called 911 to seek medical attention.   He is nonverbal at baseline. He has a history of multiple sclerosis but is able to eat, drink and communicate with minimal words at baseline.   As per

## 2025-03-05 NOTE — CARE COORDINATION
Spoke nedra Wilcox at [facility] who states pt is bed hold , no HENS needed . She will check first thing tomorrow to see if pt will need new auth and get back with CM

## 2025-03-05 NOTE — ED NOTES
ED to inpatient nurses report      Chief Complaint:  Chief Complaint   Patient presents with    Unresponsive      Present to ED from: Home    MOA:     LOC: alert and orientated to name, place, date  Mobility: Fully dependent  Oxygen Baseline: Room air    Current needs required: 4L   Pending ED orders: NA  Present condition: Stable    Why did the patient come to the ED? SOB/Cough/Fevers/CP/Abd pain(Constipation)-Covid +   What is the plan? Broad spectrum antibiotics & Remdesivir, Neurology w/u,  Any procedures or intervention occur? EEG complete, Needs MRI  Any safety concerns-     Mental Status:  Level of Consciousness: Responds to pain (2)    Psych Assessment:      Vital signs   Vitals:    03/05/25 1100 03/05/25 1200 03/05/25 1300 03/05/25 1437   BP: (!) 147/89 (!) 139/91 139/88 (!) 143/94   Pulse: (!) 110 (!) 103 97 92   Resp: 19 24 26 21   Temp:    99.1 °F (37.3 °C)   TempSrc:       SpO2: 97% 99% 100% 100%   Weight:            Vitals:  Patient Vitals for the past 24 hrs:   BP Temp Temp src Pulse Resp SpO2   03/05/25 1437 (!) 143/94 99.1 °F (37.3 °C) -- 92 21 100 %   03/05/25 1300 139/88 -- -- 97 26 100 %   03/05/25 1200 (!) 139/91 -- -- (!) 103 24 99 %   03/05/25 1100 (!) 147/89 -- -- (!) 110 19 97 %   03/05/25 1035 -- (!) 101.9 °F (38.8 °C) -- (!) 110 21 98 %   03/05/25 1000 (!) 136/90 -- -- (!) 105 24 100 %   03/05/25 0900 (!) 141/85 -- -- (!) 106 23 100 %   03/05/25 0800 (!) 152/94 (!) 100.9 °F (38.3 °C) Oral (!) 113 27 100 %   03/05/25 0700 (!) 148/94 -- -- (!) 112 26 98 %   03/05/25 0330 (!) 149/82 (!) 101 °F (38.3 °C) -- (!) 110 29 98 %   03/05/25 0141 -- (!) 101 °F (38.3 °C) -- -- -- --   03/04/25 2245 -- (!) 103 °F (39.4 °C) Oral -- -- --   03/04/25 2100 -- (!) 102.8 °F (39.3 °C) Axillary -- -- --   03/04/25 2000 (!) 145/96 97 °F (36.1 °C) Temporal 99 20 100 %   03/04/25 1930 (!) 138/92 -- -- 93 17 100 %   03/04/25 1915 (!) 144/95 -- -- 99 17 100 %      Visit Vitals  BP (!) 143/94   Pulse 92   Temp 99.1

## 2025-03-05 NOTE — CONSULTS
[facility] Neurology   IN-PATIENT SERVICE   [facility]    Neurology Consult Note            Date:   3/5/2025  Patient name:  Heri Steinberg  Date of admission:  3/4/2025 12:42 PM  MRN:   [id number]  Account:  [id number]  YOB: 1975  PCP:    Jefry Jansen DO  Room:   [facility]  Code Status:    Full Code    Chief Complaint:     Chief Complaint   Patient presents with    Unresponsive        History Obtained From:     mother    History of Present Illness:     49-year-old male with a history of tetraparesis, GLORY, pseudobulbar palsy, and relapsing multiple sclerosis is presenting after being found unresponsive by mother and having severe constipation and being found to have high fever spike.  He had a fever of 107 at home and spiked to 103 in the hospital.  He continues to have fevers and was recently started on Teriflunomide.    Patient has sterco colitis which she had before and previously it has caused him to have fevers.  However he did have a bowel movement in the ER and his fevers have persisted.  As I dictate this I see that COVID 19 just came back positive which could be a source for his fevers.  And he also has some lactic acidosis.  I could also not appreciate any convincing nuchal rigidity    Otherwise from my and neurological exam appears to be largely unchanged from his baseline with disconjugate gaze due to GLORY, tetraparesis but the patient is encephalopathic at the moment likely due to metabolic factors.      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

## 2025-03-05 NOTE — ED PROVIDER NOTES
Note Started: 8:01 AM EST     Faculty Sign-Out Attestation  Handoff taken on the following patient from prior Attending Physician at 1500: Martin    I was available and discussed any additional care issues that arose and coordinated the management plans with the resident(s) caring for the patient during my duty period. Any areas of disagreement with resident’s documentation of care or procedures are noted on the chart. I was personally present for the key portions of any/all procedures during my duty period. I have documented in the chart those procedures where I was not present during the key portions.    49 year old male from [place], unresponsive, GCS =3 on arrival. Intubated, pending CT scans. ICU admit.    Shannan Leach MD  Attending Physician       Shannan Leach MD  03/05/25 0802

## 2025-03-05 NOTE — PROCEDURES
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  Past Surgical History:  has no past surgical history on file.    Type of Study: Repeat MBS  Results of Prior Study: Minced and Moist with Nectar 2024      Patient Complaints/Reason for Referral:  Heri Steinberg was referred for a MBS to assess the efficiency of his/her swallow function, assess for aspiration, and to make recommendations regarding safe dietary consistencies, effective compensatory strategies, and safe eating environment.       Onset of problem:      49-year-old male with past medical history of relapsing MS, severe constipation and stercoral colitis presented to the emergency department after being found unresponsive by his mother. He also reportedly had a fever of 107F at home. In the ED he was febrile, tachycardic, BP WNL.  Lab workup notable for COVID positive, blood cultures with gram-positive cocci in clusters, WBC 6.6.  He was started on broad-spectrum antibiotics, neurology was consulted, and admitted for further management.      Behavior/Cognition/Vision/Hearing:  Behavior/Cognition: Cooperative;Alert  Vision: Impaired    Impressions: pt presents with + severe oral and pharyngeal phase dysphagia.  Pt with + spillover to

## 2025-03-05 NOTE — PROCEDURES
PROCEDURE NOTE  Date: 3/5/2025   Name: Heri Steinberg  YOB: 1975    Procedures            Date: 3/5/2025  Referring physician: Dr. Alarcon    Indication  Patient aged 49 y with encephalopathy. EEG done to assess for epileptiform activity.    Introduction  This routine 25-minute EEG was recorded using the International 10-20 System on a [employer] workstation at 256 samples/s. Automated spike and seizure detection algorithms were applied.    Description  The background consistent of diffuse none reactive polymorphic delta and theta slowing, limited EEG due to EMG artifact. No consistent focal slowing or interhemispheric asymmetry was noted. Stage I and stage II sleep were observed. There were no interictal epileptiform discharges or electrographic seizures.    Activations  Hyperventilation was not performed. Intermittent photic stimulation was performed and demonstrated no posterior driving response.    Impression  Abnormal awake EEG. The slowing mentioned above suggests moderate non specific encephalopathy. Limited study.    No epileptiform discharges were identified. Please note the absence of such activity on this record cannot conclusively rule out an epileptic disorder. If such is still clinically suspected, a repeat study with sleep deprivation and/or prolonged sampling may be helpful.    Catarina Marcos MD  Epilepsy Board Certified.  Neurology Board Certified.    Electronically Signed

## 2025-03-06 ENCOUNTER — APPOINTMENT (OUTPATIENT)
Dept: MRI IMAGING | Age: 50
DRG: 720 | End: 2025-03-06
Payer: MEDICAID

## 2025-03-06 ENCOUNTER — APPOINTMENT (OUTPATIENT)
Dept: GENERAL RADIOLOGY | Age: 50
DRG: 720 | End: 2025-03-06
Payer: MEDICAID

## 2025-03-06 LAB
ANION GAP SERPL CALCULATED.3IONS-SCNC: 13 MMOL/L (ref 9–16)
BASOPHILS # BLD: <0.03 K/UL (ref 0–0.2)
BASOPHILS NFR BLD: 0 % (ref 0–2)
BUN SERPL-MCNC: 9 MG/DL (ref 6–20)
CALCIUM SERPL-MCNC: 8.3 MG/DL (ref 8.6–10.4)
CHLORIDE SERPL-SCNC: 107 MMOL/L (ref 98–107)
CO2 SERPL-SCNC: 18 MMOL/L (ref 20–31)
CREAT SERPL-MCNC: 0.5 MG/DL (ref 0.7–1.2)
EOSINOPHIL # BLD: <0.03 K/UL (ref 0–0.44)
EOSINOPHILS RELATIVE PERCENT: 0 % (ref 1–4)
ERYTHROCYTE [DISTWIDTH] IN BLOOD BY AUTOMATED COUNT: 13.8 % (ref 11.8–14.4)
GFR, ESTIMATED: >90 ML/MIN/1.73M2
GLUCOSE SERPL-MCNC: 85 MG/DL (ref 74–99)
HCT VFR BLD AUTO: 38.3 % (ref 40.7–50.3)
HGB BLD-MCNC: 12.7 G/DL (ref 13–17)
IMM GRANULOCYTES # BLD AUTO: <0.03 K/UL (ref 0–0.3)
IMM GRANULOCYTES NFR BLD: 0 %
LYMPHOCYTES NFR BLD: 1.55 K/UL (ref 1.1–3.7)
LYMPHOCYTES RELATIVE PERCENT: 29 % (ref 24–43)
MCH RBC QN AUTO: 30.9 PG (ref 25.2–33.5)
MCHC RBC AUTO-ENTMCNC: 33.2 G/DL (ref 28.4–34.8)
MCV RBC AUTO: 93.2 FL (ref 82.6–102.9)
MONOCYTES NFR BLD: 0.55 K/UL (ref 0.1–1.2)
MONOCYTES NFR BLD: 10 % (ref 3–12)
NEUTROPHILS NFR BLD: 60 % (ref 36–65)
NEUTS SEG NFR BLD: 3.28 K/UL (ref 1.5–8.1)
NRBC BLD-RTO: 0 PER 100 WBC
PLATELET # BLD AUTO: 158 K/UL (ref 138–453)
PMV BLD AUTO: 11.3 FL (ref 8.1–13.5)
POTASSIUM SERPL-SCNC: 4 MMOL/L (ref 3.7–5.3)
RBC # BLD AUTO: 4.11 M/UL (ref 4.21–5.77)
SODIUM SERPL-SCNC: 138 MMOL/L (ref 136–145)
VANCOMYCIN SERPL-MCNC: 7.9 UG/ML (ref 5–40)
WBC OTHER # BLD: 5.4 K/UL (ref 3.5–11.3)

## 2025-03-06 PROCEDURE — 2580000003 HC RX 258: Performed by: INTERNAL MEDICINE

## 2025-03-06 PROCEDURE — 36415 COLL VENOUS BLD VENIPUNCTURE: CPT

## 2025-03-06 PROCEDURE — 85025 COMPLETE CBC W/AUTO DIFF WBC: CPT

## 2025-03-06 PROCEDURE — 99233 SBSQ HOSP IP/OBS HIGH 50: CPT | Performed by: INTERNAL MEDICINE

## 2025-03-06 PROCEDURE — 6370000000 HC RX 637 (ALT 250 FOR IP): Performed by: INTERNAL MEDICINE

## 2025-03-06 PROCEDURE — 99232 SBSQ HOSP IP/OBS MODERATE 35: CPT | Performed by: STUDENT IN AN ORGANIZED HEALTH CARE EDUCATION/TRAINING PROGRAM

## 2025-03-06 PROCEDURE — 6360000002 HC RX W HCPCS: Performed by: INTERNAL MEDICINE

## 2025-03-06 PROCEDURE — 80048 BASIC METABOLIC PNL TOTAL CA: CPT

## 2025-03-06 PROCEDURE — 6360000004 HC RX CONTRAST MEDICATION: Performed by: PSYCHIATRY & NEUROLOGY

## 2025-03-06 PROCEDURE — 6360000002 HC RX W HCPCS: Performed by: STUDENT IN AN ORGANIZED HEALTH CARE EDUCATION/TRAINING PROGRAM

## 2025-03-06 PROCEDURE — 74018 RADEX ABDOMEN 1 VIEW: CPT

## 2025-03-06 PROCEDURE — 70553 MRI BRAIN STEM W/O & W/DYE: CPT

## 2025-03-06 PROCEDURE — 1200000000 HC SEMI PRIVATE

## 2025-03-06 PROCEDURE — 80202 ASSAY OF VANCOMYCIN: CPT

## 2025-03-06 PROCEDURE — 2580000003 HC RX 258: Performed by: STUDENT IN AN ORGANIZED HEALTH CARE EDUCATION/TRAINING PROGRAM

## 2025-03-06 PROCEDURE — A9579 GAD-BASE MR CONTRAST NOS,1ML: HCPCS | Performed by: PSYCHIATRY & NEUROLOGY

## 2025-03-06 PROCEDURE — 99232 SBSQ HOSP IP/OBS MODERATE 35: CPT | Performed by: PSYCHIATRY & NEUROLOGY

## 2025-03-06 PROCEDURE — 72156 MRI NECK SPINE W/O & W/DYE: CPT

## 2025-03-06 PROCEDURE — 2500000003 HC RX 250 WO HCPCS: Performed by: INTERNAL MEDICINE

## 2025-03-06 RX ADMIN — PIPERACILLIN AND TAZOBACTAM 3375 MG: 3; .375 INJECTION, POWDER, LYOPHILIZED, FOR SOLUTION INTRAVENOUS at 15:50

## 2025-03-06 RX ADMIN — Medication 240 ML: at 16:30

## 2025-03-06 RX ADMIN — BACLOFEN 5 MG: 5 TABLET ORAL at 23:22

## 2025-03-06 RX ADMIN — GADOTERIDOL 12 ML: 279.3 INJECTION, SOLUTION INTRAVENOUS at 22:28

## 2025-03-06 RX ADMIN — SODIUM CHLORIDE, PRESERVATIVE FREE 10 ML: 5 INJECTION INTRAVENOUS at 20:00

## 2025-03-06 RX ADMIN — REMDESIVIR 100 MG: 100 INJECTION, POWDER, LYOPHILIZED, FOR SOLUTION INTRAVENOUS at 10:33

## 2025-03-06 RX ADMIN — BISACODYL 10 MG: 10 SUPPOSITORY RECTAL at 09:21

## 2025-03-06 RX ADMIN — VANCOMYCIN HYDROCHLORIDE 1250 MG: 1.25 INJECTION, POWDER, LYOPHILIZED, FOR SOLUTION INTRAVENOUS at 13:29

## 2025-03-06 RX ADMIN — VANCOMYCIN HYDROCHLORIDE 1000 MG: 1 INJECTION, POWDER, LYOPHILIZED, FOR SOLUTION INTRAVENOUS at 03:38

## 2025-03-06 RX ADMIN — DULOXETINE HYDROCHLORIDE 60 MG: 30 CAPSULE, DELAYED RELEASE ORAL at 23:22

## 2025-03-06 RX ADMIN — PIPERACILLIN AND TAZOBACTAM 3375 MG: 3; .375 INJECTION, POWDER, LYOPHILIZED, FOR SOLUTION INTRAVENOUS at 05:34

## 2025-03-06 RX ADMIN — ENOXAPARIN SODIUM 40 MG: 100 INJECTION SUBCUTANEOUS at 09:20

## 2025-03-06 RX ADMIN — PRIMIDONE 250 MG: 250 TABLET ORAL at 23:22

## 2025-03-06 RX ADMIN — VANCOMYCIN HYDROCHLORIDE 1250 MG: 1.25 INJECTION, POWDER, LYOPHILIZED, FOR SOLUTION INTRAVENOUS at 23:43

## 2025-03-06 NOTE — CONSULTS
Comprehensive Nutrition Assessment    Type and Reason for Visit:  Initial, Consult    Nutrition Recommendations/Plan:   If desired, recommend to start enteral nutrition support while NPO.  Recommend to start standard with fiber (Jevity 1.5) bolus feedings.  Start with 100 ml bolus four times daily.  Increase bolus volume by 50 ml every feeding to goal volume of 250 ml four times daily.  At goal volume, provides total of 1500 kcal, 63.8 g protein, 760 ml fluid and 21g fiber daily.  For NGT feedings, recommend continuous feedings.  Start standard with fiber (Jevity 1.5) at 25 ml/hr and advance by 10 ml/hr every 8 hours to goal rate of 45 ml/hr.     Malnutrition Assessment:  Malnutrition Status:  At risk for malnutrition (03/06/25 7534)    Context:  Acute Illness     Findings of the 6 clinical characteristics of malnutrition:  Energy Intake:  Mild decrease in energy intake  Weight Loss:  No weight loss     Body Fat Loss:  Unable to assess     Muscle Mass Loss:  Unable to assess    Fluid Accumulation:  No fluid accumulation     Strength:  Not Performed    Nutrition Assessment:    Pt admitted with syncope and collapse and sepsis with Hx MS, paraplegia and seizures. Pt did undergo MBS with SLP recommending Pt remain NPO at this time.  Dietitian was consulted for TF order and management.  Spoke with nurse who reports plan is for NGT to be placed, although reports mother is hesitant to have NGT placed at this time.  Spoke with mother via phone who reports Pt had excellent appetite and intake prior to admission and denies any N/V/Abd pain or chewing/swallowing problems prior.  Mother denies any food allergies/intolerances, although reports Pt will at time say he can't eat eggs.  However, he does eat eggs mixed with other foods, he just doesn't like eggs to eat.  Weight records confirms stable weight with current weight being consistent with previous weights of 65 kg - 67 kg since 2020, although this is an estimated

## 2025-03-06 NOTE — CARE COORDINATION
Case Management   Daily Progress Note       Patient Name: Heri Steinberg                   YOB: 1975  Diagnosis: Syncope and collapse [R55]  Decreased level of consciousness [R41.89]  SIRS (systemic inflammatory response syndrome) (HCC) [R65.10]  Stercoral colitis [K52.89]                       GMLOS: 4.9 days  Length of Stay: 2  days    Anticipated Discharge Date: Two or more days before discharge    Readmission Risk (Low < 19, Mod (19-27), High > 27): Readmission Risk Score: 15      Patient Transitional Goal: Skilled nursing facility- [facility]    Current Transitional Plan    [] Home Independently    [] Home with HC    [x] Skilled Nursing Facility    [] Acute Rehabilitation    [] Long Term Acute Care (LTAC)    [] Other:     Plan for the Stay (Medical Management):           Workflow Continuation (Additional Notes):     1055: CM received voicemail from Jeri, agent for [facility]. Jeri reports patient will need new auth to return to facility, she did confirm that patient is long term for facility and does have bed available.     Jeri [phone]    Nadine Gipson  March 6, 2025

## 2025-03-07 LAB
25(OH)D3 SERPL-MCNC: 30.3 NG/ML (ref 30–100)
ANION GAP SERPL CALCULATED.3IONS-SCNC: 19 MMOL/L (ref 9–16)
ANION GAP SERPL CALCULATED.3IONS-SCNC: 25 MMOL/L (ref 9–16)
BASOPHILS # BLD: <0.03 K/UL (ref 0–0.2)
BASOPHILS NFR BLD: 0 % (ref 0–2)
BUN SERPL-MCNC: 6 MG/DL (ref 6–20)
BUN SERPL-MCNC: 7 MG/DL (ref 6–20)
CALCIUM SERPL-MCNC: 8.7 MG/DL (ref 8.6–10.4)
CALCIUM SERPL-MCNC: 8.9 MG/DL (ref 8.6–10.4)
CHLORIDE SERPL-SCNC: 107 MMOL/L (ref 98–107)
CHLORIDE SERPL-SCNC: 107 MMOL/L (ref 98–107)
CO2 SERPL-SCNC: 12 MMOL/L (ref 20–31)
CO2 SERPL-SCNC: 7 MMOL/L (ref 20–31)
CREAT SERPL-MCNC: 0.5 MG/DL (ref 0.7–1.2)
CREAT SERPL-MCNC: 0.6 MG/DL (ref 0.7–1.2)
CRP SERPL HS-MCNC: 210 MG/L (ref 0–5)
EOSINOPHIL # BLD: <0.03 K/UL (ref 0–0.44)
EOSINOPHILS RELATIVE PERCENT: 0 % (ref 1–4)
ERYTHROCYTE [DISTWIDTH] IN BLOOD BY AUTOMATED COUNT: 14.1 % (ref 11.8–14.4)
GFR, ESTIMATED: >90 ML/MIN/1.73M2
GFR, ESTIMATED: >90 ML/MIN/1.73M2
GLUCOSE SERPL-MCNC: 62 MG/DL (ref 74–99)
GLUCOSE SERPL-MCNC: 68 MG/DL (ref 74–99)
HCT VFR BLD AUTO: 38 % (ref 40.7–50.3)
HGB BLD-MCNC: 12.8 G/DL (ref 13–17)
IMM GRANULOCYTES # BLD AUTO: 0.05 K/UL (ref 0–0.3)
IMM GRANULOCYTES NFR BLD: 1 %
LYMPHOCYTES NFR BLD: 1.61 K/UL (ref 1.1–3.7)
LYMPHOCYTES RELATIVE PERCENT: 24 % (ref 24–43)
MCH RBC QN AUTO: 31.2 PG (ref 25.2–33.5)
MCHC RBC AUTO-ENTMCNC: 33.7 G/DL (ref 28.4–34.8)
MCV RBC AUTO: 92.7 FL (ref 82.6–102.9)
MONOCYTES NFR BLD: 0.62 K/UL (ref 0.1–1.2)
MONOCYTES NFR BLD: 9 % (ref 3–12)
NEUTROPHILS NFR BLD: 66 % (ref 36–65)
NEUTS SEG NFR BLD: 4.41 K/UL (ref 1.5–8.1)
NRBC BLD-RTO: 0 PER 100 WBC
PHOSPHATE SERPL-MCNC: 2 MG/DL (ref 2.5–4.5)
PLATELET # BLD AUTO: ABNORMAL K/UL (ref 138–453)
PLATELET, FLUORESCENCE: 111 K/UL (ref 138–453)
PLATELETS.RETICULATED NFR BLD AUTO: 4.7 % (ref 1.1–10.3)
POTASSIUM SERPL-SCNC: 3.8 MMOL/L (ref 3.7–5.3)
POTASSIUM SERPL-SCNC: 4.9 MMOL/L (ref 3.7–5.3)
RBC # BLD AUTO: 4.1 M/UL (ref 4.21–5.77)
SODIUM SERPL-SCNC: 138 MMOL/L (ref 136–145)
SODIUM SERPL-SCNC: 139 MMOL/L (ref 136–145)
WBC OTHER # BLD: 6.7 K/UL (ref 3.5–11.3)

## 2025-03-07 PROCEDURE — 1200000000 HC SEMI PRIVATE

## 2025-03-07 PROCEDURE — 6370000000 HC RX 637 (ALT 250 FOR IP): Performed by: STUDENT IN AN ORGANIZED HEALTH CARE EDUCATION/TRAINING PROGRAM

## 2025-03-07 PROCEDURE — 99232 SBSQ HOSP IP/OBS MODERATE 35: CPT | Performed by: INTERNAL MEDICINE

## 2025-03-07 PROCEDURE — 82306 VITAMIN D 25 HYDROXY: CPT

## 2025-03-07 PROCEDURE — 86140 C-REACTIVE PROTEIN: CPT

## 2025-03-07 PROCEDURE — 6360000002 HC RX W HCPCS: Performed by: INTERNAL MEDICINE

## 2025-03-07 PROCEDURE — 36415 COLL VENOUS BLD VENIPUNCTURE: CPT

## 2025-03-07 PROCEDURE — 6370000000 HC RX 637 (ALT 250 FOR IP): Performed by: INTERNAL MEDICINE

## 2025-03-07 PROCEDURE — 2580000003 HC RX 258: Performed by: INTERNAL MEDICINE

## 2025-03-07 PROCEDURE — 84100 ASSAY OF PHOSPHORUS: CPT

## 2025-03-07 PROCEDURE — 80048 BASIC METABOLIC PNL TOTAL CA: CPT

## 2025-03-07 PROCEDURE — 85055 RETICULATED PLATELET ASSAY: CPT

## 2025-03-07 PROCEDURE — 99232 SBSQ HOSP IP/OBS MODERATE 35: CPT | Performed by: PSYCHIATRY & NEUROLOGY

## 2025-03-07 PROCEDURE — 99232 SBSQ HOSP IP/OBS MODERATE 35: CPT | Performed by: STUDENT IN AN ORGANIZED HEALTH CARE EDUCATION/TRAINING PROGRAM

## 2025-03-07 PROCEDURE — 87641 MR-STAPH DNA AMP PROBE: CPT

## 2025-03-07 PROCEDURE — 85025 COMPLETE CBC W/AUTO DIFF WBC: CPT

## 2025-03-07 PROCEDURE — 2500000003 HC RX 250 WO HCPCS: Performed by: INTERNAL MEDICINE

## 2025-03-07 PROCEDURE — 2500000003 HC RX 250 WO HCPCS: Performed by: STUDENT IN AN ORGANIZED HEALTH CARE EDUCATION/TRAINING PROGRAM

## 2025-03-07 RX ORDER — SODIUM BICARBONATE 650 MG/1
1300 TABLET ORAL 2 TIMES DAILY
Status: DISCONTINUED | OUTPATIENT
Start: 2025-03-07 | End: 2025-03-11 | Stop reason: HOSPADM

## 2025-03-07 RX ADMIN — DULOXETINE HYDROCHLORIDE 60 MG: 30 CAPSULE, DELAYED RELEASE ORAL at 20:31

## 2025-03-07 RX ADMIN — PIPERACILLIN AND TAZOBACTAM 3375 MG: 3; .375 INJECTION, POWDER, LYOPHILIZED, FOR SOLUTION INTRAVENOUS at 02:19

## 2025-03-07 RX ADMIN — PIPERACILLIN AND TAZOBACTAM 3375 MG: 3; .375 INJECTION, POWDER, LYOPHILIZED, FOR SOLUTION INTRAVENOUS at 18:42

## 2025-03-07 RX ADMIN — PRIMIDONE 250 MG: 250 TABLET ORAL at 09:18

## 2025-03-07 RX ADMIN — ENOXAPARIN SODIUM 40 MG: 100 INJECTION SUBCUTANEOUS at 09:17

## 2025-03-07 RX ADMIN — PRIMIDONE 250 MG: 250 TABLET ORAL at 20:32

## 2025-03-07 RX ADMIN — SODIUM BICARBONATE 50 MEQ: 84 INJECTION INTRAVENOUS at 13:00

## 2025-03-07 RX ADMIN — SODIUM CHLORIDE, PRESERVATIVE FREE 10 ML: 5 INJECTION INTRAVENOUS at 20:33

## 2025-03-07 RX ADMIN — BACLOFEN 5 MG: 5 TABLET ORAL at 20:32

## 2025-03-07 RX ADMIN — Medication 2000 UNITS: at 09:17

## 2025-03-07 RX ADMIN — SODIUM BICARBONATE 1300 MG: 650 TABLET ORAL at 20:31

## 2025-03-07 RX ADMIN — PIPERACILLIN AND TAZOBACTAM 3375 MG: 3; .375 INJECTION, POWDER, LYOPHILIZED, FOR SOLUTION INTRAVENOUS at 09:24

## 2025-03-07 RX ADMIN — VANCOMYCIN HYDROCHLORIDE 1250 MG: 1.25 INJECTION, POWDER, LYOPHILIZED, FOR SOLUTION INTRAVENOUS at 07:47

## 2025-03-07 RX ADMIN — REMDESIVIR 100 MG: 100 INJECTION, POWDER, LYOPHILIZED, FOR SOLUTION INTRAVENOUS at 13:39

## 2025-03-07 RX ADMIN — BACLOFEN 5 MG: 5 TABLET ORAL at 09:18

## 2025-03-07 RX ADMIN — POLYETHYLENE GLYCOL 3350 17 G: 17 POWDER, FOR SOLUTION ORAL at 20:31

## 2025-03-07 RX ADMIN — SENNOSIDES AND DOCUSATE SODIUM 1 TABLET: 50; 8.6 TABLET ORAL at 09:17

## 2025-03-07 RX ADMIN — SODIUM BICARBONATE 1300 MG: 650 TABLET ORAL at 13:21

## 2025-03-07 ASSESSMENT — PAIN SCALES - GENERAL
PAINLEVEL_OUTOF10: 0
PAINLEVEL_OUTOF10: 0

## 2025-03-07 NOTE — CARE COORDINATION
Case Management   Daily Progress Note       Patient Name: Heri Steinberg                   YOB: 1975  Diagnosis: Syncope and collapse [R55]  Decreased level of consciousness [R41.89]  SIRS (systemic inflammatory response syndrome) (HCC) [R65.10]  Stercoral colitis [K52.89]                       GMLOS: 4.9 days  Length of Stay: 3  days    Anticipated Discharge Date: Two or more days before discharge    Readmission Risk (Low < 19, Mod (19-27), High > 27): Readmission Risk Score: 14.2      Patient Transitional Goal: Skilled nursing facility    Current Transitional Plan    [] Home Independently    [] Home with HC    [x] Skilled Nursing Facility    [] Acute Rehabilitation    [] Long Term Acute Care (LTAC)    [] Other:     Plan for the Stay (Medical Management) :    Patient has NGT with TF, awaiting if SNF is able to manage or if additional referrals will need to be sent.       Workflow Continuation (Additional Notes) :    1210: CM called and spoke with Jeri, maty for [facility]. CM confirmed patient is long term patient and CM requested if facility is able to accept patient back with NGT with TF. Jeri to speak with facility and return call if patient is able to return with the NGT with TF. Awaiting return call.     Nadine Gipson  March 7, 2025

## 2025-03-08 ENCOUNTER — APPOINTMENT (OUTPATIENT)
Dept: GENERAL RADIOLOGY | Age: 50
DRG: 720 | End: 2025-03-08
Payer: MEDICAID

## 2025-03-08 LAB
ALBUMIN SERPL-MCNC: 3.3 G/DL (ref 3.5–5.2)
ALBUMIN/GLOB SERPL: 1.2 {RATIO} (ref 1–2.5)
ALP SERPL-CCNC: 85 U/L (ref 40–129)
ALT SERPL-CCNC: 30 U/L (ref 10–50)
ANION GAP SERPL CALCULATED.3IONS-SCNC: 11 MMOL/L (ref 9–16)
AST SERPL-CCNC: 25 U/L (ref 10–50)
BILIRUB DIRECT SERPL-MCNC: 0.2 MG/DL (ref 0–0.2)
BILIRUB INDIRECT SERPL-MCNC: 0.1 MG/DL (ref 0–1)
BILIRUB SERPL-MCNC: 0.3 MG/DL (ref 0–1.2)
BUN SERPL-MCNC: 4 MG/DL (ref 6–20)
CALCIUM SERPL-MCNC: 8.4 MG/DL (ref 8.6–10.4)
CHLORIDE SERPL-SCNC: 105 MMOL/L (ref 98–107)
CO2 SERPL-SCNC: 20 MMOL/L (ref 20–31)
CREAT SERPL-MCNC: 0.5 MG/DL (ref 0.7–1.2)
CRP SERPL HS-MCNC: 128 MG/L (ref 0–5)
ERYTHROCYTE [DISTWIDTH] IN BLOOD BY AUTOMATED COUNT: 14.2 % (ref 11.8–14.4)
GFR, ESTIMATED: >90 ML/MIN/1.73M2
GLOBULIN SER CALC-MCNC: 2.8 G/DL
GLUCOSE BLD-MCNC: 121 MG/DL (ref 75–110)
GLUCOSE BLD-MCNC: 153 MG/DL (ref 75–110)
GLUCOSE SERPL-MCNC: 132 MG/DL (ref 74–99)
HCT VFR BLD AUTO: 39.7 % (ref 40.7–50.3)
HGB BLD-MCNC: 13.4 G/DL (ref 13–17)
MAGNESIUM SERPL-MCNC: 1.8 MG/DL (ref 1.6–2.6)
MCH RBC QN AUTO: 31 PG (ref 25.2–33.5)
MCHC RBC AUTO-ENTMCNC: 33.8 G/DL (ref 28.4–34.8)
MCV RBC AUTO: 91.9 FL (ref 82.6–102.9)
MRSA, DNA, NASAL: NEGATIVE
NRBC BLD-RTO: 0 PER 100 WBC
PHOSPHATE SERPL-MCNC: 1.3 MG/DL (ref 2.5–4.5)
PLATELET # BLD AUTO: ABNORMAL K/UL (ref 138–453)
PLATELET, FLUORESCENCE: 140 K/UL (ref 138–453)
POTASSIUM SERPL-SCNC: 3.6 MMOL/L (ref 3.7–5.3)
PROT SERPL-MCNC: 6.1 G/DL (ref 6.6–8.7)
RBC # BLD AUTO: 4.32 M/UL (ref 4.21–5.77)
SODIUM SERPL-SCNC: 136 MMOL/L (ref 136–145)
SPECIMEN DESCRIPTION: NORMAL
TRIGL SERPL-MCNC: 91 MG/DL
WBC OTHER # BLD: 9.9 K/UL (ref 3.5–11.3)

## 2025-03-08 PROCEDURE — 86140 C-REACTIVE PROTEIN: CPT

## 2025-03-08 PROCEDURE — 76937 US GUIDE VASCULAR ACCESS: CPT

## 2025-03-08 PROCEDURE — 6370000000 HC RX 637 (ALT 250 FOR IP): Performed by: INTERNAL MEDICINE

## 2025-03-08 PROCEDURE — 80048 BASIC METABOLIC PNL TOTAL CA: CPT

## 2025-03-08 PROCEDURE — 2500000003 HC RX 250 WO HCPCS: Performed by: STUDENT IN AN ORGANIZED HEALTH CARE EDUCATION/TRAINING PROGRAM

## 2025-03-08 PROCEDURE — 99232 SBSQ HOSP IP/OBS MODERATE 35: CPT | Performed by: STUDENT IN AN ORGANIZED HEALTH CARE EDUCATION/TRAINING PROGRAM

## 2025-03-08 PROCEDURE — 1200000000 HC SEMI PRIVATE

## 2025-03-08 PROCEDURE — 92611 MOTION FLUOROSCOPY/SWALLOW: CPT

## 2025-03-08 PROCEDURE — 84478 ASSAY OF TRIGLYCERIDES: CPT

## 2025-03-08 PROCEDURE — 36415 COLL VENOUS BLD VENIPUNCTURE: CPT

## 2025-03-08 PROCEDURE — 2500000003 HC RX 250 WO HCPCS: Performed by: INTERNAL MEDICINE

## 2025-03-08 PROCEDURE — 85027 COMPLETE CBC AUTOMATED: CPT

## 2025-03-08 PROCEDURE — 74230 X-RAY XM SWLNG FUNCJ C+: CPT

## 2025-03-08 PROCEDURE — 99232 SBSQ HOSP IP/OBS MODERATE 35: CPT | Performed by: INTERNAL MEDICINE

## 2025-03-08 PROCEDURE — 83735 ASSAY OF MAGNESIUM: CPT

## 2025-03-08 PROCEDURE — 02HV33Z INSERTION OF INFUSION DEVICE INTO SUPERIOR VENA CAVA, PERCUTANEOUS APPROACH: ICD-10-PCS | Performed by: INTERNAL MEDICINE

## 2025-03-08 PROCEDURE — 6360000002 HC RX W HCPCS: Performed by: INTERNAL MEDICINE

## 2025-03-08 PROCEDURE — 2580000003 HC RX 258: Performed by: INTERNAL MEDICINE

## 2025-03-08 PROCEDURE — 2580000003 HC RX 258: Performed by: STUDENT IN AN ORGANIZED HEALTH CARE EDUCATION/TRAINING PROGRAM

## 2025-03-08 PROCEDURE — 84100 ASSAY OF PHOSPHORUS: CPT

## 2025-03-08 PROCEDURE — 82947 ASSAY GLUCOSE BLOOD QUANT: CPT

## 2025-03-08 PROCEDURE — 36410 VNPNXR 3YR/> PHY/QHP DX/THER: CPT

## 2025-03-08 PROCEDURE — 80076 HEPATIC FUNCTION PANEL: CPT

## 2025-03-08 PROCEDURE — 85055 RETICULATED PLATELET ASSAY: CPT

## 2025-03-08 PROCEDURE — 6370000000 HC RX 637 (ALT 250 FOR IP): Performed by: STUDENT IN AN ORGANIZED HEALTH CARE EDUCATION/TRAINING PROGRAM

## 2025-03-08 RX ORDER — LIDOCAINE HYDROCHLORIDE 10 MG/ML
50 INJECTION, SOLUTION INFILTRATION; PERINEURAL ONCE
Status: DISCONTINUED | OUTPATIENT
Start: 2025-03-08 | End: 2025-03-11 | Stop reason: HOSPADM

## 2025-03-08 RX ORDER — SODIUM CHLORIDE 0.9 % (FLUSH) 0.9 %
5-40 SYRINGE (ML) INJECTION EVERY 12 HOURS SCHEDULED
Status: DISCONTINUED | OUTPATIENT
Start: 2025-03-08 | End: 2025-03-11 | Stop reason: HOSPADM

## 2025-03-08 RX ORDER — BACLOFEN 5 MG/1
5 TABLET ORAL 2 TIMES DAILY PRN
Status: DISCONTINUED | OUTPATIENT
Start: 2025-03-08 | End: 2025-03-11 | Stop reason: HOSPADM

## 2025-03-08 RX ORDER — SODIUM CHLORIDE 0.9 % (FLUSH) 0.9 %
5-40 SYRINGE (ML) INJECTION PRN
Status: DISCONTINUED | OUTPATIENT
Start: 2025-03-08 | End: 2025-03-11 | Stop reason: HOSPADM

## 2025-03-08 RX ORDER — SODIUM CHLORIDE 9 MG/ML
INJECTION, SOLUTION INTRAVENOUS PRN
Status: DISCONTINUED | OUTPATIENT
Start: 2025-03-08 | End: 2025-03-11 | Stop reason: HOSPADM

## 2025-03-08 RX ADMIN — BACLOFEN 5 MG: 5 TABLET ORAL at 08:59

## 2025-03-08 RX ADMIN — SODIUM BICARBONATE 1300 MG: 650 TABLET ORAL at 08:59

## 2025-03-08 RX ADMIN — SODIUM CHLORIDE, PRESERVATIVE FREE 10 ML: 5 INJECTION INTRAVENOUS at 20:27

## 2025-03-08 RX ADMIN — PIPERACILLIN AND TAZOBACTAM 3375 MG: 3; .375 INJECTION, POWDER, LYOPHILIZED, FOR SOLUTION INTRAVENOUS at 09:07

## 2025-03-08 RX ADMIN — POLYETHYLENE GLYCOL 3350 17 G: 17 POWDER, FOR SOLUTION ORAL at 20:25

## 2025-03-08 RX ADMIN — ENOXAPARIN SODIUM 40 MG: 100 INJECTION SUBCUTANEOUS at 08:58

## 2025-03-08 RX ADMIN — PRIMIDONE 250 MG: 250 TABLET ORAL at 20:25

## 2025-03-08 RX ADMIN — SODIUM CHLORIDE, PRESERVATIVE FREE 10 ML: 5 INJECTION INTRAVENOUS at 08:58

## 2025-03-08 RX ADMIN — POLYETHYLENE GLYCOL 3350 17 G: 17 POWDER, FOR SOLUTION ORAL at 13:52

## 2025-03-08 RX ADMIN — SENNOSIDES AND DOCUSATE SODIUM 1 TABLET: 50; 8.6 TABLET ORAL at 08:58

## 2025-03-08 RX ADMIN — DULOXETINE HYDROCHLORIDE 60 MG: 30 CAPSULE, DELAYED RELEASE ORAL at 20:26

## 2025-03-08 RX ADMIN — BACLOFEN 5 MG: 5 TABLET ORAL at 20:26

## 2025-03-08 RX ADMIN — POTASSIUM PHOSPHATE, MONOBASIC AND POTASSIUM PHOSPHATE, DIBASIC 20 MMOL: 224; 236 INJECTION, SOLUTION, CONCENTRATE INTRAVENOUS at 13:49

## 2025-03-08 RX ADMIN — SODIUM BICARBONATE 1300 MG: 650 TABLET ORAL at 20:25

## 2025-03-08 RX ADMIN — PRIMIDONE 250 MG: 250 TABLET ORAL at 09:00

## 2025-03-08 RX ADMIN — PIPERACILLIN AND TAZOBACTAM 3375 MG: 3; .375 INJECTION, POWDER, LYOPHILIZED, FOR SOLUTION INTRAVENOUS at 01:59

## 2025-03-08 RX ADMIN — PIPERACILLIN AND TAZOBACTAM 3375 MG: 3; .375 INJECTION, POWDER, LYOPHILIZED, FOR SOLUTION INTRAVENOUS at 18:29

## 2025-03-08 RX ADMIN — Medication 2000 UNITS: at 08:58

## 2025-03-08 NOTE — PROCEDURES
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  Past Surgical History:  has no past surgical history on file.       Type of Study: Repeat MBS      Patient Complaints/Reason for Referral:  Heri Steinberg was referred for a MBS to assess the efficiency of his/her swallow function, assess for aspiration, and to make recommendations regarding safe dietary consistencies, effective compensatory strategies, and safe eating environment.       Onset of problem:      pt presents with + severe oral and pharyngeal phase dysphagia. Pt with + spillover to pyriform with + penetration and + aspiration during the swallow with + wet semi-productive cough with honey thick and nectar thick liquids,  Recommend NPO with alternative means of nutrition.          Behavior/Cognition/Vision/Hearing:  Behavior/Cognition: Alert;Cooperative  Vision: Impaired    Impressions: pt presents with + severe oral and pharyngeal phase dysphagia. Pt with + spillover to pyriform/airway with + penetration and + gross aspiration before, during and after the swallow with + wet semi-productive cough with honey thick liquids and + aspiration after the swallow from moderate pharyngeal residual with Puree. Recommend NPO with alternative means

## 2025-03-08 NOTE — PROCEDURES
PROCEDURE NOTE  Date: 3/8/2025   Name: Heri Steinberg  YOB: 1975    Procedures    Midline catheter insertion note:     Reason for placement: IV Zosyn till 3/13/25 per ID  Device inserted - 22g 8cm midline  Ultrasound preassessment done. Target vessel is right brachial vein.   Vessel depth = 1.5 cm.   Vein measures .77 cm. CVR is 1.3 %. (Preffered area-based CVR is less than 20%).  Placed using ANTT fashion, US prepared with sterile probe cover.   Visualization of needle entry into vessel, 1 attempt using AST technique.   Total 8 cm inserted, 0cm external.   Lot # = [id number]  Expires = 9/30/25  Easy aspiration of dark non-pulsating blood. Flushes easily with 10ml of 0.9 NS.   Catheter secured with adhesive securement device.   Dressing placed - CHG-TSM.   Injection cap and swab cap applied.   No bleeding or hematoma noted.   Estimated blood loss = 0ml.   Device should have blood return, if no blood return assess for infiltration and/or call VAT for consult.     Instructions given on insertion and care.  RN aware no lab draws without a physician order, line is power injectable, keep clamped when not in use, pulsatile 10ml NS flush every 8 hours when not in use or after intermittent use with medication. Line ready for use.     Midline education:      [ x ] Post care line insertion was discussed with Family prior to procedure. Risks, benefits, alternatives, and reason for procedure were discussed and teaching was reinforced. An educational handout on post insertion line care and maintenance was left at bedside with patient. Family acknowledged understanding of information relayed.

## 2025-03-09 LAB
ALBUMIN SERPL-MCNC: 3.5 G/DL (ref 3.5–5.2)
ALBUMIN/GLOB SERPL: 1.2 {RATIO} (ref 1–2.5)
ALP SERPL-CCNC: 83 U/L (ref 40–129)
ALT SERPL-CCNC: 25 U/L (ref 10–50)
ANION GAP SERPL CALCULATED.3IONS-SCNC: 12 MMOL/L (ref 9–16)
AST SERPL-CCNC: 20 U/L (ref 10–50)
BILIRUB DIRECT SERPL-MCNC: 0.2 MG/DL (ref 0–0.2)
BILIRUB INDIRECT SERPL-MCNC: 0.1 MG/DL (ref 0–1)
BILIRUB SERPL-MCNC: 0.3 MG/DL (ref 0–1.2)
BUN SERPL-MCNC: 5 MG/DL (ref 6–20)
CALCIUM SERPL-MCNC: 8.8 MG/DL (ref 8.6–10.4)
CHLORIDE SERPL-SCNC: 107 MMOL/L (ref 98–107)
CO2 SERPL-SCNC: 23 MMOL/L (ref 20–31)
CREAT SERPL-MCNC: 0.6 MG/DL (ref 0.7–1.2)
GFR, ESTIMATED: >90 ML/MIN/1.73M2
GLOBULIN SER CALC-MCNC: 3 G/DL
GLUCOSE SERPL-MCNC: 155 MG/DL (ref 74–99)
MAGNESIUM SERPL-MCNC: 1.9 MG/DL (ref 1.6–2.6)
MICROORGANISM SPEC CULT: NORMAL
PHOSPHATE SERPL-MCNC: 1.4 MG/DL (ref 2.5–4.5)
POTASSIUM SERPL-SCNC: 3.7 MMOL/L (ref 3.7–5.3)
PROT SERPL-MCNC: 6.5 G/DL (ref 6.6–8.7)
SERVICE CMNT-IMP: NORMAL
SODIUM SERPL-SCNC: 142 MMOL/L (ref 136–145)
SPECIMEN DESCRIPTION: NORMAL

## 2025-03-09 PROCEDURE — 6370000000 HC RX 637 (ALT 250 FOR IP): Performed by: STUDENT IN AN ORGANIZED HEALTH CARE EDUCATION/TRAINING PROGRAM

## 2025-03-09 PROCEDURE — 6360000002 HC RX W HCPCS: Performed by: INTERNAL MEDICINE

## 2025-03-09 PROCEDURE — 6370000000 HC RX 637 (ALT 250 FOR IP): Performed by: INTERNAL MEDICINE

## 2025-03-09 PROCEDURE — 84100 ASSAY OF PHOSPHORUS: CPT

## 2025-03-09 PROCEDURE — 2500000003 HC RX 250 WO HCPCS: Performed by: INTERNAL MEDICINE

## 2025-03-09 PROCEDURE — 80076 HEPATIC FUNCTION PANEL: CPT

## 2025-03-09 PROCEDURE — 83735 ASSAY OF MAGNESIUM: CPT

## 2025-03-09 PROCEDURE — 36415 COLL VENOUS BLD VENIPUNCTURE: CPT

## 2025-03-09 PROCEDURE — 80048 BASIC METABOLIC PNL TOTAL CA: CPT

## 2025-03-09 PROCEDURE — 6360000002 HC RX W HCPCS: Performed by: STUDENT IN AN ORGANIZED HEALTH CARE EDUCATION/TRAINING PROGRAM

## 2025-03-09 PROCEDURE — 1200000000 HC SEMI PRIVATE

## 2025-03-09 PROCEDURE — 2580000003 HC RX 258: Performed by: INTERNAL MEDICINE

## 2025-03-09 PROCEDURE — 99232 SBSQ HOSP IP/OBS MODERATE 35: CPT | Performed by: STUDENT IN AN ORGANIZED HEALTH CARE EDUCATION/TRAINING PROGRAM

## 2025-03-09 RX ORDER — CLONAZEPAM 1 MG/1
0.5 TABLET ORAL 3 TIMES DAILY PRN
Qty: 11 TABLET | Refills: 0 | Status: SHIPPED | OUTPATIENT
Start: 2025-03-09 | End: 2025-03-16

## 2025-03-09 RX ORDER — BISACODYL 10 MG
10 SUPPOSITORY, RECTAL RECTAL DAILY PRN
Status: DISCONTINUED | OUTPATIENT
Start: 2025-03-09 | End: 2025-03-11 | Stop reason: HOSPADM

## 2025-03-09 RX ORDER — GLYCOPYRROLATE 0.2 MG/ML
0.1 INJECTION INTRAMUSCULAR; INTRAVENOUS ONCE
Status: COMPLETED | OUTPATIENT
Start: 2025-03-09 | End: 2025-03-09

## 2025-03-09 RX ADMIN — Medication 2000 UNITS: at 09:20

## 2025-03-09 RX ADMIN — SODIUM CHLORIDE, PRESERVATIVE FREE 10 ML: 5 INJECTION INTRAVENOUS at 09:25

## 2025-03-09 RX ADMIN — ENOXAPARIN SODIUM 40 MG: 100 INJECTION SUBCUTANEOUS at 09:20

## 2025-03-09 RX ADMIN — ACETAMINOPHEN 650 MG: 325 TABLET ORAL at 09:24

## 2025-03-09 RX ADMIN — BACLOFEN 5 MG: 5 TABLET ORAL at 09:20

## 2025-03-09 RX ADMIN — SODIUM BICARBONATE 1300 MG: 650 TABLET ORAL at 20:52

## 2025-03-09 RX ADMIN — GLYCOPYRROLATE 0.1 MG: 0.2 INJECTION INTRAMUSCULAR; INTRAVENOUS at 10:44

## 2025-03-09 RX ADMIN — POLYETHYLENE GLYCOL 3350 17 G: 17 POWDER, FOR SOLUTION ORAL at 09:21

## 2025-03-09 RX ADMIN — DULOXETINE HYDROCHLORIDE 60 MG: 30 CAPSULE, DELAYED RELEASE ORAL at 20:50

## 2025-03-09 RX ADMIN — SODIUM CHLORIDE, PRESERVATIVE FREE 10 ML: 5 INJECTION INTRAVENOUS at 20:52

## 2025-03-09 RX ADMIN — BISACODYL 10 MG: 10 SUPPOSITORY RECTAL at 09:25

## 2025-03-09 RX ADMIN — SENNOSIDES AND DOCUSATE SODIUM 1 TABLET: 50; 8.6 TABLET ORAL at 09:21

## 2025-03-09 RX ADMIN — PIPERACILLIN AND TAZOBACTAM 3375 MG: 3; .375 INJECTION, POWDER, LYOPHILIZED, FOR SOLUTION INTRAVENOUS at 03:44

## 2025-03-09 RX ADMIN — PRIMIDONE 250 MG: 250 TABLET ORAL at 20:51

## 2025-03-09 RX ADMIN — PIPERACILLIN AND TAZOBACTAM 3375 MG: 3; .375 INJECTION, POWDER, LYOPHILIZED, FOR SOLUTION INTRAVENOUS at 18:34

## 2025-03-09 RX ADMIN — PIPERACILLIN AND TAZOBACTAM 3375 MG: 3; .375 INJECTION, POWDER, LYOPHILIZED, FOR SOLUTION INTRAVENOUS at 10:48

## 2025-03-09 RX ADMIN — SODIUM BICARBONATE 1300 MG: 650 TABLET ORAL at 09:23

## 2025-03-09 RX ADMIN — PRIMIDONE 250 MG: 250 TABLET ORAL at 09:23

## 2025-03-09 RX ADMIN — POLYETHYLENE GLYCOL 3350 17 G: 17 POWDER, FOR SOLUTION ORAL at 20:51

## 2025-03-09 ASSESSMENT — PAIN SCALES - GENERAL
PAINLEVEL_OUTOF10: 0
PAINLEVEL_OUTOF10: 0

## 2025-03-09 NOTE — DISCHARGE SUMMARY
[facility]  Office: [phone]  Kranthi Correa DO, Tuan Garza DO, Hugo Peterson DO, Jose Bills DO, Georgi Wong MD, Yeni Mei MD, Carmina Tiwari MD, Chanell Maldonado MD,  Ethan Silva MD, Dom Perry MD, Ben Painter MD,  Catarina Garcia DO, Owen Hartmann MD, Chilango Chavez MD, Davonte Correa DO, Roseann Mitchell MD,  Brendan Li DO, Fabiola Mc MD, Evie Valderrama MD, Sol Casiano MD, Va Waite MD,  Demetris De La Vega MD, Sandor Leung MD, Megan Rodriguez MD, Olu Cortez MD, Carlos Walsh MD, Jeffry Luna MD, Enrrique Schaeffer DO, Luca Cabello MD, Catarina Kemp MD, Mohsin Reza, MD, Shirley Waterhouse, CNP,  Leonora Fairbanks CNP, Enrrique Bales, CNP,  Jesusita Tariq, DNP, Park Nassar, CNP, Ansley Robles, CNP, Morenita Duque, CNP, Corina Lora CNP, SULMA Henry-MICHAEL, Doreen King, CNP, Francheska Steinberg, CNP,  Almaz Garcia, CNP, Sarita Webber, CNP, Karen Diego, CNP,  Ludmila Gracia, CNS, Jolanta Oshea CNP, Jazmin Awan CNP,   Deisi White, CNP         [facility]   IN-PATIENT SERVICE   [facility]    Discharge Summary     Patient ID: Heri Steinberg  :  1975   MRN: [id number]     ACCOUNT:  [id number]   Patient's PCP: Jefry Jansen DO  Admit Date: 3/4/2025   Discharge Date: 3/9/2025     Length of Stay: 5  Code Status:  DNR-CCA  Admitting Physician: Ben Painter MD  Discharge Physician: Ben Painter MD     Active Discharge Diagnoses:     Hospital Problem Lists:  Principal Problem:    Syncope and collapse  Active Problems:    Multiple sclerosis (HCC)    Relapsing remitting multiple sclerosis (HCC)    Cerebellar ataxia (HCC)    Dysphagia    Decreased level of consciousness    Sepsis (HCC)    CRP elevated    Acute encephalopathy    SIRS (systemic inflammatory response syndrome) (HCC)  Resolved Problems:    * No resolved hospital problems. *      Admission Condition:  stable     Discharged

## 2025-03-10 LAB
ALBUMIN SERPL-MCNC: 3.2 G/DL (ref 3.5–5.2)
ALBUMIN/GLOB SERPL: 1.1 {RATIO} (ref 1–2.5)
ALP SERPL-CCNC: 76 U/L (ref 40–129)
ALT SERPL-CCNC: 22 U/L (ref 10–50)
ANION GAP SERPL CALCULATED.3IONS-SCNC: 11 MMOL/L (ref 9–16)
AST SERPL-CCNC: 22 U/L (ref 10–50)
BILIRUB DIRECT SERPL-MCNC: 0.1 MG/DL (ref 0–0.2)
BILIRUB INDIRECT SERPL-MCNC: 0.2 MG/DL (ref 0–1)
BILIRUB SERPL-MCNC: 0.3 MG/DL (ref 0–1.2)
BUN SERPL-MCNC: 8 MG/DL (ref 6–20)
CALCIUM SERPL-MCNC: 8.7 MG/DL (ref 8.6–10.4)
CHLORIDE SERPL-SCNC: 105 MMOL/L (ref 98–107)
CO2 SERPL-SCNC: 27 MMOL/L (ref 20–31)
CREAT SERPL-MCNC: 0.4 MG/DL (ref 0.7–1.2)
CRP SERPL HS-MCNC: 123 MG/L (ref 0–5)
GFR, ESTIMATED: >90 ML/MIN/1.73M2
GLOBULIN SER CALC-MCNC: 2.9 G/DL
GLUCOSE SERPL-MCNC: 105 MG/DL (ref 74–99)
MAGNESIUM SERPL-MCNC: 2 MG/DL (ref 1.6–2.6)
PHOSPHATE SERPL-MCNC: 2.5 MG/DL (ref 2.5–4.5)
POTASSIUM SERPL-SCNC: 3.3 MMOL/L (ref 3.7–5.3)
PROT SERPL-MCNC: 6.1 G/DL (ref 6.6–8.7)
SODIUM SERPL-SCNC: 143 MMOL/L (ref 136–145)

## 2025-03-10 PROCEDURE — 1200000000 HC SEMI PRIVATE

## 2025-03-10 PROCEDURE — 6370000000 HC RX 637 (ALT 250 FOR IP): Performed by: INTERNAL MEDICINE

## 2025-03-10 PROCEDURE — 6360000002 HC RX W HCPCS: Performed by: INTERNAL MEDICINE

## 2025-03-10 PROCEDURE — 99232 SBSQ HOSP IP/OBS MODERATE 35: CPT | Performed by: INTERNAL MEDICINE

## 2025-03-10 PROCEDURE — 2500000003 HC RX 250 WO HCPCS: Performed by: INTERNAL MEDICINE

## 2025-03-10 PROCEDURE — 83735 ASSAY OF MAGNESIUM: CPT

## 2025-03-10 PROCEDURE — 80048 BASIC METABOLIC PNL TOTAL CA: CPT

## 2025-03-10 PROCEDURE — 80076 HEPATIC FUNCTION PANEL: CPT

## 2025-03-10 PROCEDURE — 84100 ASSAY OF PHOSPHORUS: CPT

## 2025-03-10 PROCEDURE — 86140 C-REACTIVE PROTEIN: CPT

## 2025-03-10 PROCEDURE — 2580000003 HC RX 258: Performed by: INTERNAL MEDICINE

## 2025-03-10 PROCEDURE — 36415 COLL VENOUS BLD VENIPUNCTURE: CPT

## 2025-03-10 PROCEDURE — 99232 SBSQ HOSP IP/OBS MODERATE 35: CPT | Performed by: STUDENT IN AN ORGANIZED HEALTH CARE EDUCATION/TRAINING PROGRAM

## 2025-03-10 PROCEDURE — 6370000000 HC RX 637 (ALT 250 FOR IP): Performed by: STUDENT IN AN ORGANIZED HEALTH CARE EDUCATION/TRAINING PROGRAM

## 2025-03-10 RX ADMIN — SODIUM CHLORIDE, PRESERVATIVE FREE 10 ML: 5 INJECTION INTRAVENOUS at 21:14

## 2025-03-10 RX ADMIN — SODIUM CHLORIDE, PRESERVATIVE FREE 10 ML: 5 INJECTION INTRAVENOUS at 08:28

## 2025-03-10 RX ADMIN — SODIUM CHLORIDE: 0.9 INJECTION, SOLUTION INTRAVENOUS at 02:26

## 2025-03-10 RX ADMIN — SODIUM BICARBONATE 1300 MG: 650 TABLET ORAL at 08:28

## 2025-03-10 RX ADMIN — ENOXAPARIN SODIUM 40 MG: 100 INJECTION SUBCUTANEOUS at 08:28

## 2025-03-10 RX ADMIN — PIPERACILLIN AND TAZOBACTAM 3375 MG: 3; .375 INJECTION, POWDER, LYOPHILIZED, FOR SOLUTION INTRAVENOUS at 10:29

## 2025-03-10 RX ADMIN — BACLOFEN 5 MG: 5 TABLET ORAL at 08:28

## 2025-03-10 RX ADMIN — PIPERACILLIN AND TAZOBACTAM 3375 MG: 3; .375 INJECTION, POWDER, LYOPHILIZED, FOR SOLUTION INTRAVENOUS at 02:26

## 2025-03-10 RX ADMIN — PRIMIDONE 250 MG: 250 TABLET ORAL at 21:55

## 2025-03-10 RX ADMIN — DULOXETINE HYDROCHLORIDE 60 MG: 30 CAPSULE, DELAYED RELEASE ORAL at 21:55

## 2025-03-10 RX ADMIN — POLYETHYLENE GLYCOL 3350 17 G: 17 POWDER, FOR SOLUTION ORAL at 21:17

## 2025-03-10 RX ADMIN — SODIUM CHLORIDE, PRESERVATIVE FREE 10 ML: 5 INJECTION INTRAVENOUS at 21:13

## 2025-03-10 RX ADMIN — SODIUM BICARBONATE 1300 MG: 650 TABLET ORAL at 21:55

## 2025-03-10 RX ADMIN — Medication 2000 UNITS: at 08:28

## 2025-03-10 RX ADMIN — PIPERACILLIN AND TAZOBACTAM 3375 MG: 3; .375 INJECTION, POWDER, LYOPHILIZED, FOR SOLUTION INTRAVENOUS at 18:19

## 2025-03-10 RX ADMIN — PRIMIDONE 250 MG: 250 TABLET ORAL at 08:28

## 2025-03-10 ASSESSMENT — PAIN SCALES - GENERAL: PAINLEVEL_OUTOF10: 0

## 2025-03-10 NOTE — CARE COORDINATION
Case Management   Daily Progress Note       Patient Name: Heri Steinberg                   YOB: 1975  Diagnosis: Syncope and collapse [R55]  Decreased level of consciousness [R41.89]  SIRS (systemic inflammatory response syndrome) (HCC) [R65.10]  Stercoral colitis [K52.89]                       GMLOS: 4.9 days  Length of Stay: 6  days    Anticipated Discharge Date: Ready for discharge    Readmission Risk (Low < 19, Mod (19-27), High > 27): Readmission Risk Score: 14.1      Patient Transitional Goal: Skilled nursing facility    Current Transitional Plan    [] Home Independently    [] Home with HC    [x] Skilled Nursing Facility    [] Acute Rehabilitation    [] Long Term Acute Care (LTAC)    [] Other:     Plan for the Stay (Medical Management) :    Plan for [facility], awaiting return call if able to accept back today.       Workflow Continuation (Additional Notes) :    0853: CM called and left voicemail for Jeri agent for [facility]. CM updated Jeri that patient no longer has the NGT and is now on a Moist/Minced diet. CM also notified her the plan is for IV Zoysn until 3/13 via a Midline. Patient is ready for D/C today and need to clarify if able to accept back today. Awaiting return call.     0858: CM received phone call from maty Wilcox for [facility]. Jeri reports due to insurance a new Auth will be needed to return Long Term. Jeri is submitting, awaiting approval.     Nadine Gipson  March 10, 2025

## 2025-03-11 VITALS
OXYGEN SATURATION: 98 % | HEIGHT: 72 IN | DIASTOLIC BLOOD PRESSURE: 104 MMHG | TEMPERATURE: 97.9 F | HEART RATE: 96 BPM | BODY MASS INDEX: 19.92 KG/M2 | RESPIRATION RATE: 19 BRPM | SYSTOLIC BLOOD PRESSURE: 134 MMHG | WEIGHT: 147.05 LBS

## 2025-03-11 LAB
ANION GAP SERPL CALCULATED.3IONS-SCNC: 10 MMOL/L (ref 9–16)
BASOPHILS # BLD: 0.03 K/UL (ref 0–0.2)
BASOPHILS NFR BLD: 0 % (ref 0–2)
BUN SERPL-MCNC: 8 MG/DL (ref 6–20)
CALCIUM SERPL-MCNC: 8.7 MG/DL (ref 8.6–10.4)
CHLORIDE SERPL-SCNC: 105 MMOL/L (ref 98–107)
CO2 SERPL-SCNC: 25 MMOL/L (ref 20–31)
CREAT SERPL-MCNC: 0.5 MG/DL (ref 0.7–1.2)
CRP SERPL HS-MCNC: 75.8 MG/L (ref 0–5)
EOSINOPHIL # BLD: 0.14 K/UL (ref 0–0.44)
EOSINOPHILS RELATIVE PERCENT: 2 % (ref 1–4)
ERYTHROCYTE [DISTWIDTH] IN BLOOD BY AUTOMATED COUNT: 14.7 % (ref 11.8–14.4)
GFR, ESTIMATED: >90 ML/MIN/1.73M2
GLUCOSE SERPL-MCNC: 101 MG/DL (ref 74–99)
HCT VFR BLD AUTO: 36.2 % (ref 40.7–50.3)
HGB BLD-MCNC: 11.8 G/DL (ref 13–17)
IMM GRANULOCYTES # BLD AUTO: 0.1 K/UL (ref 0–0.3)
IMM GRANULOCYTES NFR BLD: 1 %
LYMPHOCYTES NFR BLD: 3.07 K/UL (ref 1.1–3.7)
LYMPHOCYTES RELATIVE PERCENT: 42 % (ref 24–43)
MAGNESIUM SERPL-MCNC: 2.1 MG/DL (ref 1.6–2.6)
MCH RBC QN AUTO: 31.1 PG (ref 25.2–33.5)
MCHC RBC AUTO-ENTMCNC: 32.6 G/DL (ref 28.4–34.8)
MCV RBC AUTO: 95.3 FL (ref 82.6–102.9)
MONOCYTES NFR BLD: 0.83 K/UL (ref 0.1–1.2)
MONOCYTES NFR BLD: 11 % (ref 3–12)
NEUTROPHILS NFR BLD: 44 % (ref 36–65)
NEUTS SEG NFR BLD: 3.21 K/UL (ref 1.5–8.1)
NRBC BLD-RTO: 0 PER 100 WBC
PHOSPHATE SERPL-MCNC: 2.7 MG/DL (ref 2.5–4.5)
PLATELET # BLD AUTO: 297 K/UL (ref 138–453)
PMV BLD AUTO: 10.8 FL (ref 8.1–13.5)
POTASSIUM SERPL-SCNC: 3.4 MMOL/L (ref 3.7–5.3)
RBC # BLD AUTO: 3.8 M/UL (ref 4.21–5.77)
RBC # BLD: ABNORMAL 10*6/UL
SODIUM SERPL-SCNC: 140 MMOL/L (ref 136–145)
WBC OTHER # BLD: 7.4 K/UL (ref 3.5–11.3)

## 2025-03-11 PROCEDURE — 83735 ASSAY OF MAGNESIUM: CPT

## 2025-03-11 PROCEDURE — 6360000002 HC RX W HCPCS: Performed by: INTERNAL MEDICINE

## 2025-03-11 PROCEDURE — 36415 COLL VENOUS BLD VENIPUNCTURE: CPT

## 2025-03-11 PROCEDURE — 85025 COMPLETE CBC W/AUTO DIFF WBC: CPT

## 2025-03-11 PROCEDURE — 86140 C-REACTIVE PROTEIN: CPT

## 2025-03-11 PROCEDURE — 84100 ASSAY OF PHOSPHORUS: CPT

## 2025-03-11 PROCEDURE — 2580000003 HC RX 258: Performed by: INTERNAL MEDICINE

## 2025-03-11 PROCEDURE — 6370000000 HC RX 637 (ALT 250 FOR IP): Performed by: INTERNAL MEDICINE

## 2025-03-11 PROCEDURE — 6370000000 HC RX 637 (ALT 250 FOR IP): Performed by: STUDENT IN AN ORGANIZED HEALTH CARE EDUCATION/TRAINING PROGRAM

## 2025-03-11 PROCEDURE — 99232 SBSQ HOSP IP/OBS MODERATE 35: CPT | Performed by: STUDENT IN AN ORGANIZED HEALTH CARE EDUCATION/TRAINING PROGRAM

## 2025-03-11 PROCEDURE — 2500000003 HC RX 250 WO HCPCS: Performed by: INTERNAL MEDICINE

## 2025-03-11 PROCEDURE — 80048 BASIC METABOLIC PNL TOTAL CA: CPT

## 2025-03-11 RX ADMIN — Medication 2000 UNITS: at 08:12

## 2025-03-11 RX ADMIN — SODIUM CHLORIDE, PRESERVATIVE FREE 10 ML: 5 INJECTION INTRAVENOUS at 08:12

## 2025-03-11 RX ADMIN — SENNOSIDES AND DOCUSATE SODIUM 1 TABLET: 50; 8.6 TABLET ORAL at 08:11

## 2025-03-11 RX ADMIN — SODIUM BICARBONATE 1300 MG: 650 TABLET ORAL at 08:12

## 2025-03-11 RX ADMIN — PIPERACILLIN AND TAZOBACTAM 3375 MG: 3; .375 INJECTION, POWDER, LYOPHILIZED, FOR SOLUTION INTRAVENOUS at 02:17

## 2025-03-11 RX ADMIN — ENOXAPARIN SODIUM 40 MG: 100 INJECTION SUBCUTANEOUS at 08:12

## 2025-03-11 RX ADMIN — PIPERACILLIN AND TAZOBACTAM 3375 MG: 3; .375 INJECTION, POWDER, LYOPHILIZED, FOR SOLUTION INTRAVENOUS at 10:31

## 2025-03-11 RX ADMIN — PRIMIDONE 250 MG: 250 TABLET ORAL at 08:11

## 2025-03-11 RX ADMIN — POTASSIUM CHLORIDE 40 MEQ: 1500 TABLET, EXTENDED RELEASE ORAL at 11:06

## 2025-03-11 ASSESSMENT — PAIN SCALES - GENERAL: PAINLEVEL_OUTOF10: 0

## 2025-03-11 NOTE — CARE COORDINATION
Case Management   Daily Progress Note       Patient Name: Heri Steinberg                   YOB: 1975  Diagnosis: Syncope and collapse [R55]  Decreased level of consciousness [R41.89]  SIRS (systemic inflammatory response syndrome) (HCC) [R65.10]  Stercoral colitis [K52.89]                       GMLOS: 4.9 days  Length of Stay: 7  days    Anticipated Discharge Date: Ready for discharge    Readmission Risk (Low < 19, Mod (19-27), High > 27): Readmission Risk Score: 15.2      Patient Transitional Goal: Skilled nursing facility    Current Transitional Plan    [] Home Independently    [] Home with HC    [x] Skilled Nursing Facility    [] Acute Rehabilitation    [] Long Term Acute Care (LTAC)    [] Other:     Plan for the Stay (Medical Management) :          Workflow Continuation (Additional Notes) :    1041: CM received voicemail from maty Wilcox for [facility]. Insurance authorization has been received. Call Back number is [phone]    1110: CM called and spoke with maty Wilcox for [facility]. CM confirmed insurance authorization approval has been received and facility can accept today. CM to return call once transport time is confirmed.     1249: CM called and spoke with maty Wilcox for [facility] and updated transport time is schedule for 1500. Will call with any changes.     1259: CM spoke with patient's POA, Desaray at patient's bedside and updated plan if for transport today at 1500 to [facility].    1301: CM faxed CLINT to [facility].         Nadine Gipson  March 11, 2025

## 2025-03-11 NOTE — DISCHARGE SUMMARY
[facility]  Office: [phone]  Kranthi Correa DO, Tuan Garza DO, Hugo Peterson DO, Jose Bills DO, Georgi Wong MD, Yeni Mei MD, Carmina Tiwari MD, Chanell Maldonado MD,  Ethan Silva MD, Dom Perry MD, Ben Painter MD,  Catarina Garcia DO, Owen Hartmann MD, Chilango Chavez MD, Davonte Correa DO, Roseann Mitchell MD,  Brendan Li DO, Fabiola Mc MD, Evie Valderrama MD, Sol Casiano MD, Va Waite MD,  Demetris De La Vega MD, Sandor Leung MD, Megan Rodriguez MD, Olu Cortez MD, Carlos Walsh MD, Jeffry Luna MD, Enrrique Schaeffer DO, Luca Cabello MD, Catarina Kemp MD, Mohsin Reza, MD, Shirley Waterhouse, CNP,  Leonora Fairbanks CNP, Enrrique Bales, CNP,  Jesusita Tariq, JORDYN, Park Nassar, CNP, Ansley Robles, CNP, Morenita Duque, CNP, Corina Lora CNP, SULMA Henry-MICHAEL, Doreen King, CNP, Francheska Steinberg, CNP,  Almaz Garcia, CNP, Sarita Webber, CNP, Karen Diego, CNP,  Ludmila Gracia, CNS, Jolanta Oshea CNP, Jazmin Awan CNP,   Deisi White, CNP         [facility]   IN-PATIENT SERVICE   [facility]    Discharge Summary     Patient ID: Heri Steinberg  :  1975   MRN: [id number]     ACCOUNT:  [id number]   Patient's PCP: Jefry Jansen DO  Admit Date: 3/4/2025   Discharge Date: 3/11/2025 ***    Length of Stay: 7  Code Status:  DNR-CCA  Admitting Physician: Ben Painter MD  Discharge Physician: Va Waite MD     Active Discharge Diagnoses:     Hospital Problem Lists:  Principal Problem:    Syncope and collapse  Active Problems:    Multiple sclerosis (HCC)    Relapsing remitting multiple sclerosis (HCC)    Cerebellar ataxia (HCC)    Dysphagia    Decreased level of consciousness    Sepsis (HCC)    CRP elevated    Acute encephalopathy    SIRS (systemic inflammatory response syndrome) (HCC)  Resolved Problems:    * No resolved hospital problems. *      Admission Condition:

## 2025-03-11 NOTE — PLAN OF CARE
Problem: Pain  Goal: Verbalizes/displays adequate comfort level or baseline comfort level  3/11/2025 0352 by Fredy Alonso RN  Outcome: Progressing  Flowsheets  Taken 3/10/2025 2330  Verbalizes/displays adequate comfort level or baseline comfort level: Encourage patient to monitor pain and request assistance  Taken 3/10/2025 1933  Verbalizes/displays adequate comfort level or baseline comfort level: Encourage patient to monitor pain and request assistance  3/10/2025 1621 by Marie Mosqueda RN  Outcome: Adequate for Discharge  Flowsheets (Taken 3/10/2025 0310 by Elizabeth Washington RN)  Verbalizes/displays adequate comfort level or baseline comfort level: Encourage patient to monitor pain and request assistance     Problem: Respiratory - Adult  Goal: Achieves optimal ventilation and oxygenation  3/11/2025 0352 by Fredy Alonso RN  Outcome: Progressing  3/10/2025 1621 by Marie Mosqueda RN  Outcome: Adequate for Discharge     Problem: Skin/Tissue Integrity - Adult  Goal: Skin integrity remains intact  Description: 1.  Monitor for areas of redness and/or skin breakdown  2.  Assess vascular access sites hourly  3.  Every 4-6 hours minimum:  Change oxygen saturation probe site  4.  Every 4-6 hours:  If on nasal continuous positive airway pressure, respiratory therapy assess nares and determine need for appliance change or resting period  3/11/2025 0352 by Fredy Alonso RN  Outcome: Progressing  3/10/2025 1621 by Maire Mosqueda RN  Outcome: Adequate for Discharge     Problem: Safety - Adult  Goal: Free from fall injury  3/11/2025 0352 by Fredy Alonso RN  Outcome: Progressing  3/10/2025 1621 by Marie Mosqueda RN  Outcome: Adequate for Discharge     Problem: Discharge Planning  Goal: Discharge to home or other facility with appropriate resources  3/11/2025 0352 by Fredy Alonso RN  Outcome: Progressing  3/10/2025 1621 by Marie Mosqueda RN  Outcome: Adequate for Discharge     Problem: 
  Problem: Pain  Goal: Verbalizes/displays adequate comfort level or baseline comfort level  3/7/2025 0817 by Nato Quevedo RN  Outcome: Progressing  3/6/2025 2036 by Mallory Colmenares RN  Outcome: Progressing     Problem: Respiratory - Adult  Goal: Achieves optimal ventilation and oxygenation  3/7/2025 0817 by Nato Quevedo RN  Outcome: Progressing  3/6/2025 2036 by Mallory Colmenares RN  Outcome: Progressing  Flowsheets (Taken 3/6/2025 2000 by Nato Quevedo RN)  Achieves optimal ventilation and oxygenation:   Assess for changes in respiratory status   Assess for changes in mentation and behavior   Position to facilitate oxygenation and minimize respiratory effort     Problem: Skin/Tissue Integrity - Adult  Goal: Skin integrity remains intact  Description: 1.  Monitor for areas of redness and/or skin breakdown  2.  Assess vascular access sites hourly  3.  Every 4-6 hours minimum:  Change oxygen saturation probe site  4.  Every 4-6 hours:  If on nasal continuous positive airway pressure, respiratory therapy assess nares and determine need for appliance change or resting period  3/7/2025 0817 by Nato Quevedo RN  Outcome: Progressing  Flowsheets (Taken 3/7/2025 0809)  Skin Integrity Remains Intact:   Monitor for areas of redness and/or skin breakdown   Assess vascular access sites hourly  3/6/2025 2036 by Mallory Colmenares RN  Outcome: Progressing  Flowsheets  Taken 3/6/2025 2000 by Nato Quevedo RN  Skin Integrity Remains Intact:   Monitor for areas of redness and/or skin breakdown   Assess vascular access sites hourly  Taken 3/6/2025 0840 by Mallory Colmenares RN  Skin Integrity Remains Intact:   Monitor for areas of redness and/or skin breakdown   Assess vascular access sites hourly   Every 4-6 hours minimum: Change oxygen saturation probe site   Every 4-6 hours: If on nasal continuous positive airway pressure, respiratory therapy assesses nares and determine need for appliance change or resting period   
  Problem: Pain  Goal: Verbalizes/displays adequate comfort level or baseline comfort level  3/7/2025 1454 by Cristina Reed RN  Outcome: Progressing  Flowsheets (Taken 3/7/2025 1310)  Verbalizes/displays adequate comfort level or baseline comfort level: Encourage patient to monitor pain and request assistance  3/7/2025 0817 by Nato Quevedo RN  Outcome: Progressing     Problem: Respiratory - Adult  Goal: Achieves optimal ventilation and oxygenation  3/7/2025 1454 by Cristina Reed RN  Outcome: Progressing  3/7/2025 0817 by Nato Quevedo RN  Outcome: Progressing  Flowsheets (Taken 3/7/2025 0800 by Cristina Reed RN)  Achieves optimal ventilation and oxygenation: Assess for changes in respiratory status     Problem: Skin/Tissue Integrity - Adult  Goal: Skin integrity remains intact  3/7/2025 1454 by Cristina Reed RN  Outcome: Progressing  3/7/2025 0817 by Nato Quevedo RN  Outcome: Progressing  Flowsheets  Taken 3/7/2025 0809 by Nato Quevedo RN  Skin Integrity Remains Intact:   Monitor for areas of redness and/or skin breakdown   Assess vascular access sites hourly  Taken 3/7/2025 0800 by Cristina Reed RN  Skin Integrity Remains Intact: Monitor for areas of redness and/or skin breakdown     Problem: Safety - Adult  Goal: Free from fall injury  3/7/2025 1454 by Cristina Reed RN  Outcome: Progressing  3/7/2025 0817 by Nato Quevedo RN  Outcome: Progressing  Flowsheets (Taken 3/7/2025 0809)  Free From Fall Injury:   Instruct family/caregiver on patient safety   Based on caregiver fall risk screen, instruct family/caregiver to ask for assistance with transferring infant if caregiver noted to have fall risk factors     Problem: Discharge Planning  Goal: Discharge to home or other facility with appropriate resources  3/7/2025 1454 by Cristina Reed RN  Outcome: Progressing  3/7/2025 0817 by Nato Quevedo RN  Outcome: Progressing  Flowsheets (Taken 3/7/2025 0800 by Cristina Reed 
  Problem: Pain  Goal: Verbalizes/displays adequate comfort level or baseline comfort level  3/7/2025 2000 by Elizabeth Washington RN  Outcome: Progressing  3/7/2025 1454 by Cristina Reed RN  Outcome: Progressing  Flowsheets (Taken 3/7/2025 1310)  Verbalizes/displays adequate comfort level or baseline comfort level: Encourage patient to monitor pain and request assistance  3/7/2025 0817 by Nato Quevedo RN  Outcome: Progressing     Problem: Respiratory - Adult  Goal: Achieves optimal ventilation and oxygenation  3/7/2025 2000 by Elizabeth Washington RN  Outcome: Progressing  3/7/2025 1454 by Cristina Reed RN  Outcome: Progressing  3/7/2025 0817 by Nato Quevedo RN  Outcome: Progressing  Flowsheets (Taken 3/7/2025 0800 by Cristina Reed RN)  Achieves optimal ventilation and oxygenation: Assess for changes in respiratory status     Problem: Skin/Tissue Integrity - Adult  Goal: Skin integrity remains intact  Description: 1.  Monitor for areas of redness and/or skin breakdown  2.  Assess vascular access sites hourly  3.  Every 4-6 hours minimum:  Change oxygen saturation probe site  4.  Every 4-6 hours:  If on nasal continuous positive airway pressure, respiratory therapy assess nares and determine need for appliance change or resting period  3/7/2025 2000 by Elizabeth Washington RN  Outcome: Progressing  3/7/2025 1454 by Cristina Reed RN  Outcome: Progressing  3/7/2025 0817 by Nato Quevedo RN  Outcome: Progressing  Flowsheets  Taken 3/7/2025 0809 by Nato Quevedo RN  Skin Integrity Remains Intact:   Monitor for areas of redness and/or skin breakdown   Assess vascular access sites hourly  Taken 3/7/2025 0800 by Cristina Reed RN  Skin Integrity Remains Intact: Monitor for areas of redness and/or skin breakdown     Problem: Safety - Adult  Goal: Free from fall injury  3/7/2025 2000 by Elizabeth Washington RN  Outcome: Progressing  3/7/2025 1454 by Cristina Reed RN  Outcome: 
  Problem: Pain  Goal: Verbalizes/displays adequate comfort level or baseline comfort level  3/8/2025 1951 by Elizabeth Washington RN  Outcome: Progressing  3/8/2025 1853 by Elizabeth Zhong RN  Outcome: Progressing     Problem: Respiratory - Adult  Goal: Achieves optimal ventilation and oxygenation  3/8/2025 1951 by Elizabeth Washington RN  Outcome: Progressing  3/8/2025 1853 by Elizabeth Zhong RN  Outcome: Progressing     Problem: Skin/Tissue Integrity - Adult  Goal: Skin integrity remains intact  Description: 1.  Monitor for areas of redness and/or skin breakdown  2.  Assess vascular access sites hourly  3.  Every 4-6 hours minimum:  Change oxygen saturation probe site  4.  Every 4-6 hours:  If on nasal continuous positive airway pressure, respiratory therapy assess nares and determine need for appliance change or resting period  3/8/2025 1951 by Elizabeth Washington RN  Outcome: Progressing  3/8/2025 1853 by Elizabeth Zhong RN  Outcome: Progressing     Problem: Safety - Adult  Goal: Free from fall injury  3/8/2025 1951 by Elizabeth Washington RN  Outcome: Progressing  3/8/2025 1853 by Elizabeth Zhong RN  Outcome: Progressing     Problem: Discharge Planning  Goal: Discharge to home or other facility with appropriate resources  3/8/2025 1951 by Elizabeth Washington RN  Outcome: Progressing  3/8/2025 1853 by Elizabeth Zhong RN  Outcome: Progressing     Problem: ABCDS Injury Assessment  Goal: Absence of physical injury  3/8/2025 1951 by Elizabeth Washington RN  Outcome: Progressing  3/8/2025 1853 by Elizabeth Zhong RN  Outcome: Progressing     Problem: Skin/Tissue Integrity  Goal: Skin integrity remains intact  Description: 1.  Monitor for areas of redness and/or skin breakdown  2.  Assess vascular access sites hourly  3.  Every 4-6 hours minimum:  Change oxygen saturation probe site  4.  Every 4-6 hours:  If on nasal continuous positive airway pressure, respiratory therapy assess nares and determine need for 
  Problem: Pain  Goal: Verbalizes/displays adequate comfort level or baseline comfort level  Outcome: Adequate for Discharge  Flowsheets (Taken 3/10/2025 0310 by Elizabeth Washington RN)  Verbalizes/displays adequate comfort level or baseline comfort level: Encourage patient to monitor pain and request assistance     Problem: Respiratory - Adult  Goal: Achieves optimal ventilation and oxygenation  Outcome: Adequate for Discharge     Problem: Skin/Tissue Integrity - Adult  Goal: Skin integrity remains intact  Description: 1.  Monitor for areas of redness and/or skin breakdown  2.  Assess vascular access sites hourly  3.  Every 4-6 hours minimum:  Change oxygen saturation probe site  4.  Every 4-6 hours:  If on nasal continuous positive airway pressure, respiratory therapy assess nares and determine need for appliance change or resting period  Outcome: Adequate for Discharge     Problem: Neurosensory - Adult  Goal: Achieves stable or improved neurological status  Outcome: Adequate for Discharge  Goal: Absence of seizures  Outcome: Adequate for Discharge  Goal: Remains free of injury related to seizures activity  Outcome: Adequate for Discharge  Goal: Achieves maximal functionality and self care  Outcome: Adequate for Discharge     Problem: Cardiovascular - Adult  Goal: Maintains optimal cardiac output and hemodynamic stability  Outcome: Adequate for Discharge  Goal: Absence of cardiac dysrhythmias or at baseline  Outcome: Adequate for Discharge     Problem: Musculoskeletal - Adult  Goal: Return mobility to safest level of function  Outcome: Adequate for Discharge  Goal: Maintain proper alignment of affected body part  Outcome: Adequate for Discharge  Goal: Return ADL status to a safe level of function  Outcome: Adequate for Discharge     Problem: Gastrointestinal - Adult  Goal: Minimal or absence of nausea and vomiting  Outcome: Adequate for Discharge  Goal: Maintains or returns to baseline bowel function  Outcome: 
  Problem: Pain  Goal: Verbalizes/displays adequate comfort level or baseline comfort level  Outcome: Adequate for Discharge  Flowsheets (Taken 3/9/2025 8819 by Elizabeth Washington RN)  Verbalizes/displays adequate comfort level or baseline comfort level: Encourage patient to monitor pain and request assistance     Problem: Respiratory - Adult  Goal: Achieves optimal ventilation and oxygenation  Outcome: Adequate for Discharge     Problem: Skin/Tissue Integrity - Adult  Goal: Skin integrity remains intact  Description: 1.  Monitor for areas of redness and/or skin breakdown  2.  Assess vascular access sites hourly  3.  Every 4-6 hours minimum:  Change oxygen saturation probe site  4.  Every 4-6 hours:  If on nasal continuous positive airway pressure, respiratory therapy assess nares and determine need for appliance change or resting period  Outcome: Adequate for Discharge     Problem: Neurosensory - Adult  Goal: Achieves stable or improved neurological status  Outcome: Adequate for Discharge  Goal: Absence of seizures  Outcome: Adequate for Discharge  Goal: Remains free of injury related to seizures activity  Outcome: Adequate for Discharge  Goal: Achieves maximal functionality and self care  Outcome: Adequate for Discharge     Problem: Cardiovascular - Adult  Goal: Maintains optimal cardiac output and hemodynamic stability  Outcome: Adequate for Discharge  Goal: Absence of cardiac dysrhythmias or at baseline  Outcome: Adequate for Discharge     Problem: Musculoskeletal - Adult  Goal: Return mobility to safest level of function  Outcome: Adequate for Discharge  Goal: Maintain proper alignment of affected body part  Outcome: Adequate for Discharge  Goal: Return ADL status to a safe level of function  Outcome: Adequate for Discharge     Problem: Gastrointestinal - Adult  Goal: Minimal or absence of nausea and vomiting  Outcome: Adequate for Discharge  Goal: Maintains or returns to baseline bowel function  Outcome: 
  Problem: Pain  Goal: Verbalizes/displays adequate comfort level or baseline comfort level  Outcome: Progressing     Problem: Respiratory - Adult  Goal: Achieves optimal ventilation and oxygenation  Outcome: Progressing     Problem: Skin/Tissue Integrity - Adult  Goal: Skin integrity remains intact  Description: 1.  Monitor for areas of redness and/or skin breakdown  2.  Assess vascular access sites hourly  3.  Every 4-6 hours minimum:  Change oxygen saturation probe site  4.  Every 4-6 hours:  If on nasal continuous positive airway pressure, respiratory therapy assess nares and determine need for appliance change or resting period  Outcome: Progressing  Flowsheets (Taken 3/6/2025 0840)  Skin Integrity Remains Intact:   Monitor for areas of redness and/or skin breakdown   Assess vascular access sites hourly   Every 4-6 hours minimum: Change oxygen saturation probe site   Every 4-6 hours: If on nasal continuous positive airway pressure, respiratory therapy assesses nares and determine need for appliance change or resting period     Problem: Safety - Adult  Goal: Free from fall injury  Outcome: Progressing     Problem: Discharge Planning  Goal: Discharge to home or other facility with appropriate resources  Outcome: Progressing     Problem: ABCDS Injury Assessment  Goal: Absence of physical injury  Outcome: Progressing     Problem: Skin/Tissue Integrity  Goal: Skin integrity remains intact  Description: 1.  Monitor for areas of redness and/or skin breakdown  2.  Assess vascular access sites hourly  3.  Every 4-6 hours minimum:  Change oxygen saturation probe site  4.  Every 4-6 hours:  If on nasal continuous positive airway pressure, respiratory therapy assess nares and determine need for appliance change or resting period  Outcome: Progressing  Flowsheets (Taken 3/6/2025 0840)  Skin Integrity Remains Intact:   Monitor for areas of redness and/or skin breakdown   Assess vascular access sites hourly   Every 4-6 hours 
  Problem: Pain  Goal: Verbalizes/displays adequate comfort level or baseline comfort level  Outcome: Progressing     Problem: Skin/Tissue Integrity - Adult  Goal: Skin integrity remains intact  Outcome: Progressing     
Pt capable of nodding to yes or no questions; pt denies pain.  Problem: Pain  Goal: Verbalizes/displays adequate comfort level or baseline comfort level  Outcome: Progressing     Problem: Respiratory - Adult  Goal: Achieves optimal ventilation and oxygenation  Outcome: Progressing     Problem: Skin/Tissue Integrity - Adult  Goal: Skin integrity remains intact  Outcome: Progressing     
[facility]  Office: [phone]  Kranthi Correa DO, Tuan Garza DO, Hugo Peterson DO, Jose Bills, DO, Georgi Wong MD, Yeni Mei MD, Carmina Tiwari MD, Chanell Maldonado MD,  Ethan Silva MD, Dom Perry MD, Ben Painter MD,  Catarina Garcia DO, Owen Hartmann MD, Chilango Chavez MD, Davonte Correa DO, Roseann Mitchell MD,  Brendan Li DO, Fabiola Mc MD, Evie Valderrama MD, Sol Casiano MD, Va Waite MD,  Demetris De La Vega MD, Sandor Leung MD, Megan Rodriguez MD, Olu Cortez MD, Carlos Walsh MD, Jeffry Luna MD, Enrrique Schaeffer DO, Luca Cabello MD, Catarina Kemp MD, Mohsin Reza, MD, Shirley Waterhouse, CNP,  Leonora Fairbanks CNP, Enrrique Bales, CNP,  Jesusita Tariq, DNP, Park Nassar, CNP, Ansley Robles, CNP, Morenita Duque, CNP, Corina Lora CNP, Chanda Mccord PA-C, Doreen King, CNP, Francheska Steinberg, CNP,  Almaz Garcia, CNP, Sarita Webber, CNP, Karen Diego, CNP,  Ludmila Gracia, CNS, Jolanta Oshea CNP, Jazmin Awan CNP,   Deisi White, CNP         [facility]   IN-PATIENT SERVICE   [facility]    Second Visit Note  For more detailed information please refer to the progress note of the day      3/6/2025    4:19 PM    Name:   Heri Steinberg  MRN:     [id number]     Acct:      [id number]   Room:   0546/0546-01   Day:  2  Admit Date:  3/4/2025 12:42 PM    PCP:   Jefry Jansen DO  Code Status:  Full Code      Pt vitals were reviewed   New labs were reviewed   Patient was seen    Updated plan :     Met with patient's mother. Had some hesitations about NG tube. Wants to re-attempt swallow study. We discussed concerns of calorie intake and ensuring good nutritional support. Patient and mom aggreeable to NG tube and Tube feeds and to attempt swallow study at a later time. She has re-iterated that patient would not want a PEG tube.      Ben Painter MD  3/6/2025  4:19 PM     
dysrhythmias or at baseline  Outcome: Progressing     Problem: Musculoskeletal - Adult  Goal: Return mobility to safest level of function  Outcome: Progressing  Goal: Maintain proper alignment of affected body part  Outcome: Progressing  Goal: Return ADL status to a safe level of function  Outcome: Progressing     Problem: Gastrointestinal - Adult  Goal: Minimal or absence of nausea and vomiting  Outcome: Progressing  Goal: Maintains or returns to baseline bowel function  Outcome: Progressing  Goal: Maintains adequate nutritional intake  Outcome: Progressing  Goal: Establish and maintain optimal ostomy function  Outcome: Progressing     Problem: Genitourinary - Adult  Goal: Absence of urinary retention  Outcome: Progressing  Goal: Urinary catheter remains patent  Outcome: Progressing     Problem: Infection - Adult  Goal: Absence of infection at discharge  Outcome: Progressing  Goal: Absence of infection during hospitalization  Outcome: Progressing  Goal: Absence of fever/infection during anticipated neutropenic period  Outcome: Progressing     Problem: Confusion  Goal: Confusion, delirium, dementia, or psychosis is improved or at baseline  Description: INTERVENTIONS:  1. Assess for possible contributors to thought disturbance, including medications, impaired vision or hearing, underlying metabolic abnormalities, dehydration, psychiatric diagnoses, and notify attending LIP  2. [place] high risk fall precautions, as indicated  3. Provide frequent short contacts to provide reality reorientation, refocusing and direction  4. Decrease environmental stimuli, including noise as appropriate  5. Monitor and intervene to maintain adequate nutrition, hydration, elimination, sleep and activity  6. If unable to ensure safety without constant attention obtain sitter and review sitter guidelines with assigned personnel  7. Initiate Psychosocial CNS and Spiritual Care consult, as indicated  Outcome: Progressing     Problem: 
during hospitalization  3/9/2025 1923 by Elizabeth Washington RN  Outcome: Progressing  3/9/2025 1838 by Marie Mosqueda RN  Outcome: Adequate for Discharge  Goal: Absence of fever/infection during anticipated neutropenic period  3/9/2025 1923 by Elizabeth Washington RN  Outcome: Progressing  3/9/2025 1838 by Marie Mosqueda RN  Outcome: Adequate for Discharge     Problem: Confusion  Goal: Confusion, delirium, dementia, or psychosis is improved or at baseline  Description: INTERVENTIONS:  1. Assess for possible contributors to thought disturbance, including medications, impaired vision or hearing, underlying metabolic abnormalities, dehydration, psychiatric diagnoses, and notify attending LIP  2. [place] high risk fall precautions, as indicated  3. Provide frequent short contacts to provide reality reorientation, refocusing and direction  4. Decrease environmental stimuli, including noise as appropriate  5. Monitor and intervene to maintain adequate nutrition, hydration, elimination, sleep and activity  6. If unable to ensure safety without constant attention obtain sitter and review sitter guidelines with assigned personnel  7. Initiate Psychosocial CNS and Spiritual Care consult, as indicated  3/9/2025 1923 by Elizabeth Washington RN  Outcome: Progressing  3/9/2025 1838 by Marie Mosqueda RN  Outcome: Adequate for Discharge     Problem: Behavior  Goal: Pt/Family maintain appropriate behavior and adhere to behavioral management agreement, if implemented  Description: INTERVENTIONS:  1. Assess patient/family's coping skills and  non-compliant behavior (including use of illegal substances)  2. Notify security of behavior or suspected illegal substances which indicate the need for search of the family and/or belongings  3. Encourage verbalization of thoughts and concerns in a socially appropriate manner  4. Utilize positive, consistent limit setting strategies supporting safety of patient, staff and

## 2025-03-12 ENCOUNTER — TELEPHONE (OUTPATIENT)
Dept: NEUROLOGY | Age: 50
End: 2025-03-12

## 2025-03-12 NOTE — TELEPHONE ENCOUNTER
Received email from Ethan Torres with [facility] that they have not received an order for the Teriflunomide 7 mg that Dr. Ruiz typically orders when she starts patients on this med.     To note, patient was admitted to [facility] on 3/4/2025 for SIRS/Sepsis/Covid/AMS. He was discharged yesterday to [facility], will need to call to discuss with them.    Patient did not have his TB Gold completed but he did have a liver panel completed on 3/10/2025 while in the hospital which showed his ALT to be normal at 22.     I will call the facility he is staying at and have them complete a TB Gold on this patient.     How would you like to proceed with the Teriflunomide given the recent hospitalization?

## 2025-03-13 NOTE — TELEPHONE ENCOUNTER
Attempted to call [facility], spoke with , twice they tried to transfer me to the nursing staff, no answer. She took down my information to have them call me will follow up if no response this afternoon.    We need the nursing home to check a TB gold and make sure he is not receiving any Teriflunomide.

## 2025-03-13 NOTE — TELEPHONE ENCOUNTER
[facility] K[facility] discussed the information from Dr. Ruiz advised we are going to hold on the Teriflunomide, Kiera voiced understanding. Also advised we still need a completed TB gold. Faxed order to Kiera at [phone].     She will have TB gold completed, bring patient for follow up on 5/5/2025 at which point we will discuss starting the Teriflunomide then. Kiera voiced understanding to all the information.    Patient did have a MRI cervical spine and MRI brain completed at [facility] on 3/6/2025, Dr. Ruiz would you like to review these since you had ordered these images for the patient prior?

## 2025-03-13 NOTE — TELEPHONE ENCOUNTER
Called Kiera at [facility] to advise of the MRI's and to let her know that we do not need additional scans. Kiera was unavailable, advised that Kiera can call back to discuss results if she wants but advised of Dr. Pulliam recommendations. Advised to please remember to fax the TB Gold when she has results. All questions answered.

## 2025-05-02 NOTE — PROGRESS NOTES
Substance and Sexual Activity    Alcohol use: No     Alcohol/week: 0.0 standard drinks of alcohol    Drug use: Yes     Types: Marijuana (Weed)    Sexual activity: Not on file   Other Topics Concern    Not on file   Social History Narrative    Not on file     Social Drivers of Health     Financial Resource Strain: Not on file   Food Insecurity: No Food Insecurity (3/11/2025)    Hunger Vital Sign     Worried About Running Out of Food in the Last Year: Never true     Ran Out of Food in the Last Year: Never true   Transportation Needs: No Transportation Needs (3/11/2025)    PRAPARE - Transportation     Lack of Transportation (Medical): No     Lack of Transportation (Non-Medical): No   Physical Activity: Not on file   Stress: Not on file   Social Connections: Not on file   Intimate Partner Violence: Unknown (2/22/2024)    Received from [facility], [facility]     Fear of Current or Ex-Partner: Not on file     Emotionally Abused: Not on file     Physically Abused: Not on file     Sexually Abused: Not on file     Physically or Sexually Abused: Not on file   Housing Stability: Low Risk  (3/11/2025)    Housing Stability Vital Sign     Unable to Pay for Housing in the Last Year: No     Number of Times Moved in the Last Year: 1     Homeless in the Last Year: No        Current Outpatient Medications   Medication Sig Dispense Refill    [START ON 5/12/2025] teriflunomide (AUBAGIO) 14 MG tablet Take 1 tablet by mouth daily 30 tablet 11    teriflunomide (AUBAGIO) 7 MG tablet Take 1 tablet by mouth daily for 7 days 7 tablet 0    clonazePAM (KLONOPIN) 1 MG tablet Take 0.5 tablets by mouth 3 times daily as needed for Anxiety for up to 7 days. Max Daily Amount: 1.5 mg 11 tablet 0    sennosides-docusate sodium (SENOKOT-S) 8.6-50 MG tablet Take 1 tablet by mouth daily 10 tablet 0    vitamin D (CHOLECALCIFEROL) 50 MCG (2000 UT) CAPS capsule Take 1 capsule by mouth daily      zinc

## 2025-05-05 ENCOUNTER — TELEPHONE (OUTPATIENT)
Dept: NEUROLOGY | Age: 50
End: 2025-05-05

## 2025-05-05 ENCOUNTER — OFFICE VISIT (OUTPATIENT)
Dept: NEUROLOGY | Age: 50
End: 2025-05-05
Payer: MEDICAID

## 2025-05-05 VITALS — DIASTOLIC BLOOD PRESSURE: 79 MMHG | HEART RATE: 91 BPM | SYSTOLIC BLOOD PRESSURE: 105 MMHG

## 2025-05-05 DIAGNOSIS — K59.00 CONSTIPATION, UNSPECIFIED CONSTIPATION TYPE: ICD-10-CM

## 2025-05-05 DIAGNOSIS — G35 MULTIPLE SCLEROSIS (HCC): Primary | ICD-10-CM

## 2025-05-05 DIAGNOSIS — R13.11 ORAL PHASE DYSPHAGIA: ICD-10-CM

## 2025-05-05 DIAGNOSIS — G11.9 CEREBELLAR ATAXIA (HCC): ICD-10-CM

## 2025-05-05 DIAGNOSIS — Z91.89 AT RISK FOR SIDE EFFECT OF MEDICATION: ICD-10-CM

## 2025-05-05 PROCEDURE — 99214 OFFICE O/P EST MOD 30 MIN: CPT | Performed by: PSYCHIATRY & NEUROLOGY

## 2025-05-05 RX ORDER — TERIFLUNOMIDE 7 MG/1
7 TABLET, FILM COATED ORAL DAILY
Qty: 7 TABLET | Refills: 0 | Status: ACTIVE | OUTPATIENT
Start: 2025-05-05 | End: 2025-05-12

## 2025-05-05 RX ORDER — TERIFLUNOMIDE 14 MG/1
14 TABLET, FILM COATED ORAL DAILY
Qty: 30 TABLET | Refills: 11 | Status: ACTIVE | OUTPATIENT
Start: 2025-05-12

## 2025-05-05 NOTE — TELEPHONE ENCOUNTER
Per Dr Ruiz request to send office note from 5/5/25 visit -to the nursing home for orders. Lab's and Medication .

## 2025-06-12 ENCOUNTER — HOSPITAL ENCOUNTER (EMERGENCY)
Age: 50
Discharge: HOME | End: 2025-06-12
Attending: EMERGENCY MEDICINE
Payer: MEDICAID

## 2025-06-12 ENCOUNTER — APPOINTMENT (OUTPATIENT)
Dept: GENERAL RADIOLOGY | Age: 50
End: 2025-06-12
Payer: MEDICAID

## 2025-06-12 ENCOUNTER — APPOINTMENT (OUTPATIENT)
Dept: CT IMAGING | Age: 50
End: 2025-06-12
Payer: MEDICAID

## 2025-06-12 VITALS
RESPIRATION RATE: 23 BRPM | WEIGHT: 145.5 LBS | HEART RATE: 104 BPM | SYSTOLIC BLOOD PRESSURE: 141 MMHG | BODY MASS INDEX: 19.71 KG/M2 | HEIGHT: 72 IN | DIASTOLIC BLOOD PRESSURE: 91 MMHG | TEMPERATURE: 99.1 F | OXYGEN SATURATION: 98 %

## 2025-06-12 DIAGNOSIS — B34.1 ENTEROVIRUS INFECTION: ICD-10-CM

## 2025-06-12 DIAGNOSIS — B34.8 RHINOVIRUS INFECTION: Primary | ICD-10-CM

## 2025-06-12 LAB
ALBUMIN SERPL-MCNC: 3.9 G/DL (ref 3.5–5.2)
ALBUMIN/GLOB SERPL: 1.2 {RATIO} (ref 1–2.5)
ALP SERPL-CCNC: 135 U/L (ref 40–129)
ALT SERPL-CCNC: 18 U/L (ref 10–50)
AMMONIA PLAS-SCNC: 43 UMOL/L (ref 16–60)
ANION GAP SERPL CALCULATED.3IONS-SCNC: 12 MMOL/L (ref 9–16)
AST SERPL-CCNC: 17 U/L (ref 10–50)
B PARAP IS1001 DNA NPH QL NAA+NON-PROBE: NOT DETECTED
B PERT DNA SPEC QL NAA+PROBE: NOT DETECTED
BACTERIA URNS QL MICRO: ABNORMAL
BASOPHILS # BLD: 0.03 K/UL (ref 0–0.2)
BASOPHILS NFR BLD: 0 % (ref 0–2)
BILIRUB SERPL-MCNC: 0.6 MG/DL (ref 0–1.2)
BILIRUB UR QL STRIP: NEGATIVE
BUN SERPL-MCNC: 8 MG/DL (ref 6–20)
C PNEUM DNA NPH QL NAA+NON-PROBE: NOT DETECTED
CALCIUM SERPL-MCNC: 9.2 MG/DL (ref 8.6–10.4)
CASTS #/AREA URNS LPF: ABNORMAL /LPF (ref 0–8)
CHLORIDE SERPL-SCNC: 104 MMOL/L (ref 98–107)
CLARITY UR: CLEAR
CO2 SERPL-SCNC: 22 MMOL/L (ref 20–31)
COLOR UR: YELLOW
CREAT SERPL-MCNC: 0.6 MG/DL (ref 0.7–1.2)
EOSINOPHIL # BLD: <0.03 K/UL (ref 0–0.44)
EOSINOPHILS RELATIVE PERCENT: 0 % (ref 1–4)
EPI CELLS #/AREA URNS HPF: ABNORMAL /HPF (ref 0–5)
ERYTHROCYTE [DISTWIDTH] IN BLOOD BY AUTOMATED COUNT: 13.5 % (ref 11.8–14.4)
FLUAV RNA NPH QL NAA+NON-PROBE: NOT DETECTED
FLUBV RNA NPH QL NAA+NON-PROBE: NOT DETECTED
GFR, ESTIMATED: >90 ML/MIN/1.73M2
GLUCOSE SERPL-MCNC: 119 MG/DL (ref 74–99)
GLUCOSE UR STRIP-MCNC: NEGATIVE MG/DL
HADV DNA NPH QL NAA+NON-PROBE: NOT DETECTED
HCOV 229E RNA NPH QL NAA+NON-PROBE: NOT DETECTED
HCOV HKU1 RNA NPH QL NAA+NON-PROBE: NOT DETECTED
HCOV NL63 RNA NPH QL NAA+NON-PROBE: NOT DETECTED
HCOV OC43 RNA NPH QL NAA+NON-PROBE: NOT DETECTED
HCT VFR BLD AUTO: 43.4 % (ref 40.7–50.3)
HGB BLD-MCNC: 14.7 G/DL (ref 13–17)
HGB UR QL STRIP.AUTO: NEGATIVE
HMPV RNA NPH QL NAA+NON-PROBE: NOT DETECTED
HPIV1 RNA NPH QL NAA+NON-PROBE: NOT DETECTED
HPIV2 RNA NPH QL NAA+NON-PROBE: NOT DETECTED
HPIV3 RNA NPH QL NAA+NON-PROBE: NOT DETECTED
HPIV4 RNA NPH QL NAA+NON-PROBE: NOT DETECTED
IMM GRANULOCYTES # BLD AUTO: 0.04 K/UL (ref 0–0.3)
IMM GRANULOCYTES NFR BLD: 0 %
INR PPP: 1
KETONES UR STRIP-MCNC: NEGATIVE MG/DL
LACTIC ACID, WHOLE BLOOD: 1.6 MMOL/L (ref 0.7–2.1)
LEUKOCYTE ESTERASE UR QL STRIP: NEGATIVE
LYMPHOCYTES NFR BLD: 1.65 K/UL (ref 1.1–3.7)
LYMPHOCYTES RELATIVE PERCENT: 17 % (ref 24–43)
M PNEUMO DNA NPH QL NAA+NON-PROBE: NOT DETECTED
MCH RBC QN AUTO: 31.7 PG (ref 25.2–33.5)
MCHC RBC AUTO-ENTMCNC: 33.9 G/DL (ref 28.4–34.8)
MCV RBC AUTO: 93.7 FL (ref 82.6–102.9)
MONOCYTES NFR BLD: 0.61 K/UL (ref 0.1–1.2)
MONOCYTES NFR BLD: 6 % (ref 3–12)
NEUTROPHILS NFR BLD: 77 % (ref 36–65)
NEUTS SEG NFR BLD: 7.68 K/UL (ref 1.5–8.1)
NITRITE UR QL STRIP: NEGATIVE
NRBC BLD-RTO: 0 PER 100 WBC
PARTIAL THROMBOPLASTIN TIME: 32.4 SEC (ref 23–36.5)
PH UR STRIP: 8.5 [PH] (ref 5–8)
PLATELET # BLD AUTO: 237 K/UL (ref 138–453)
PMV BLD AUTO: 11.6 FL (ref 8.1–13.5)
POTASSIUM SERPL-SCNC: 3.8 MMOL/L (ref 3.7–5.3)
PROT SERPL-MCNC: 7.1 G/DL (ref 6.6–8.7)
PROT UR STRIP-MCNC: NEGATIVE MG/DL
PROTHROMBIN TIME: 13.4 SEC (ref 11.7–14.9)
RBC # BLD AUTO: 4.63 M/UL (ref 4.21–5.77)
RBC #/AREA URNS HPF: ABNORMAL /HPF (ref 0–4)
RSV RNA NPH QL NAA+NON-PROBE: NOT DETECTED
RV+EV RNA NPH QL NAA+NON-PROBE: DETECTED
SARS-COV-2 RNA NPH QL NAA+NON-PROBE: NOT DETECTED
SODIUM SERPL-SCNC: 138 MMOL/L (ref 136–145)
SP GR UR STRIP: 1.01 (ref 1–1.03)
SPECIMEN DESCRIPTION: ABNORMAL
TROPONIN I SERPL HS-MCNC: 12 NG/L (ref 0–22)
TSH SERPL DL<=0.05 MIU/L-ACNC: 0.37 UIU/ML (ref 0.27–4.2)
UROBILINOGEN UR STRIP-ACNC: NORMAL EU/DL (ref 0–1)
WBC #/AREA URNS HPF: ABNORMAL /HPF (ref 0–5)
WBC OTHER # BLD: 10 K/UL (ref 3.5–11.3)

## 2025-06-12 PROCEDURE — 85730 THROMBOPLASTIN TIME PARTIAL: CPT

## 2025-06-12 PROCEDURE — 83605 ASSAY OF LACTIC ACID: CPT

## 2025-06-12 PROCEDURE — 70450 CT HEAD/BRAIN W/O DYE: CPT

## 2025-06-12 PROCEDURE — 2580000003 HC RX 258: Performed by: STUDENT IN AN ORGANIZED HEALTH CARE EDUCATION/TRAINING PROGRAM

## 2025-06-12 PROCEDURE — 96367 TX/PROPH/DG ADDL SEQ IV INF: CPT | Performed by: EMERGENCY MEDICINE

## 2025-06-12 PROCEDURE — 71045 X-RAY EXAM CHEST 1 VIEW: CPT

## 2025-06-12 PROCEDURE — 85025 COMPLETE CBC W/AUTO DIFF WBC: CPT

## 2025-06-12 PROCEDURE — 87086 URINE CULTURE/COLONY COUNT: CPT

## 2025-06-12 PROCEDURE — 93005 ELECTROCARDIOGRAM TRACING: CPT | Performed by: STUDENT IN AN ORGANIZED HEALTH CARE EDUCATION/TRAINING PROGRAM

## 2025-06-12 PROCEDURE — 6360000002 HC RX W HCPCS: Performed by: STUDENT IN AN ORGANIZED HEALTH CARE EDUCATION/TRAINING PROGRAM

## 2025-06-12 PROCEDURE — 82140 ASSAY OF AMMONIA: CPT

## 2025-06-12 PROCEDURE — 84443 ASSAY THYROID STIM HORMONE: CPT

## 2025-06-12 PROCEDURE — 96365 THER/PROPH/DIAG IV INF INIT: CPT | Performed by: EMERGENCY MEDICINE

## 2025-06-12 PROCEDURE — 81001 URINALYSIS AUTO W/SCOPE: CPT

## 2025-06-12 PROCEDURE — 99285 EMERGENCY DEPT VISIT HI MDM: CPT | Performed by: EMERGENCY MEDICINE

## 2025-06-12 PROCEDURE — 84484 ASSAY OF TROPONIN QUANT: CPT

## 2025-06-12 PROCEDURE — 85610 PROTHROMBIN TIME: CPT

## 2025-06-12 PROCEDURE — 0202U NFCT DS 22 TRGT SARS-COV-2: CPT

## 2025-06-12 PROCEDURE — 96361 HYDRATE IV INFUSION ADD-ON: CPT | Performed by: EMERGENCY MEDICINE

## 2025-06-12 PROCEDURE — 80053 COMPREHEN METABOLIC PANEL: CPT

## 2025-06-12 PROCEDURE — 87040 BLOOD CULTURE FOR BACTERIA: CPT

## 2025-06-12 RX ORDER — 0.9 % SODIUM CHLORIDE 0.9 %
500 INTRAVENOUS SOLUTION INTRAVENOUS ONCE
Status: COMPLETED | OUTPATIENT
Start: 2025-06-12 | End: 2025-06-12

## 2025-06-12 RX ADMIN — PIPERACILLIN AND TAZOBACTAM 3375 MG: 3; .375 INJECTION, POWDER, LYOPHILIZED, FOR SOLUTION INTRAVENOUS at 13:21

## 2025-06-12 RX ADMIN — VANCOMYCIN HYDROCHLORIDE 1000 MG: 1 INJECTION, POWDER, LYOPHILIZED, FOR SOLUTION INTRAVENOUS at 14:04

## 2025-06-12 RX ADMIN — SODIUM CHLORIDE 500 ML: 0.9 INJECTION, SOLUTION INTRAVENOUS at 09:53

## 2025-06-12 RX ADMIN — SODIUM CHLORIDE 500 ML: 0.9 INJECTION, SOLUTION INTRAVENOUS at 14:43

## 2025-06-12 ASSESSMENT — PAIN SCALES - WONG BAKER: WONGBAKER_NUMERICALRESPONSE: NO HURT

## 2025-06-12 NOTE — ED NOTES
Patient in need of a ride back to [facility].  SW set up [facility] for 8:30pm, but they are calling around to see if they can get an earlier time.  Await return call from [facility].  KIRIT Noriega

## 2025-06-12 NOTE — ED NOTES
[facility] had a cancellation and they will now transport patient at 4:15pm.  SW called and updated [facility] staff about return time.  RN has transfer paperwork.  KIRIT Noriega

## 2025-06-12 NOTE — PROGRESS NOTES
[facility]   Pharmacy Pharmacokinetic Monitoring Service - Vancomycin     Heri Steinberg is a 49 y.o. male starting on vancomycin therapy for sepsis. Pharmacy consulted by Dr Carroll for monitoring and adjustment.    Target Concentration: Goal AUC/MIKAEL 400-600 mg*hr/L    Additional Antimicrobials: zosyn    Pertinent Laboratory Values:   Wt Readings from Last 1 Encounters:   06/12/25 66 kg (145 lb 8.1 oz)     Temp Readings from Last 1 Encounters:   06/12/25 (S) 99.1 °F (37.3 °C) ((S) Rectal)     Estimated Creatinine Clearance: 142 mL/min (A) (based on SCr of 0.6 mg/dL (L)).  Recent Labs     06/12/25  0943   CREATININE 0.6*   BUN 8   WBC 10.0     Procalcitonin: na    Pertinent Cultures:  Culture Date Source Results   6/12 Blood Pending   MRSA Nasal Swab: N/A. Non-respiratory infection.    Plan:  Dosing recommendations based on Bayesian software  Start vancomycin 1000 mg IVPB q12h  Anticipated AUC of 550 and trough concentration of 18 at steady state  Renal labs as indicated   Vancomycin concentration not ordered yet  Pharmacy will continue to monitor patient and adjust therapy as indicated    Thank you for the consult,  Afshan Canela RPH  6/12/2025 11:49 AM

## 2025-06-12 NOTE — ED PROVIDER NOTES
by mouth 3 times daily.    Provider, MD Irma   DULoxetine (CYMBALTA) 60 MG extended release capsule Take 1 capsule by mouth at bedtime    Provider, MD Irma   primidone (MYSOLINE) 250 MG tablet Take 1 tablet by mouth 2 times daily 9/21/18   Jj Becerra MD       REVIEW OF SYSTEMS       Review of Systems   Reason unable to perform ROS: Nonverbal at baseline.       PHYSICAL EXAM      INITIAL VITALS:   BP (!) 141/91   Pulse (!) 104   Temp (S) 99.1 °F (37.3 °C) (Rectal)   Resp 23   Ht 1.829 m (6')   Wt 66 kg (145 lb 8.1 oz)   SpO2 98%   BMI 19.73 kg/m²     Physical Exam  Constitutional:       Appearance: He is ill-appearing and toxic-appearing. He is not diaphoretic.      Comments: Alert chronically ill male with heavy secretions on room air.  Nonverbal with baseline left-sided neurodeficits along with paraplegia.  Appears acutely ill and toxic   HENT:      Head: Normocephalic and atraumatic.      Mouth/Throat:      Comments: Dry mucous membranes with heavy secretions  Eyes:      General: No scleral icterus.     Comments: Disconjugate gaze at baseline.  Pupils equal, round, reactive to light bilaterally   Cardiovascular:      Rate and Rhythm: Regular rhythm. Tachycardia present.      Heart sounds: Normal heart sounds.   Pulmonary:      Comments: Coarse breath sounds bilaterally.  No wheezes or stridor.  Increased work of breathing  Abdominal:      General: There is no distension.      Palpations: Abdomen is soft.      Tenderness: There is no abdominal tenderness. There is no guarding.   Musculoskeletal:         General: No swelling or tenderness.      Cervical back: Neck supple. No rigidity.      Comments: Decreased muscle mass all 4 extremities.  Left upper extremity muscle contracture   Skin:     General: Skin is warm and dry.      Findings: No erythema or rash.   Neurological:      Mental Status: He is alert.      Comments: Paraplegia and left-sided deficits in setting of advanced multiple  excluding any documented procedures.    FINAL IMPRESSION      1. Rhinovirus infection    2. Enterovirus infection          DISPOSITION / PLAN     DISPOSITION Decision To Discharge 06/12/2025 02:37:30 PM   DISPOSITION CONDITION Stable           PATIENT REFERRED TO:  Jefry Jansen DO  [address]  [place] [address]  [phone]    Call in 1 day  for ED follow-up    [facility] Emergency Department  [address] [place] [address]  [phone]  Go to   If symptoms worsen      DISCHARGE MEDICATIONS:  New Prescriptions    No medications on file       Sina Carroll DO  Emergency Medicine Resident    (Please note that portions of this note were completed with a voice recognition program.  Efforts were made to edit the dictations but occasionally words are mis-transcribed.)

## 2025-06-12 NOTE — ED PROVIDER NOTES
[facility]     Emergency Department     Faculty Attestation    I performed a history and physical examination of the patient and discussed management with the resident. I have reviewed and agree with the resident’s findings including all diagnostic interpretations, and treatment plans as written at the time of my review. Any areas of disagreement are noted on the chart. I was personally present for the key portions of any procedures. I have documented in the chart those procedures where I was not present during the key portions. For Physician Assistant/ Nurse Practitioner cases/documentation I have personally evaluated this patient and have completed at least one if not all key elements of the E/M (history, physical exam, and MDM). Additional findings are as noted.    PtName: Heri Steinberg  MRN: [id number]  Birthdate 1975  Date of evaluation: 6/12/25  Note Started: 9:43 AM EDT    Primary Care Physician: Jefry Jansen DO        History: This is a 49 y.o. male who presents to the Emergency Department with complaint of warm to the touch tachycardic.  Patient brought in by EMS.  Has significant previous stroke with significant mental status issues.  Nursing home also states patient has been having coughing episodes    Physical:   weight is 66.7 kg (147 lb 0.8 oz). His oral temperature is 98.3 °F (36.8 °C). His blood pressure is 155/100 (abnormal) and his pulse is 123 (abnormal). His respiration is 29 and oxygen saturation is 93%.  Patient is warm to the touch, coarse breath sounds auscultated throughout, heart tachycardic with a regular rhythm, abdomen is soft bowel sounds are present.    Impression: Cough warm to the touch    Plan: Chest x-ray, EKG, CBC, BMP, lactic acid, troponin, blood culture, urinalysis, urine culture      EKG Interpretation    Interpreted by me  Sinus tachycardia ventricular 128, normal OH interval, normal QRS duration, normal

## 2025-06-13 LAB
EKG ATRIAL RATE: 128 BPM
EKG P AXIS: 78 DEGREES
EKG P-R INTERVAL: 160 MS
EKG Q-T INTERVAL: 300 MS
EKG QRS DURATION: 88 MS
EKG QTC CALCULATION (BAZETT): 438 MS
EKG R AXIS: 79 DEGREES
EKG T AXIS: 42 DEGREES
EKG VENTRICULAR RATE: 128 BPM
MICROORGANISM SPEC CULT: NORMAL
SERVICE CMNT-IMP: NORMAL
SPECIMEN DESCRIPTION: NORMAL

## 2025-06-13 PROCEDURE — 93010 ELECTROCARDIOGRAM REPORT: CPT | Performed by: INTERNAL MEDICINE

## 2025-07-10 NOTE — PROGRESS NOTES
At goal with last FLP.  Reviewed healthy, AHA diet and activity recommendations.  Continue statins.  Recheck labs, nonfasting.  Orders:    Comprehensive Metabolic Panel; Future    Lipid Panel; Future     8.10 k/uL 1.46 (L)    Lymphocytes Absolute      1.10 - 3.70 k/uL 2.80    Monocytes Absolute      0.10 - 1.20 k/uL 0.44    Eosinophils Absolute      0.00 - 0.44 k/uL 0.07    Basophils Absolute      0.00 - 0.20 k/uL <0.03    Immature Granulocytes Absolute      0.00 - 0.30 k/uL <0.03    Sodium      136 - 145 mmol/L 142    Potassium      3.7 - 5.3 mmol/L 3.8    Chloride      98 - 107 mmol/L 109 (H)    CARBON DIOXIDE      20 - 31 mmol/L 24    Anion Gap      9 - 16 mmol/L 9    Glucose      74 - 99 mg/dL 92    BUN,BUNPL      6 - 20 mg/dL 10    Creatinine      0.7 - 1.2 mg/dL 0.4 (L)    Est, Glom Filt Rate      >60 mL/min/1.73m2 >90    Calcium      8.6 - 10.4 mg/dL 8.6       Legend:  (L) Low  (H) High    ASSESSMENT / PLAN:     ASSESSMENT:     Heri Steinberg is a 49 y.o. male presenting today for follow-up regarding of relapsing multiple sclerosis.   He is not currently on DMT.  I recommend the following plan:      PLAN:       MRI brain and c-spine with and without gadolinium ordered.     2.  AQP4 and MOG Abs ordered.      3. Start teriflunomide.  Will need TB testing now and ALT monthly for 6 months after starting.     4.  I will check a vitamin D level today.  Our goal level is  ng/mL unless there are contraindications to supplementation.       5.  I discussed the pathophysiology, natural history, disease course, and prognosis of multiple sclerosis.  I discussed the realistic goals of disease modifying therapies, which is to reduce the frequency of MS attacks and decrease neurological worsening.  We discussed the concept of a relapse/exacerbation/attack and asked the patient to notify us for any new or worsening neurological symptoms lasting > 48 hours in duration.  We also discussed the concept of a pseudo-relapse, which is worsening of baseline symptoms or reappearance of symptoms from old attacks in the setting of infection or other stressors.       6.  The following additional diagnoses were addressed today:

## 2025-08-04 ENCOUNTER — TELEPHONE (OUTPATIENT)
Dept: NEUROLOGY | Age: 50
End: 2025-08-04

## 2025-08-04 ENCOUNTER — OFFICE VISIT (OUTPATIENT)
Dept: NEUROLOGY | Age: 50
End: 2025-08-04
Payer: MEDICAID

## 2025-08-04 VITALS
HEART RATE: 85 BPM | BODY MASS INDEX: 19.64 KG/M2 | WEIGHT: 145 LBS | SYSTOLIC BLOOD PRESSURE: 114 MMHG | HEIGHT: 72 IN | DIASTOLIC BLOOD PRESSURE: 81 MMHG

## 2025-08-04 DIAGNOSIS — R25.2 SPASTICITY: ICD-10-CM

## 2025-08-04 DIAGNOSIS — G35 MULTIPLE SCLEROSIS (HCC): Primary | ICD-10-CM

## 2025-08-04 DIAGNOSIS — Z91.89 AT RISK FOR SIDE EFFECT OF MEDICATION: ICD-10-CM

## 2025-08-04 DIAGNOSIS — G82.50 TETRAPARESIS (HCC): ICD-10-CM

## 2025-08-04 PROCEDURE — 99214 OFFICE O/P EST MOD 30 MIN: CPT | Performed by: PSYCHIATRY & NEUROLOGY

## 2025-08-04 RX ORDER — TERIFLUNOMIDE 7 MG/1
7 TABLET, FILM COATED ORAL DAILY
Qty: 14 TABLET | Refills: 0 | Status: ACTIVE | OUTPATIENT
Start: 2025-08-04 | End: 2025-08-18

## 2025-08-04 RX ORDER — TERIFLUNOMIDE 14 MG/1
14 TABLET, FILM COATED ORAL DAILY
Qty: 30 TABLET | Refills: 11 | Status: ACTIVE | OUTPATIENT
Start: 2025-08-18 | End: 2026-08-18

## 2025-08-30 ENCOUNTER — APPOINTMENT (OUTPATIENT)
Dept: CT IMAGING | Age: 50
DRG: 720 | End: 2025-08-30
Payer: MEDICAID

## 2025-08-30 ENCOUNTER — APPOINTMENT (OUTPATIENT)
Dept: GENERAL RADIOLOGY | Age: 50
DRG: 720 | End: 2025-08-30
Payer: MEDICAID

## 2025-08-30 ENCOUNTER — HOSPITAL ENCOUNTER (INPATIENT)
Age: 50
LOS: 5 days | Discharge: NURSING, REHAB OR LONG-TERM CARE | DRG: 720 | End: 2025-09-04
Attending: EMERGENCY MEDICINE | Admitting: INTERNAL MEDICINE
Payer: MEDICAID

## 2025-08-30 DIAGNOSIS — J18.9 PNEUMONIA OF RIGHT MIDDLE LOBE DUE TO INFECTIOUS ORGANISM: Primary | ICD-10-CM

## 2025-08-30 DIAGNOSIS — G35 RELAPSING REMITTING MULTIPLE SCLEROSIS (HCC): ICD-10-CM

## 2025-08-30 PROBLEM — J69.0 ASPIRATION PNEUMONIA OF RIGHT MIDDLE LOBE DUE TO GASTRIC SECRETIONS (HCC): Status: ACTIVE | Noted: 2025-08-30

## 2025-08-30 LAB
AMMONIA PLAS-SCNC: 32 UMOL/L (ref 16–60)
ANION GAP SERPL CALCULATED.3IONS-SCNC: 12 MMOL/L (ref 9–16)
BASOPHILS # BLD: <0.03 K/UL (ref 0–0.2)
BASOPHILS NFR BLD: 0 % (ref 0–2)
BILIRUB UR QL STRIP: NEGATIVE
BODY TEMPERATURE: 37
BUN BLD-MCNC: 8 MG/DL (ref 8–26)
BUN SERPL-MCNC: 7 MG/DL (ref 6–20)
CA-I BLD-SCNC: 1.25 MMOL/L (ref 1.15–1.33)
CALCIUM SERPL-MCNC: 7.8 MG/DL (ref 8.6–10.4)
CHLORIDE BLD-SCNC: 108 MMOL/L (ref 98–107)
CHLORIDE SERPL-SCNC: 108 MMOL/L (ref 98–107)
CK SERPL-CCNC: 43 U/L (ref 39–308)
CLARITY UR: CLEAR
CO2 BLD CALC-SCNC: 28 MMOL/L (ref 22–30)
CO2 SERPL-SCNC: 21 MMOL/L (ref 20–31)
COHGB MFR BLD: 2.4 % (ref 0–5)
COLOR UR: YELLOW
COMMENT: ABNORMAL
CREAT SERPL-MCNC: 0.5 MG/DL (ref 0.7–1.2)
EGFR, POC: >90 ML/MIN/1.73M2
EOSINOPHIL # BLD: 0.06 K/UL (ref 0–0.44)
EOSINOPHILS RELATIVE PERCENT: 1 % (ref 1–4)
ERYTHROCYTE [DISTWIDTH] IN BLOOD BY AUTOMATED COUNT: 14.3 % (ref 11.8–14.4)
FIO2 ON VENT: ABNORMAL %
GFR, ESTIMATED: >90 ML/MIN/1.73M2
GLUCOSE BLD-MCNC: 96 MG/DL (ref 74–100)
GLUCOSE SERPL-MCNC: 97 MG/DL (ref 74–99)
GLUCOSE UR STRIP-MCNC: NEGATIVE MG/DL
HCO3 VENOUS: 23.7 MMOL/L (ref 24–30)
HCO3 VENOUS: 28.9 MMOL/L (ref 22–29)
HCT VFR BLD AUTO: 35.4 % (ref 40.7–50.3)
HCT VFR BLD AUTO: 50 % (ref 41–53)
HGB BLD-MCNC: 11.5 G/DL (ref 13–17)
HGB UR QL STRIP.AUTO: NEGATIVE
IMM GRANULOCYTES # BLD AUTO: 0.03 K/UL (ref 0–0.3)
IMM GRANULOCYTES NFR BLD: 0 %
INR PPP: 1.2
KETONES UR STRIP-MCNC: NEGATIVE MG/DL
L PNEUMO1 AG UR QL IA.RAPID: NEGATIVE
LACTIC ACID, SEPSIS WHOLE BLOOD: 1.4 MMOL/L (ref 0.5–1.9)
LACTIC ACID, SEPSIS WHOLE BLOOD: 1.8 MMOL/L (ref 0.5–1.9)
LACTIC ACID, SEPSIS WHOLE BLOOD: 3.1 MMOL/L (ref 0.5–1.9)
LACTIC ACID, WHOLE BLOOD: 1.4 MMOL/L (ref 0.7–2.1)
LEUKOCYTE ESTERASE UR QL STRIP: NEGATIVE
LYMPHOCYTES NFR BLD: 0.97 K/UL (ref 1.1–3.7)
LYMPHOCYTES RELATIVE PERCENT: 10 % (ref 24–43)
MCH RBC QN AUTO: 31.1 PG (ref 25.2–33.5)
MCHC RBC AUTO-ENTMCNC: 32.5 G/DL (ref 28.4–34.8)
MCV RBC AUTO: 95.7 FL (ref 82.6–102.9)
MONOCYTES NFR BLD: 0.64 K/UL (ref 0.1–1.2)
MONOCYTES NFR BLD: 6 % (ref 3–12)
MYOGLOBIN SERPL-MCNC: 31 NG/ML (ref 28–72)
NEGATIVE BASE EXCESS, VEN: 1.5 MMOL/L (ref 0–2)
NEUTROPHILS NFR BLD: 83 % (ref 36–65)
NEUTS SEG NFR BLD: 8.31 K/UL (ref 1.5–8.1)
NITRITE UR QL STRIP: NEGATIVE
NRBC BLD-RTO: 0 PER 100 WBC
O2 SAT, VEN: 68.7 % (ref 60–85)
O2 SAT, VEN: 97.6 % (ref 60–85)
PARTIAL THROMBOPLASTIN TIME: 29.4 SEC (ref 23–36.5)
PCO2 VENOUS: 41.4 MM HG (ref 39–55)
PCO2 VENOUS: 55.8 MM HG (ref 41–51)
PH UR STRIP: 7.5 [PH] (ref 5–8)
PH VENOUS: 7.32 (ref 7.32–7.43)
PH VENOUS: 7.38 (ref 7.32–7.42)
PLATELET # BLD AUTO: 183 K/UL (ref 138–453)
PMV BLD AUTO: 12 FL (ref 8.1–13.5)
PO2 VENOUS: 102.5 MM HG (ref 30–50)
PO2 VENOUS: 39.5 MM HG (ref 30–50)
POC ANION GAP: 9 MMOL/L (ref 7–16)
POC CREATININE: 0.6 MG/DL (ref 0.51–1.19)
POC HEMOGLOBIN (CALC): 17 G/DL (ref 13.5–17.5)
POC LACTIC ACID: 2.3 MMOL/L (ref 0.56–1.39)
POSITIVE BASE EXCESS, VEN: 1.2 MMOL/L (ref 0–3)
POTASSIUM BLD-SCNC: 4.2 MMOL/L (ref 3.5–4.5)
POTASSIUM SERPL-SCNC: 3.5 MMOL/L (ref 3.7–5.3)
PROT UR STRIP-MCNC: NEGATIVE MG/DL
PROTHROMBIN TIME: 15.8 SEC (ref 11.7–14.9)
RBC # BLD AUTO: 3.7 M/UL (ref 4.21–5.77)
S PNEUM AG SPEC QL: NEGATIVE
SODIUM BLD-SCNC: 144 MMOL/L (ref 138–146)
SODIUM SERPL-SCNC: 141 MMOL/L (ref 136–145)
SP GR UR STRIP: 1.05 (ref 1–1.03)
SPECIMEN SOURCE: NORMAL
T4 FREE SERPL-MCNC: 1 NG/DL (ref 0.92–1.68)
TROPONIN I SERPL HS-MCNC: 8 NG/L (ref 0–22)
TSH SERPL DL<=0.05 MIU/L-ACNC: 0.44 UIU/ML (ref 0.27–4.2)
UROBILINOGEN UR STRIP-ACNC: NORMAL EU/DL (ref 0–1)
WBC OTHER # BLD: 10 K/UL (ref 3.5–11.3)

## 2025-08-30 PROCEDURE — 36415 COLL VENOUS BLD VENIPUNCTURE: CPT

## 2025-08-30 PROCEDURE — 82947 ASSAY GLUCOSE BLOOD QUANT: CPT

## 2025-08-30 PROCEDURE — 82805 BLOOD GASES W/O2 SATURATION: CPT

## 2025-08-30 PROCEDURE — 99285 EMERGENCY DEPT VISIT HI MDM: CPT

## 2025-08-30 PROCEDURE — 87040 BLOOD CULTURE FOR BACTERIA: CPT

## 2025-08-30 PROCEDURE — 2500000003 HC RX 250 WO HCPCS: Performed by: INTERNAL MEDICINE

## 2025-08-30 PROCEDURE — 2580000003 HC RX 258: Performed by: INTERNAL MEDICINE

## 2025-08-30 PROCEDURE — 82550 ASSAY OF CK (CPK): CPT

## 2025-08-30 PROCEDURE — 82565 ASSAY OF CREATININE: CPT

## 2025-08-30 PROCEDURE — 71045 X-RAY EXAM CHEST 1 VIEW: CPT

## 2025-08-30 PROCEDURE — 96374 THER/PROPH/DIAG INJ IV PUSH: CPT

## 2025-08-30 PROCEDURE — 70450 CT HEAD/BRAIN W/O DYE: CPT

## 2025-08-30 PROCEDURE — 83874 ASSAY OF MYOGLOBIN: CPT

## 2025-08-30 PROCEDURE — 70498 CT ANGIOGRAPHY NECK: CPT

## 2025-08-30 PROCEDURE — 81003 URINALYSIS AUTO W/O SCOPE: CPT

## 2025-08-30 PROCEDURE — 87449 NOS EACH ORGANISM AG IA: CPT

## 2025-08-30 PROCEDURE — 84439 ASSAY OF FREE THYROXINE: CPT

## 2025-08-30 PROCEDURE — 85014 HEMATOCRIT: CPT

## 2025-08-30 PROCEDURE — 6360000002 HC RX W HCPCS: Performed by: INTERNAL MEDICINE

## 2025-08-30 PROCEDURE — 93005 ELECTROCARDIOGRAM TRACING: CPT

## 2025-08-30 PROCEDURE — 85610 PROTHROMBIN TIME: CPT

## 2025-08-30 PROCEDURE — 93005 ELECTROCARDIOGRAM TRACING: CPT | Performed by: EMERGENCY MEDICINE

## 2025-08-30 PROCEDURE — 74018 RADEX ABDOMEN 1 VIEW: CPT

## 2025-08-30 PROCEDURE — 85730 THROMBOPLASTIN TIME PARTIAL: CPT

## 2025-08-30 PROCEDURE — 85025 COMPLETE CBC W/AUTO DIFF WBC: CPT

## 2025-08-30 PROCEDURE — 96375 TX/PRO/DX INJ NEW DRUG ADDON: CPT

## 2025-08-30 PROCEDURE — 6360000002 HC RX W HCPCS

## 2025-08-30 PROCEDURE — 84484 ASSAY OF TROPONIN QUANT: CPT

## 2025-08-30 PROCEDURE — 6360000004 HC RX CONTRAST MEDICATION

## 2025-08-30 PROCEDURE — 82553 CREATINE MB FRACTION: CPT

## 2025-08-30 PROCEDURE — 2500000003 HC RX 250 WO HCPCS

## 2025-08-30 PROCEDURE — 84443 ASSAY THYROID STIM HORMONE: CPT

## 2025-08-30 PROCEDURE — 2060000000 HC ICU INTERMEDIATE R&B

## 2025-08-30 PROCEDURE — 80051 ELECTROLYTE PANEL: CPT

## 2025-08-30 PROCEDURE — 82330 ASSAY OF CALCIUM: CPT

## 2025-08-30 PROCEDURE — 82140 ASSAY OF AMMONIA: CPT

## 2025-08-30 PROCEDURE — 99223 1ST HOSP IP/OBS HIGH 75: CPT | Performed by: INTERNAL MEDICINE

## 2025-08-30 PROCEDURE — 2580000003 HC RX 258

## 2025-08-30 PROCEDURE — 99223 1ST HOSP IP/OBS HIGH 75: CPT | Performed by: PSYCHIATRY & NEUROLOGY

## 2025-08-30 PROCEDURE — 82803 BLOOD GASES ANY COMBINATION: CPT

## 2025-08-30 PROCEDURE — 83605 ASSAY OF LACTIC ACID: CPT

## 2025-08-30 PROCEDURE — 80048 BASIC METABOLIC PNL TOTAL CA: CPT

## 2025-08-30 PROCEDURE — 84520 ASSAY OF UREA NITROGEN: CPT

## 2025-08-30 PROCEDURE — 87899 AGENT NOS ASSAY W/OPTIC: CPT

## 2025-08-30 RX ORDER — TERIFLUNOMIDE 14 MG/1
14 TABLET, FILM COATED ORAL DAILY
Status: DISCONTINUED | OUTPATIENT
Start: 2025-08-30 | End: 2025-09-04 | Stop reason: HOSPADM

## 2025-08-30 RX ORDER — BACLOFEN 10 MG/1
5 TABLET ORAL 2 TIMES DAILY
Status: DISCONTINUED | OUTPATIENT
Start: 2025-08-30 | End: 2025-09-02

## 2025-08-30 RX ORDER — KETOROLAC TROMETHAMINE 15 MG/ML
15 INJECTION, SOLUTION INTRAMUSCULAR; INTRAVENOUS ONCE
Status: COMPLETED | OUTPATIENT
Start: 2025-08-30 | End: 2025-08-30

## 2025-08-30 RX ORDER — VITAMIN B COMPLEX
2000 TABLET ORAL DAILY
Status: DISCONTINUED | OUTPATIENT
Start: 2025-08-30 | End: 2025-09-04 | Stop reason: HOSPADM

## 2025-08-30 RX ORDER — SODIUM CHLORIDE 0.9 % (FLUSH) 0.9 %
5-40 SYRINGE (ML) INJECTION EVERY 12 HOURS SCHEDULED
Status: DISCONTINUED | OUTPATIENT
Start: 2025-08-30 | End: 2025-09-04 | Stop reason: HOSPADM

## 2025-08-30 RX ORDER — POLYETHYLENE GLYCOL 3350 17 G/17G
17 POWDER, FOR SOLUTION ORAL DAILY PRN
Status: DISCONTINUED | OUTPATIENT
Start: 2025-08-30 | End: 2025-09-04 | Stop reason: HOSPADM

## 2025-08-30 RX ORDER — SODIUM CHLORIDE 0.9 % (FLUSH) 0.9 %
5-40 SYRINGE (ML) INJECTION PRN
Status: DISCONTINUED | OUTPATIENT
Start: 2025-08-30 | End: 2025-09-04 | Stop reason: HOSPADM

## 2025-08-30 RX ORDER — ACETAMINOPHEN 325 MG/1
650 TABLET ORAL EVERY 6 HOURS PRN
Status: DISCONTINUED | OUTPATIENT
Start: 2025-08-30 | End: 2025-09-04 | Stop reason: HOSPADM

## 2025-08-30 RX ORDER — PRIMIDONE 250 MG/1
250 TABLET ORAL 2 TIMES DAILY
Status: DISCONTINUED | OUTPATIENT
Start: 2025-08-30 | End: 2025-09-04 | Stop reason: HOSPADM

## 2025-08-30 RX ORDER — SENNA AND DOCUSATE SODIUM 50; 8.6 MG/1; MG/1
1 TABLET, FILM COATED ORAL DAILY
Status: DISCONTINUED | OUTPATIENT
Start: 2025-08-30 | End: 2025-09-02

## 2025-08-30 RX ORDER — ONDANSETRON 2 MG/ML
INJECTION INTRAMUSCULAR; INTRAVENOUS
Status: COMPLETED
Start: 2025-08-30 | End: 2025-08-30

## 2025-08-30 RX ORDER — ACETAMINOPHEN 650 MG/1
650 SUPPOSITORY RECTAL EVERY 6 HOURS PRN
Status: DISCONTINUED | OUTPATIENT
Start: 2025-08-30 | End: 2025-09-04 | Stop reason: HOSPADM

## 2025-08-30 RX ORDER — DULOXETIN HYDROCHLORIDE 30 MG/1
60 CAPSULE, DELAYED RELEASE ORAL NIGHTLY
Status: DISCONTINUED | OUTPATIENT
Start: 2025-08-30 | End: 2025-09-04 | Stop reason: HOSPADM

## 2025-08-30 RX ORDER — 0.9 % SODIUM CHLORIDE 0.9 %
30 INTRAVENOUS SOLUTION INTRAVENOUS ONCE
Status: COMPLETED | OUTPATIENT
Start: 2025-08-30 | End: 2025-08-30

## 2025-08-30 RX ORDER — CLONAZEPAM 1 MG/1
0.5 TABLET ORAL 3 TIMES DAILY PRN
Status: DISCONTINUED | OUTPATIENT
Start: 2025-08-30 | End: 2025-09-04 | Stop reason: HOSPADM

## 2025-08-30 RX ORDER — GUAIFENESIN 600 MG/1
600 TABLET, EXTENDED RELEASE ORAL 2 TIMES DAILY
Status: DISCONTINUED | OUTPATIENT
Start: 2025-08-30 | End: 2025-09-04

## 2025-08-30 RX ORDER — SODIUM CHLORIDE, SODIUM LACTATE, POTASSIUM CHLORIDE, CALCIUM CHLORIDE 600; 310; 30; 20 MG/100ML; MG/100ML; MG/100ML; MG/100ML
INJECTION, SOLUTION INTRAVENOUS CONTINUOUS
Status: ACTIVE | OUTPATIENT
Start: 2025-08-30 | End: 2025-08-31

## 2025-08-30 RX ORDER — ONDANSETRON 2 MG/ML
4 INJECTION INTRAMUSCULAR; INTRAVENOUS ONCE
Status: COMPLETED | OUTPATIENT
Start: 2025-08-30 | End: 2025-08-30

## 2025-08-30 RX ORDER — IOPAMIDOL 755 MG/ML
75 INJECTION, SOLUTION INTRAVASCULAR
Status: COMPLETED | OUTPATIENT
Start: 2025-08-30 | End: 2025-08-30

## 2025-08-30 RX ORDER — ENOXAPARIN SODIUM 100 MG/ML
40 INJECTION SUBCUTANEOUS DAILY
Status: DISCONTINUED | OUTPATIENT
Start: 2025-08-30 | End: 2025-09-04 | Stop reason: HOSPADM

## 2025-08-30 RX ORDER — SODIUM CHLORIDE 9 MG/ML
INJECTION, SOLUTION INTRAVENOUS PRN
Status: DISCONTINUED | OUTPATIENT
Start: 2025-08-30 | End: 2025-09-04 | Stop reason: HOSPADM

## 2025-08-30 RX ORDER — GABAPENTIN 300 MG/1
300 CAPSULE ORAL 3 TIMES DAILY
Status: DISCONTINUED | OUTPATIENT
Start: 2025-08-30 | End: 2025-09-04 | Stop reason: HOSPADM

## 2025-08-30 RX ADMIN — SODIUM CHLORIDE 1000 ML: 0.9 INJECTION, SOLUTION INTRAVENOUS at 14:43

## 2025-08-30 RX ADMIN — WATER 1000 MG: 1 INJECTION INTRAMUSCULAR; INTRAVENOUS; SUBCUTANEOUS at 23:18

## 2025-08-30 RX ADMIN — SODIUM CHLORIDE, SODIUM LACTATE, POTASSIUM CHLORIDE, AND CALCIUM CHLORIDE 75 ML/HR: .6; .31; .03; .02 INJECTION, SOLUTION INTRAVENOUS at 18:20

## 2025-08-30 RX ADMIN — AZITHROMYCIN MONOHYDRATE 500 MG: 500 INJECTION, POWDER, LYOPHILIZED, FOR SOLUTION INTRAVENOUS at 15:19

## 2025-08-30 RX ADMIN — WATER 1000 MG: 1 INJECTION INTRAMUSCULAR; INTRAVENOUS; SUBCUTANEOUS at 15:20

## 2025-08-30 RX ADMIN — ONDANSETRON 4 MG: 2 INJECTION INTRAMUSCULAR; INTRAVENOUS at 14:39

## 2025-08-30 RX ADMIN — ONDANSETRON 4 MG: 2 INJECTION, SOLUTION INTRAMUSCULAR; INTRAVENOUS at 14:39

## 2025-08-30 RX ADMIN — SODIUM CHLORIDE, PRESERVATIVE FREE 10 ML: 5 INJECTION INTRAVENOUS at 20:14

## 2025-08-30 RX ADMIN — KETOROLAC TROMETHAMINE 15 MG: 15 INJECTION, SOLUTION INTRAMUSCULAR; INTRAVENOUS at 15:23

## 2025-08-30 RX ADMIN — IOPAMIDOL 75 ML: 755 INJECTION, SOLUTION INTRAVENOUS at 14:41

## 2025-08-30 ASSESSMENT — PAIN SCALES - GENERAL
PAINLEVEL_OUTOF10: 0

## 2025-08-31 ENCOUNTER — APPOINTMENT (OUTPATIENT)
Dept: GENERAL RADIOLOGY | Age: 50
DRG: 720 | End: 2025-08-31
Payer: MEDICAID

## 2025-08-31 PROBLEM — R56.9 SEIZURE (HCC): Status: ACTIVE | Noted: 2025-08-31

## 2025-08-31 LAB
ALBUMIN SERPL-MCNC: 3.4 G/DL (ref 3.5–5.2)
ALBUMIN/GLOB SERPL: 1.5 {RATIO} (ref 1–2.5)
ALP SERPL-CCNC: 96 U/L (ref 40–129)
ALT SERPL-CCNC: 16 U/L (ref 10–50)
ANION GAP SERPL CALCULATED.3IONS-SCNC: 11 MMOL/L (ref 9–16)
AST SERPL-CCNC: 17 U/L (ref 10–50)
BASOPHILS # BLD: 0.03 K/UL (ref 0–0.2)
BASOPHILS NFR BLD: 0 % (ref 0–2)
BILIRUB SERPL-MCNC: 0.3 MG/DL (ref 0–1.2)
BUN SERPL-MCNC: 9 MG/DL (ref 6–20)
CALCIUM SERPL-MCNC: 8.5 MG/DL (ref 8.6–10.4)
CHLORIDE SERPL-SCNC: 107 MMOL/L (ref 98–107)
CO2 SERPL-SCNC: 22 MMOL/L (ref 20–31)
CREAT SERPL-MCNC: 0.4 MG/DL (ref 0.7–1.2)
EKG ATRIAL RATE: 104 BPM
EKG P AXIS: 52 DEGREES
EKG P-R INTERVAL: 174 MS
EKG Q-T INTERVAL: 332 MS
EKG QRS DURATION: 86 MS
EKG QTC CALCULATION (BAZETT): 436 MS
EKG R AXIS: 68 DEGREES
EKG T AXIS: 81 DEGREES
EKG VENTRICULAR RATE: 104 BPM
EOSINOPHIL # BLD: 0.14 K/UL (ref 0–0.44)
EOSINOPHILS RELATIVE PERCENT: 2 % (ref 1–4)
ERYTHROCYTE [DISTWIDTH] IN BLOOD BY AUTOMATED COUNT: 14.2 % (ref 11.8–14.4)
GFR, ESTIMATED: >90 ML/MIN/1.73M2
GLUCOSE SERPL-MCNC: 83 MG/DL (ref 74–99)
HCT VFR BLD AUTO: 34.6 % (ref 40.7–50.3)
HGB BLD-MCNC: 11.1 G/DL (ref 13–17)
IMM GRANULOCYTES # BLD AUTO: <0.03 K/UL (ref 0–0.3)
IMM GRANULOCYTES NFR BLD: 0 %
INR PPP: 1.2
LYMPHOCYTES NFR BLD: 2.62 K/UL (ref 1.1–3.7)
LYMPHOCYTES RELATIVE PERCENT: 34 % (ref 24–43)
MCH RBC QN AUTO: 30.4 PG (ref 25.2–33.5)
MCHC RBC AUTO-ENTMCNC: 32.1 G/DL (ref 28.4–34.8)
MCV RBC AUTO: 94.8 FL (ref 82.6–102.9)
MONOCYTES NFR BLD: 0.6 K/UL (ref 0.1–1.2)
MONOCYTES NFR BLD: 8 % (ref 3–12)
NEUTROPHILS NFR BLD: 56 % (ref 36–65)
NEUTS SEG NFR BLD: 4.4 K/UL (ref 1.5–8.1)
NRBC BLD-RTO: 0 PER 100 WBC
PLATELET # BLD AUTO: 194 K/UL (ref 138–453)
PMV BLD AUTO: 12.1 FL (ref 8.1–13.5)
POTASSIUM SERPL-SCNC: 3.7 MMOL/L (ref 3.7–5.3)
PROCALCITONIN SERPL-MCNC: 0.24 NG/ML (ref 0–0.09)
PROT SERPL-MCNC: 5.7 G/DL (ref 6.6–8.7)
PROTHROMBIN TIME: 15.1 SEC (ref 11.7–14.9)
RBC # BLD AUTO: 3.65 M/UL (ref 4.21–5.77)
SODIUM SERPL-SCNC: 140 MMOL/L (ref 136–145)
TROPONIN I SERPL HS-MCNC: 9 NG/L (ref 0–22)
VANCOMYCIN TROUGH SERPL-MCNC: 7.2 UG/ML (ref 10–20)
WBC OTHER # BLD: 7.8 K/UL (ref 3.5–11.3)

## 2025-08-31 PROCEDURE — 74230 X-RAY XM SWLNG FUNCJ C+: CPT

## 2025-08-31 PROCEDURE — 80053 COMPREHEN METABOLIC PANEL: CPT

## 2025-08-31 PROCEDURE — 85610 PROTHROMBIN TIME: CPT

## 2025-08-31 PROCEDURE — 85025 COMPLETE CBC W/AUTO DIFF WBC: CPT

## 2025-08-31 PROCEDURE — 95816 EEG AWAKE AND DROWSY: CPT

## 2025-08-31 PROCEDURE — 84484 ASSAY OF TROPONIN QUANT: CPT

## 2025-08-31 PROCEDURE — 80202 ASSAY OF VANCOMYCIN: CPT

## 2025-08-31 PROCEDURE — 99223 1ST HOSP IP/OBS HIGH 75: CPT | Performed by: INTERNAL MEDICINE

## 2025-08-31 PROCEDURE — 87641 MR-STAPH DNA AMP PROBE: CPT

## 2025-08-31 PROCEDURE — 84145 PROCALCITONIN (PCT): CPT

## 2025-08-31 PROCEDURE — 2500000003 HC RX 250 WO HCPCS: Performed by: INTERNAL MEDICINE

## 2025-08-31 PROCEDURE — 93005 ELECTROCARDIOGRAM TRACING: CPT | Performed by: INTERNAL MEDICINE

## 2025-08-31 PROCEDURE — 6360000002 HC RX W HCPCS: Performed by: INTERNAL MEDICINE

## 2025-08-31 PROCEDURE — 2060000000 HC ICU INTERMEDIATE R&B

## 2025-08-31 PROCEDURE — 36415 COLL VENOUS BLD VENIPUNCTURE: CPT

## 2025-08-31 PROCEDURE — 93010 ELECTROCARDIOGRAM REPORT: CPT | Performed by: STUDENT IN AN ORGANIZED HEALTH CARE EDUCATION/TRAINING PROGRAM

## 2025-08-31 PROCEDURE — 92611 MOTION FLUOROSCOPY/SWALLOW: CPT

## 2025-08-31 PROCEDURE — 95819 EEG AWAKE AND ASLEEP: CPT | Performed by: PSYCHIATRY & NEUROLOGY

## 2025-08-31 PROCEDURE — 99222 1ST HOSP IP/OBS MODERATE 55: CPT | Performed by: PSYCHIATRY & NEUROLOGY

## 2025-08-31 PROCEDURE — 2580000003 HC RX 258: Performed by: INTERNAL MEDICINE

## 2025-08-31 RX ADMIN — VANCOMYCIN HYDROCHLORIDE 1000 MG: 1 INJECTION, POWDER, LYOPHILIZED, FOR SOLUTION INTRAVENOUS at 19:13

## 2025-08-31 RX ADMIN — SODIUM CHLORIDE, PRESERVATIVE FREE 10 ML: 5 INJECTION INTRAVENOUS at 08:02

## 2025-08-31 RX ADMIN — WATER 1000 MG: 1 INJECTION INTRAMUSCULAR; INTRAVENOUS; SUBCUTANEOUS at 23:51

## 2025-08-31 RX ADMIN — ENOXAPARIN SODIUM 40 MG: 100 INJECTION SUBCUTANEOUS at 08:04

## 2025-08-31 RX ADMIN — VANCOMYCIN HYDROCHLORIDE 1000 MG: 1 INJECTION, POWDER, LYOPHILIZED, FOR SOLUTION INTRAVENOUS at 12:30

## 2025-08-31 RX ADMIN — SODIUM CHLORIDE, SODIUM LACTATE, POTASSIUM CHLORIDE, AND CALCIUM CHLORIDE 75 ML/HR: .6; .31; .03; .02 INJECTION, SOLUTION INTRAVENOUS at 09:12

## 2025-08-31 RX ADMIN — VANCOMYCIN HYDROCHLORIDE 1000 MG: 1 INJECTION, POWDER, LYOPHILIZED, FOR SOLUTION INTRAVENOUS at 02:06

## 2025-08-31 ASSESSMENT — PAIN SCALES - GENERAL
PAINLEVEL_OUTOF10: 0

## 2025-09-01 LAB
ALBUMIN SERPL-MCNC: 3.7 G/DL (ref 3.5–5.2)
ALBUMIN/GLOB SERPL: 1.3 {RATIO} (ref 1–2.5)
ALP SERPL-CCNC: 111 U/L (ref 40–129)
ALT SERPL-CCNC: 16 U/L (ref 10–50)
ANION GAP SERPL CALCULATED.3IONS-SCNC: 14 MMOL/L (ref 9–16)
AST SERPL-CCNC: 17 U/L (ref 10–50)
BASOPHILS # BLD: <0.03 K/UL (ref 0–0.2)
BASOPHILS NFR BLD: 0 % (ref 0–2)
BILIRUB SERPL-MCNC: 0.3 MG/DL (ref 0–1.2)
BODY TEMPERATURE: 37
BUN SERPL-MCNC: 7 MG/DL (ref 6–20)
CALCIUM SERPL-MCNC: 8.9 MG/DL (ref 8.6–10.4)
CHLORIDE SERPL-SCNC: 105 MMOL/L (ref 98–107)
CO2 SERPL-SCNC: 21 MMOL/L (ref 20–31)
COHGB MFR BLD: 1.3 % (ref 0–5)
CREAT SERPL-MCNC: 0.5 MG/DL (ref 0.7–1.2)
EKG ATRIAL RATE: 67 BPM
EKG P AXIS: 76 DEGREES
EKG P-R INTERVAL: 190 MS
EKG Q-T INTERVAL: 404 MS
EKG QRS DURATION: 96 MS
EKG QTC CALCULATION (BAZETT): 426 MS
EKG R AXIS: 70 DEGREES
EKG T AXIS: 76 DEGREES
EKG VENTRICULAR RATE: 67 BPM
EOSINOPHIL # BLD: <0.03 K/UL (ref 0–0.44)
EOSINOPHILS RELATIVE PERCENT: 0 % (ref 1–4)
ERYTHROCYTE [DISTWIDTH] IN BLOOD BY AUTOMATED COUNT: 13.9 % (ref 11.8–14.4)
FIO2 ON VENT: ABNORMAL %
GFR, ESTIMATED: >90 ML/MIN/1.73M2
GLUCOSE SERPL-MCNC: 107 MG/DL (ref 74–99)
HCO3 VENOUS: 24.1 MMOL/L (ref 24–30)
HCT VFR BLD AUTO: 36.3 % (ref 40.7–50.3)
HGB BLD-MCNC: 12.2 G/DL (ref 13–17)
IMM GRANULOCYTES # BLD AUTO: 0.05 K/UL (ref 0–0.3)
IMM GRANULOCYTES NFR BLD: 0 %
LYMPHOCYTES NFR BLD: 0.9 K/UL (ref 1.1–3.7)
LYMPHOCYTES RELATIVE PERCENT: 7 % (ref 24–43)
MCH RBC QN AUTO: 30.9 PG (ref 25.2–33.5)
MCHC RBC AUTO-ENTMCNC: 33.6 G/DL (ref 28.4–34.8)
MCV RBC AUTO: 91.9 FL (ref 82.6–102.9)
MONOCYTES NFR BLD: 0.76 K/UL (ref 0.1–1.2)
MONOCYTES NFR BLD: 6 % (ref 3–12)
MRSA, DNA, NASAL: NEGATIVE
NEUTROPHILS NFR BLD: 87 % (ref 36–65)
NEUTS SEG NFR BLD: 11.25 K/UL (ref 1.5–8.1)
NRBC BLD-RTO: 0 PER 100 WBC
O2 SAT, VEN: 99.1 % (ref 60–85)
PCO2 VENOUS: 36.3 MM HG (ref 39–55)
PH VENOUS: 7.44 (ref 7.32–7.42)
PLATELET # BLD AUTO: 199 K/UL (ref 138–453)
PMV BLD AUTO: 12.6 FL (ref 8.1–13.5)
PO2 VENOUS: 153.2 MM HG (ref 30–50)
POSITIVE BASE EXCESS, VEN: 0.3 MMOL/L (ref 0–2)
POTASSIUM SERPL-SCNC: 3.6 MMOL/L (ref 3.7–5.3)
PROT SERPL-MCNC: 6.5 G/DL (ref 6.6–8.7)
RBC # BLD AUTO: 3.95 M/UL (ref 4.21–5.77)
SODIUM SERPL-SCNC: 140 MMOL/L (ref 136–145)
SPECIMEN DESCRIPTION: NORMAL
VANCOMYCIN TROUGH SERPL-MCNC: 14.9 UG/ML (ref 10–20)
WBC OTHER # BLD: 13 K/UL (ref 3.5–11.3)

## 2025-09-01 PROCEDURE — 99232 SBSQ HOSP IP/OBS MODERATE 35: CPT | Performed by: INTERNAL MEDICINE

## 2025-09-01 PROCEDURE — 82805 BLOOD GASES W/O2 SATURATION: CPT

## 2025-09-01 PROCEDURE — 2060000000 HC ICU INTERMEDIATE R&B

## 2025-09-01 PROCEDURE — 93010 ELECTROCARDIOGRAM REPORT: CPT | Performed by: STUDENT IN AN ORGANIZED HEALTH CARE EDUCATION/TRAINING PROGRAM

## 2025-09-01 PROCEDURE — 2500000003 HC RX 250 WO HCPCS: Performed by: INTERNAL MEDICINE

## 2025-09-01 PROCEDURE — 6360000002 HC RX W HCPCS: Performed by: INTERNAL MEDICINE

## 2025-09-01 PROCEDURE — 85025 COMPLETE CBC W/AUTO DIFF WBC: CPT

## 2025-09-01 PROCEDURE — 2580000003 HC RX 258: Performed by: INTERNAL MEDICINE

## 2025-09-01 PROCEDURE — 36415 COLL VENOUS BLD VENIPUNCTURE: CPT

## 2025-09-01 PROCEDURE — 80202 ASSAY OF VANCOMYCIN: CPT

## 2025-09-01 PROCEDURE — 80053 COMPREHEN METABOLIC PANEL: CPT

## 2025-09-01 RX ADMIN — SODIUM CHLORIDE, PRESERVATIVE FREE 10 ML: 5 INJECTION INTRAVENOUS at 08:38

## 2025-09-01 RX ADMIN — VANCOMYCIN HYDROCHLORIDE 1000 MG: 1 INJECTION, POWDER, LYOPHILIZED, FOR SOLUTION INTRAVENOUS at 01:30

## 2025-09-01 RX ADMIN — VANCOMYCIN HYDROCHLORIDE 750 MG: 750 INJECTION, POWDER, LYOPHILIZED, FOR SOLUTION INTRAVENOUS at 11:05

## 2025-09-01 RX ADMIN — ENOXAPARIN SODIUM 40 MG: 100 INJECTION SUBCUTANEOUS at 08:38

## 2025-09-01 ASSESSMENT — PAIN SCALES - GENERAL: PAINLEVEL_OUTOF10: 0

## 2025-09-02 LAB
ALBUMIN SERPL-MCNC: 3.6 G/DL (ref 3.5–5.2)
ALBUMIN/GLOB SERPL: 1.4 {RATIO} (ref 1–2.5)
ALP SERPL-CCNC: 111 U/L (ref 40–129)
ALT SERPL-CCNC: 15 U/L (ref 10–50)
ANION GAP SERPL CALCULATED.3IONS-SCNC: 10 MMOL/L (ref 9–16)
AST SERPL-CCNC: 20 U/L (ref 10–50)
BASOPHILS # BLD: <0.03 K/UL (ref 0–0.2)
BASOPHILS NFR BLD: 0 % (ref 0–2)
BILIRUB SERPL-MCNC: 0.2 MG/DL (ref 0–1.2)
BUN SERPL-MCNC: 11 MG/DL (ref 6–20)
CALCIUM SERPL-MCNC: 9 MG/DL (ref 8.6–10.4)
CHLORIDE SERPL-SCNC: 109 MMOL/L (ref 98–107)
CO2 SERPL-SCNC: 22 MMOL/L (ref 20–31)
CREAT SERPL-MCNC: 0.5 MG/DL (ref 0.7–1.2)
EKG ATRIAL RATE: 102 BPM
EKG P AXIS: 58 DEGREES
EKG P-R INTERVAL: 174 MS
EKG Q-T INTERVAL: 332 MS
EKG QRS DURATION: 88 MS
EKG QTC CALCULATION (BAZETT): 432 MS
EKG R AXIS: 51 DEGREES
EKG T AXIS: 62 DEGREES
EKG VENTRICULAR RATE: 102 BPM
EOSINOPHIL # BLD: 0.19 K/UL (ref 0–0.44)
EOSINOPHILS RELATIVE PERCENT: 3 % (ref 1–4)
ERYTHROCYTE [DISTWIDTH] IN BLOOD BY AUTOMATED COUNT: 14 % (ref 11.8–14.4)
GFR, ESTIMATED: >90 ML/MIN/1.73M2
GLUCOSE SERPL-MCNC: 100 MG/DL (ref 74–99)
HCT VFR BLD AUTO: 34.8 % (ref 40.7–50.3)
HGB BLD-MCNC: 11.6 G/DL (ref 13–17)
IMM GRANULOCYTES # BLD AUTO: <0.03 K/UL (ref 0–0.3)
IMM GRANULOCYTES NFR BLD: 0 %
LYMPHOCYTES NFR BLD: 2.48 K/UL (ref 1.1–3.7)
LYMPHOCYTES RELATIVE PERCENT: 38 % (ref 24–43)
MCH RBC QN AUTO: 30.9 PG (ref 25.2–33.5)
MCHC RBC AUTO-ENTMCNC: 33.3 G/DL (ref 28.4–34.8)
MCV RBC AUTO: 92.6 FL (ref 82.6–102.9)
MONOCYTES NFR BLD: 0.53 K/UL (ref 0.1–1.2)
MONOCYTES NFR BLD: 8 % (ref 3–12)
NEUTROPHILS NFR BLD: 51 % (ref 36–65)
NEUTS SEG NFR BLD: 3.3 K/UL (ref 1.5–8.1)
NRBC BLD-RTO: 0 PER 100 WBC
PLATELET # BLD AUTO: 186 K/UL (ref 138–453)
PMV BLD AUTO: 12.3 FL (ref 8.1–13.5)
POTASSIUM SERPL-SCNC: 3.7 MMOL/L (ref 3.7–5.3)
PROT SERPL-MCNC: 6.2 G/DL (ref 6.6–8.7)
RBC # BLD AUTO: 3.76 M/UL (ref 4.21–5.77)
SODIUM SERPL-SCNC: 141 MMOL/L (ref 136–145)
WBC OTHER # BLD: 6.5 K/UL (ref 3.5–11.3)

## 2025-09-02 PROCEDURE — 85025 COMPLETE CBC W/AUTO DIFF WBC: CPT

## 2025-09-02 PROCEDURE — 6370000000 HC RX 637 (ALT 250 FOR IP): Performed by: STUDENT IN AN ORGANIZED HEALTH CARE EDUCATION/TRAINING PROGRAM

## 2025-09-02 PROCEDURE — 36415 COLL VENOUS BLD VENIPUNCTURE: CPT

## 2025-09-02 PROCEDURE — 93010 ELECTROCARDIOGRAM REPORT: CPT | Performed by: INTERNAL MEDICINE

## 2025-09-02 PROCEDURE — 80053 COMPREHEN METABOLIC PANEL: CPT

## 2025-09-02 PROCEDURE — 2060000000 HC ICU INTERMEDIATE R&B

## 2025-09-02 PROCEDURE — 2500000003 HC RX 250 WO HCPCS: Performed by: INTERNAL MEDICINE

## 2025-09-02 PROCEDURE — 94761 N-INVAS EAR/PLS OXIMETRY MLT: CPT

## 2025-09-02 PROCEDURE — 99232 SBSQ HOSP IP/OBS MODERATE 35: CPT | Performed by: STUDENT IN AN ORGANIZED HEALTH CARE EDUCATION/TRAINING PROGRAM

## 2025-09-02 PROCEDURE — 6360000002 HC RX W HCPCS: Performed by: INTERNAL MEDICINE

## 2025-09-02 PROCEDURE — 6360000002 HC RX W HCPCS: Performed by: STUDENT IN AN ORGANIZED HEALTH CARE EDUCATION/TRAINING PROGRAM

## 2025-09-02 RX ORDER — BACLOFEN 10 MG/1
5 TABLET ORAL 2 TIMES DAILY
Status: DISCONTINUED | OUTPATIENT
Start: 2025-09-02 | End: 2025-09-04 | Stop reason: HOSPADM

## 2025-09-02 RX ORDER — POTASSIUM CHLORIDE 7.45 MG/ML
10 INJECTION INTRAVENOUS
Status: COMPLETED | OUTPATIENT
Start: 2025-09-02 | End: 2025-09-02

## 2025-09-02 RX ORDER — POTASSIUM CHLORIDE 1500 MG/1
20 TABLET, EXTENDED RELEASE ORAL ONCE
Status: DISCONTINUED | OUTPATIENT
Start: 2025-09-02 | End: 2025-09-02 | Stop reason: ALTCHOICE

## 2025-09-02 RX ORDER — SENNA AND DOCUSATE SODIUM 50; 8.6 MG/1; MG/1
1 TABLET, FILM COATED ORAL DAILY
Status: DISCONTINUED | OUTPATIENT
Start: 2025-09-02 | End: 2025-09-04 | Stop reason: HOSPADM

## 2025-09-02 RX ADMIN — ENOXAPARIN SODIUM 40 MG: 100 INJECTION SUBCUTANEOUS at 08:39

## 2025-09-02 RX ADMIN — GABAPENTIN 300 MG: 300 CAPSULE ORAL at 21:21

## 2025-09-02 RX ADMIN — POTASSIUM CHLORIDE 10 MEQ: 7.46 INJECTION, SOLUTION INTRAVENOUS at 11:15

## 2025-09-02 RX ADMIN — SODIUM CHLORIDE, PRESERVATIVE FREE 10 ML: 5 INJECTION INTRAVENOUS at 21:29

## 2025-09-02 RX ADMIN — POTASSIUM CHLORIDE 10 MEQ: 7.46 INJECTION, SOLUTION INTRAVENOUS at 10:07

## 2025-09-02 RX ADMIN — POTASSIUM CHLORIDE 10 MEQ: 7.46 INJECTION, SOLUTION INTRAVENOUS at 12:19

## 2025-09-02 RX ADMIN — GUAIFENESIN 600 MG: 600 TABLET, EXTENDED RELEASE ORAL at 21:21

## 2025-09-02 RX ADMIN — DULOXETINE 60 MG: 30 CAPSULE, DELAYED RELEASE ORAL at 21:22

## 2025-09-02 RX ADMIN — WATER 1000 MG: 1 INJECTION INTRAMUSCULAR; INTRAVENOUS; SUBCUTANEOUS at 00:28

## 2025-09-02 RX ADMIN — BACLOFEN 5 MG: 10 TABLET ORAL at 15:40

## 2025-09-02 RX ADMIN — BACLOFEN 5 MG: 10 TABLET ORAL at 21:21

## 2025-09-02 RX ADMIN — PRIMIDONE 250 MG: 250 TABLET ORAL at 21:26

## 2025-09-02 RX ADMIN — SODIUM CHLORIDE, PRESERVATIVE FREE 10 ML: 5 INJECTION INTRAVENOUS at 08:39

## 2025-09-02 ASSESSMENT — PAIN SCALES - GENERAL
PAINLEVEL_OUTOF10: 0

## 2025-09-03 LAB
ALBUMIN SERPL-MCNC: 3.7 G/DL (ref 3.5–5.2)
ALBUMIN/GLOB SERPL: 1.2 {RATIO} (ref 1–2.5)
ALP SERPL-CCNC: 95 U/L (ref 40–129)
ALT SERPL-CCNC: 17 U/L (ref 10–50)
ANION GAP SERPL CALCULATED.3IONS-SCNC: 12 MMOL/L (ref 9–16)
AST SERPL-CCNC: 16 U/L (ref 10–50)
BASOPHILS # BLD: <0.03 K/UL (ref 0–0.2)
BASOPHILS NFR BLD: 0 % (ref 0–2)
BILIRUB SERPL-MCNC: 0.2 MG/DL (ref 0–1.2)
BUN SERPL-MCNC: 11 MG/DL (ref 6–20)
CALCIUM SERPL-MCNC: 9.6 MG/DL (ref 8.6–10.4)
CHLORIDE SERPL-SCNC: 106 MMOL/L (ref 98–107)
CO2 SERPL-SCNC: 22 MMOL/L (ref 20–31)
CREAT SERPL-MCNC: 0.5 MG/DL (ref 0.7–1.2)
EOSINOPHIL # BLD: 0.22 K/UL (ref 0–0.44)
EOSINOPHILS RELATIVE PERCENT: 2 % (ref 1–4)
ERYTHROCYTE [DISTWIDTH] IN BLOOD BY AUTOMATED COUNT: 14.2 % (ref 11.8–14.4)
GFR, ESTIMATED: >90 ML/MIN/1.73M2
GLUCOSE SERPL-MCNC: 115 MG/DL (ref 74–99)
HCT VFR BLD AUTO: 36.8 % (ref 40.7–50.3)
HGB BLD-MCNC: 12.3 G/DL (ref 13–17)
IMM GRANULOCYTES # BLD AUTO: 0.04 K/UL (ref 0–0.3)
IMM GRANULOCYTES NFR BLD: 0 %
LYMPHOCYTES NFR BLD: 1.75 K/UL (ref 1.1–3.7)
LYMPHOCYTES RELATIVE PERCENT: 19 % (ref 24–43)
MCH RBC QN AUTO: 30.9 PG (ref 25.2–33.5)
MCHC RBC AUTO-ENTMCNC: 33.4 G/DL (ref 28.4–34.8)
MCV RBC AUTO: 92.5 FL (ref 82.6–102.9)
MONOCYTES NFR BLD: 0.64 K/UL (ref 0.1–1.2)
MONOCYTES NFR BLD: 7 % (ref 3–12)
NEUTROPHILS NFR BLD: 72 % (ref 36–65)
NEUTS SEG NFR BLD: 6.7 K/UL (ref 1.5–8.1)
NRBC BLD-RTO: 0 PER 100 WBC
PLATELET # BLD AUTO: 206 K/UL (ref 138–453)
PMV BLD AUTO: 11.9 FL (ref 8.1–13.5)
POTASSIUM SERPL-SCNC: 3.8 MMOL/L (ref 3.7–5.3)
PROT SERPL-MCNC: 6.7 G/DL (ref 6.6–8.7)
RBC # BLD AUTO: 3.98 M/UL (ref 4.21–5.77)
SODIUM SERPL-SCNC: 140 MMOL/L (ref 136–145)
WBC OTHER # BLD: 9.4 K/UL (ref 3.5–11.3)

## 2025-09-03 PROCEDURE — 85025 COMPLETE CBC W/AUTO DIFF WBC: CPT

## 2025-09-03 PROCEDURE — 6370000000 HC RX 637 (ALT 250 FOR IP): Performed by: STUDENT IN AN ORGANIZED HEALTH CARE EDUCATION/TRAINING PROGRAM

## 2025-09-03 PROCEDURE — 6360000002 HC RX W HCPCS: Performed by: INTERNAL MEDICINE

## 2025-09-03 PROCEDURE — 94761 N-INVAS EAR/PLS OXIMETRY MLT: CPT

## 2025-09-03 PROCEDURE — 36415 COLL VENOUS BLD VENIPUNCTURE: CPT

## 2025-09-03 PROCEDURE — 80053 COMPREHEN METABOLIC PANEL: CPT

## 2025-09-03 PROCEDURE — 2060000000 HC ICU INTERMEDIATE R&B

## 2025-09-03 PROCEDURE — 2500000003 HC RX 250 WO HCPCS: Performed by: INTERNAL MEDICINE

## 2025-09-03 PROCEDURE — 99231 SBSQ HOSP IP/OBS SF/LOW 25: CPT | Performed by: STUDENT IN AN ORGANIZED HEALTH CARE EDUCATION/TRAINING PROGRAM

## 2025-09-03 RX ADMIN — PRIMIDONE 250 MG: 250 TABLET ORAL at 10:03

## 2025-09-03 RX ADMIN — WATER 1000 MG: 1 INJECTION INTRAMUSCULAR; INTRAVENOUS; SUBCUTANEOUS at 00:19

## 2025-09-03 RX ADMIN — BACLOFEN 5 MG: 10 TABLET ORAL at 20:19

## 2025-09-03 RX ADMIN — GUAIFENESIN 600 MG: 600 TABLET, EXTENDED RELEASE ORAL at 10:03

## 2025-09-03 RX ADMIN — BACLOFEN 5 MG: 10 TABLET ORAL at 10:04

## 2025-09-03 RX ADMIN — PRIMIDONE 250 MG: 250 TABLET ORAL at 20:19

## 2025-09-03 RX ADMIN — GABAPENTIN 300 MG: 300 CAPSULE ORAL at 20:18

## 2025-09-03 RX ADMIN — Medication 2000 UNITS: at 10:05

## 2025-09-03 RX ADMIN — ENOXAPARIN SODIUM 40 MG: 100 INJECTION SUBCUTANEOUS at 10:05

## 2025-09-03 RX ADMIN — SODIUM CHLORIDE, PRESERVATIVE FREE 5 ML: 5 INJECTION INTRAVENOUS at 20:23

## 2025-09-03 RX ADMIN — GABAPENTIN 300 MG: 300 CAPSULE ORAL at 14:04

## 2025-09-03 RX ADMIN — GABAPENTIN 300 MG: 300 CAPSULE ORAL at 10:04

## 2025-09-03 RX ADMIN — SODIUM CHLORIDE, PRESERVATIVE FREE 10 ML: 5 INJECTION INTRAVENOUS at 10:06

## 2025-09-03 RX ADMIN — GUAIFENESIN 600 MG: 600 TABLET, EXTENDED RELEASE ORAL at 20:18

## 2025-09-03 RX ADMIN — DULOXETINE 60 MG: 30 CAPSULE, DELAYED RELEASE ORAL at 20:18

## 2025-09-03 ASSESSMENT — PAIN SCALES - GENERAL: PAINLEVEL_OUTOF10: 0

## 2025-09-04 VITALS
OXYGEN SATURATION: 98 % | DIASTOLIC BLOOD PRESSURE: 79 MMHG | WEIGHT: 132.72 LBS | HEIGHT: 72 IN | HEART RATE: 77 BPM | SYSTOLIC BLOOD PRESSURE: 106 MMHG | TEMPERATURE: 98.2 F | BODY MASS INDEX: 17.98 KG/M2 | RESPIRATION RATE: 14 BRPM

## 2025-09-04 LAB
ALBUMIN SERPL-MCNC: 3.7 G/DL (ref 3.5–5.2)
ALBUMIN/GLOB SERPL: 1.3 {RATIO} (ref 1–2.5)
ALP SERPL-CCNC: 87 U/L (ref 40–129)
ALT SERPL-CCNC: 19 U/L (ref 10–50)
ANION GAP SERPL CALCULATED.3IONS-SCNC: 12 MMOL/L (ref 9–16)
AST SERPL-CCNC: 17 U/L (ref 10–50)
BASOPHILS # BLD: <0.03 K/UL (ref 0–0.2)
BASOPHILS NFR BLD: 0 % (ref 0–2)
BILIRUB SERPL-MCNC: 0.2 MG/DL (ref 0–1.2)
BUN SERPL-MCNC: 15 MG/DL (ref 6–20)
CALCIUM SERPL-MCNC: 9.3 MG/DL (ref 8.6–10.4)
CHLORIDE SERPL-SCNC: 107 MMOL/L (ref 98–107)
CO2 SERPL-SCNC: 24 MMOL/L (ref 20–31)
CREAT SERPL-MCNC: 0.5 MG/DL (ref 0.7–1.2)
EOSINOPHIL # BLD: 0.14 K/UL (ref 0–0.44)
EOSINOPHILS RELATIVE PERCENT: 2 % (ref 1–4)
ERYTHROCYTE [DISTWIDTH] IN BLOOD BY AUTOMATED COUNT: 14.5 % (ref 11.8–14.4)
GFR, ESTIMATED: >90 ML/MIN/1.73M2
GLUCOSE SERPL-MCNC: 112 MG/DL (ref 74–99)
HCT VFR BLD AUTO: 37.1 % (ref 40.7–50.3)
HGB BLD-MCNC: 12.3 G/DL (ref 13–17)
IMM GRANULOCYTES # BLD AUTO: <0.03 K/UL (ref 0–0.3)
IMM GRANULOCYTES NFR BLD: 0 %
LYMPHOCYTES NFR BLD: 1.85 K/UL (ref 1.1–3.7)
LYMPHOCYTES RELATIVE PERCENT: 32 % (ref 24–43)
MAGNESIUM SERPL-MCNC: 1.8 MG/DL (ref 1.6–2.6)
MCH RBC QN AUTO: 30.4 PG (ref 25.2–33.5)
MCHC RBC AUTO-ENTMCNC: 33.2 G/DL (ref 28.4–34.8)
MCV RBC AUTO: 91.6 FL (ref 82.6–102.9)
MICROORGANISM SPEC CULT: NORMAL
MICROORGANISM SPEC CULT: NORMAL
MONOCYTES NFR BLD: 0.48 K/UL (ref 0.1–1.2)
MONOCYTES NFR BLD: 8 % (ref 3–12)
NEUTROPHILS NFR BLD: 57 % (ref 36–65)
NEUTS SEG NFR BLD: 3.26 K/UL (ref 1.5–8.1)
NRBC BLD-RTO: 0 PER 100 WBC
PLATELET # BLD AUTO: 228 K/UL (ref 138–453)
PMV BLD AUTO: 11.6 FL (ref 8.1–13.5)
POTASSIUM SERPL-SCNC: 3.5 MMOL/L (ref 3.7–5.3)
PROT SERPL-MCNC: 6.5 G/DL (ref 6.6–8.7)
RBC # BLD AUTO: 4.05 M/UL (ref 4.21–5.77)
RBC # BLD: ABNORMAL 10*6/UL
SERVICE CMNT-IMP: NORMAL
SERVICE CMNT-IMP: NORMAL
SODIUM SERPL-SCNC: 143 MMOL/L (ref 136–145)
SPECIMEN DESCRIPTION: NORMAL
SPECIMEN DESCRIPTION: NORMAL
WBC OTHER # BLD: 5.8 K/UL (ref 3.5–11.3)

## 2025-09-04 PROCEDURE — 83735 ASSAY OF MAGNESIUM: CPT

## 2025-09-04 PROCEDURE — 2500000003 HC RX 250 WO HCPCS: Performed by: INTERNAL MEDICINE

## 2025-09-04 PROCEDURE — 6360000002 HC RX W HCPCS: Performed by: INTERNAL MEDICINE

## 2025-09-04 PROCEDURE — 6370000000 HC RX 637 (ALT 250 FOR IP): Performed by: STUDENT IN AN ORGANIZED HEALTH CARE EDUCATION/TRAINING PROGRAM

## 2025-09-04 PROCEDURE — 6370000000 HC RX 637 (ALT 250 FOR IP): Performed by: NURSE PRACTITIONER

## 2025-09-04 PROCEDURE — 36415 COLL VENOUS BLD VENIPUNCTURE: CPT

## 2025-09-04 PROCEDURE — 85025 COMPLETE CBC W/AUTO DIFF WBC: CPT

## 2025-09-04 PROCEDURE — 80053 COMPREHEN METABOLIC PANEL: CPT

## 2025-09-04 RX ORDER — GUAIFENESIN 200 MG/10ML
200 LIQUID ORAL EVERY 4 HOURS PRN
Status: DISCONTINUED | OUTPATIENT
Start: 2025-09-04 | End: 2025-09-04 | Stop reason: HOSPADM

## 2025-09-04 RX ADMIN — SODIUM CHLORIDE, PRESERVATIVE FREE 10 ML: 5 INJECTION INTRAVENOUS at 09:34

## 2025-09-04 RX ADMIN — PRIMIDONE 250 MG: 250 TABLET ORAL at 09:29

## 2025-09-04 RX ADMIN — WATER 1000 MG: 1 INJECTION INTRAMUSCULAR; INTRAVENOUS; SUBCUTANEOUS at 00:16

## 2025-09-04 RX ADMIN — Medication 2000 UNITS: at 09:30

## 2025-09-04 RX ADMIN — POTASSIUM BICARBONATE 40 MEQ: 782 TABLET, EFFERVESCENT ORAL at 05:45

## 2025-09-04 RX ADMIN — ENOXAPARIN SODIUM 40 MG: 100 INJECTION SUBCUTANEOUS at 09:30

## 2025-09-04 RX ADMIN — GABAPENTIN 300 MG: 300 CAPSULE ORAL at 09:29

## 2025-09-04 RX ADMIN — GABAPENTIN 300 MG: 300 CAPSULE ORAL at 14:55

## 2025-09-04 RX ADMIN — BACLOFEN 5 MG: 10 TABLET ORAL at 09:29
